# Patient Record
Sex: FEMALE | Race: WHITE | NOT HISPANIC OR LATINO | Employment: OTHER | URBAN - NONMETROPOLITAN AREA
[De-identification: names, ages, dates, MRNs, and addresses within clinical notes are randomized per-mention and may not be internally consistent; named-entity substitution may affect disease eponyms.]

---

## 2017-01-23 ENCOUNTER — OFFICE VISIT (OUTPATIENT)
Dept: FAMILY MEDICINE | Facility: OTHER | Age: 72
End: 2017-01-23
Payer: COMMERCIAL

## 2017-01-23 VITALS
OXYGEN SATURATION: 94 % | DIASTOLIC BLOOD PRESSURE: 70 MMHG | WEIGHT: 162 LBS | RESPIRATION RATE: 20 BRPM | HEART RATE: 73 BPM | TEMPERATURE: 97.3 F | SYSTOLIC BLOOD PRESSURE: 112 MMHG | BODY MASS INDEX: 28.7 KG/M2 | HEIGHT: 63 IN

## 2017-01-23 DIAGNOSIS — E78.5 HYPERLIPIDEMIA WITH TARGET LDL LESS THAN 100: ICD-10-CM

## 2017-01-23 DIAGNOSIS — I10 HYPERTENSION, GOAL BELOW 140/90: ICD-10-CM

## 2017-01-23 DIAGNOSIS — Z12.11 SCREENING FOR COLON CANCER: ICD-10-CM

## 2017-01-23 DIAGNOSIS — E11.69 TYPE 2 DIABETES MELLITUS WITH OTHER SPECIFIED COMPLICATION, WITHOUT LONG-TERM CURRENT USE OF INSULIN (H): ICD-10-CM

## 2017-01-23 DIAGNOSIS — I10 BENIGN ESSENTIAL HYPERTENSION: ICD-10-CM

## 2017-01-23 DIAGNOSIS — G60.9 HEREDITARY AND IDIOPATHIC PERIPHERAL NEUROPATHY: Primary | ICD-10-CM

## 2017-01-23 DIAGNOSIS — N18.2 TYPE 2 DIABETES MELLITUS WITH STAGE 2 CHRONIC KIDNEY DISEASE, WITHOUT LONG-TERM CURRENT USE OF INSULIN (H): ICD-10-CM

## 2017-01-23 DIAGNOSIS — Z12.31 ENCOUNTER FOR MAMMOGRAM TO ESTABLISH BASELINE MAMMOGRAM: ICD-10-CM

## 2017-01-23 DIAGNOSIS — E11.22 TYPE 2 DIABETES MELLITUS WITH STAGE 2 CHRONIC KIDNEY DISEASE, WITHOUT LONG-TERM CURRENT USE OF INSULIN (H): ICD-10-CM

## 2017-01-23 DIAGNOSIS — R29.898 RIGHT HAND WEAKNESS: ICD-10-CM

## 2017-01-23 DIAGNOSIS — M65.351 TRIGGER LITTLE FINGER OF RIGHT HAND: ICD-10-CM

## 2017-01-23 LAB — HBA1C MFR BLD: 6 % (ref 4.3–6)

## 2017-01-23 PROCEDURE — 99214 OFFICE O/P EST MOD 30 MIN: CPT | Performed by: NURSE PRACTITIONER

## 2017-01-23 PROCEDURE — 36415 COLL VENOUS BLD VENIPUNCTURE: CPT | Performed by: NURSE PRACTITIONER

## 2017-01-23 PROCEDURE — 82274 ASSAY TEST FOR BLOOD FECAL: CPT | Performed by: NURSE PRACTITIONER

## 2017-01-23 PROCEDURE — 83036 HEMOGLOBIN GLYCOSYLATED A1C: CPT | Performed by: NURSE PRACTITIONER

## 2017-01-23 PROCEDURE — 99207 C FOOT EXAM  NO CHARGE: CPT | Performed by: NURSE PRACTITIONER

## 2017-01-23 RX ORDER — LISINOPRIL 20 MG/1
20 TABLET ORAL DAILY
Qty: 30 TABLET | Refills: 3 | Status: SHIPPED | OUTPATIENT
Start: 2017-01-23 | End: 2017-05-26

## 2017-01-23 RX ORDER — GABAPENTIN 300 MG/1
600 CAPSULE ORAL 2 TIMES DAILY
Qty: 120 CAPSULE | Refills: 3 | Status: SHIPPED | OUTPATIENT
Start: 2017-01-23 | End: 2017-05-26

## 2017-01-23 RX ORDER — SIMVASTATIN 5 MG
TABLET ORAL
Qty: 30 TABLET | Refills: 3 | Status: SHIPPED | OUTPATIENT
Start: 2017-01-23 | End: 2017-05-26

## 2017-01-23 NOTE — MR AVS SNAPSHOT
After Visit Summary   1/23/2017    Tracy Mcclelland    MRN: [id number]           Patient Information     Date Of Birth          1945        Visit Information        Provider Department      1/23/2017 9:40 AM Skylar Gomez APRN [facility]        Today's Diagnoses     Hereditary and idiopathic peripheral neuropathy    -  1     Hypertension, goal below 140/90         Benign essential hypertension         Hyperlipidemia with target LDL less than 100         Type 2 diabetes mellitus with stage 2 chronic kidney disease, without long-term current use of insulin (H)         Type 2 diabetes mellitus with other specified complication, without long-term current use of insulin (H)         Right hand weakness         Trigger little finger of right hand         Screening for breast cancer         Screening for colon cancer         Encounter for mammogram to establish baseline mammogram           Care Instructions    Increase your Gabapentin to 600 mg twice day.      Stop smoking.  Let us know if we can help with this.  There are multiple things we can do to assist you.     Stop the Glipizide and the Metoprolol.      Have your blood pressure rechecked in 2 weeks.     See the orthopedic doctor in [place] about your right hand.     Drop off the paperwork for the disability parking pass     Labs will be done today.  I will call or send a letter with results.   You will need another A1C in 3 months.     Send back the fit test.     Schedule a mammogram.               Follow-ups after your visit        Additional Services     ORTHOPEDICS ADULT REFERRAL       Your provider has referred you to: PHILLIP: [facility] [place] [facility] [phone]   [email or web address]    Please be aware that coverage of these services is subject to the terms and limitations of your health insurance plan.  Call member services at your health plan with any benefit or coverage  "questions.      Please bring the following to your appointment:    >>   Any x-rays, CTs or MRIs which have been performed.  Contact the facility where they were done to arrange for  prior to your scheduled appointment.    >>   List of current medications   >>   This referral request   >>   Any documents/labs given to you for this referral                  Future tests that were ordered for you today     Open Future Orders        Priority Expected Expires Ordered    *MA Screening Digital Bilateral Routine  1/23/2018 1/23/2017    Fecal colorectal cancer screen (FIT) Routine 2/13/2017 4/17/2017 1/23/2017            Who to contact     If you have questions or need follow up information about today's clinic visit or your schedule please contact [facility] directly at [phone].  Normal or non-critical lab and imaging results will be communicated to you by MyChart, letter or phone within 4 business days after the clinic has received the results. If you do not hear from us within 7 days, please contact the clinic through [employer] or phone. If you have a critical or abnormal lab result, we will notify you by phone as soon as possible.  Submit refill requests through [employer] or call your pharmacy and they will forward the refill request to us. Please allow 3 business days for your refill to be completed.          Additional Information About Your Visit        [employer] Information     [employer] lets you send messages to your doctor, view your test results, renew your prescriptions, schedule appointments and more. To sign up, go to [email or web address] . Click on \"Log in\" on the left side of the screen, which will take you to the Welcome page. Then click on \"Sign up Now\" on the right side of the page.     You will be asked to enter the access code listed below, as well as some personal information. Please follow the directions to create your username and password.     Your access code is: " "[email or web address]  Expires: 2017 10:28 AM     Your access code will  in 90 days. If you need help or a new code, please call your [facility] clinic or [phone].        Care EveryWhere ID     This is your Care EveryWhere ID. This could be used by other organizations to access your [facility] medical records  [id number]        Your Vitals Were     Pulse Temperature Respirations    73 97.3  F (36.3  C) (Tympanic) 20    Height BMI (Body Mass Index) Pulse Oximetry    5' 3.2\" (1.605 m) 28.53 kg/m2 94%    Breastfeeding?          No         Blood Pressure from Last 3 Encounters:   17 112/70   08/15/16 124/68   16 120/70    Weight from Last 3 Encounters:   17 162 lb (73.483 kg)   16 156 lb (70.761 kg)   08/03/15 163 lb 11.2 oz (74.254 kg)              We Performed the Following     FOOT EXAM     Hemoglobin A1c     ORTHOPEDICS ADULT REFERRAL          Today's Medication Changes          These changes are accurate as of: 17 10:28 AM.  If you have any questions, ask your nurse or doctor.               These medicines have changed or have updated prescriptions.        Dose/Directions    gabapentin 300 MG capsule   Commonly known as:  NEURONTIN   This may have changed:  See the new instructions.   Used for:  Hereditary and idiopathic peripheral neuropathy   Changed by:  Skylar Gomez APRN CNP        Dose:  600 mg   Take 2 capsules (600 mg) by mouth 2 times daily   Quantity:  120 capsule   Refills:  3         Stop taking these medicines if you haven't already. Please contact your care team if you have questions.     glipiZIDE 5 MG tablet   Commonly known as:  GLUCOTROL   Stopped by:  Skylar Gomez APRN CNP           metoprolol 25 MG tablet   Commonly known as:  LOPRESSOR   Stopped by:  Skylar Gomez APRN CNP                Where to get your medicines      These medications were sent to [facility] [place], [place] - [address]  [address] , [facility] [address]     Phone:  " [phone]    - gabapentin 300 MG capsule  - lisinopril 20 MG tablet  - metFORMIN 500 MG tablet  - simvastatin 5 MG tablet             Primary Care Provider Office Phone # Fax #    MACHO Cifuentes [phone]       [facility] [address] [facility] [address]        Thank you!     Thank you for choosing [facility]  for your care. Our goal is always to provide you with excellent care. Hearing back from our patients is one way we can continue to improve our services. Please take a few minutes to complete the written survey that you may receive in the mail after your visit with us. Thank you!             Your Updated Medication List - Protect others around you: Learn how to safely use, store and throw away your medicines at www.disposemymeds.org.          This list is accurate as of: 1/23/17 10:28 AM.  Always use your most recent med list.                   Brand Name Dispense Instructions for use    aspirin 81 MG EC tablet     90 tablet    Take 1 tablet by mouth daily.       gabapentin 300 MG capsule    NEURONTIN    120 capsule    Take 2 capsules (600 mg) by mouth 2 times daily       lisinopril 20 MG tablet    PRINIVIL/ZESTRIL    30 tablet    Take 1 tablet (20 mg) by mouth daily       metFORMIN 500 MG tablet    GLUCOPHAGE    60 tablet    Take 1 tablet (500 mg) by mouth 2 times daily (with meals)       simvastatin 5 MG tablet    Zocor    30 tablet    TAKE ONE TABLET BY MOUTH EVERY NIGHT AT BEDTIME

## 2017-01-23 NOTE — PATIENT INSTRUCTIONS
Increase your Gabapentin to 600 mg twice day.      Stop smoking.  Let us know if we can help with this.  There are multiple things we can do to assist you.     Stop the Glipizide and the Metoprolol.      Have your blood pressure rechecked in 2 weeks.     See the orthopedic doctor in [place] about your right hand.     Drop off the paperwork for the disability parking pass     Labs will be done today.  I will call or send a letter with results.   You will need another A1C in 3 months.     Send back the fit test.     Schedule a mammogram.

## 2017-01-23 NOTE — NURSING NOTE
"Chief Complaint   Patient presents with     RECHECK     medication     Forms     wants handicap sticker       Initial /70 mmHg  Pulse 73  Temp(Src) 97.3  F (36.3  C) (Tympanic)  Resp 20  Ht 5' 3.2\" (1.605 m)  Wt 162 lb (73.483 kg)  BMI 28.53 kg/m2  SpO2 94%  Breastfeeding? No Estimated body mass index is 28.53 kg/(m^2) as calculated from the following:    Height as of this encounter: 5' 3.2\" (1.605 m).    Weight as of this encounter: 162 lb (73.483 kg).  BP completed using cuff size: large  ................Carrillo Bauer LPN,   January 23, 2017,      9:55 AM,   [facility]      "

## 2017-01-23 NOTE — PROGRESS NOTES
SUBJECTIVE:                                                    Tracy Mcclelland is a 71 year old female who presents to clinic today for the following health issues:        Diabetes Follow-up      Patient is checking blood sugars: not at all    Diabetic concerns: None     Symptoms of hypoglycemia (low blood sugar): 1 dizzy spell last week     Paresthesias (numbness or burning in feet) or sores: Yes painful - worsening neuropathy symptoms     Date of last diabetic eye exam: 2011     Hyperlipidemia Follow-Up      Rate your low fat/cholesterol diet?: good    Taking statin?  Yes, no muscle aches from statin    Other lipid medications/supplements?:  none     Hypertension Follow-up      Outpatient blood pressures are not being checked.    Low Salt Diet: no added salt       Chronic Kidney Disease Follow-up      Current NSAID use?  Yes:   Aleve  Frequency: twice daily      Amount of exercise or physical activity: None    Problems taking medications regularly: No    Medication side effects: none    Diet: regular (no restrictions)    Musculoskeletal problem/pain      Duration: Right hand pain, numbness, finger stiffness, weakness     Description  Location: right hand    Intensity:  moderate    Accompanying signs and symptoms: numbness, tingling and weakness     History  Previous similar problem: no   Previous evaluation:  none    Precipitating or alleviating factors:  Trauma or overuse: no   Aggravating factors include: right handed     Therapies tried and outcome: nothing       -WANTING HANDICAP STICKER    Problem list and histories reviewed & adjusted, as indicated.  Additional history: none    Patient Active Problem List   Diagnosis     Hyperlipidemia with target LDL less than 100     Microalbuminuria     Tennis Elbow-left     Tobacco use disorder     Type 2 diabetes mellitus with diabetic chronic kidney disease (H)     Hypertension, goal below 140/90     Granuloma annulare     Hereditary and idiopathic peripheral  neuropathy     CKD (chronic kidney disease) stage 2, GFR 60-89 ml/min     Past Surgical History   Procedure Laterality Date     Hc laparoscopy, surgical; cholecystectomy  2000     Cholecystectomy, Laparoscopic     Hc remove tonsils/adenoids,<12 y/o       T & A <11y.o.     C ligate fallopian tube         Social History   Substance Use Topics     Smoking status: Current Every Day Smoker -- 0.25 packs/day for 20 years     Types: Cigarettes     Last Attempt to Quit: 07/10/2012     Smokeless tobacco: Never Used      Comment: 2-3 cigs daily     Alcohol Use: Yes      Comment: couple times monthly     Family History   Problem Relation Age of Onset     HEART DISEASE Mother      Pacemaker     Alzheimer Disease Father      Dementia/Loss of memory     CEREBROVASCULAR DISEASE Maternal Grandmother      HEART DISEASE Maternal Grandfather      MI     DIABETES Paternal Grandmother          Current Outpatient Prescriptions   Medication Sig Dispense Refill     gabapentin (NEURONTIN) 300 MG capsule Take 2 capsules (600 mg) by mouth 2 times daily 120 capsule 3     lisinopril (PRINIVIL/ZESTRIL) 20 MG tablet Take 1 tablet (20 mg) by mouth daily 30 tablet 3     simvastatin (ZOCOR) 5 MG tablet TAKE ONE TABLET BY MOUTH EVERY NIGHT AT BEDTIME 30 tablet 3     metFORMIN (GLUCOPHAGE) 500 MG tablet Take 1 tablet (500 mg) by mouth 2 times daily (with meals) 60 tablet 2     aspirin 81 MG EC tablet Take 1 tablet by mouth daily. 90 tablet 3     [DISCONTINUED] gabapentin (NEURONTIN) 300 MG capsule TAKE TWO CAPSULES BY MOUTH AT BEDTIME 60 capsule 3     [DISCONTINUED] lisinopril (PRINIVIL,ZESTRIL) 20 MG tablet Take 1 tablet (20 mg) by mouth daily 90 tablet 1     [DISCONTINUED] metFORMIN (GLUCOPHAGE) 500 MG tablet Take 1 tablet (500 mg) by mouth 2 times daily (with meals) 180 tablet 1     [DISCONTINUED] simvastatin (ZOCOR) 5 MG tablet TAKE ONE TABLET BY MOUTH EVERY NIGHT AT BEDTIME 90 tablet 1     Allergies   Allergen Reactions     Mold      sneezing,  "coughing , runny nose, watery eyes & red,     No Known Drug Allergies      BP Readings from Last 3 Encounters:   01/23/17 112/70   08/15/16 124/68   08/01/16 120/70    Wt Readings from Last 3 Encounters:   01/23/17 162 lb (73.483 kg)   08/01/16 156 lb (70.761 kg)   08/03/15 163 lb 11.2 oz (74.254 kg)                    ROS:  C: NEGATIVE for fever, chills, change in weight  E/M: NEGATIVE for ear, mouth and throat problems  R: NEGATIVE for significant cough or SOB  CV: NEGATIVE for chest pain, palpitations or peripheral edema  MUSCULOSKELETAL: bilateral LE neuropathy, right hand pain, weakness as above     OBJECTIVE:                                                    /70 mmHg  Pulse 73  Temp(Src) 97.3  F (36.3  C) (Tympanic)  Resp 20  Ht 5' 3.2\" (1.605 m)  Wt 162 lb (73.483 kg)  BMI 28.53 kg/m2  SpO2 94%  Breastfeeding? No  Body mass index is 28.53 kg/(m^2).  GENERAL: healthy, alert and no distress  NECK: no adenopathy, no asymmetry, masses, or scars and thyroid normal to palpation  RESP: lungs clear to auscultation - no rales, rhonchi or wheezes  CV: regular rate and rhythm, normal S1 S2, no S3 or S4, no murmur, click or rub, no peripheral edema and peripheral pulses strong  ABDOMEN: soft, nontender, no hepatosplenomegaly, no masses and bowel sounds normal  MS: right hand has what appears to be a trigger finger of the 5th digit.  She has pain to palpation of the 4th and 5th digits.  Negative Phalens and Tinels   Diabetic foot exam: normal DP and PT pulses, no trophic changes or ulcerative lesions, normal sensory exam and dry cracking heels    Diagnostic Test Results:  Office Visit on 01/23/2017   Component Date Value Ref Range Status     Hemoglobin A1C 01/23/2017 6.0  4.3 - 6.0 % Final          ASSESSMENT/PLAN:                                                    Diabetes Type II, A1c Controlled, non-insulin dependent   Associated with the following complications    Renal Complications:  " CKD    Ophthalmologic Complications: None    Neurologic Complications: Polyneuropathy/Neuralgia/Neuritis/Neuropathy     Peripheral Vascular Complications:  None    Other: None   Plan:  The following changes are made - she would like to be on less medications.  Will try discontinuing her Glipizide.  Her A1C has been well controlled.  Will need repeat A1C in 3 months, consider increasing Metformin at that time if needed.  Is going to increase her Gabapentin to twice a day as her neuropathy is worsening.         - gabapentin (NEURONTIN) 300 MG capsule; Take 2 capsules (600 mg) by mouth 2 times daily  Dispense: 120 capsule; Refill: 3  - FOOT EXAM  - Hemoglobin A1c  - metFORMIN (GLUCOPHAGE) 500 MG tablet; Take 1 tablet (500 mg) by mouth 2 times daily (with meals)  Dispense: 60 tablet; Refill: 2  - Hemoglobin A1c; Future    Hyperlipidemia; controlled   Plan:  No changes in the patient's current treatment plan  - simvastatin (ZOCOR) 5 MG tablet; TAKE ONE TABLET BY MOUTH EVERY NIGHT AT BEDTIME  Dispense: 30 tablet; Refill: 3    Hypertension; controlled   Associated with the following complications:    CKD and Diabetes   Plan:  The following changes are made - She would like to be on less medications.  Will try discontinuing the Metoprolol.  Repeat BP check in 2 weeks with nurse.  She may need a dose increase on this.   - lisinopril (PRINIVIL/ZESTRIL) 20 MG tablet; Take 1 tablet (20 mg) by mouth daily  Dispense: 30 tablet; Refill: 3      Chronic Kidney Disease Stage 2 , stable     Associated with the following complications: None     Plan:  Current treatment plan is appropriate, no change in therapy  Right hand weakness  - ORTHOPEDICS ADULT REFERRAL    Trigger little finger of right hand  - ORTHOPEDICS ADULT REFERRAL    Screening for colon cancer  - Fecal colorectal cancer screen (FIT); Future    FUTURE APPOINTMENTS:       - Follow-up visit in 3 months for recheck.  Needs BP recheck in 2 weeks with nurse visit.   See Patient  Instructions    MACHO Cifuentes CNP  [facility]

## 2017-01-24 ENCOUNTER — OFFICE VISIT (OUTPATIENT)
Dept: ORTHOPEDICS | Facility: CLINIC | Age: 72
End: 2017-01-24
Payer: COMMERCIAL

## 2017-01-24 VITALS — BODY MASS INDEX: 28.7 KG/M2 | HEIGHT: 63 IN | TEMPERATURE: 98.4 F | WEIGHT: 162 LBS

## 2017-01-24 DIAGNOSIS — G56.21 ULNAR NERVE COMPRESSION, RIGHT: Primary | ICD-10-CM

## 2017-01-24 PROCEDURE — 99203 OFFICE O/P NEW LOW 30 MIN: CPT | Performed by: ORTHOPAEDIC SURGERY

## 2017-01-24 ASSESSMENT — PAIN SCALES - GENERAL: PAINLEVEL: NO PAIN (0)

## 2017-01-24 NOTE — PROGRESS NOTES
ORTHOPEDIC CLINIC CONSULT      Tracy Mcclelland is a 71 year old female who is seen in consultation at the request of Skylar Gomez.    History of Present illness:    Tracy presents for evaluation of:     1.) Numbness in Right hand and fingers  2.) Right Pinky and ring finger will not straighten out    Onset:  2-3 months ago      Symptoms brought on by Had been knitting and crossword puzzles a lot before it started.     Location:  Right hand.      Character:  numbness.  Palmar region below pinky finger.    Progression of symptoms:  worse.      Previous similar pain: no .     Pain Level:  0/10.     Previous treatments:  massage.    Currently on Blood thinners? Yes, Asa 81 mg    Diagnosis of Diabetes? Yes      Past Medical History   Diagnosis Date     Unspecified essential hypertension      Lumbago      Headache(784.0)      hx.of Migraines     Type II or unspecified type diabetes mellitus without mention of complication, not stated as uncontrolled      CKD (chronic kidney disease) stage 2, GFR 60-89 ml/min 10/19/2015       Past Surgical History   Procedure Laterality Date     Hc laparoscopy, surgical; cholecystectomy  2000     Cholecystectomy, Laparoscopic     Hc remove tonsils/adenoids,<12 y/o       T & A <11y.o.     C ligate fallopian tube         Home Medications:  Prior to Admission medications    Medication Sig Start Date End Date Taking? Authorizing Provider   gabapentin (NEURONTIN) 300 MG capsule Take 2 capsules (600 mg) by mouth 2 times daily 1/23/17  Yes Skylar Gomez APRN CNP   lisinopril (PRINIVIL/ZESTRIL) 20 MG tablet Take 1 tablet (20 mg) by mouth daily 1/23/17  Yes Skylar Gomez APRN CNP   simvastatin (ZOCOR) 5 MG tablet TAKE ONE TABLET BY MOUTH EVERY NIGHT AT BEDTIME 1/23/17  Yes Skylar Gomez APRN CNP   metFORMIN (GLUCOPHAGE) 500 MG tablet Take 1 tablet (500 mg) by mouth 2 times daily (with meals) 1/23/17  Yes Skylar Gomez APRN CNP   aspirin 81 MG EC tablet Take 1 tablet by mouth daily.  "7/17/12  Yes Tory Gómez MD       Allergies   Allergen Reactions     Mold      sneezing, coughing , runny nose, watery eyes & red,     No Known Drug Allergies        Social History     Occupational History      [employer]     Social History Main Topics     Smoking status: Current Every Day Smoker -- 0.25 packs/day for 20 years     Types: Cigarettes     Last Attempt to Quit: 07/10/2012     Smokeless tobacco: Never Used      Comment: 2-3 cigs daily     Alcohol Use: Yes      Comment: couple times monthly     Drug Use: No     Sexual Activity: No       Family History   Problem Relation Age of Onset     HEART DISEASE Mother      Pacemaker     Alzheimer Disease Father      Dementia/Loss of memory     CEREBROVASCULAR DISEASE Maternal Grandmother      HEART DISEASE Maternal Grandfather      MI     DIABETES Paternal Grandmother        REVIEW OF SYSTEMS  General: negative for fever or fatigue  Psych:  No anxiety or depression   Resp: No shortness of breath and no cough  Ophthalmic:  Corrective lenses?  yes  ENT:  Hearing difficulty? no  Endocrine:  positive diabetes   Resp:  Normal respiratory effort  Skin:  Negative for cuts or redness  Musculoskeletal: as above  Neurologic: positive for numbness/tingling  Hematologic: negative for bleeding disorder, no use of anticoagulants      Physical Exam:  Vitals: Temp(Src) 98.4  F (36.9  C) (Temporal)  Ht 1.605 m (5' 3.2\")  Wt 73.483 kg (162 lb)  BMI 28.53 kg/m2  BMI= Body mass index is 28.53 kg/(m^2).  Constitutional: healthy, alert and no acute distress   Psychiatric: mentation appears normal and affect normal/bright  NEURO: no focal deficits  SKIN: no excoriation or erythema. No signs of infection.  JOINT/EXTREMITIES: rightHand/Finger Exam: Inspection:  There is no specific swelling of the ring and pinky fingers of the right hand. She does have prominent PIPs.  Tender: nontender to direct palpation of the digits  Range of Motion patient is unable " to completely extend her ring and pinky fingers. She can pull all fingers into a fist. There is no locking or catching identified.  Strength: patient is asked to spread fingers wide on both hands. Her right hand she has difficulty just performing this function. With resistance applied she is very weak.  Patient with atrophy between the thumb and index finger when asked to place the thumb over the index.    Patient with a positive Tinel's at the elbow. Patient describes a tingling sensation down into her ring and pinky fingers.  Tinel's at the carpal tunnel with no additional symptoms.  Tinel's at Guyon's canal with no additional tingling.    Impression:      ICD-10-CM    1. Ulnar nerve compression, right G56.21 EMG     Patient's ring and pinky fingers appear unable to open completely in extension primarily due to small muscle wasting.  Patient is describing symptoms most consistent with ulnar nerve compression.Suspect this is likely occurring at the elbow since Tinel's is positive. However patient with a persistent numbness at the fat pad of the ulnar side of her hand which may have Guyon's canal contribution.    Plan:  Plan is to obtain EMG to further define source of the ulnar nerve compression.  Patient can utilize nighttime splinting techniques which is described to her while she awaits EMG testing.  Patient is informed that if she does have ulnar nerve compression at the elbow this would likely need surgery.  Depending on the extent of the nerve damage would depend on how much function would return.  Patient follow-up 2 weeks after EMG is accomplished for review all of her current symptoms, reexamination and referenced to the EMG.    Patient is evaluated with and plan developed in conjunction with Dr. Nieto    Scribed by  Mar Goddard PA-C   1/24/2017  1:21 PM        I attest I have seen and evaluated the patient.  I agree with above impression and plan.    Romero Nieto MD

## 2017-01-24 NOTE — MR AVS SNAPSHOT
After Visit Summary   1/24/2017    Tracy Mcclelladn    MRN: [id number]           Patient Information     Date Of Birth          1945        Visit Information        Provider Department      1/24/2017 1:20 PM Romero Nieto MD [facility]         Follow-ups after your visit        Your next 10 appointments already scheduled     Jan 24, 2017  1:20 PM   New Visit with Romero Nieto MD   [facility] ([facility])    [address] [facility] [id number] [phone]            Jan 27, 2017  9:00 AM   MA SCREENING DIGITAL BILATERAL with MCMA1   [facility] ([facility])    [address] [facility] [id number] [phone]           Do not use any powder, lotion or deodorant under your arms or on your breast. If you do, we will ask you to remove it before your exam.  Wear comfortable, two-piece clothing.  If you have any allergies, tell your care team.  Bring any previous mammograms from other facilities or have them mailed to the breast center.              Future tests that were ordered for you today     Open Future Orders        Priority Expected Expires Ordered    Hemoglobin A1c Routine 4/23/2017 4/23/2017 1/23/2017    *MA Screening Digital Bilateral Routine  1/23/2018 1/23/2017    Fecal colorectal cancer screen (FIT) Routine 2/13/2017 4/17/2017 1/23/2017            Who to contact     If you have questions or need follow up information about today's clinic visit or your schedule please contact [facility] directly at [phone].  Normal or non-critical lab and imaging results will be communicated to you by MyChart, letter or phone within 4 business days after the clinic has received the results. If you do not hear from us within 7 days, please contact the clinic through MyChart or phone. If you have a critical or abnormal lab result, we will notify you by phone as soon as possible.  Submit  "refill requests through [employer] or call your pharmacy and they will forward the refill request to us. Please allow 3 business days for your refill to be completed.          Additional Information About Your Visit        [facility] Information     [employer] lets you send messages to your doctor, view your test results, renew your prescriptions, schedule appointments and more. To sign up, go to [email or web address] . Click on \"Log in\" on the left side of the screen, which will take you to the Welcome page. Then click on \"Sign up Now\" on the right side of the page.     You will be asked to enter the access code listed below, as well as some personal information. Please follow the directions to create your username and password.     Your access code is: [email or web address]  Expires: 2017 10:28 AM     Your access code will  in 90 days. If you need help or a new code, please call your [facility] clinic or [phone].        Care EveryWhere ID     This is your Care EveryWhere ID. This could be used by other organizations to access your [facility] medical records  [id number]        Your Vitals Were     Temperature Height BMI (Body Mass Index)             98.4  F (36.9  C) (Temporal) 1.605 m (5' 3.2\") 28.53 kg/m2          Blood Pressure from Last 3 Encounters:   17 112/70   08/15/16 124/68   16 120/70    Weight from Last 3 Encounters:   17 73.483 kg (162 lb)   17 73.483 kg (162 lb)   16 70.761 kg (156 lb)              Today, you had the following     No orders found for display       Primary Care Provider Office Phone # Fax #    MACHO Cifuentes [phone]       [facility] [address] [facility] [address]        Thank you!     Thank you for choosing [facility]  for your care. Our goal is always to provide you with excellent care. Hearing back from our patients is one way we can continue to improve our services. Please take a few minutes to " complete the written survey that you may receive in the mail after your visit with us. Thank you!             Your Updated Medication List - Protect others around you: Learn how to safely use, store and throw away your medicines at www.disposemymeds.org.          This list is accurate as of: 1/24/17 12:40 PM.  Always use your most recent med list.                   Brand Name Dispense Instructions for use    aspirin 81 MG EC tablet     90 tablet    Take 1 tablet by mouth daily.       gabapentin 300 MG capsule    NEURONTIN    120 capsule    Take 2 capsules (600 mg) by mouth 2 times daily       lisinopril 20 MG tablet    PRINIVIL/ZESTRIL    30 tablet    Take 1 tablet (20 mg) by mouth daily       metFORMIN 500 MG tablet    GLUCOPHAGE    60 tablet    Take 1 tablet (500 mg) by mouth 2 times daily (with meals)       simvastatin 5 MG tablet    Zocor    30 tablet    TAKE ONE TABLET BY MOUTH EVERY NIGHT AT BEDTIME

## 2017-01-24 NOTE — NURSING NOTE
"Chief Complaint   Patient presents with     Consult     Right trigger finger.       Initial Temp(Src) 98.4  F (36.9  C) (Temporal)  Ht 1.605 m (5' 3.2\")  Wt 73.483 kg (162 lb)  BMI 28.53 kg/m2 Estimated body mass index is 28.53 kg/(m^2) as calculated from the following:    Height as of this encounter: 1.605 m (5' 3.2\").    Weight as of this encounter: 73.483 kg (162 lb).  BP completed using cuff size: NA (Not Taken)  SKYE Quevedo  "

## 2017-01-26 DIAGNOSIS — Z12.11 SCREENING FOR COLON CANCER: ICD-10-CM

## 2017-01-26 LAB — HEMOCCULT STL QL IA: NEGATIVE

## 2017-01-27 ENCOUNTER — RADIANT APPOINTMENT (OUTPATIENT)
Dept: MAMMOGRAPHY | Facility: OTHER | Age: 72
End: 2017-01-27
Attending: NURSE PRACTITIONER
Payer: COMMERCIAL

## 2017-01-27 DIAGNOSIS — Z12.31 ENCOUNTER FOR SCREENING MAMMOGRAM FOR BREAST CANCER: ICD-10-CM

## 2017-01-27 PROCEDURE — G0202 SCR MAMMO BI INCL CAD: HCPCS | Mod: TC

## 2017-04-24 ENCOUNTER — TRANSFERRED RECORDS (OUTPATIENT)
Dept: HEALTH INFORMATION MANAGEMENT | Facility: CLINIC | Age: 72
End: 2017-04-24

## 2017-05-11 DIAGNOSIS — E11.22 TYPE 2 DIABETES MELLITUS WITH STAGE 2 CHRONIC KIDNEY DISEASE, WITHOUT LONG-TERM CURRENT USE OF INSULIN (H): ICD-10-CM

## 2017-05-11 DIAGNOSIS — N18.2 TYPE 2 DIABETES MELLITUS WITH STAGE 2 CHRONIC KIDNEY DISEASE, WITHOUT LONG-TERM CURRENT USE OF INSULIN (H): ICD-10-CM

## 2017-05-11 NOTE — TELEPHONE ENCOUNTER
Routing refill request to provider for review/approval because:  Labs out of range:  Microalbumin    Kala Burk, RN  [facility]

## 2017-05-11 NOTE — TELEPHONE ENCOUNTER
metFORMIN (GLUCOPHAGE) 500 MG tablet         Last Written Prescription Date: 1/23/17  Last Fill Quantity: 60, # refills: 2  Last Office Visit with G, [facility] or [facility] prescribing provider:  1/23/17        BP Readings from Last 3 Encounters:   01/23/17 112/70   08/15/16 124/68   08/01/16 120/70     Lab Results   Component Value Date    MICROL 11 08/02/2016     Lab Results   Component Value Date    UMALCR 31.45 08/02/2016     Creatinine   Date Value Ref Range Status   08/01/2016 0.65 0.52 - 1.04 mg/dL Final   ]  GFR Estimate   Date Value Ref Range Status   08/01/2016 90 >60 mL/min/1.7m2 Final     Comment:     Non  GFR Calc   08/03/2015 69 >60 mL/min/1.7m2 Final     Comment:     Non  GFR Calc   07/29/2014 78 >60 mL/min/1.7m2 Final     GFR Estimate If Black   Date Value Ref Range Status   08/01/2016 >90   GFR Calc   >60 mL/min/1.7m2 Final   08/03/2015 84 >60 mL/min/1.7m2 Final     Comment:      GFR Calc   07/29/2014 >90 >60 mL/min/1.7m2 Final     Lab Results   Component Value Date    CHOL 152 08/01/2016     Lab Results   Component Value Date    HDL 50 08/01/2016     Lab Results   Component Value Date    LDL 83 08/01/2016     Lab Results   Component Value Date    TRIG 94 08/01/2016     Lab Results   Component Value Date    CHOLHDLRATIO 4.0 07/29/2014     Lab Results   Component Value Date    AST 10 08/03/2015     Lab Results   Component Value Date    ALT 19 08/03/2015     Lab Results   Component Value Date    A1C 6.0 01/23/2017    A1C 6.2 08/01/2016    A1C 6.3 04/16/2015    A1C 6.1 09/29/2014    A1C 6.1 03/03/2014     Potassium   Date Value Ref Range Status   08/01/2016 4.6 3.4 - 5.3 mmol/L Final

## 2017-05-26 DIAGNOSIS — G60.9 HEREDITARY AND IDIOPATHIC PERIPHERAL NEUROPATHY: ICD-10-CM

## 2017-05-26 DIAGNOSIS — E78.5 HYPERLIPIDEMIA WITH TARGET LDL LESS THAN 100: ICD-10-CM

## 2017-05-26 DIAGNOSIS — I10 BENIGN ESSENTIAL HYPERTENSION: ICD-10-CM

## 2017-05-26 NOTE — TELEPHONE ENCOUNTER
Lisinopril,   Last Written Prescription Date: 1/23/17  Last Fill Quantity: 30, # refills: 3  [facility] Office Visit with [facility] or [facility] prescribing provider: 1/23/17       Potassium   Date Value Ref Range Status   08/01/2016 4.6 3.4 - 5.3 mmol/L Final     Creatinine   Date Value Ref Range Status   08/01/2016 0.65 0.52 - 1.04 mg/dL Final     BP Readings from Last 3 Encounters:   01/23/17 112/70   08/15/16 124/68   08/01/16 120/70       Simvastatin,        Last Written Prescription Date: 1/23/17  Last Fill Quantity: 120, # refills: 3  Last Office Visit with [facility] or [facility] prescribing provider: 1/23/17       Lab Results   Component Value Date    CHOL 152 08/01/2016     Lab Results   Component Value Date    HDL 50 08/01/2016     Lab Results   Component Value Date    LDL 83 08/01/2016     Lab Results   Component Value Date    TRIG 94 08/01/2016     Lab Results   Component Value Date    CHOLHDLRATIO 4.0 07/29/2014       Gabapentin        Last Written Prescription Date:  1/23/17  Last Fill Quantity: 120,   # refills: 3  [facility] or [facility] prescribing provider: 1/23/17  Future Office visit:       Routing refill request to provider for review/approval because:  Drug not on the [facility] or [facility] refill protocol or controlled substance

## 2017-05-29 RX ORDER — SIMVASTATIN 5 MG
TABLET ORAL
Qty: 30 TABLET | Refills: 3 | Status: SHIPPED | OUTPATIENT
Start: 2017-05-29 | End: 2017-06-21

## 2017-05-29 RX ORDER — GABAPENTIN 300 MG/1
CAPSULE ORAL
Qty: 120 CAPSULE | Refills: 3 | Status: SHIPPED | OUTPATIENT
Start: 2017-05-29 | End: 2017-06-21

## 2017-05-29 RX ORDER — LISINOPRIL 20 MG/1
TABLET ORAL
Qty: 30 TABLET | Refills: 3 | Status: SHIPPED | OUTPATIENT
Start: 2017-05-29 | End: 2017-06-21

## 2017-06-05 DIAGNOSIS — E11.22 TYPE 2 DIABETES MELLITUS WITH STAGE 2 CHRONIC KIDNEY DISEASE, WITHOUT LONG-TERM CURRENT USE OF INSULIN (H): ICD-10-CM

## 2017-06-05 DIAGNOSIS — N18.2 TYPE 2 DIABETES MELLITUS WITH STAGE 2 CHRONIC KIDNEY DISEASE, WITHOUT LONG-TERM CURRENT USE OF INSULIN (H): ICD-10-CM

## 2017-06-21 DIAGNOSIS — I10 BENIGN ESSENTIAL HYPERTENSION: ICD-10-CM

## 2017-06-21 DIAGNOSIS — E78.5 HYPERLIPIDEMIA WITH TARGET LDL LESS THAN 100: ICD-10-CM

## 2017-06-21 DIAGNOSIS — G60.9 HEREDITARY AND IDIOPATHIC PERIPHERAL NEUROPATHY: ICD-10-CM

## 2017-06-21 NOTE — TELEPHONE ENCOUNTER
Lisinopril, Simvastatin, Gabapentin recently filled but insurance is wanting 90 day supply instead.  Please advise if 90 day supply is acceptable.  ................Carrillo Bauer LPN,   June 21, 2017,      4:29 PM,   [facility]

## 2017-06-22 RX ORDER — LISINOPRIL 20 MG/1
20 TABLET ORAL DAILY
Qty: 90 TABLET | Refills: 0 | Status: SHIPPED | OUTPATIENT
Start: 2017-06-22 | End: 2017-07-27

## 2017-06-22 RX ORDER — GABAPENTIN 300 MG/1
CAPSULE ORAL
Qty: 360 CAPSULE | Refills: 0 | Status: SHIPPED | OUTPATIENT
Start: 2017-06-22 | End: 2017-07-27

## 2017-06-22 RX ORDER — SIMVASTATIN 5 MG
TABLET ORAL
Qty: 90 TABLET | Refills: 0 | Status: SHIPPED | OUTPATIENT
Start: 2017-06-22 | End: 2017-07-27

## 2017-07-27 ENCOUNTER — OFFICE VISIT (OUTPATIENT)
Dept: FAMILY MEDICINE | Facility: OTHER | Age: 72
End: 2017-07-27
Payer: COMMERCIAL

## 2017-07-27 VITALS
TEMPERATURE: 97.1 F | RESPIRATION RATE: 20 BRPM | DIASTOLIC BLOOD PRESSURE: 66 MMHG | OXYGEN SATURATION: 96 % | HEART RATE: 109 BPM | SYSTOLIC BLOOD PRESSURE: 124 MMHG | HEIGHT: 63 IN | WEIGHT: 162 LBS | BODY MASS INDEX: 28.7 KG/M2

## 2017-07-27 DIAGNOSIS — Z00.00 ROUTINE GENERAL MEDICAL EXAMINATION AT A HEALTH CARE FACILITY: Primary | ICD-10-CM

## 2017-07-27 DIAGNOSIS — E11.22 TYPE 2 DIABETES MELLITUS WITH STAGE 2 CHRONIC KIDNEY DISEASE, WITHOUT LONG-TERM CURRENT USE OF INSULIN (H): ICD-10-CM

## 2017-07-27 DIAGNOSIS — E78.5 HYPERLIPIDEMIA WITH TARGET LDL LESS THAN 100: ICD-10-CM

## 2017-07-27 DIAGNOSIS — I10 BENIGN ESSENTIAL HYPERTENSION: ICD-10-CM

## 2017-07-27 DIAGNOSIS — Z78.0 ASYMPTOMATIC POSTMENOPAUSAL STATUS: ICD-10-CM

## 2017-07-27 DIAGNOSIS — N18.2 TYPE 2 DIABETES MELLITUS WITH STAGE 2 CHRONIC KIDNEY DISEASE, WITHOUT LONG-TERM CURRENT USE OF INSULIN (H): ICD-10-CM

## 2017-07-27 DIAGNOSIS — G60.9 HEREDITARY AND IDIOPATHIC PERIPHERAL NEUROPATHY: ICD-10-CM

## 2017-07-27 DIAGNOSIS — N18.2 CKD (CHRONIC KIDNEY DISEASE) STAGE 2, GFR 60-89 ML/MIN: ICD-10-CM

## 2017-07-27 LAB
ANION GAP SERPL CALCULATED.3IONS-SCNC: 10 MMOL/L (ref 3–14)
BUN SERPL-MCNC: 18 MG/DL (ref 7–30)
CALCIUM SERPL-MCNC: 8.9 MG/DL (ref 8.5–10.1)
CHLORIDE SERPL-SCNC: 100 MMOL/L (ref 94–109)
CO2 SERPL-SCNC: 27 MMOL/L (ref 20–32)
CREAT SERPL-MCNC: 0.65 MG/DL (ref 0.52–1.04)
GFR SERPL CREATININE-BSD FRML MDRD: 89 ML/MIN/1.7M2
GLUCOSE SERPL-MCNC: 140 MG/DL (ref 70–99)
HBA1C MFR BLD: 6.7 % (ref 4.3–6)
LDLC SERPL DIRECT ASSAY-MCNC: 91 MG/DL
POTASSIUM SERPL-SCNC: 4.3 MMOL/L (ref 3.4–5.3)
SODIUM SERPL-SCNC: 137 MMOL/L (ref 133–144)
TSH SERPL DL<=0.005 MIU/L-ACNC: 2.62 MU/L (ref 0.4–4)

## 2017-07-27 PROCEDURE — 80048 BASIC METABOLIC PNL TOTAL CA: CPT | Performed by: NURSE PRACTITIONER

## 2017-07-27 PROCEDURE — 83721 ASSAY OF BLOOD LIPOPROTEIN: CPT | Performed by: NURSE PRACTITIONER

## 2017-07-27 PROCEDURE — 84443 ASSAY THYROID STIM HORMONE: CPT | Performed by: NURSE PRACTITIONER

## 2017-07-27 PROCEDURE — 99397 PER PM REEVAL EST PAT 65+ YR: CPT | Performed by: NURSE PRACTITIONER

## 2017-07-27 PROCEDURE — 82043 UR ALBUMIN QUANTITATIVE: CPT | Performed by: NURSE PRACTITIONER

## 2017-07-27 PROCEDURE — 36415 COLL VENOUS BLD VENIPUNCTURE: CPT | Performed by: NURSE PRACTITIONER

## 2017-07-27 PROCEDURE — 83036 HEMOGLOBIN GLYCOSYLATED A1C: CPT | Performed by: NURSE PRACTITIONER

## 2017-07-27 RX ORDER — SIMVASTATIN 5 MG
TABLET ORAL
Qty: 90 TABLET | Refills: 0 | Status: SHIPPED | OUTPATIENT
Start: 2017-07-27 | End: 2017-11-08

## 2017-07-27 RX ORDER — GABAPENTIN 300 MG/1
600 CAPSULE ORAL 3 TIMES DAILY
Qty: 180 CAPSULE | Refills: 3 | Status: SHIPPED | OUTPATIENT
Start: 2017-07-27 | End: 2018-01-04

## 2017-07-27 RX ORDER — LISINOPRIL 20 MG/1
20 TABLET ORAL DAILY
Qty: 90 TABLET | Refills: 0 | Status: SHIPPED | OUTPATIENT
Start: 2017-07-27 | End: 2017-11-08

## 2017-07-27 ASSESSMENT — PAIN SCALES - GENERAL: PAINLEVEL: NO PAIN (0)

## 2017-07-27 NOTE — NURSING NOTE
"Chief Complaint   Patient presents with     Physical     Refill Request       Initial /66  Pulse 109  Temp 97.1  F (36.2  C) (Tympanic)  Resp 20  Ht 5' 3.2\" (1.605 m)  Wt 162 lb (73.5 kg)  SpO2 96%  Breastfeeding? No  BMI 28.52 kg/m2 Estimated body mass index is 28.52 kg/(m^2) as calculated from the following:    Height as of this encounter: 5' 3.2\" (1.605 m).    Weight as of this encounter: 162 lb (73.5 kg).  Medication Reconciliation: complete   ................Carrillo Bauer LPN,   July 27, 2017,      9:25 AM,   [facility]     "

## 2017-07-27 NOTE — PROGRESS NOTES
SUBJECTIVE:   Tracy Mcclelland is a 72 year old female who presents for Preventive Visit.    Are you in the first 12 months of your Medicare Part B coverage?  No    Healthy Habits:    Do you get at least three servings of calcium containing foods daily (dairy, green leafy vegetables, etc.)? yes    Amount of exercise or daily activities, outside of work: 3 day(s) per week    Problems taking medications regularly No    Medication side effects: No    Have you had an eye exam in the past two years? yes    Do you see a dentist twice per year? no    Do you have sleep apnea, excessive snoring or daytime drowsiness?no    COGNITIVE SCREEN  1) Repeat 3 items (Banana, Sunrise, Chair)    2) Clock draw: NORMAL  3) 3 item recall: Recalls 3 objects  Results: 3 items recalled: COGNITIVE IMPAIRMENT LESS LIKELY    Mini-CogTM Copyright S Mita. Licensed by the author for use in [facility]; reprinted with permission ([email or web address]). All rights reserved.        Reviewed and updated as needed this visit by clinical staffTobacco  Allergies  Meds  Med Hx  Surg Hx  Fam Hx  Soc Hx        Reviewed and updated as needed this visit by Provider        Social History   Substance Use Topics     Smoking status: Current Every Day Smoker     Packs/day: 0.25     Years: 20.00     Types: Cigarettes     Last attempt to quit: 7/10/2012     Smokeless tobacco: Never Used      Comment: 2-3 cigs daily     Alcohol use Yes      Comment: couple times monthly       The patient does not drink >3 drinks per day nor >7 drinks per week.    Today's PHQ-2 Score:   PHQ-2 ( 1999 [employer]) 7/27/2017 8/1/2016   Q1: Little interest or pleasure in doing things 0 0   Q2: Feeling down, depressed or hopeless 0 0   PHQ-2 Score 0 0         Do you feel safe in your environment - Yes    Do you have a Health Care Directive?: No: Advance care planning reviewed with patient; information given to patient to review.      Current providers sharing in care for this  patient include: Patient Care Team:  Skylar Gomez APRN CNP as PCP - General (Nurse Practitioner - Family)      Hearing impairment: Yes, sometimes from right ear at times    Ability to successfully perform activities of daily living: Yes, no assistance needed     Fall risk:   Fallen 2 or more times in the past year?: No  Any fall with injury in the past year?: No      Home safety:  throw rugs in the hallway and lack of grab bars in the bathroom  click delete button to remove this line now    The following health maintenance items are reviewed in Epic and correct as of today:  Health Maintenance   Topic Date Due     DEXA SCAN SCREENING (SYSTEM ASSIGNED)  06/20/2009     PNEUMOCOCCAL (2 of 2 - PCV13) 07/07/2011     ADVANCE DIRECTIVE PLANNING Q5 YRS  11/02/2016     A1C Q6 MO  07/23/2017     BMP Q1 YR  08/01/2017     FALL RISK ASSESSMENT  08/01/2017     LIPID MONITORING Q1 YEAR  08/01/2017     MICROALBUMIN Q1 YEAR  08/02/2017     TSH W/ FREE T4 REFLEX Q2 YEAR  08/03/2017     INFLUENZA VACCINE (SYSTEM ASSIGNED)  09/01/2017     FOOT EXAM Q1 YEAR  01/23/2018     FIT Q1 YR  01/23/2018     EYE EXAM Q1 YEAR  04/24/2018     MAMMO SCREEN Q2 YR (SYSTEM ASSIGNED)  01/27/2019     TETANUS IMMUNIZATION (SYSTEM ASSIGNED)  07/07/2020     Labs reviewed in EPIC  Patient Active Problem List   Diagnosis     Hyperlipidemia with target LDL less than 100     Microalbuminuria     Tennis Elbow-left     Tobacco use disorder     Type 2 diabetes mellitus with diabetic chronic kidney disease (H)     Hypertension, goal below 140/90     Granuloma annulare     Hereditary and idiopathic peripheral neuropathy     CKD (chronic kidney disease) stage 2, GFR 60-89 ml/min     Past Surgical History:   Procedure Laterality Date     C LIGATE FALLOPIAN TUBE       HC LAPAROSCOPY, SURGICAL; CHOLECYSTECTOMY  2000    Cholecystectomy, Laparoscopic     HC REMOVE TONSILS/ADENOIDS,<11 Y/O      T & A <11y.o.       Social History   Substance Use Topics     Smoking  status: Current Every Day Smoker     Packs/day: 0.25     Years: 20.00     Types: Cigarettes     Last attempt to quit: 7/10/2012     Smokeless tobacco: Never Used      Comment: 2-3 cigs daily     Alcohol use Yes      Comment: couple times monthly     Family History   Problem Relation Age of Onset     HEART DISEASE Mother      Pacemaker     Alzheimer Disease Father      Dementia/Loss of memory     CEREBROVASCULAR DISEASE Maternal Grandmother      HEART DISEASE Maternal Grandfather      MI     DIABETES Paternal Grandmother          Current Outpatient Prescriptions   Medication Sig Dispense Refill     simvastatin (ZOCOR) 5 MG tablet TAKE ONE TABLET BY MOUTH AT BEDTIME 90 tablet 0     lisinopril (PRINIVIL/ZESTRIL) 20 MG tablet Take 1 tablet (20 mg) by mouth daily 90 tablet 0     metFORMIN (GLUCOPHAGE) 500 MG tablet Take 1 tablet (500 mg) by mouth 2 times daily (with meals) 180 tablet 0     gabapentin (NEURONTIN) 300 MG capsule Take 2 capsules (600 mg) by mouth 3 times daily HOLD until time to fill 180 capsule 3     aspirin 81 MG EC tablet Take 1 tablet by mouth daily. 90 tablet 3     [DISCONTINUED] gabapentin (NEURONTIN) 300 MG capsule TAKE TWO CAPSULES BY MOUTH TWICE A  capsule 0     [DISCONTINUED] simvastatin (ZOCOR) 5 MG tablet TAKE ONE TABLET BY MOUTH AT BEDTIME 90 tablet 0     [DISCONTINUED] lisinopril (PRINIVIL/ZESTRIL) 20 MG tablet Take 1 tablet (20 mg) by mouth daily 90 tablet 0     [DISCONTINUED] metFORMIN (GLUCOPHAGE) 500 MG tablet Take 1 tablet (500 mg) by mouth 2 times daily (with meals) 180 tablet 0     Allergies   Allergen Reactions     Mold      sneezing, coughing , runny nose, watery eyes & red,     No Known Drug Allergies      Recent Labs   Lab Test  07/27/17   0940  01/23/17   1030  08/01/16   0837  08/03/15   0843   12/02/14   0754   07/29/14   0802   07/23/13   0758   07/17/12   0815  11/02/11   1116   A1C  6.7*  6.0  6.2*   --    < >   --    < >   --    < >   --    < >  6.1*  5.9   LDL  91    "--   83   --    --   101   < >  101   --   90   --   96  118   HDL   --    --   50   --    --    --    --   48*   --   46*   --    --   61   TRIG   --    --   94   --    --    --    --   98   --   104   --    --   91   ALT   --    --    --   19   --    --    --    --    --    --    --   11  <6   CR  0.65   --   0.65  0.81   --    --    --   0.74   --    --    < >   --   0.75   GFRESTIMATED  89   --   90  69   --    --    --   78   --    --    < >   --   77   GFRESTBLACK  >90   GFR Calc     --   >90   GFR Calc    84   --    --    --   >90   --    --    < >   --   >90   POTASSIUM  4.3   --   4.6  4.8   --    --    --   4.9   --    --    < >   --   4.9   TSH  2.62   --    --   2.89   --    --    --    --    --   4.19   --    --    --     < > = values in this interval not displayed.            Mammogram Screening: Patient over age 50, mutual decision to screen reflected in health maintenance.    History of abnormal Pap smear: NO - age 65 - see link Cervical Cytology Screening Guidelines    ROS:  C: NEGATIVE for fever, chills, change in weight  I: NEGATIVE for worrisome rashes, moles or lesions  E: NEGATIVE for vision changes or irritation  E/M: NEGATIVE for ear, mouth and throat problems  R: NEGATIVE for significant cough or SOB  B: NEGATIVE for masses, tenderness or discharge  CV: NEGATIVE for chest pain, palpitations or peripheral edema  GI: NEGATIVE for nausea, abdominal pain, heartburn, or change in bowel habits  : NEGATIVE for frequency, dysuria, or hematuria  M: NEGATIVE for significant arthralgias or myalgia  N: NEGATIVE for weakness, dizziness or paresthesias  E: NEGATIVE for temperature intolerance, skin/hair changes  H: NEGATIVE for bleeding problems  P: NEGATIVE for changes in mood or affect    OBJECTIVE:   /66  Pulse 109  Temp 97.1  F (36.2  C) (Tympanic)  Resp 20  Ht 5' 3.2\" (1.605 m)  Wt 162 lb (73.5 kg)  SpO2 96%  Breastfeeding? No  BMI 28.52 kg/m2 Estimated " "body mass index is 28.52 kg/(m^2) as calculated from the following:    Height as of this encounter: 5' 3.2\" (1.605 m).    Weight as of this encounter: 162 lb (73.5 kg).  EXAM:   GENERAL APPEARANCE: alert, no distress and over weight  EYES: Eyes grossly normal to inspection, PERRL and conjunctivae and sclerae normal  HENT: ear canals and TM's normal, nose and mouth without ulcers or lesions, oropharynx clear and oral mucous membranes moist  NECK: no adenopathy, no asymmetry, masses, or scars and thyroid normal to palpation  RESP: lungs clear to auscultation - no rales, rhonchi or wheezes  CV: regular rate and rhythm, normal S1 S2, no S3 or S4, no murmur, click or rub, no peripheral edema and peripheral pulses strong  ABDOMEN: soft, nontender, no hepatosplenomegaly, no masses and bowel sounds normal  MS: no musculoskeletal defects are noted and gait is age appropriate without ataxia  SKIN: no suspicious lesions or rashes  NEURO: Normal strength and tone, sensory exam grossly normal, mentation intact and speech normal  PSYCH: mentation appears normal and affect normal/bright    ASSESSMENT / PLAN:   1. Routine general medical examination at a health care facility    2. Hyperlipidemia with target LDL less than 100  Chronic, controlled.  No change in treatment plan.   - simvastatin (ZOCOR) 5 MG tablet; TAKE ONE TABLET BY MOUTH AT BEDTIME  Dispense: 90 tablet; Refill: 0  - LDL cholesterol direct    3. Benign essential hypertension  Chronic, controlled.  No change in treatment plan.   - lisinopril (PRINIVIL/ZESTRIL) 20 MG tablet; Take 1 tablet (20 mg) by mouth daily  Dispense: 90 tablet; Refill: 0  - Basic metabolic panel  (Ca, Cl, CO2, Creat, Gluc, K, Na, BUN)    4. Type 2 diabetes mellitus with stage 2 chronic kidney disease, without long-term current use of insulin (H)  Chronic, controlled.  No change in treatment plan.   - metFORMIN (GLUCOPHAGE) 500 MG tablet; Take 1 tablet (500 mg) by mouth 2 times daily (with meals)  " "Dispense: 180 tablet; Refill: 0  - HEMOGLOBIN A1C  - Basic metabolic panel  (Ca, Cl, CO2, Creat, Gluc, K, Na, BUN)  - Albumin Random Urine Quantitative  - TSH with free T4 reflex    5. CKD (chronic kidney disease) stage 2, GFR 60-89 ml/min  - TSH with free T4 reflex    6. Asymptomatic postmenopausal status  - DX Hip/Pelvis/Spine; Future    7. Hereditary and idiopathic peripheral neuropathy  - gabapentin (NEURONTIN) 300 MG capsule; Take 2 capsules (600 mg) by mouth 3 times daily HOLD until time to fill  Dispense: 180 capsule; Refill: 3    End of Life Planning:  Patient currently has an advanced directive: No.  I have verified the patient's ablity to prepare an advanced directive/make health care decisions.  Literature was provided to assist patient in preparing an advanced directive.    COUNSELING:  Reviewed preventive health counseling, as reflected in patient instructions       Regular exercise       Healthy diet/nutrition       Osteoporosis Prevention/Bone Health        Estimated body mass index is 28.52 kg/(m^2) as calculated from the following:    Height as of this encounter: 5' 3.2\" (1.605 m).    Weight as of this encounter: 162 lb (73.5 kg).     reports that she has been smoking Cigarettes.  She has a 5.00 pack-year smoking history. She has never used smokeless tobacco.  Tobacco Cessation Action Plan: Information offered: Patient not interested at this time    Appropriate preventive services were discussed with this patient, including applicable screening as appropriate for cardiovascular disease, diabetes, osteopenia/osteoporosis, and glaucoma.  As appropriate for age/gender, discussed screening for colorectal cancer, prostate cancer, breast cancer, and cervical cancer. Checklist reviewing preventive services available has been given to the patient.    Reviewed patients plan of care and provided an AVS. The Basic Care Plan (routine screening as documented in Health Maintenance) for Tracy meets the Care " Plan requirement. This Care Plan has been established and reviewed with the Patient.    Counseling Resources:  ATP IV Guidelines  Pooled Cohorts Equation Calculator  Breast Cancer Risk Calculator  FRAX Risk Assessment  ICSI Preventive Guidelines  Dietary Guidelines for Americans, 2010  USDA's MyPlate  ASA Prophylaxis  Lung CA Screening    MACHO Cifuentes [facility]

## 2017-07-27 NOTE — MR AVS SNAPSHOT
After Visit Summary   7/27/2017    Tracy Mcclelland    MRN: [id number]           Patient Information     Date Of Birth          1945        Visit Information        Provider Department      7/27/2017 9:20 AM Skylar Gomez APRN [facility]        Today's Diagnoses     CKD (chronic kidney disease) stage 2, GFR 60-89 ml/min    -  1    Hyperlipidemia with target LDL less than 100        Benign essential hypertension        Type 2 diabetes mellitus with stage 2 chronic kidney disease, without long-term current use of insulin (H)        Asymptomatic postmenopausal status        Hereditary and idiopathic peripheral neuropathy          Care Instructions    Labs will be done today.  I will call or send a letter with results within the next 1-2 weeks.      Scheduled the DEXA (bone density) scan in [place].     Stop smoking.  Let us know if we can help with this.  There are multiple things we can do to assist you.       Preventive Health Recommendations  Female Ages 65 +    Yearly exam:     See your health care provider every year in order to  o Review health changes.   o Discuss preventive care.    o Review your medicines if your doctor has prescribed any.      You no longer need a yearly Pap test unless you've had an abnormal Pap test in the past 10 years. If you have vaginal symptoms, such as bleeding or discharge, be sure to talk with your provider about a Pap test.      Every 1 to 2 years, have a mammogram.  If you are over 69, talk with your health care provider about whether or not you want to continue having screening mammograms.      Every 10 years, have a colonoscopy. Or, have a yearly FIT test (stool test). These exams will check for colon cancer.       Have a cholesterol test every 5 years, or more often if your doctor advises it.       Have a diabetes test (fasting glucose) every three years. If you are at risk for diabetes, you should have this test more often.       At age  65, have a bone density scan (DEXA) to check for osteoporosis (brittle bone disease).    Shots:    Get a flu shot each year.    Get a tetanus shot every 10 years.    Talk to your doctor about your pneumonia vaccines. There are now two you should receive - Pneumovax (PPSV 23) and Prevnar (PCV 13).    Talk to your doctor about the shingles vaccine.    Talk to your doctor about the hepatitis B vaccine.    Nutrition:     Eat at least 5 servings of fruits and vegetables each day.      Eat whole-grain bread, whole-wheat pasta and brown rice instead of white grains and rice.      Talk to your provider about Calcium and Vitamin D.     Lifestyle    Exercise at least 150 minutes a week (30 minutes a day, 5 days a week). This will help you control your weight and prevent disease.      Limit alcohol to one drink per day.      No smoking.       Wear sunscreen to prevent skin cancer.       See your dentist twice a year for an exam and cleaning.      See your eye doctor every 1 to 2 years to screen for conditions such as glaucoma, macular degeneration, cataracts, etc           Follow-ups after your visit        Future tests that were ordered for you today     Open Future Orders        Priority Expected Expires Ordered    DX Hip/Pelvis/Spine Routine  7/28/2018 7/27/2017            Who to contact     If you have questions or need follow up information about today's clinic visit or your schedule please contact [facility] directly at [phone].  Normal or non-critical lab and imaging results will be communicated to you by MyChart, letter or phone within 4 business days after the clinic has received the results. If you do not hear from us within 7 days, please contact the clinic through MyChart or phone. If you have a critical or abnormal lab result, we will notify you by phone as soon as possible.  Submit refill requests through [employer] or call your pharmacy and they will forward the refill request to us. Please allow  "3 business days for your refill to be completed.          Additional Information About Your Visit        MyChart Information     [employer] lets you send messages to your doctor, view your test results, renew your prescriptions, schedule appointments and more. To sign up, go to [email or web address] . Click on \"Log in\" on the left side of the screen, which will take you to the Welcome page. Then click on \"Sign up Now\" on the right side of the page.     You will be asked to enter the access code listed below, as well as some personal information. Please follow the directions to create your username and password.     Your access code is: [email or web address]  Expires: 10/25/2017  9:39 AM     Your access code will  in 90 days. If you need help or a new code, please call your [place] clinic or [phone].        Care EveryWhere ID     This is your Care EveryWhere ID. This could be used by other organizations to access your [place] medical records  [id number]        Your Vitals Were     Pulse Temperature Respirations Height Pulse Oximetry Breastfeeding?    109 97.1  F (36.2  C) (Tympanic) 20 5' 3.2\" (1.605 m) 96% No    BMI (Body Mass Index)                   28.52 kg/m2            Blood Pressure from Last 3 Encounters:   17 124/66   17 112/70   08/15/16 124/68    Weight from Last 3 Encounters:   17 162 lb (73.5 kg)   17 162 lb (73.5 kg)   17 162 lb (73.5 kg)              We Performed the Following     Albumin Random Urine Quantitative     Basic metabolic panel  (Ca, Cl, CO2, Creat, Gluc, K, Na, BUN)     HEMOGLOBIN A1C     LDL cholesterol direct     TSH with free T4 reflex          Today's Medication Changes          These changes are accurate as of: 17  9:39 AM.  If you have any questions, ask your nurse or doctor.               These medicines have changed or have updated prescriptions.        Dose/Directions    gabapentin 300 MG capsule   Commonly known as:  NEURONTIN   This may " have changed:    - how much to take  - how to take this  - when to take this  - additional instructions   Used for:  Hereditary and idiopathic peripheral neuropathy   Changed by:  Skylar Gomez APRN CNP        Dose:  600 mg   Take 2 capsules (600 mg) by mouth 3 times daily HOLD until time to fill   Quantity:  180 capsule   Refills:  3            Where to get your medicines      These medications were sent to [facility] - [place], [place] - [address]   [address] , [facility] [address]     Phone:  [phone]     gabapentin 300 MG capsule    lisinopril 20 MG tablet    metFORMIN 500 MG tablet    simvastatin 5 MG tablet                Primary Care Provider Office Phone # Fax #    MACHO Cifuentes -[phone]       [facility] [address] [facility] [address]        Equal Access to Services     LIU KURTZ : Hadii denton cho hadasho Soomaali, waaxda luqadaha, qaybta kaalmada adeegyada, waxmichi davies . So [facility] [phone].    ATENCIÓN: Si habla español, tiene a johnson disposición servicios gratuitos de asistencia lingüística. Suad al [phone].    We comply with applicable federal civil rights laws and [place] laws. We do not discriminate on the basis of race, color, national origin, age, disability sex, sexual orientation or gender identity.            Thank you!     Thank you for choosing [facility]  for your care. Our goal is always to provide you with excellent care. Hearing back from our patients is one way we can continue to improve our services. Please take a few minutes to complete the written survey that you may receive in the mail after your visit with us. Thank you!             Your Updated Medication List - Protect others around you: Learn how to safely use, store and throw away your medicines at www.disposemymeds.org.          This list is accurate as of: 7/27/17  9:39 AM.  Always use your most recent med list.                    Brand Name Dispense Instructions for use Diagnosis    aspirin 81 MG EC tablet     90 tablet    Take 1 tablet by mouth daily.    Type 2 diabetes, HbA1c goal < 7% (H)       gabapentin 300 MG capsule    NEURONTIN    180 capsule    Take 2 capsules (600 mg) by mouth 3 times daily HOLD until time to fill    Hereditary and idiopathic peripheral neuropathy       lisinopril 20 MG tablet    PRINIVIL/ZESTRIL    90 tablet    Take 1 tablet (20 mg) by mouth daily    Benign essential hypertension       metFORMIN 500 MG tablet    GLUCOPHAGE    180 tablet    Take 1 tablet (500 mg) by mouth 2 times daily (with meals)    Type 2 diabetes mellitus with stage 2 chronic kidney disease, without long-term current use of insulin (H)       simvastatin 5 MG tablet    ZOCOR    90 tablet    TAKE ONE TABLET BY MOUTH AT BEDTIME    Hyperlipidemia with target LDL less than 100

## 2017-07-27 NOTE — PATIENT INSTRUCTIONS
Labs will be done today.  I will call or send a letter with results within the next 1-2 weeks.      Scheduled the DEXA (bone density) scan in [place].     Stop smoking.  Let us know if we can help with this.  There are multiple things we can do to assist you.       Preventive Health Recommendations  Female Ages 65 +    Yearly exam:     See your health care provider every year in order to  o Review health changes.   o Discuss preventive care.    o Review your medicines if your doctor has prescribed any.      You no longer need a yearly Pap test unless you've had an abnormal Pap test in the past 10 years. If you have vaginal symptoms, such as bleeding or discharge, be sure to talk with your provider about a Pap test.      Every 1 to 2 years, have a mammogram.  If you are over 69, talk with your health care provider about whether or not you want to continue having screening mammograms.      Every 10 years, have a colonoscopy. Or, have a yearly FIT test (stool test). These exams will check for colon cancer.       Have a cholesterol test every 5 years, or more often if your doctor advises it.       Have a diabetes test (fasting glucose) every three years. If you are at risk for diabetes, you should have this test more often.       At age 65, have a bone density scan (DEXA) to check for osteoporosis (brittle bone disease).    Shots:    Get a flu shot each year.    Get a tetanus shot every 10 years.    Talk to your doctor about your pneumonia vaccines. There are now two you should receive - Pneumovax (PPSV 23) and Prevnar (PCV 13).    Talk to your doctor about the shingles vaccine.    Talk to your doctor about the hepatitis B vaccine.    Nutrition:     Eat at least 5 servings of fruits and vegetables each day.      Eat whole-grain bread, whole-wheat pasta and brown rice instead of white grains and rice.      Talk to your provider about Calcium and Vitamin D.     Lifestyle    Exercise at least 150 minutes a week (30  minutes a day, 5 days a week). This will help you control your weight and prevent disease.      Limit alcohol to one drink per day.      No smoking.       Wear sunscreen to prevent skin cancer.       See your dentist twice a year for an exam and cleaning.      See your eye doctor every 1 to 2 years to screen for conditions such as glaucoma, macular degeneration, cataracts, etc   Preventive Health Recommendations    Female Ages 65 +    Yearly exam:   See your health care provider every year in order to  Review health changes.   Discuss preventive care.    Review your medicines if your doctor has prescribed any.    You no longer need a yearly Pap test unless you've had an abnormal Pap test in the past 10 years. If you have vaginal symptoms, such as bleeding or discharge, be sure to talk with your provider about a Pap test.    Every 1 to 2 years, have a mammogram.  If you are over 69, talk with your health care provider about whether or not you want to continue having screening mammograms.    Every 10 years, have a colonoscopy. Or, have a yearly FIT test (stool test). These exams will check for colon cancer.     Have a cholesterol test every 5 years, or more often if your doctor advises it.     Have a diabetes test (fasting glucose) every three years. If you are at risk for diabetes, you should have this test more often.     At age 65, have a bone density scan (DEXA) to check for osteoporosis (brittle bone disease).    Shots:  Get a flu shot each year.  Get a tetanus shot every 10 years.  Talk to your doctor about your pneumonia vaccines. There are now two you should receive - Pneumovax (PPSV 23) and Prevnar (PCV 13).  Talk to your doctor about the shingles vaccine.  Talk to your doctor about the hepatitis B vaccine.    Nutrition:   Eat at least 5 servings of fruits and vegetables each day.    Eat whole-grain bread, whole-wheat pasta and brown rice instead of white grains and rice.    Talk to your provider about  Calcium and Vitamin D.     Lifestyle  Exercise at least 150 minutes a week (30 minutes a day, 5 days a week). This will help you control your weight and prevent disease.    Limit alcohol to one drink per day.    No smoking.     Wear sunscreen to prevent skin cancer.     See your dentist twice a year for an exam and cleaning.    See your eye doctor every 1 to 2 years to screen for conditions such as glaucoma, macular degeneration and cataracts.

## 2017-07-27 NOTE — PROGRESS NOTES
Letter sent to patient informing of lab results.  ................Carrillo Bauer LPN,   July 27, 2017,      4:09 PM,   [facility]

## 2017-07-28 LAB
CREAT UR-MCNC: 61 MG/DL
MICROALBUMIN UR-MCNC: 20 MG/L
MICROALBUMIN/CREAT UR: 32.18 MG/G CR (ref 0–25)

## 2017-08-09 DIAGNOSIS — N18.2 TYPE 2 DIABETES MELLITUS WITH STAGE 2 CHRONIC KIDNEY DISEASE, WITHOUT LONG-TERM CURRENT USE OF INSULIN (H): ICD-10-CM

## 2017-08-09 DIAGNOSIS — E11.22 TYPE 2 DIABETES MELLITUS WITH STAGE 2 CHRONIC KIDNEY DISEASE, WITHOUT LONG-TERM CURRENT USE OF INSULIN (H): ICD-10-CM

## 2017-08-09 NOTE — TELEPHONE ENCOUNTER
Metformin         Last Written Prescription Date: 7/27/2017  Last Fill Quantity: 180, # refills: 0  Last Office Visit with [facility], [facility] or [facility] prescribing provider:  7/27/2017        BP Readings from Last 3 Encounters:   07/27/17 124/66   01/23/17 112/70   08/15/16 124/68     Lab Results   Component Value Date    MICROL 20 07/27/2017     Lab Results   Component Value Date    UMALCR 32.18 07/27/2017     Creatinine   Date Value Ref Range Status   07/27/2017 0.65 0.52 - 1.04 mg/dL Final   ]  GFR Estimate   Date Value Ref Range Status   07/27/2017 89 >60 mL/min/1.7m2 Final     Comment:     Non  GFR Calc   08/01/2016 90 >60 mL/min/1.7m2 Final     Comment:     Non  GFR Calc   08/03/2015 69 >60 mL/min/1.7m2 Final     Comment:     Non  GFR Calc     GFR Estimate If Black   Date Value Ref Range Status   07/27/2017 >90   GFR Calc   >60 mL/min/1.7m2 Final   08/01/2016 >90   GFR Calc   >60 mL/min/1.7m2 Final   08/03/2015 84 >60 mL/min/1.7m2 Final     Comment:      GFR Calc     Lab Results   Component Value Date    CHOL 152 08/01/2016     Lab Results   Component Value Date    HDL 50 08/01/2016     Lab Results   Component Value Date    LDL 91 07/27/2017    LDL 83 08/01/2016     Lab Results   Component Value Date    TRIG 94 08/01/2016     Lab Results   Component Value Date    CHOLHDLRATIO 4.0 07/29/2014     Lab Results   Component Value Date    AST 10 08/03/2015     Lab Results   Component Value Date    ALT 19 08/03/2015     Lab Results   Component Value Date    A1C 6.7 07/27/2017    A1C 6.0 01/23/2017    A1C 6.2 08/01/2016    A1C 6.3 04/16/2015    A1C 6.1 09/29/2014     Potassium   Date Value Ref Range Status   07/27/2017 4.3 3.4 - 5.3 mmol/L Final

## 2017-08-09 NOTE — TELEPHONE ENCOUNTER
Prescription was sent 7/27/17 for #180 with 0 refills.  Pharmacy notified via E-Prescribe refusal.     Kala Burk RN  [facility]

## 2017-10-19 ENCOUNTER — OFFICE VISIT (OUTPATIENT)
Dept: FAMILY MEDICINE | Facility: OTHER | Age: 72
End: 2017-10-19
Payer: COMMERCIAL

## 2017-10-19 VITALS
SYSTOLIC BLOOD PRESSURE: 154 MMHG | TEMPERATURE: 98.2 F | HEART RATE: 104 BPM | BODY MASS INDEX: 29.11 KG/M2 | WEIGHT: 165.4 LBS | RESPIRATION RATE: 16 BRPM | DIASTOLIC BLOOD PRESSURE: 76 MMHG

## 2017-10-19 DIAGNOSIS — H61.23 BILATERAL IMPACTED CERUMEN: Primary | ICD-10-CM

## 2017-10-19 PROCEDURE — 69209 REMOVE IMPACTED EAR WAX UNI: CPT | Mod: 50 | Performed by: FAMILY MEDICINE

## 2017-10-19 PROCEDURE — 99212 OFFICE O/P EST SF 10 MIN: CPT | Mod: 25 | Performed by: FAMILY MEDICINE

## 2017-10-19 ASSESSMENT — PAIN SCALES - GENERAL: PAINLEVEL: NO PAIN (0)

## 2017-10-19 NOTE — NURSING NOTE
"Chief Complaint   Patient presents with     Ear Problem     lt ear feels plugged x's today       Initial /76 (BP Location: Right arm, Patient Position: Chair, Cuff Size: Adult Regular)  Pulse 104  Temp 98.2  F (36.8  C) (Temporal)  Resp 16  Wt 165 lb 6.4 oz (75 kg)  BMI 29.11 kg/m2 Estimated body mass index is 29.11 kg/(m^2) as calculated from the following:    Height as of 7/27/17: 5' 3.2\" (1.605 m).    Weight as of this encounter: 165 lb 6.4 oz (75 kg).  Medication Reconciliation: complete  "

## 2017-10-19 NOTE — PROGRESS NOTES
SUBJECTIVE:   Tracy Mcclelland is a 72 year old female who presents to clinic today for the following health issues:  Chief Complaint   Patient presents with     Ear Problem     lt ear feels plugged x's today     S: Tracy Mcclelland is a 72 year old year old female who presents to the clinic today for a possible cerumen impaction. Patient reports a plugged sensation.  The patient reports no pain or injury.  No fever or URI symptoms.    O:  A cerumen impaction on the left is seen. Also partial impaction on the right.  Following removal, both tympanic membranes are clear without significant erythema.    A:  Cerumen Impaction.    P:  The cerumen was  removed with tap water irrigation.    She was slightly dizzy p the irrigation, but she stated she was okay on discharge.    -Glenroy Germain MD

## 2017-10-19 NOTE — MR AVS SNAPSHOT
"              After Visit Summary   10/19/2017    Tracy Mcclelland    MRN: [id number]           Patient Information     Date Of Birth          1945        Visit Information        Provider Department      10/19/2017 2:30 PM Glenroy Germain MD [employer]        Today's Diagnoses     Bilateral impacted cerumen    -  1       Follow-ups after your visit        Who to contact     If you have questions or need follow up information about today's clinic visit or your schedule please contact [facility] directly at [phone].  Normal or non-critical lab and imaging results will be communicated to you by [employer], letter or phone within 4 business days after the clinic has received the results. If you do not hear from us within 7 days, please contact the clinic through [employer] or phone. If you have a critical or abnormal lab result, we will notify you by phone as soon as possible.  Submit refill requests through [employer] or call your pharmacy and they will forward the refill request to us. Please allow 3 business days for your refill to be completed.          Additional Information About Your Visit        MyChart Information     [employer] lets you send messages to your doctor, view your test results, renew your prescriptions, schedule appointments and more. To sign up, go to [email or web address] . Click on \"Log in\" on the left side of the screen, which will take you to the Welcome page. Then click on \"Sign up Now\" on the right side of the page.     You will be asked to enter the access code listed below, as well as some personal information. Please follow the directions to create your username and password.     Your access code is: [email or web address]  Expires: 10/25/2017  9:39 AM     Your access code will  in 90 days. If you need help or a new code, please call your [facility] or [phone].        Care EveryWhere ID     This is your Care EveryWhere ID. This could be used by other " organizations to access your [place] medical records  [id number]        Your Vitals Were     Pulse Temperature Respirations BMI (Body Mass Index)          104 98.2  F (36.8  C) (Temporal) 16 29.11 kg/m2         Blood Pressure from Last 3 Encounters:   10/19/17 154/76   07/27/17 124/66   01/23/17 112/70    Weight from Last 3 Encounters:   10/19/17 165 lb 6.4 oz (75 kg)   07/27/17 162 lb (73.5 kg)   01/24/17 162 lb (73.5 kg)              Today, you had the following     No orders found for display       Primary Care Provider Office Phone # Fax #    Skylar Gomez, MACHO [phone]       [address] [facility] [address]        Equal Access to Services     LIU KURTZ : Hadii aad ku hadasho Sobia, waaxda luqadaha, qaybta kaalmada adeegyada, tatyana davies . So [facility] [phone].    ATENCIÓN: Si habla español, tiene a johnson disposición servicios gratuitos de asistencia lingüística. Suad al [phone].    We comply with applicable federal civil rights laws and [place] laws. We do not discriminate on the basis of race, color, national origin, age, disability, sex, sexual orientation, or gender identity.            Thank you!     Thank you for choosing [facility]  for your care. Our goal is always to provide you with excellent care. Hearing back from our patients is one way we can continue to improve our services. Please take a few minutes to complete the written survey that you may receive in the mail after your visit with us. Thank you!             Your Updated Medication List - Protect others around you: Learn how to safely use, store and throw away your medicines at www.disposemymeds.org.          This list is accurate as of: 10/19/17  2:57 PM.  Always use your most recent med list.                   Brand Name Dispense Instructions for use Diagnosis    aspirin 81 MG EC tablet     90 tablet    Take 1 tablet by mouth daily.    Type 2 diabetes, HbA1c goal  < 7% (H)       gabapentin 300 MG capsule    NEURONTIN    180 capsule    Take 2 capsules (600 mg) by mouth 3 times daily HOLD until time to fill    Hereditary and idiopathic peripheral neuropathy       lisinopril 20 MG tablet    PRINIVIL/ZESTRIL    90 tablet    Take 1 tablet (20 mg) by mouth daily    Benign essential hypertension       metFORMIN 500 MG tablet    GLUCOPHAGE    180 tablet    Take 1 tablet (500 mg) by mouth 2 times daily (with meals)    Type 2 diabetes mellitus with stage 2 chronic kidney disease, without long-term current use of insulin (H)       simvastatin 5 MG tablet    ZOCOR    90 tablet    TAKE ONE TABLET BY MOUTH AT BEDTIME    Hyperlipidemia with target LDL less than 100

## 2017-11-02 ENCOUNTER — TELEPHONE (OUTPATIENT)
Dept: FAMILY MEDICINE | Facility: OTHER | Age: 72
End: 2017-11-02

## 2017-11-02 NOTE — TELEPHONE ENCOUNTER
Panel Management Review      Patient has the following on her problem list:     Hypertension   Last three blood pressure readings:  BP Readings from Last 3 Encounters:   10/19/17 154/76   07/27/17 124/66   01/23/17 112/70     Blood pressure: FAILED    HTN Guidelines:  Age 18-59 BP range:  Less than 140/90  Age 60-85 with Diabetes:  Less than 140/90  Age 60-85 without Diabetes:  less than 150/90        Composite cancer screening  Chart review shows that this patient is due/due soon for the following None  Summary:    Patient is due/failing the following:   BP CHECK    Action needed:   Patient needs nurse only appointment.    Type of outreach:    Phone, spoke to patient.  appt set up.    Questions for provider review:    None                                                                                                                                    ................Carrillo Bauer LPN,   November 2, 2017,      2:11 PM,   [facility]      Chart routed to closed .

## 2017-11-07 ENCOUNTER — ALLIED HEALTH/NURSE VISIT (OUTPATIENT)
Dept: FAMILY MEDICINE | Facility: OTHER | Age: 72
End: 2017-11-07
Payer: COMMERCIAL

## 2017-11-07 VITALS — DIASTOLIC BLOOD PRESSURE: 74 MMHG | SYSTOLIC BLOOD PRESSURE: 154 MMHG | HEART RATE: 74 BPM

## 2017-11-07 DIAGNOSIS — Z01.30 BP CHECK: Primary | ICD-10-CM

## 2017-11-07 PROCEDURE — 99207 ZZC NO CHARGE NURSE ONLY: CPT

## 2017-11-07 NOTE — MR AVS SNAPSHOT
"              After Visit Summary   2017    Tracy Mcclelland    MRN: [id number]           Patient Information     Date Of Birth          1945        Visit Information        Provider Department      2017 9:45 AM RADHA FLORES NURSE, [facility]        Today's Diagnoses     BP check    -  1       Follow-ups after your visit        Who to contact     If you have questions or need follow up information about today's clinic visit or your schedule please contact [facility] directly at [phone].  Normal or non-critical lab and imaging results will be communicated to you by MyChart, letter or phone within 4 business days after the clinic has received the results. If you do not hear from us within 7 days, please contact the clinic through GoCooph[employer] or phone. If you have a critical or abnormal lab result, we will notify you by phone as soon as possible.  Submit refill requests through [employer] or call your pharmacy and they will forward the refill request to us. Please allow 3 business days for your refill to be completed.          Additional Information About Your Visit        MyChart Information     Cur[employer] lets you send messages to your doctor, view your test results, renew your prescriptions, schedule appointments and more. To sign up, go to [email or web address] . Click on \"Log in\" on the left side of the screen, which will take you to the Welcome page. Then click on \"Sign up Now\" on the right side of the page.     You will be asked to enter the access code listed below, as well as some personal information. Please follow the directions to create your username and password.     Your access code is: [email or web address]  Expires: 2018  3:47 PM     Your access code will  in 90 days. If you need help or a new code, please call your [facility] or [phone].        Care EveryWhere ID     This is your Care EveryWhere ID. This could be used by other organizations to access your " [place] medical records  [id number]        Your Vitals Were     Pulse                   74            Blood Pressure from Last 3 Encounters:   11/07/17 154/74   10/19/17 154/76   07/27/17 124/66    Weight from Last 3 Encounters:   10/19/17 165 lb 6.4 oz (75 kg)   07/27/17 162 lb (73.5 kg)   01/24/17 162 lb (73.5 kg)              Today, you had the following     No orders found for display       Primary Care Provider Office Phone # Fax #    Skylar Gomez, MACHO [phone]       [address] [facility] [address]        Equal Access to Services     [facility] Center[facility] : Hadii denton cho hadasho Somicaali, waaxda luqadaha, qaybta kaalmada adeegyada, tatyana davies . So [facility] [phone].    ATENCIÓN: Si habla español, tiene a johnson disposición servicios gratuitos de asistencia lingüística. Llame al [phone].    We comply with applicable federal civil rights laws and [place] laws. We do not discriminate on the basis of race, color, national origin, age, disability, sex, sexual orientation, or gender identity.            Thank you!     Thank you for choosing [facility]  for your care. Our goal is always to provide you with excellent care. Hearing back from our patients is one way we can continue to improve our services. Please take a few minutes to complete the written survey that you may receive in the mail after your visit with us. Thank you!             Your Updated Medication List - Protect others around you: Learn how to safely use, store and throw away your medicines at www.disposemymeds.org.          This list is accurate as of: 11/7/17  3:47 PM.  Always use your most recent med list.                   Brand Name Dispense Instructions for use Diagnosis    aspirin 81 MG EC tablet     90 tablet    Take 1 tablet by mouth daily.    Type 2 diabetes, HbA1c goal < 7% (H)       gabapentin 300 MG capsule    NEURONTIN    180 capsule    Take 2 capsules (600 mg) by mouth 3  times daily HOLD until time to fill    Hereditary and idiopathic peripheral neuropathy       lisinopril 20 MG tablet    PRINIVIL/ZESTRIL    90 tablet    Take 1 tablet (20 mg) by mouth daily    Benign essential hypertension       metFORMIN 500 MG tablet    GLUCOPHAGE    180 tablet    Take 1 tablet (500 mg) by mouth 2 times daily (with meals)    Type 2 diabetes mellitus with stage 2 chronic kidney disease, without long-term current use of insulin (H)       simvastatin 5 MG tablet    ZOCOR    90 tablet    TAKE ONE TABLET BY MOUTH AT BEDTIME    Hyperlipidemia with target LDL less than 100

## 2017-11-07 NOTE — NURSING NOTE
Tracy is a 72 year old female who comes in today for a blood pressure check because of ongoing blood pressure monitoring.  Patient is taking medication as prescribed  Patient is tolerating medications well.  Current complaints: none  Patient is not monitoring Blood Pressure elsewhere.  /74  Pulse 74    Vitals as recorded, a regular cuff was used.  left arm  BP Readings from Last 4 Encounters:   11/07/17 154/74   10/19/17 154/76   07/27/17 124/66   01/23/17 112/70       Health Maintenance Due   Topic Date Due     DEXA SCAN SCREENING (SYSTEM ASSIGNED)  06/20/2009     PNEUMOCOCCAL (2 of 2 - PCV13) 07/07/2011     ADVANCE DIRECTIVE PLANNING Q5 YRS  11/02/2016     INFLUENZA VACCINE (SYSTEM ASSIGNED)  09/01/2017     Pt plans to return for a bp check in a couple weeks. Pati Conrad MA     11/7/2017

## 2018-01-04 DIAGNOSIS — G60.9 HEREDITARY AND IDIOPATHIC PERIPHERAL NEUROPATHY: ICD-10-CM

## 2018-01-04 RX ORDER — GABAPENTIN 300 MG/1
CAPSULE ORAL
Qty: 180 CAPSULE | Refills: 3 | Status: SHIPPED | OUTPATIENT
Start: 2018-01-04 | End: 2018-04-27

## 2018-01-04 NOTE — TELEPHONE ENCOUNTER
Gabapentin 300 MG       Last Written Prescription Date:  7/27/17  Last Fill Quantity: 180,   # refills: 3  Last Office Visit: 7/27/17  Future Office visit:       Routing refill request to provider for review/approval because:  Drug not on the G, P or [facility] refill protocol or controlled substance

## 2018-02-14 ENCOUNTER — OFFICE VISIT (OUTPATIENT)
Dept: FAMILY MEDICINE | Facility: OTHER | Age: 73
End: 2018-02-14
Payer: COMMERCIAL

## 2018-02-14 ENCOUNTER — RADIANT APPOINTMENT (OUTPATIENT)
Dept: GENERAL RADIOLOGY | Facility: OTHER | Age: 73
End: 2018-02-14
Attending: NURSE PRACTITIONER
Payer: COMMERCIAL

## 2018-02-14 VITALS
DIASTOLIC BLOOD PRESSURE: 62 MMHG | BODY MASS INDEX: 28.52 KG/M2 | SYSTOLIC BLOOD PRESSURE: 136 MMHG | TEMPERATURE: 97.8 F | HEART RATE: 111 BPM | WEIGHT: 162 LBS | OXYGEN SATURATION: 94 % | RESPIRATION RATE: 20 BRPM

## 2018-02-14 DIAGNOSIS — G89.29 CHRONIC BILATERAL LOW BACK PAIN WITHOUT SCIATICA: ICD-10-CM

## 2018-02-14 DIAGNOSIS — E11.22 TYPE 2 DIABETES MELLITUS WITH STAGE 2 CHRONIC KIDNEY DISEASE, WITHOUT LONG-TERM CURRENT USE OF INSULIN (H): Primary | ICD-10-CM

## 2018-02-14 DIAGNOSIS — I10 BENIGN ESSENTIAL HYPERTENSION: ICD-10-CM

## 2018-02-14 DIAGNOSIS — E78.5 HYPERLIPIDEMIA WITH TARGET LDL LESS THAN 100: ICD-10-CM

## 2018-02-14 DIAGNOSIS — M54.50 CHRONIC BILATERAL LOW BACK PAIN WITHOUT SCIATICA: ICD-10-CM

## 2018-02-14 DIAGNOSIS — Z12.11 SPECIAL SCREENING FOR MALIGNANT NEOPLASMS, COLON: ICD-10-CM

## 2018-02-14 DIAGNOSIS — N18.2 TYPE 2 DIABETES MELLITUS WITH STAGE 2 CHRONIC KIDNEY DISEASE, WITHOUT LONG-TERM CURRENT USE OF INSULIN (H): Primary | ICD-10-CM

## 2018-02-14 LAB — HBA1C MFR BLD: 6.9 % (ref 4.3–6)

## 2018-02-14 PROCEDURE — 36415 COLL VENOUS BLD VENIPUNCTURE: CPT | Performed by: NURSE PRACTITIONER

## 2018-02-14 PROCEDURE — 83036 HEMOGLOBIN GLYCOSYLATED A1C: CPT | Performed by: NURSE PRACTITIONER

## 2018-02-14 PROCEDURE — 99214 OFFICE O/P EST MOD 30 MIN: CPT | Performed by: NURSE PRACTITIONER

## 2018-02-14 PROCEDURE — 99207 C FOOT EXAM  NO CHARGE: CPT | Mod: 25 | Performed by: NURSE PRACTITIONER

## 2018-02-14 PROCEDURE — 72100 X-RAY EXAM L-S SPINE 2/3 VWS: CPT | Mod: FY

## 2018-02-14 RX ORDER — SIMVASTATIN 5 MG
TABLET ORAL
Qty: 90 TABLET | Refills: 1 | Status: SHIPPED | OUTPATIENT
Start: 2018-02-14 | End: 2018-08-06

## 2018-02-14 RX ORDER — LISINOPRIL 20 MG/1
20 TABLET ORAL DAILY
Qty: 90 TABLET | Refills: 0 | Status: SHIPPED | OUTPATIENT
Start: 2018-02-14 | End: 2018-06-06

## 2018-02-14 RX ORDER — GABAPENTIN 600 MG/1
600 TABLET ORAL 3 TIMES DAILY
Qty: 360 TABLET | Refills: 0 | Status: SHIPPED | OUTPATIENT
Start: 2018-02-14 | End: 2018-06-29

## 2018-02-14 NOTE — PROGRESS NOTES
Letter sent to patient informing of a1c.  ................Carrillo Bauer LPN,   February 14, 2018,      4:50 PM,   [facility]

## 2018-02-14 NOTE — LETTER
February 14, 2018      Tracy Mcclelland  [address] [place] [id number]        Dear MsAshtynMcclelland,    We are writing to inform you of your test results.    Your test results fall within the expected range(s) or remain unchanged from previous results.  Please continue with current treatment plan.    Resulted Orders   Hemoglobin A1c   Result Value Ref Range    Hemoglobin A1C 6.9 (H) 4.3 - 6.0 %       If you have any questions or concerns, please call the clinic at the number listed above.       Sincerely,        MACHO Cifuentes CNP

## 2018-02-14 NOTE — NURSING NOTE
"Chief Complaint   Patient presents with     Hypertension     Refill Request     3 mo supply     Back Pain     x9 mo       Initial /62  Pulse 111  Temp 97.8  F (36.6  C) (Tympanic)  Resp 20  Wt 162 lb (73.5 kg)  SpO2 94%  Breastfeeding? No  BMI 28.52 kg/m2 Estimated body mass index is 28.52 kg/(m^2) as calculated from the following:    Height as of 7/27/17: 5' 3.2\" (1.605 m).    Weight as of this encounter: 162 lb (73.5 kg).  Medication Reconciliation: complete   ................Carrillo Bauer LPN,   February 14, 2018,      2:33 PM,   [facility]    "

## 2018-02-14 NOTE — PATIENT INSTRUCTIONS
Increase your Gabapentin to 600 mg in the morning, 600 mg at 1400 and 1200 mg at night.  I sent over new pills for this that are stronger.     We will call you with the x-ray results.     Labs will be done today.  I will call or send a letter with results within the next 1-2 weeks.      Follow up in 6 months for a physical, be fasting for labs that day.     If you want to do any physical therapy, call and let me know.

## 2018-02-14 NOTE — MR AVS SNAPSHOT
After Visit Summary   2/14/2018    Tracy Mcclelland    MRN: [id number]           Patient Information     Date Of Birth          1945        Visit Information        Provider Department      2/14/2018 2:20 PM Skylar Gomez APRN CNP [facility]        Today's Diagnoses     Type 2 diabetes mellitus with stage 2 chronic kidney disease, without long-term current use of insulin (H)    -  1    Benign essential hypertension        Hyperlipidemia with target LDL less than 100        Special screening for malignant neoplasms, colon        Chronic bilateral low back pain without sciatica          Care Instructions    Increase your Gabapentin to 600 mg in the morning, 600 mg at 1400 and 1200 mg at night.  I sent over new pills for this that are stronger.     We will call you with the x-ray results.     Labs will be done today.  I will call or send a letter with results within the next 1-2 weeks.      Follow up in 6 months for a physical, be fasting for labs that day.     If you want to do any physical therapy, call and let me know.               Follow-ups after your visit        Future tests that were ordered for you today     Open Future Orders        Priority Expected Expires Ordered    Fecal colorectal cancer screen (FIT) Routine 3/7/2018 5/9/2018 2/14/2018    XR Lumbar Spine 2/3 Views Routine 2/14/2018 2/14/2019 2/14/2018            Who to contact     If you have questions or need follow up information about today's clinic visit or your schedule please contact [facility] directly at [phone].  Normal or non-critical lab and imaging results will be communicated to you by MyChart, letter or phone within 4 business days after the clinic has received the results. If you do not hear from us within 7 days, please contact the clinic through MyChart or phone. If you have a critical or abnormal lab result, we will notify you by phone as soon as possible.  Submit refill requests  "through [employer] or call your pharmacy and they will forward the refill request to us. Please allow 3 business days for your refill to be completed.          Additional Information About Your Visit        [employer] Information     [employer] lets you send messages to your doctor, view your test results, renew your prescriptions, schedule appointments and more. To sign up, go to [email or web address] . Click on \"Log in\" on the left side of the screen, which will take you to the Welcome page. Then click on \"Sign up Now\" on the right side of the page.     You will be asked to enter the access code listed below, as well as some personal information. Please follow the directions to create your username and password.     Your access code is: [email or web address]  Expires: 5/15/2018  2:59 PM     Your access code will  in 90 days. If you need help or a new code, please call your [place] clinic or [phone].        Care EveryWhere ID     This is your Care EveryWhere ID. This could be used by other organizations to access your [place] medical records  [id number]        Your Vitals Were     Pulse Temperature Respirations Pulse Oximetry Breastfeeding? BMI (Body Mass Index)    111 97.8  F (36.6  C) (Tympanic) 20 94% No 28.52 kg/m2       Blood Pressure from Last 3 Encounters:   18 136/62   17 154/74   10/19/17 154/76    Weight from Last 3 Encounters:   18 162 lb (73.5 kg)   10/19/17 165 lb 6.4 oz (75 kg)   17 162 lb (73.5 kg)              We Performed the Following     FOOT EXAM     Hemoglobin A1c          Today's Medication Changes          These changes are accurate as of 18  2:59 PM.  If you have any questions, ask your nurse or doctor.               These medicines have changed or have updated prescriptions.        Dose/Directions    * gabapentin 300 MG capsule   Commonly known as:  NEURONTIN   This may have changed:  Another medication with the same name was added. Make sure you understand how " and when to take each.   Used for:  Hereditary and idiopathic peripheral neuropathy   Changed by:  Skylar Gomez APRN CNP        TAKE TWO CAPSULES BY MOUTH THREE TIMES A DAY   Quantity:  180 capsule   Refills:  3       * gabapentin 600 MG tablet   Commonly known as:  NEURONTIN   This may have changed:  You were already taking a medication with the same name, and this prescription was added. Make sure you understand how and when to take each.   Used for:  Type 2 diabetes mellitus with stage 2 chronic kidney disease, without long-term current use of insulin (H)   Changed by:  Skylar Gomez APRN CNP        Dose:  600 mg   Take 1 tablet (600 mg) by mouth 3 times daily   Quantity:  360 tablet   Refills:  0       lisinopril 20 MG tablet   Commonly known as:  PRINIVIL/ZESTRIL   This may have changed:  See the new instructions.   Used for:  Benign essential hypertension   Changed by:  Skylar Gomez APRN CNP        Dose:  20 mg   Take 1 tablet (20 mg) by mouth daily   Quantity:  90 tablet   Refills:  0       metFORMIN 500 MG tablet   Commonly known as:  GLUCOPHAGE   This may have changed:  See the new instructions.   Used for:  Type 2 diabetes mellitus with stage 2 chronic kidney disease, without long-term current use of insulin (H)   Changed by:  Skylar Gomez APRN CNP        Dose:  500 mg   Take 1 tablet (500 mg) by mouth 2 times daily (with meals)   Quantity:  180 tablet   Refills:  0       simvastatin 5 MG tablet   Commonly known as:  ZOCOR   This may have changed:  See the new instructions.   Used for:  Hyperlipidemia with target LDL less than 100   Changed by:  Skylar Gomez APRN CNP        TAKE ONE TABLET BY MOUTH EVERY NIGHT AT BEDTIME   Quantity:  90 tablet   Refills:  1       * Notice:  This list has 2 medication(s) that are the same as other medications prescribed for you. Read the directions carefully, and ask your doctor or other care provider to review them with you.         Where to get  your medicines      These medications [place] [facility] [place] - [address]  [address] , [facility] [address]     Phone:  [phone]     gabapentin 600 MG tablet    lisinopril 20 MG tablet    metFORMIN 500 MG tablet    simvastatin 5 MG tablet                Primary Care Provider Office Phone # Fax #    MACHO Cifuentes [phone]       [address] [facility] [address]        Equal Access to Services     LIU KURTZ : Hadii aad ku hadasho Soomaali, waaxda luqadaha, qaybta kaalmada adeegyada, waxay idiin hayaan adeeg kharash laamparo . So [facility] [phone].    ATENCIÓN: Si habla español, tiene a johnson disposición servicios gratuitos de asistencia lingüística. [facility] [phone].    We comply with applicable federal civil rights laws and [place] laws. We do not discriminate on the basis of race, color, national origin, age, disability, sex, sexual orientation, or gender identity.            Thank you!     Thank you for choosing [facility]  for your care. Our goal is always to provide you with excellent care. Hearing back from our patients is one way we can continue to improve our services. Please take a few minutes to complete the written survey that you may receive in the mail after your visit with us. Thank you!             Your Updated Medication List - Protect others around you: Learn how to safely use, store and throw away your medicines at www.disposemymeds.org.          This list is accurate as of 2/14/18  2:59 PM.  Always use your most recent med list.                   Brand Name Dispense Instructions for use Diagnosis    aspirin 81 MG EC tablet     90 tablet    Take 1 tablet by mouth daily.    Type 2 diabetes, HbA1c goal < 7% (H)       * gabapentin 300 MG capsule    NEURONTIN    180 capsule    TAKE TWO CAPSULES BY MOUTH THREE TIMES A DAY    Hereditary and idiopathic peripheral neuropathy       * gabapentin 600 MG tablet    NEURONTIN    360  tablet    Take 1 tablet (600 mg) by mouth 3 times daily    Type 2 diabetes mellitus with stage 2 chronic kidney disease, without long-term current use of insulin (H)       lisinopril 20 MG tablet    PRINIVIL/ZESTRIL    90 tablet    Take 1 tablet (20 mg) by mouth daily    Benign essential hypertension       metFORMIN 500 MG tablet    GLUCOPHAGE    180 tablet    Take 1 tablet (500 mg) by mouth 2 times daily (with meals)    Type 2 diabetes mellitus with stage 2 chronic kidney disease, without long-term current use of insulin (H)       simvastatin 5 MG tablet    ZOCOR    90 tablet    TAKE ONE TABLET BY MOUTH EVERY NIGHT AT BEDTIME    Hyperlipidemia with target LDL less than 100       * Notice:  This list has 2 medication(s) that are the same as other medications prescribed for you. Read the directions carefully, and ask your doctor or other care provider to review them with you.

## 2018-02-14 NOTE — PROGRESS NOTES
SUBJECTIVE:   Tracy Mcclelland is a 72 year old female who presents to clinic today for the following health issues:      Diabetes Follow-up      Patient is checking blood sugars: not at all    Diabetic concerns: None     Symptoms of hypoglycemia (low blood sugar): none     Paresthesias (numbness or burning in feet) or sores: Yes burning at night, tingling in daytime     Date of last diabetic eye exam: 1 yr ago    Hyperlipidemia Follow-Up      Rate your low fat/cholesterol diet?: good    Taking statin?  Yes, no muscle aches from statin    Other lipid medications/supplements?:  none    Hypertension Follow-up      Outpatient blood pressures are not being checked.    Low Salt Diet: low salt    BP Readings from Last 2 Encounters:   02/14/18 136/62   11/07/17 154/74     Hemoglobin A1C (%)   Date Value   07/27/2017 6.7 (H)   01/23/2017 6.0     LDL Cholesterol Calculated (mg/dL)   Date Value   08/01/2016 83   07/29/2014 101     LDL Cholesterol Direct (mg/dL)   Date Value   07/27/2017 91   12/02/2014 101       Amount of exercise or physical activity: None    Problems taking medications regularly: No    Medication side effects: none    Diet: regular (no restrictions)        Back Pain Follow Up      Description:   Location of pain:  center  Character of pain: dull ache  Pain radiation: Does not radiate  Since last visit, pain is:  worsened  New numbness or weakness in legs, not attributed to pain:  no     Intensity: moderate    History:   Pain interferes with job: Not applicable  Therapies tried without relief: acetaminophen (Tylenol)  Therapies tried with relief: none           Accompanying Signs & Symptoms:  Risk of Fracture:  Age >64  Risk of Cauda Equina:  None  Risk of Infection:  None  Risk of Cancer:  None        Problem list and histories reviewed & adjusted, as indicated.  Additional history: none    Patient Active Problem List   Diagnosis     Hyperlipidemia with target LDL less than 100     Microalbuminuria      Tennis Elbow-left     Tobacco use disorder     Type 2 diabetes mellitus with diabetic chronic kidney disease (H)     Hypertension, goal below 140/90     Granuloma annulare     Hereditary and idiopathic peripheral neuropathy     CKD (chronic kidney disease) stage 2, GFR 60-89 ml/min     Past Surgical History:   Procedure Laterality Date     C LIGATE FALLOPIAN TUBE       HC LAPAROSCOPY, SURGICAL; CHOLECYSTECTOMY  2000    Cholecystectomy, Laparoscopic     HC REMOVE TONSILS/ADENOIDS,<12 Y/O      T & A <11y.o.       Social History   Substance Use Topics     Smoking status: Current Every Day Smoker     Packs/day: 0.25     Years: 20.00     Types: Cigarettes     Last attempt to quit: 7/10/2012     Smokeless tobacco: Never Used      Comment: 2-3 cigs daily     Alcohol use Yes      Comment: couple times monthly     Family History   Problem Relation Age of Onset     HEART DISEASE Mother      Pacemaker     Alzheimer Disease Father      Dementia/Loss of memory     CEREBROVASCULAR DISEASE Maternal Grandmother      HEART DISEASE Maternal Grandfather      MI     DIABETES Paternal Grandmother          Current Outpatient Prescriptions   Medication Sig Dispense Refill     lisinopril (PRINIVIL/ZESTRIL) 20 MG tablet Take 1 tablet (20 mg) by mouth daily 90 tablet 0     metFORMIN (GLUCOPHAGE) 500 MG tablet Take 1 tablet (500 mg) by mouth 2 times daily (with meals) 180 tablet 0     simvastatin (ZOCOR) 5 MG tablet TAKE ONE TABLET BY MOUTH EVERY NIGHT AT BEDTIME 90 tablet 1     gabapentin (NEURONTIN) 600 MG tablet Take 1 tablet (600 mg) by mouth 3 times daily 360 tablet 0     gabapentin (NEURONTIN) 300 MG capsule TAKE TWO CAPSULES BY MOUTH THREE TIMES A  capsule 3     aspirin 81 MG EC tablet Take 1 tablet by mouth daily. 90 tablet 3     Allergies   Allergen Reactions     Mold      sneezing, coughing , runny nose, watery eyes & red,     No Known Drug Allergies      BP Readings from Last 3 Encounters:   02/14/18 136/62   11/07/17 154/74    10/19/17 154/76    Wt Readings from Last 3 Encounters:   02/14/18 162 lb (73.5 kg)   10/19/17 165 lb 6.4 oz (75 kg)   07/27/17 162 lb (73.5 kg)                    Reviewed and updated as needed this visit by clinical staff  Tobacco  Allergies  Meds  Med Hx  Surg Hx  Fam Hx  Soc Hx      Reviewed and updated as needed this visit by Provider         ROS:  Constitutional, HEENT, cardiovascular, pulmonary, gi and gu systems are negative, except as otherwise noted.    OBJECTIVE:     /62  Pulse 111  Temp 97.8  F (36.6  C) (Tympanic)  Resp 20  Wt 162 lb (73.5 kg)  SpO2 94%  Breastfeeding? No  BMI 28.52 kg/m2  Body mass index is 28.52 kg/(m^2).  GENERAL: healthy, alert and no distress  NECK: no adenopathy, no asymmetry, masses, or scars and thyroid normal to palpation  RESP: lungs clear to auscultation - no rales, rhonchi or wheezes  CV: regular rate and rhythm, normal S1 S2, no S3 or S4, no murmur, click or rub, no peripheral edema and peripheral pulses strong  MS: no gross musculoskeletal defects noted, no edema  Comprehensive back pain exam:  Tenderness of lumbar paraspinal muscles, Pain limits the following motions: bending, twisting, Lower extremity strength functional and equal on both sides, Lower extremity sensation normal and equal on both sides and Straight leg raise negative bilaterally  Diabetic foot exam: normal DP and PT pulses, no trophic changes or ulcerative lesions, normal sensory exam and normal monofilament exam    Diagnostic Test Results:  Office Visit on 02/14/2018   Component Date Value Ref Range Status     Hemoglobin A1C 02/14/2018 6.9* 4.3 - 6.0 % Final      X-ray lumbar spine: multilevel degenerative changes of lumbar spine, no fractures      ASSESSMENT/PLAN:     Diabetes Type II, A1c Controlled, non-insulin dependent   Associated with the following complications    Renal Complications:  CKD    Ophthalmologic Complications: None    Neurologic Complications: None    Peripheral  Vascular Complications:  None    Other: None   Plan:  No changes in the patient's current treatment plan  - metFORMIN (GLUCOPHAGE) 500 MG tablet; Take 1 tablet (500 mg) by mouth 2 times daily (with meals)  Dispense: 180 tablet; Refill: 0  - Hemoglobin A1c  - FOOT EXAM  - gabapentin (NEURONTIN) 600 MG tablet; Take 1 tablet (600 mg) by mouth 3 times daily  Dispense: 360 tablet; Refill: 0      Hyperlipidemia; controlled   Plan:  No changes in the patient's current treatment plan  - simvastatin (ZOCOR) 5 MG tablet; TAKE ONE TABLET BY MOUTH EVERY NIGHT AT BEDTIME  Dispense: 90 tablet; Refill: 1    Hypertension; controlled   Associated with the following complications:    CKD and Diabetes   Plan:  No changes in the patient's current treatment plan  - lisinopril (PRINIVIL/ZESTRIL) 20 MG tablet; Take 1 tablet (20 mg) by mouth daily  Dispense: 90 tablet; Refill: 0       Special screening for malignant neoplasms, colon  - Fecal colorectal cancer screen (FIT); Future    Chronic bilateral low back pain without sciatica  Degenerative changes of the lumbar spine seen on X-ray.  Discussed trial of physical therapy to strengthen her core as she feels weakness in that area after extended physical activity.  She will consider this and is going to try Tylenol arthritis and see if that helps.   - XR Lumbar Spine 2/3 Views; Future    FUTURE APPOINTMENTS:       - Follow-up visit in 6 months for physical and fasting labs   See Patient Instructions    MACHO Cifuentes [facility]

## 2018-02-15 ENCOUNTER — TELEPHONE (OUTPATIENT)
Dept: FAMILY MEDICINE | Facility: OTHER | Age: 73
End: 2018-02-15

## 2018-02-15 DIAGNOSIS — M54.50 BILATERAL LOW BACK PAIN WITHOUT SCIATICA, UNSPECIFIED CHRONICITY: Primary | ICD-10-CM

## 2018-02-15 NOTE — PROGRESS NOTES
Called pt, left msg informing to call clinic for xray results.  ................Carrillo Bauer LPN,   February 15, 2018,      9:28 AM,   [facility]

## 2018-02-15 NOTE — TELEPHONE ENCOUNTER
----- Message from MACHO Cifuentes CNP sent at 2/15/2018  7:05 AM CST -----  Please call and advise patient her x-ray showed degenerative disease (arthritis) of her lumbar spine, but nothing else abnormal.  If she wants to try physical therapy, she should let me know.     Electronically signed by Skylar Gomez CNP.

## 2018-02-15 NOTE — TELEPHONE ENCOUNTER
Left msg to call for xray results. Please give info from Skylar Gomez' note below.  ................Carrillo Bauer LPN,   February 15, 2018,      9:30 AM,   [facility]

## 2018-02-16 NOTE — TELEPHONE ENCOUNTER
Tracy would like you to place an order for Physical Therapy, she will call to make an appointment with them.    Regina Huynh XRO/  [facility]

## 2018-02-18 ENCOUNTER — HOSPITAL ENCOUNTER (OUTPATIENT)
Facility: CLINIC | Age: 73
Setting detail: SPECIMEN
Discharge: HOME | End: 2018-02-18
Admitting: NURSE PRACTITIONER
Payer: MEDICARE

## 2018-02-18 PROCEDURE — 82274 ASSAY TEST FOR BLOOD FECAL: CPT | Performed by: NURSE PRACTITIONER

## 2018-02-20 DIAGNOSIS — Z12.11 SPECIAL SCREENING FOR MALIGNANT NEOPLASMS, COLON: ICD-10-CM

## 2018-02-20 LAB — HEMOCCULT STL QL IA: NEGATIVE

## 2018-04-27 DIAGNOSIS — G60.9 HEREDITARY AND IDIOPATHIC PERIPHERAL NEUROPATHY: ICD-10-CM

## 2018-04-27 RX ORDER — GABAPENTIN 300 MG/1
CAPSULE ORAL
Qty: 540 CAPSULE | Refills: 1 | Status: SHIPPED | OUTPATIENT
Start: 2018-04-27 | End: 2018-06-29 | Stop reason: DRUGHIGH

## 2018-04-27 NOTE — TELEPHONE ENCOUNTER
GABAPENTIN 300MG  Last Written Prescription Date:  1/4/18  Last Fill Quantity: 180,  # refills: 3   Last office visit: 2/14/2018 with prescribing provider   Future Office Visit:  None      Requested Prescriptions   Pending Prescriptions Disp Refills     gabapentin (NEURONTIN) 300 MG capsule 540 capsule 1     Sig: TAKE TWO CAPSULES BY MOUTH THREE TIMES A DAY    There is no refill protocol information for this order

## 2018-06-06 DIAGNOSIS — N18.2 TYPE 2 DIABETES MELLITUS WITH STAGE 2 CHRONIC KIDNEY DISEASE, WITHOUT LONG-TERM CURRENT USE OF INSULIN (H): ICD-10-CM

## 2018-06-06 DIAGNOSIS — E11.22 TYPE 2 DIABETES MELLITUS WITH STAGE 2 CHRONIC KIDNEY DISEASE, WITHOUT LONG-TERM CURRENT USE OF INSULIN (H): ICD-10-CM

## 2018-06-06 DIAGNOSIS — I10 BENIGN ESSENTIAL HYPERTENSION: ICD-10-CM

## 2018-06-06 NOTE — TELEPHONE ENCOUNTER
"Requested Prescriptions   Pending Prescriptions Disp Refills     metFORMIN (GLUCOPHAGE) 500 MG tablet [Pharmacy Med Name: METFORMIN HCL 500MG TABS] 180 tablet 0     Sig: TAKE ONE TABLET BY MOUTH TWICE A DAY WITH MEALS    Biguanide Agents Passed    6/6/2018 12:07 PM       Passed - Blood pressure less than 140/90 in past 6 months    BP Readings from Last 3 Encounters:   02/14/18 136/62   11/07/17 154/74   10/19/17 154/76                Passed - Patient has documented LDL within the past 12 mos.    Recent Labs   Lab Test  07/27/17   0940   LDL  91            Passed - Patient has had a Microalbumin in the past 12 mos.    Recent Labs   Lab Test  07/27/17   1326   MICROL  20   UMALCR  32.18*            Passed - Patient is age 10 or older       Passed - Patient has documented A1c within the specified period of time.    If HgbA1C is 8 or greater, it needs to be on file within the past 3 months.  If less than 8, must be on file within the past 6 months.     Recent Labs   Lab Test  02/14/18   1505   A1C  6.9*            Passed - Patient's CR is NOT>1.4 OR Patient's EGFR is NOT<45 within past 12 mos.    Recent Labs   Lab Test  07/27/17   0940   GFRESTIMATED  89   GFRESTBLACK  >90   GFR Calc         Recent Labs   Lab Test  07/27/17   0940   CR  0.65            Passed - Patient does NOT have a diagnosis of CHF.       Passed - Patient is not pregnant       Passed - Patient has not had a positive pregnancy test within the past 12 mos.        Passed - Recent (6 mo) or future (30 days) visit within the authorizing provider's specialty    Patient had office visit in the last 6 months or has a visit in the next 30 days with authorizing provider or within the authorizing provider's specialty.  See \"Patient Info\" tab in inbasket, or \"Choose Columns\" in Meds & Orders section of the refill encounter.            lisinopril (PRINIVIL/ZESTRIL) 20 MG tablet [Pharmacy Med Name: LISINOPRIL 20MG TABS] 90 tablet 0     Sig: TAKE " "ONE TABLET BY MOUTH ONCE DAILY    ACE Inhibitors (Including Combos) Protocol Passed    6/6/2018 12:07 PM       Passed - Blood pressure under 140/90 in past 12 months    BP Readings from Last 3 Encounters:   02/14/18 136/62   11/07/17 154/74   10/19/17 154/76                Passed - Recent (12 mo) or future (30 days) visit within the authorizing provider's specialty    Patient had office visit in the last 12 months or has a visit in the next 30 days with authorizing provider or within the authorizing provider's specialty.  See \"Patient Info\" tab in inbasket, or \"Choose Columns\" in Meds & Orders section of the refill encounter.           Passed - Patient is age 18 or older       Passed - No active pregnancy on record       Passed - Normal serum creatinine on file in past 12 months    Recent Labs   Lab Test  07/27/17   0940   CR  0.65            Passed - Normal serum potassium on file in past 12 months    Recent Labs   Lab Test  07/27/17   0940   POTASSIUM  4.3            Passed - No positive pregnancy test in past 12 months          Last Written Prescription Date:  2/14/18  Last Fill Quantity: 180,  # refills: 0   Last Office Visit with [facility], [facility] or [facility] prescribing provider:  2/14/18   Future Office Visit:       Lisinopril 20 MG       Last Written Prescription Date:  2/14/18  Last Fill Quantity: 90,   # refills: 0  Last Office Visit: 2/14/18  Future Office visit:           "

## 2018-06-07 RX ORDER — LISINOPRIL 20 MG/1
TABLET ORAL
Qty: 90 TABLET | Refills: 0 | Status: SHIPPED | OUTPATIENT
Start: 2018-06-07 | End: 2018-08-06

## 2018-06-07 NOTE — TELEPHONE ENCOUNTER
Routing refill request to provider for review/approval because:  Labs out of range:  Microalbumin    Kala Burk, RN  [facility]

## 2018-06-29 ENCOUNTER — OFFICE VISIT (OUTPATIENT)
Dept: FAMILY MEDICINE | Facility: OTHER | Age: 73
End: 2018-06-29
Payer: COMMERCIAL

## 2018-06-29 VITALS
HEART RATE: 108 BPM | WEIGHT: 162 LBS | TEMPERATURE: 98.1 F | OXYGEN SATURATION: 95 % | BODY MASS INDEX: 28.52 KG/M2 | SYSTOLIC BLOOD PRESSURE: 130 MMHG | DIASTOLIC BLOOD PRESSURE: 68 MMHG | RESPIRATION RATE: 20 BRPM

## 2018-06-29 DIAGNOSIS — M25.561 CHRONIC PAIN OF BOTH KNEES: ICD-10-CM

## 2018-06-29 DIAGNOSIS — E11.22 TYPE 2 DIABETES MELLITUS WITH STAGE 2 CHRONIC KIDNEY DISEASE, WITHOUT LONG-TERM CURRENT USE OF INSULIN (H): Primary | ICD-10-CM

## 2018-06-29 DIAGNOSIS — M25.551 HIP PAIN, RIGHT: ICD-10-CM

## 2018-06-29 DIAGNOSIS — H61.23 BILATERAL IMPACTED CERUMEN: ICD-10-CM

## 2018-06-29 DIAGNOSIS — G89.29 CHRONIC PAIN OF BOTH KNEES: ICD-10-CM

## 2018-06-29 DIAGNOSIS — N18.2 TYPE 2 DIABETES MELLITUS WITH STAGE 2 CHRONIC KIDNEY DISEASE, WITHOUT LONG-TERM CURRENT USE OF INSULIN (H): Primary | ICD-10-CM

## 2018-06-29 DIAGNOSIS — M25.562 CHRONIC PAIN OF BOTH KNEES: ICD-10-CM

## 2018-06-29 DIAGNOSIS — M51.369 DDD (DEGENERATIVE DISC DISEASE), LUMBAR: ICD-10-CM

## 2018-06-29 PROCEDURE — 69209 REMOVE IMPACTED EAR WAX UNI: CPT | Mod: 50 | Performed by: NURSE PRACTITIONER

## 2018-06-29 PROCEDURE — 99214 OFFICE O/P EST MOD 30 MIN: CPT | Mod: 25 | Performed by: NURSE PRACTITIONER

## 2018-06-29 RX ORDER — GABAPENTIN 600 MG/1
600 TABLET ORAL 3 TIMES DAILY
Qty: 360 TABLET | Refills: 1 | Status: SHIPPED | OUTPATIENT
Start: 2018-06-29 | End: 2018-08-06

## 2018-06-29 NOTE — PROGRESS NOTES
SUBJECTIVE:   Tracy Mcclelland is a 73 year old female who presents to clinic today for the following health issues:      Musculoskeletal Pain  -knees  -back  -hips  -causing trouble walking any distance  -Hx: worsening    Has DDD in her lumbar spine, chronic low back pain from that.  Now having right hip and bilateral knee pain.  It is intermittent, getting worse.  Worse with prolonged activity and standing.  No joint swelling or warmth.  Has been using Aleve and Tylenol without much improvement.     Also has diabetic neuropathy that is worsening.  On Gabapentin 400 mg TID.     Ear Problem  -left ear plugged  -wants irrigation      Problem list and histories reviewed & adjusted, as indicated.  Additional history: as documented    Patient Active Problem List   Diagnosis     Hyperlipidemia with target LDL less than 100     Microalbuminuria     Tennis Elbow-left     Tobacco use disorder     Type 2 diabetes mellitus with diabetic chronic kidney disease (H)     Hypertension, goal below 140/90     Granuloma annulare     Hereditary and idiopathic peripheral neuropathy     CKD (chronic kidney disease) stage 2, GFR 60-89 ml/min     Past Surgical History:   Procedure Laterality Date     C LIGATE FALLOPIAN TUBE       HC LAPAROSCOPY, SURGICAL; CHOLECYSTECTOMY  2000    Cholecystectomy, Laparoscopic     HC REMOVE TONSILS/ADENOIDS,<13 Y/O      T & A <11y.o.       Social History   Substance Use Topics     Smoking status: Current Every Day Smoker     Packs/day: 0.25     Years: 20.00     Types: Cigarettes     Last attempt to quit: 7/10/2012     Smokeless tobacco: Never Used      Comment: 2-3 cigs daily     Alcohol use Yes      Comment: couple times monthly     Family History   Problem Relation Age of Onset     HEART DISEASE Mother      Pacemaker     Alzheimer Disease Father      Dementia/Loss of memory     Cerebrovascular Disease Maternal Grandmother      HEART DISEASE Maternal Grandfather      MI     Diabetes Paternal Grandmother           Current Outpatient Prescriptions   Medication Sig Dispense Refill     aspirin 81 MG EC tablet Take 1 tablet by mouth daily. 90 tablet 3     lisinopril (PRINIVIL/ZESTRIL) 20 MG tablet TAKE ONE TABLET BY MOUTH ONCE DAILY 90 tablet 0     metFORMIN (GLUCOPHAGE) 500 MG tablet TAKE ONE TABLET BY MOUTH TWICE A DAY WITH MEALS 180 tablet 0     simvastatin (ZOCOR) 5 MG tablet TAKE ONE TABLET BY MOUTH EVERY NIGHT AT BEDTIME 90 tablet 1     gabapentin (NEURONTIN) 300 MG capsule TAKE TWO CAPSULES BY MOUTH THREE TIMES A  capsule 1     gabapentin (NEURONTIN) 600 MG tablet Take 1 tablet (600 mg) by mouth 3 times daily 360 tablet 0     Allergies   Allergen Reactions     Mold      sneezing, coughing , runny nose, watery eyes & red,     No Known Drug Allergies      BP Readings from Last 3 Encounters:   06/29/18 130/68   02/14/18 136/62   11/07/17 154/74    Wt Readings from Last 3 Encounters:   06/29/18 162 lb (73.5 kg)   02/14/18 162 lb (73.5 kg)   10/19/17 165 lb 6.4 oz (75 kg)                    Reviewed and updated as needed this visit by clinical staff  Tobacco  Allergies  Meds       Reviewed and updated as needed this visit by Provider         ROS:  Constitutional, HEENT, cardiovascular, pulmonary, gi and gu systems are negative, except as otherwise noted.    OBJECTIVE:     /68  Pulse 108  Temp 98.1  F (36.7  C) (Tympanic)  Resp 20  Wt 162 lb (73.5 kg)  SpO2 95%  Breastfeeding? No  BMI 28.52 kg/m2  Body mass index is 28.52 kg/(m^2).  GENERAL: healthy, alert and no distress  HENT: normal cephalic/atraumatic, both ears: occluded with wax, nose and mouth without ulcers or lesions, oropharynx clear and oral mucous membranes moist  NECK: no adenopathy, no asymmetry, masses, or scars and thyroid normal to palpation  RESP: lungs clear to auscultation - no rales, rhonchi or wheezes  CV: regular rate and rhythm, normal S1 S2, no S3 or S4, no murmur, click or rub,   ABDOMEN: soft, nontender, no  hepatosplenomegaly, no masses and bowel sounds normal  MS: no gross musculoskeletal defects noted, no edema    Diagnostic Test Results:  none     Both ears irrigated by nursing staff with a large amount of cerumen removed.  TMs clear bilaterally.     ASSESSMENT/PLAN:         1. Type 2 diabetes mellitus with stage 2 chronic kidney disease, without long-term current use of insulin (H)  Will increase her Gabapentin and see if that helps her neuropathy pain.  Due for labs in 2 months.   - gabapentin (NEURONTIN) 600 MG tablet; Take 1 tablet (600 mg) by mouth 3 times daily  Dispense: 360 tablet; Refill: 1    2. Hip pain, right  - ORTHOPEDICS ADULT REFERRAL    3. Chronic pain of both knees  - ORTHOPEDICS ADULT REFERRAL    4. DDD (degenerative disc disease), lumbar  - ORTHOPEDICS ADULT REFERRAL    5. Bilateral impacted cerumen  - REMOVE IMPACTED CERUMEN    FUTURE APPOINTMENTS:       - Follow-up visit in 2 months for diabetes check, fasting labs.   See Patient Instructions    MACHO Cifuentes [facility]

## 2018-06-29 NOTE — PROGRESS NOTES
ORTHOPEDIC CONSULT      Chief Complaint: Tracy Mcclelland is a 73 year old female      She is being seen for   Chief Complaints and History of Present Illnesses   Patient presents with     Consult     rt hip pain        History of Present Illness:   Tracy Mcclelland is a 73 year old female who is seen in consultation at the request of .  History of Present illness:  Tracy presents for evaluation of:  1.) rt hip   Onset: about 1 year  Symptoms brought on by: nothing.   Character:  sharp, dull ache and loss of balance.    Progression of symptoms:  worse.    Previous similar pain: no .   Pain Level:  5/10.   Previous treatments:  aleve.  Currently on Blood thinners? Yes  Diagnosis of Diabetes? Yes  Difficulty walking, limping  Right leg shorter      Patient's past medical, surgical, social and family histories reviewed.       Past Medical History:   Diagnosis Date     CKD (chronic kidney disease) stage 2, GFR 60-89 ml/min 10/19/2015     Headache(784.0)     hx.of Migraines     Lumbago      Type II or unspecified type diabetes mellitus without mention of complication, not stated as uncontrolled      Unspecified essential hypertension          Past Surgical History:   Procedure Laterality Date     C LIGATE FALLOPIAN TUBE       HC LAPAROSCOPY, SURGICAL; CHOLECYSTECTOMY  2000    Cholecystectomy, Laparoscopic     HC REMOVE TONSILS/ADENOIDS,<10 Y/O      T & A <11y.o.         Medications:    Current Outpatient Prescriptions on File Prior to Visit:  aspirin 81 MG EC tablet Take 1 tablet by mouth daily.   gabapentin (NEURONTIN) 600 MG tablet Take 1 tablet (600 mg) by mouth 3 times daily   lisinopril (PRINIVIL/ZESTRIL) 20 MG tablet TAKE ONE TABLET BY MOUTH ONCE DAILY   metFORMIN (GLUCOPHAGE) 500 MG tablet TAKE ONE TABLET BY MOUTH TWICE A DAY WITH MEALS   simvastatin (ZOCOR) 5 MG tablet TAKE ONE TABLET BY MOUTH EVERY NIGHT AT BEDTIME     No current facility-administered medications on file prior to visit.       Allergies   Allergen  "Reactions     Mold      sneezing, coughing , runny nose, watery eyes & red,     No Known Drug Allergies          Social History     Occupational History      [employer]     Social History Main Topics     Smoking status: Current Every Day Smoker     Packs/day: 0.25     Years: 20.00     Types: Cigarettes     Last attempt to quit: 7/10/2012     Smokeless tobacco: Never Used      Comment: 2-3 cigs daily     Alcohol use Yes      Comment: couple times monthly     Drug use: No     Sexual activity: No       Patient does use Tobacco products. Patient not ready to quit at this time.  Strongly encouraged smoking cessation.  Risks of smoking and benefits of quitting were discussed.  Information offered: AVS with information about stopping smoking and advised to discuss smoking cessation medications/strategies with Primary care provider.  3-5 Minutes were spent in counseling.      Family History   Problem Relation Age of Onset     HEART DISEASE Mother      Pacemaker     Alzheimer Disease Father      Dementia/Loss of memory     Cerebrovascular Disease Maternal Grandmother      HEART DISEASE Maternal Grandfather      MI     Diabetes Paternal Grandmother          REVIEW OF SYSTEMS  10 point review systems performed otherwise negative as noted as per history of present illness.      Physical Exam:  Vitals: /81  Ht 1.605 m (5' 3.2\")  Wt 73.5 kg (162 lb)  BMI 28.52 kg/m2  BMI= Body mass index is 28.52 kg/(m^2).    Constitutional: healthy, alert and no acute distress   Psychiatric: mentation appears normal and affect normal/bright  NEURO: no focal deficits  RESP: Normal with easy respirations and no use of accessory muscles to breathe, no audible wheezing or retractions  CV: regular pulse  SKIN: No erythema, rashes, excoriation, or breakdown. No evidence of infection.   JOINT/EXTREMITIES:right Hip Exam: Palpation: Tender:   none  Non-tender:  right greater trochanter  Range of Motion:  Right Hip  Very limited " rotation with pain  Strength:  full strength    GAIT: antalgic  Lymph: no palpable lymph nodes    Diagnostic Modalities:  right hip X-ray: superior wear , with bone on bone wear, with bone erosion noted, osteophytes, Shallow acetabulum, Flattening of the head, Collapse of the head  Independent visualization of the images was performed.      Impression: right Hip osteoarthritis  Severe with significant femoral wear and shallow acetabulum    Plan:  All of the above pertinent physical exam and imaging modalities findings was reviewed with Tracy.                                          CONSERVATIVE CARE:  I recommend conservative care for the patient to include NSAIDs, Tylenol, steroid injections, activity modifications. Today I provided or dispensed Mobic prescription, info, referral to XR for hip injection.                                        NSAIDS RISKS:  I have prescribed an antiinflammatory medication.  We discussed that it is the same class of some the common over the counter medications (Ibuprofen, Advil, Motrin, Aleve, Naproxen, and  Naprosyn). I recommend to avoid taking these OTC's medication when taking the medication that I prescribed. This medication should be stopped if having stomach issues, bleeding, high blood pressure and/or chest pain                                                FUTURE PLAN:  On their return if they still have symptoms we will consider referral to Dr Ervin for EAMON.    BP Readings from Last 1 Encounters:   07/02/18 148/81       BP noted to be well controlled today in office.     Return to clinic PRN, or sooner as needed for changes.  Re-x-ray on return: No    Martita Stiles M.D.

## 2018-06-29 NOTE — MR AVS SNAPSHOT
After Visit Summary   6/29/2018    Tracy Mcclelland    MRN: [id number]           Patient Information     Date Of Birth          1945        Visit Information        Provider Department      6/29/2018 2:40 PM Skylar Gomez APRN CNP [facility]        Today's Diagnoses     Type 2 diabetes mellitus with stage 2 chronic kidney disease, without long-term current use of insulin (H)    -  1    Hip pain, right        Chronic pain of both knees        DDD (degenerative disc disease), lumbar          Care Instructions    Increase your Gabapentin to 600 mg three times a day.      See the orthopedic doctor about your hip, knees and back.     Come back in August, see me and be fasting for labs.               Follow-ups after your visit        Additional Services     ORTHOPEDICS ADULT REFERRAL       Your provider has referred you to: [facility]: [facility] [phone]   [email or web address]    Please be aware that coverage of these services is subject to the terms and limitations of your health insurance plan.  Call member services at your health plan with any benefit or coverage questions.      Please bring the following to your appointment:    >>   Any x-rays, CTs or MRIs which have been performed.  Contact the facility where they were done to arrange for  prior to your scheduled appointment.    >>   List of current medications   >>   This referral request   >>   Any documents/labs given to you for this referral                  Your next 10 appointments already scheduled     Jul 02, 2018 10:00 AM CDT   [facility] Visit with [facility] Stiles MD   [facility]    [address] [place] [id number] [phone]              Who to contact     If you have questions or need follow up information about today's clinic visit or your schedule please contact [facility] directly at  "[phone].  Normal or non-critical lab and imaging results will be communicated to you by MyChart, letter or phone within 4 business days after the clinic has received the results. If you do not hear from us within 7 days, please contact the clinic through [employer] or phone. If you have a critical or abnormal lab result, we will notify you by phone as soon as possible.  Submit refill requests through [employer] or call your pharmacy and they will forward the refill request to us. Please allow 3 business days for your refill to be completed.          Additional Information About Your Visit        [employer] Information     [employer] lets you send messages to your doctor, view your test results, renew your prescriptions, schedule appointments and more. To sign up, go to [email or web address] . Click on \"Log in\" on the left side of the screen, which will take you to the Welcome page. Then click on \"Sign up Now\" on the right side of the page.     You will be asked to enter the access code listed below, as well as some personal information. Please follow the directions to create your username and password.     Your access code is: [email or web address]  Expires: 2018  3:31 PM     Your access code will  in 90 days. If you need help or a new code, please call your [facility] clinic or [phone].        Care EveryWhere ID     This is your Care EveryWhere ID. This could be used by other organizations to access your [facility] medical records  [id number]        Your Vitals Were     Pulse Temperature Respirations Pulse Oximetry Breastfeeding? BMI (Body Mass Index)    108 98.1  F (36.7  C) (Tympanic) 20 95% No 28.52 kg/m2       Blood Pressure from Last 3 Encounters:   18 130/68   18 136/62   17 154/74    Weight from Last 3 Encounters:   18 162 lb (73.5 kg)   18 162 lb (73.5 kg)   10/19/17 165 lb 6.4 oz (75 kg)              We Performed the Following     ORTHOPEDICS ADULT REFERRAL          Today's " Medication Changes          These changes are accurate as of 6/29/18  3:53 PM.  If you have any questions, ask your nurse or doctor.               These medicines have changed or have updated prescriptions.        Dose/Directions    gabapentin 600 MG tablet   Commonly known as:  NEURONTIN   This may have changed:  Another medication with the same name was removed. Continue taking this medication, and follow the directions you see here.   Used for:  Type 2 diabetes mellitus with stage 2 chronic kidney disease, without long-term current use of insulin (H)   Changed by:  Skylar Gomez APRN CNP        Dose:  600 mg   Take 1 tablet (600 mg) by mouth 3 times daily   Quantity:  360 tablet   Refills:  1            Where to get your medicines      These medications were sent to [facility] [address]   [address] [facility] [address]     Phone:  [phone]     gabapentin 600 MG tablet                Primary Care Provider Office Phone # Fax #    MACHO Cifuentes [phone]       [address] [facility] [address]        Equal Access to Services     [facility]: Hadii denton ku hadasho Soomaali, waaxda luqadaha, qaybta kaalmada adeegyada, waxay idiin hayliana davies . So [facility] [phone].    ATENCIÓN: Si habla español, tiene a johnson disposición servicios gratuitos de asistencia lingüística. Llame al [phone].    We comply with applicable federal civil rights laws and [place] laws. We do not discriminate on the basis of race, color, national origin, age, disability, sex, sexual orientation, or gender identity.            Thank you!     Thank you for choosing [facility]  for your care. Our goal is always to provide you with excellent care. Hearing back from our patients is one way we can continue to improve our services. Please take a few minutes to complete the written survey that you may receive in the mail after your visit with us. Thank  you!             Your Updated Medication List - Protect others around you: Learn how to safely use, store and throw away your medicines at www.disposemymeds.org.          This list is accurate as of 6/29/18  3:53 PM.  Always use your most recent med list.                   Brand Name Dispense Instructions for use Diagnosis    aspirin 81 MG EC tablet     90 tablet    Take 1 tablet by mouth daily.    Type 2 diabetes, HbA1c goal < 7% (H)       gabapentin 600 MG tablet    NEURONTIN    360 tablet    Take 1 tablet (600 mg) by mouth 3 times daily    Type 2 diabetes mellitus with stage 2 chronic kidney disease, without long-term current use of insulin (H)       lisinopril 20 MG tablet    PRINIVIL/ZESTRIL    90 tablet    TAKE ONE TABLET BY MOUTH ONCE DAILY    Benign essential hypertension       metFORMIN 500 MG tablet    GLUCOPHAGE    180 tablet    TAKE ONE TABLET BY MOUTH TWICE A DAY WITH MEALS    Type 2 diabetes mellitus with stage 2 chronic kidney disease, without long-term current use of insulin (H)       simvastatin 5 MG tablet    ZOCOR    90 tablet    TAKE ONE TABLET BY MOUTH EVERY NIGHT AT BEDTIME    Hyperlipidemia with target LDL less than 100

## 2018-06-29 NOTE — PATIENT INSTRUCTIONS
Increase your Gabapentin to 600 mg three times a day.      See the orthopedic doctor about your hip, knees and back.     Come back in August, see me and be fasting for labs.

## 2018-07-02 ENCOUNTER — OFFICE VISIT (OUTPATIENT)
Dept: ORTHOPEDICS | Facility: CLINIC | Age: 73
End: 2018-07-02
Payer: COMMERCIAL

## 2018-07-02 ENCOUNTER — RADIANT APPOINTMENT (OUTPATIENT)
Dept: GENERAL RADIOLOGY | Facility: CLINIC | Age: 73
End: 2018-07-02
Attending: ORTHOPAEDIC SURGERY
Payer: COMMERCIAL

## 2018-07-02 VITALS
DIASTOLIC BLOOD PRESSURE: 81 MMHG | WEIGHT: 162 LBS | SYSTOLIC BLOOD PRESSURE: 148 MMHG | BODY MASS INDEX: 28.7 KG/M2 | HEIGHT: 63 IN

## 2018-07-02 DIAGNOSIS — M16.11 PRIMARY OSTEOARTHRITIS OF RIGHT HIP: Primary | ICD-10-CM

## 2018-07-02 DIAGNOSIS — M25.551 HIP PAIN, RIGHT: ICD-10-CM

## 2018-07-02 PROCEDURE — 99214 OFFICE O/P EST MOD 30 MIN: CPT | Performed by: ORTHOPAEDIC SURGERY

## 2018-07-02 PROCEDURE — 73502 X-RAY EXAM HIP UNI 2-3 VIEWS: CPT | Mod: TC

## 2018-07-02 RX ORDER — MELOXICAM 15 MG/1
15 TABLET ORAL DAILY
Qty: 30 TABLET | Refills: 1 | Status: SHIPPED | OUTPATIENT
Start: 2018-07-02 | End: 2018-10-03

## 2018-07-02 ASSESSMENT — PAIN SCALES - GENERAL: PAINLEVEL: MODERATE PAIN (5)

## 2018-07-02 NOTE — PATIENT INSTRUCTIONS
Understanding Osteoarthritis of the Hip    A joint is a place where two bones meet. The hip is a ball-and-socket joint. It is formed where the ball at the top of the thighbone (femur) rests in the socket in the pelvic bone. The hip joint allows the leg to move freely in many directions.  Hip osteoarthritis is a condition where parts of the hip joint wear out. It can lead to pain, stiffness, and limited movement.   What is osteoarthritis?  All joints contain a smooth tissue called cartilage. Cartilage cushions the ends of bones, helping them glide against each other. Hip osteoarthritis occurs when cartilage in the hip joint begins to break down and wear away. The ball and socket bones may then become exposed and rub together. The cartilage may become rough and pitted and may begin to wear away. This prevents smooth movement of the joint and can lead to pain.  Causes of osteoarthritis of the hip  Causes can include:    Wear and tear from normal use of the hip over time    Overuse of the hip during sports or work activities    Being overweight. This increases stress on the hip joint.    Injury to the hip    Infection of the hip joint  Symptoms of osteoarthritis of the hip  The main symptom of hip osteoarthritis is pain. The pain is most often felt in the groin area. It may also travel down the leg to the knee or to the back of the hip. The pain usually gets worse with activity, such as walking or climbing stairs. The pain may get better with rest. The joint may also be stiff first thing in the morning or after periods of sitting or lying down. Stiffness usually gets better with movement.  Treating osteoarthritis of the hip  Osteoarthritis is a long-term condition. Treatment usually focuses on managing symptoms. Treatment may include:    Over-the-counter or prescription medicines taken by mouth to help relieve pain and swelling    Injections of medicine into the joint to help relieve symptoms for a time    A  weight-loss plan if you are overweight    A plan of physical therapy and exercises to improve strength and flexibility around the joint    Cold or heat packs help relieve pain and stiffness    Assistive devices that help with movement, such as a cane or a walker    Assistive devices that make activities of daily life easier, such as raised toilet seats or shower bars  If other treatments don t do enough to relieve severe symptoms, you may need surgery to replace the joint. This surgery replaces the hip joint with an artificial joint. It can help relieve pain and stiffness and improve function of the hip.     When to call your healthcare provider  Call your healthcare provider right away if you have any of these:    Fever of 100.4 F (38 C) or higher, or as directed    Symptoms that don t get better with prescribed medicines or get worse    New symptoms   Date Last Reviewed: 3/10/2016    2410-2440 [employer]. [address] [place] [address]. All rights reserved. This information is not intended as a substitute for professional medical care. Always follow your healthcare professional's instructions.        Understanding Hip Replacement  The hip joint is one of the body s largest weight-bearing joints. It is a ball-and-socket joint. This helps the hip remain stable even during twisting and extreme ranges of motion. A healthy hip joint allows you to walk, squat, and turn without pain. But when a hip joint is damaged, it is likely to hurt when you move. When a natural hip must be replaced, a prosthesis is used.    A healthy hip  In a healthy hip, smooth cartilage covers the ends of the thighbone, as well as the pelvis where it joins the thighbone. This allows the ball to glide easily inside the socket with little friction. When the surrounding muscles support your weight and the joint moves smoothly, you can walk painlessly.                A problem hip  In a problem hip, the worn cartilage no  longer serves as a cushion. As the roughened bones rub together, they become irregular, with a surface like sandpaper. The ball grinds in the socket when you move your leg, causing pain and stiffness.                A prosthesis  An artificial ball replaces the head of the thighbone, and an artificial cup replaces the worn socket. A stem is inserted into the thigh bone to keep the ball in place. These parts connect to create your new artificial hip. A plastic liner is placed between the metal ball and cup to create a smooth surface for comfortable movement once you have healed.     Date Last Reviewed: 10/4/2015    4594-6533 [employer]. [address] [place] [address]. All rights reserved. This information is not intended as a substitute for professional medical care. Always follow your healthcare professional's instructions.      Dr. Stiles has ordered Imaging test for you.  Please call [phone] to schedule this  You will also need to schedule a follow up visit for the results from your Imaging test  with Dr. Stiles.  You may call us at [phone] to schedule this appointment.  Please make this appointment for at least  2-3 days your test to go over the results.

## 2018-07-02 NOTE — MR AVS SNAPSHOT
After Visit Summary   7/2/2018    Tracy Mcclelland    MRN: [id number]           Patient Information     Date Of Birth          1945        Visit Information        Provider Department      7/2/2018 10:00 AM Martita Stiles MD [facility]        Today's Diagnoses     Hip pain, right    -  1      Care Instructions      Understanding Osteoarthritis of the Hip    A joint is a place where two bones meet. The hip is a ball-and-socket joint. It is formed where the ball at the top of the thighbone (femur) rests in the socket in the pelvic bone. The hip joint allows the leg to move freely in many directions.  Hip osteoarthritis is a condition where parts of the hip joint wear out. It can lead to pain, stiffness, and limited movement.   What is osteoarthritis?  All joints contain a smooth tissue called cartilage. Cartilage cushions the ends of bones, helping them glide against each other. Hip osteoarthritis occurs when cartilage in the hip joint begins to break down and wear away. The ball and socket bones may then become exposed and rub together. The cartilage may become rough and pitted and may begin to wear away. This prevents smooth movement of the joint and can lead to pain.  Causes of osteoarthritis of the hip  Causes can include:    Wear and tear from normal use of the hip over time    Overuse of the hip during sports or work activities    Being overweight. This increases stress on the hip joint.    Injury to the hip    Infection of the hip joint  Symptoms of osteoarthritis of the hip  The main symptom of hip osteoarthritis is pain. The pain is most often felt in the groin area. It may also travel down the leg to the knee or to the back of the hip. The pain usually gets worse with activity, such as walking or climbing stairs. The pain may get better with rest. The joint may also be stiff first thing in the morning or after periods of sitting or lying down. Stiffness usually gets  better with movement.  Treating osteoarthritis of the hip  Osteoarthritis is a long-term condition. Treatment usually focuses on managing symptoms. Treatment may include:    Over-the-counter or prescription medicines taken by mouth to help relieve pain and swelling    Injections of medicine into the joint to help relieve symptoms for a time    A weight-loss plan if you are overweight    A plan of physical therapy and exercises to improve strength and flexibility around the joint    Cold or heat packs help relieve pain and stiffness    Assistive devices that help with movement, such as a cane or a walker    Assistive devices that make activities of daily life easier, such as raised toilet seats or shower bars  If other treatments don t do enough to relieve severe symptoms, you may need surgery to replace the joint. This surgery replaces the hip joint with an artificial joint. It can help relieve pain and stiffness and improve function of the hip.     When to call your healthcare provider  Call your healthcare provider right away if you have any of these:    Fever of 100.4 F (38 C) or higher, or as directed    Symptoms that don t get better with prescribed medicines or get worse    New symptoms   Date Last Reviewed: 3/10/2016    0610-5809 [employer]. [address] [place] [address]. All rights reserved. This information is not intended as a substitute for professional medical care. Always follow your healthcare professional's instructions.        Understanding Hip Replacement  The hip joint is one of the body s largest weight-bearing joints. It is a ball-and-socket joint. This helps the hip remain stable even during twisting and extreme ranges of motion. A healthy hip joint allows you to walk, squat, and turn without pain. But when a hip joint is damaged, it is likely to hurt when you move. When a natural hip must be replaced, a prosthesis is used.    A healthy hip  In a healthy hip, smooth  cartilage covers the ends of the thighbone, as well as the pelvis where it joins the thighbone. This allows the ball to glide easily inside the socket with little friction. When the surrounding muscles support your weight and the joint moves smoothly, you can walk painlessly.                A problem hip  In a problem hip, the worn cartilage no longer serves as a cushion. As the roughened bones rub together, they become irregular, with a surface like sandpaper. The ball grinds in the socket when you move your leg, causing pain and stiffness.                A prosthesis  An artificial ball replaces the head of the thighbone, and an artificial cup replaces the worn socket. A stem is inserted into the thigh bone to keep the ball in place. These parts connect to create your new artificial hip. A plastic liner is placed between the metal ball and cup to create a smooth surface for comfortable movement once you have healed.     Date Last Reviewed: 10/4/2015    6711-6694 [employer]. [address] [place] [address]. All rights reserved. This information is not intended as a substitute for professional medical care. Always follow your healthcare professional's instructions.      Dr. Stiles has ordered Imaging test for you.  Please call [phone] to schedule this  You will also need to schedule a follow up visit for the results from your Imaging test  with Dr. Stiles.  You may call us at [phone] to schedule this appointment.  Please make this appointment for at least  2-3 days your test to go over the results.               Follow-ups after your visit        Who to contact     If you have questions or need follow up information about today's clinic visit or your schedule please contact [facility] directly at [phone].  Normal or non-critical lab and imaging results will be communicated to you by MyChart, letter or phone within 4 business days after the clinic has  "received the results. If you do not hear from us within 7 days, please contact the clinic through [employer] or phone. If you have a critical or abnormal lab result, we will notify you by phone as soon as possible.  Submit refill requests through [employer] or call your pharmacy and they will forward the refill request to us. Please allow 3 business days for your refill to be completed.          Additional Information About Your Visit        [employer] Information     [employer] lets you send messages to your doctor, view your test results, renew your prescriptions, schedule appointments and more. To sign up, go to [email or web address] . Click on \"Log in\" on the left side of the screen, which will take you to the Welcome page. Then click on \"Sign up Now\" on the right side of the page.     You will be asked to enter the access code listed below, as well as some personal information. Please follow the directions to create your username and password.     Your access code is: [email or web address]  Expires: 2018  3:31 PM     Your access code will  in 90 days. If you need help or a new code, please call your [facility] clinic or [phone].        Care EveryWhere ID     This is your Care EveryWhere ID. This could be used by other organizations to access your [facility] medical records  [id number]        Your Vitals Were     Height BMI (Body Mass Index)                1.605 m (5' 3.2\") 28.52 kg/m2           Blood Pressure from Last 3 Encounters:   18 148/81   18 130/68   18 136/62    Weight from Last 3 Encounters:   18 73.5 kg (162 lb)   18 73.5 kg (162 lb)   18 73.5 kg (162 lb)               Primary Care Provider Office Phone # Fax #    MACHO Cifuentes [phone]       [address] [facility] [address]        Equal Access to Services     LIU KURTZ AH: Lázaro Proctor, ani stoner, alton biswas, tatyana davies" ah. So [facility] [phone].    ATENCIÓN: Si chikis morgan, tiene a johnson disposición servicios gratuitos de asistencia lingüística. Suad al [phone].    We comply with applicable federal civil rights laws and [place] laws. We do not discriminate on the basis of race, color, national origin, age, disability, sex, sexual orientation, or gender identity.            Thank you!     Thank you for choosing [facility]  for your care. Our goal is always to provide you with excellent care. Hearing back from our patients is one way we can continue to improve our services. Please take a few minutes to complete the written survey that you may receive in the mail after your visit with us. Thank you!             Your Updated Medication List - Protect others around you: Learn how to safely use, store and throw away your medicines at www.disposemymeds.org.          This list is accurate as of 7/2/18 10:51 AM.  Always use your most recent med list.                   Brand Name Dispense Instructions for use Diagnosis    aspirin 81 MG EC tablet     90 tablet    Take 1 tablet by mouth daily.    Type 2 diabetes, HbA1c goal < 7% (H)       gabapentin 600 MG tablet    NEURONTIN    360 tablet    Take 1 tablet (600 mg) by mouth 3 times daily    Type 2 diabetes mellitus with stage 2 chronic kidney disease, without long-term current use of insulin (H)       lisinopril 20 MG tablet    PRINIVIL/ZESTRIL    90 tablet    TAKE ONE TABLET BY MOUTH ONCE DAILY    Benign essential hypertension       metFORMIN 500 MG tablet    GLUCOPHAGE    180 tablet    TAKE ONE TABLET BY MOUTH TWICE A DAY WITH MEALS    Type 2 diabetes mellitus with stage 2 chronic kidney disease, without long-term current use of insulin (H)       simvastatin 5 MG tablet    ZOCOR    90 tablet    TAKE ONE TABLET BY MOUTH EVERY NIGHT AT BEDTIME    Hyperlipidemia with target LDL less than 100

## 2018-07-02 NOTE — LETTER
7/2/2018         RE: Tracy Mcclelland  [address] [facility] [address]        Dear Colleague,    Thank you for referring your patient, Tracy Mcclelland, to the [facility]. Please see a copy of my visit note below.    ORTHOPEDIC CONSULT      Chief Complaint: Tracy Mcclelland is a 73 year old female      She is being seen for   Chief Complaints and History of Present Illnesses   Patient presents with     Consult     rt hip pain        History of Present Illness:   Tracy Mcclelland is a 73 year old female who is seen in consultation at the request of .  History of Present illness:  Tracy presents for evaluation of:  1.) rt hip   Onset: about 1 year  Symptoms brought on by: nothing.   Character:  sharp, dull ache and loss of balance.    Progression of symptoms:  worse.    Previous similar pain: no .   Pain Level:  5/10.   Previous treatments:  aleve.  Currently on Blood thinners? Yes  Diagnosis of Diabetes? Yes  Difficulty walking, limping  Right leg shorter      Patient's past medical, surgical, social and family histories reviewed.       Past Medical History:   Diagnosis Date     CKD (chronic kidney disease) stage 2, GFR 60-89 ml/min 10/19/2015     Headache(784.0)     hx.of Migraines     Lumbago      Type II or unspecified type diabetes mellitus without mention of complication, not stated as uncontrolled      Unspecified essential hypertension          Past Surgical History:   Procedure Laterality Date     C LIGATE FALLOPIAN TUBE       [facility] LAPAROSCOPY, SURGICAL; CHOLECYSTECTOMY  2000    Cholecystectomy, Laparoscopic     HC REMOVE TONSILS/ADENOIDS,<10 Y/O      T & A <11y.o.         Medications:    Current Outpatient Prescriptions on File Prior to Visit:  aspirin 81 MG EC tablet Take 1 tablet by mouth daily.   gabapentin (NEURONTIN) 600 MG tablet Take 1 tablet (600 mg) by mouth 3 times daily   lisinopril (PRINIVIL/ZESTRIL) 20 MG tablet TAKE ONE TABLET BY MOUTH ONCE DAILY   metFORMIN (GLUCOPHAGE) 500 MG  "tablet TAKE ONE TABLET BY MOUTH TWICE A DAY WITH MEALS   simvastatin (ZOCOR) 5 MG tablet TAKE ONE TABLET BY MOUTH EVERY NIGHT AT BEDTIME     No current facility-administered medications on file prior to visit.       Allergies   Allergen Reactions     Mold      sneezing, coughing , runny nose, watery eyes & red,     No Known Drug Allergies          Social History     Occupational History      [employer]     Social History Main Topics     Smoking status: Current Every Day Smoker     Packs/day: 0.25     Years: 20.00     Types: Cigarettes     Last attempt to quit: 7/10/2012     Smokeless tobacco: Never Used      Comment: 2-3 cigs daily     Alcohol use Yes      Comment: couple times monthly     Drug use: No     Sexual activity: No       Patient does use Tobacco products. Patient not ready to quit at this time.  Strongly encouraged smoking cessation.  Risks of smoking and benefits of quitting were discussed.  Information offered: AVS with information about stopping smoking and advised to discuss smoking cessation medications/strategies with Primary care provider.  3-5 Minutes were spent in counseling.      Family History   Problem Relation Age of Onset     HEART DISEASE Mother      Pacemaker     Alzheimer Disease Father      Dementia/Loss of memory     Cerebrovascular Disease Maternal Grandmother      HEART DISEASE Maternal Grandfather      MI     Diabetes Paternal Grandmother          REVIEW OF SYSTEMS  10 point review systems performed otherwise negative as noted as per history of present illness.      Physical Exam:  Vitals: /81  Ht 1.605 m (5' 3.2\")  Wt 73.5 kg (162 lb)  BMI 28.52 kg/m2  BMI= Body mass index is 28.52 kg/(m^2).    Constitutional: healthy, alert and no acute distress   Psychiatric: mentation appears normal and affect normal/bright  NEURO: no focal deficits  RESP: Normal with easy respirations and no use of accessory muscles to breathe, no audible wheezing or retractions  CV: " regular pulse  SKIN: No erythema, rashes, excoriation, or breakdown. No evidence of infection.   JOINT/EXTREMITIES:right Hip Exam: Palpation: Tender:   none  Non-tender:  right greater trochanter  Range of Motion:  Right Hip  Very limited rotation with pain  Strength:  full strength    GAIT: antalgic  Lymph: no palpable lymph nodes    Diagnostic Modalities:  right hip X-ray: superior wear , with bone on bone wear, with bone erosion noted, osteophytes, Shallow acetabulum, Flattening of the head, Collapse of the head  Independent visualization of the images was performed.      Impression: right Hip osteoarthritis  Severe with significant femoral wear and shallow acetabulum    Plan:  All of the above pertinent physical exam and imaging modalities findings was reviewed with Tracy.                                          CONSERVATIVE CARE:  I recommend conservative care for the patient to include NSAIDs, Tylenol, steroid injections, activity modifications. Today I provided or dispensed Mobic prescription, info, referral to XR for hip injection.                                        NSAIDS RISKS:  I have prescribed an antiinflammatory medication.  We discussed that it is the same class of some the common over the counter medications (Ibuprofen, Advil, Motrin, Aleve, Naproxen, and  Naprosyn). I recommend to avoid taking these OTC's medication when taking the medication that I prescribed. This medication should be stopped if having stomach issues, bleeding, high blood pressure and/or chest pain                                                FUTURE PLAN:  On their return if they still have symptoms we will consider referral to Dr Ervin for EAMON.    BP Readings from Last 1 Encounters:   07/02/18 148/81       BP noted to be well controlled today in office.     Return to clinic PRN, or sooner as needed for changes.  Re-x-ray on return: No    Martita Stiles M.D.    Again, thank you for allowing me to participate in the  care of your patient.        Sincerely,        Martita Stiles MD

## 2018-07-10 ENCOUNTER — HOSPITAL ENCOUNTER (OUTPATIENT)
Dept: GENERAL RADIOLOGY | Facility: CLINIC | Age: 73
Discharge: HOME | End: 2018-07-10
Attending: ORTHOPAEDIC SURGERY | Admitting: ORTHOPAEDIC SURGERY
Payer: MEDICARE

## 2018-07-10 DIAGNOSIS — M16.11 PRIMARY OSTEOARTHRITIS OF RIGHT HIP: ICD-10-CM

## 2018-07-10 PROCEDURE — 25000125 ZZHC RX 250: Performed by: RADIOLOGY

## 2018-07-10 PROCEDURE — 25500064 ZZH RX 255 OP 636: Performed by: RADIOLOGY

## 2018-07-10 PROCEDURE — 20610 DRAIN/INJ JOINT/BURSA W/O US: CPT | Mod: RT

## 2018-07-10 PROCEDURE — 25000128 H RX IP 250 OP 636: Performed by: RADIOLOGY

## 2018-07-10 RX ORDER — IOPAMIDOL 408 MG/ML
50 INJECTION, SOLUTION INTRAVASCULAR ONCE
Status: COMPLETED | OUTPATIENT
Start: 2018-07-10 | End: 2018-07-10

## 2018-07-10 RX ORDER — BUPIVACAINE HYDROCHLORIDE 2.5 MG/ML
10 INJECTION, SOLUTION EPIDURAL; INFILTRATION; INTRACAUDAL ONCE
Status: COMPLETED | OUTPATIENT
Start: 2018-07-10 | End: 2018-07-10

## 2018-07-10 RX ORDER — TRIAMCINOLONE ACETONIDE 40 MG/ML
1 INJECTION, SUSPENSION INTRA-ARTICULAR; INTRAMUSCULAR ONCE
Status: COMPLETED | OUTPATIENT
Start: 2018-07-10 | End: 2018-07-10

## 2018-07-10 RX ORDER — LIDOCAINE HYDROCHLORIDE 10 MG/ML
20 INJECTION, SOLUTION INFILTRATION; PERINEURAL ONCE
Status: COMPLETED | OUTPATIENT
Start: 2018-07-10 | End: 2018-07-10

## 2018-07-10 RX ADMIN — TRIAMCINOLONE ACETONIDE 40 MG: 40 INJECTION, SUSPENSION INTRA-ARTICULAR; INTRAMUSCULAR at 14:28

## 2018-07-10 RX ADMIN — LIDOCAINE HYDROCHLORIDE 0.4 ML: 10 INJECTION, SOLUTION EPIDURAL; INFILTRATION; INTRACAUDAL; PERINEURAL at 14:25

## 2018-07-10 RX ADMIN — BUPIVACAINE HYDROCHLORIDE 4 ML: 2.5 INJECTION, SOLUTION EPIDURAL; INFILTRATION; INTRACAUDAL at 14:28

## 2018-07-10 RX ADMIN — IOPAMIDOL 1 ML: 408 INJECTION, SOLUTION INTRAVASCULAR at 14:27

## 2018-07-10 NOTE — PROGRESS NOTES
Appropriate assistive devices provided during their visit.yes (Yes, No, N/A) wc (list device)    Exam table and/or cart  placed in the lowest position. yes (Yes, No, N/A)    Brakes on tables/carts/wheelchairs used at all times. yes (Yes, No, N/A)    Non slip footwear applied. na (Yes, No, NA)    Patient was accompanied by staff throughout visit. yes (Yes, No, N/A)    Equipment safety straps used. na (Yes, No, N/A)    Assist with toileting. na (Yes, No, N/A)

## 2018-08-06 ENCOUNTER — TELEPHONE (OUTPATIENT)
Dept: FAMILY MEDICINE | Facility: OTHER | Age: 73
End: 2018-08-06

## 2018-08-06 ENCOUNTER — OFFICE VISIT (OUTPATIENT)
Dept: FAMILY MEDICINE | Facility: OTHER | Age: 73
End: 2018-08-06
Payer: COMMERCIAL

## 2018-08-06 VITALS
HEART RATE: 102 BPM | RESPIRATION RATE: 20 BRPM | OXYGEN SATURATION: 96 % | BODY MASS INDEX: 27.64 KG/M2 | SYSTOLIC BLOOD PRESSURE: 124 MMHG | TEMPERATURE: 96.8 F | WEIGHT: 157 LBS | DIASTOLIC BLOOD PRESSURE: 64 MMHG

## 2018-08-06 DIAGNOSIS — N18.2 TYPE 2 DIABETES MELLITUS WITH STAGE 2 CHRONIC KIDNEY DISEASE, WITHOUT LONG-TERM CURRENT USE OF INSULIN (H): ICD-10-CM

## 2018-08-06 DIAGNOSIS — E11.22 TYPE 2 DIABETES MELLITUS WITH STAGE 2 CHRONIC KIDNEY DISEASE, WITHOUT LONG-TERM CURRENT USE OF INSULIN (H): ICD-10-CM

## 2018-08-06 DIAGNOSIS — Z78.0 POST-MENOPAUSAL: ICD-10-CM

## 2018-08-06 DIAGNOSIS — I10 BENIGN ESSENTIAL HYPERTENSION: ICD-10-CM

## 2018-08-06 DIAGNOSIS — E78.5 HYPERLIPIDEMIA WITH TARGET LDL LESS THAN 100: ICD-10-CM

## 2018-08-06 DIAGNOSIS — G62.9 PERIPHERAL POLYNEUROPATHY: ICD-10-CM

## 2018-08-06 DIAGNOSIS — R25.1 TREMOR: ICD-10-CM

## 2018-08-06 DIAGNOSIS — Z00.00 ROUTINE GENERAL MEDICAL EXAMINATION AT A HEALTH CARE FACILITY: Primary | ICD-10-CM

## 2018-08-06 DIAGNOSIS — F17.200 TOBACCO USE DISORDER: ICD-10-CM

## 2018-08-06 DIAGNOSIS — H65.91 MIDDLE EAR EFFUSION, RIGHT: ICD-10-CM

## 2018-08-06 LAB
ANION GAP SERPL CALCULATED.3IONS-SCNC: 8 MMOL/L (ref 3–14)
BUN SERPL-MCNC: 16 MG/DL (ref 7–30)
CALCIUM SERPL-MCNC: 8.7 MG/DL (ref 8.5–10.1)
CHLORIDE SERPL-SCNC: 100 MMOL/L (ref 94–109)
CO2 SERPL-SCNC: 30 MMOL/L (ref 20–32)
CREAT SERPL-MCNC: 0.79 MG/DL (ref 0.52–1.04)
GFR SERPL CREATININE-BSD FRML MDRD: 71 ML/MIN/1.7M2
GLUCOSE SERPL-MCNC: 136 MG/DL (ref 70–99)
HBA1C MFR BLD: 6.8 % (ref 0–5.6)
POTASSIUM SERPL-SCNC: 4.3 MMOL/L (ref 3.4–5.3)
SODIUM SERPL-SCNC: 138 MMOL/L (ref 133–144)

## 2018-08-06 PROCEDURE — 99214 OFFICE O/P EST MOD 30 MIN: CPT | Mod: 25 | Performed by: NURSE PRACTITIONER

## 2018-08-06 PROCEDURE — 80048 BASIC METABOLIC PNL TOTAL CA: CPT | Performed by: NURSE PRACTITIONER

## 2018-08-06 PROCEDURE — 36415 COLL VENOUS BLD VENIPUNCTURE: CPT | Performed by: NURSE PRACTITIONER

## 2018-08-06 PROCEDURE — 99397 PER PM REEVAL EST PAT 65+ YR: CPT | Performed by: NURSE PRACTITIONER

## 2018-08-06 PROCEDURE — 83036 HEMOGLOBIN GLYCOSYLATED A1C: CPT | Performed by: NURSE PRACTITIONER

## 2018-08-06 RX ORDER — LISINOPRIL 20 MG/1
20 TABLET ORAL DAILY
Qty: 90 TABLET | Refills: 0 | Status: SHIPPED | OUTPATIENT
Start: 2018-08-06 | End: 2018-12-03

## 2018-08-06 RX ORDER — LORATADINE 10 MG/1
10 TABLET ORAL DAILY
Qty: 30 TABLET | Refills: 1 | Status: SHIPPED | OUTPATIENT
Start: 2018-08-06 | End: 2020-04-20

## 2018-08-06 RX ORDER — SIMVASTATIN 5 MG
TABLET ORAL
Qty: 90 TABLET | Refills: 1 | Status: SHIPPED | OUTPATIENT
Start: 2018-08-06 | End: 2019-03-04

## 2018-08-06 RX ORDER — GABAPENTIN 600 MG/1
1200 TABLET ORAL 3 TIMES DAILY
Qty: 360 TABLET | Refills: 1
Start: 2018-08-06 | End: 2018-10-03

## 2018-08-06 NOTE — TELEPHONE ENCOUNTER
What dose is she actually taking? Please check with the pharmacy and see what they dispensed.     Electronically signed by Skylar Gomez CNP.

## 2018-08-06 NOTE — MR AVS SNAPSHOT
After Visit Summary   8/6/2018    Tracy Mcclelland    MRN: [id number]           Patient Information     Date Of Birth          1945        Visit Information        Provider Department      8/6/2018 9:20 AM Skylar Gomez APRN [facility]        Today's Diagnoses     Routine general medical examination at a health care facility    -  1    Hyperlipidemia with target LDL less than 100        Type 2 diabetes mellitus with stage 2 chronic kidney disease, without long-term current use of insulin (H)        Benign essential hypertension        Middle ear effusion, right        Tremor        Peripheral polyneuropathy        Tobacco use disorder        Post-menopausal          Care Instructions    Stay on 2 of the Gabapentin three times a day if needed.     See Neurology in [place].  Discuss your tremor and your Gabapentin dosing, Neuropathy pain.     Labs will be done today.   For normal results, you will receive a letter with the results in about 2 weeks.  If anything is abnormal or unexpected, someone from the clinic will call you.      Consider a pneumonia and shingles vaccine at the pharmacy.     Schedule a DEXA scan in [place].       Preventive Health Recommendations    Female Ages 65 +    Yearly exam:     See your health care provider every year in order to  o Review health changes.   o Discuss preventive care.    o Review your medicines if your doctor has prescribed any.      You no longer need a yearly Pap test unless you've had an abnormal Pap test in the past 10 years. If you have vaginal symptoms, such as bleeding or discharge, be sure to talk with your provider about a Pap test.      Every 1 to 2 years, have a mammogram.  If you are over 69, talk with your health care provider about whether or not you want to continue having screening mammograms.      Every 10 years, have a colonoscopy. Or, have a yearly FIT test (stool test). These exams will check for colon cancer.        Have a cholesterol test every 5 years, or more often if your doctor advises it.       Have a diabetes test (fasting glucose) every three years. If you are at risk for diabetes, you should have this test more often.       At age 65, have a bone density scan (DEXA) to check for osteoporosis (brittle bone disease).    Shots:    Get a flu shot each year.    Get a tetanus shot every 10 years.    Talk to your doctor about your pneumonia vaccines. There are now two you should receive - Pneumovax (PPSV 23) and Prevnar (PCV 13).    Talk to your pharmacist about the shingles vaccine.    Talk to your doctor about the hepatitis B vaccine.    Nutrition:     Eat at least 5 servings of fruits and vegetables each day.      Eat whole-grain bread, whole-wheat pasta and brown rice instead of white grains and rice.      Get adequate Calcium and Vitamin D.     Lifestyle    Exercise at least 150 minutes a week (30 minutes a day, 5 days a week). This will help you control your weight and prevent disease.      Limit alcohol to one drink per day.      No smoking.       Wear sunscreen to prevent skin cancer.       See your dentist twice a year for an exam and cleaning.      See your eye doctor every 1 to 2 years to screen for conditions such as glaucoma, macular degeneration and cataracts.          Follow-ups after your visit        Additional Services     NEUROLOGY ADULT REFERRAL       Your provider has referred you for the following:   Consult at [facility]: [facility] [phone]   [email or web address]    Please be aware that coverage of these services is subject to the terms and limitations of your health insurance plan.  Call member services at your health plan with any benefit or coverage questions.      Please bring the following with you to your appointment:    (1) Any X-Rays, CTs or MRIs which have been performed.  Contact the facility where  they were done to arrange for  prior to your scheduled appointment.    (2) List of current medications  (3) This referral request   (4) Any documents/labs given to you for this referral                  Your next 10 appointments already scheduled     Aug 09, 2018  8:45 AM CDT   XR PELVIS 1/2 VIEWS with PHXRSP1   [facility])    [address] [place] [id number]              Please bring a list of your current medicines to your exam. (Include vitamins, minerals and over-thecounter medicines.) Leave your valuables at home.  Tell your doctor if there is a chance you may be pregnant.  You do not need to do anything special for this exam.            Aug 09, 2018  9:10 AM CDT   New Visit with Terrell Ervin,    [facility]    [address] [place] [id number] [phone]              Future tests that were ordered for you today     Open Future Orders        Priority Expected Expires Ordered    DX Hip/Pelvis/Spine Routine  8/6/2019 8/6/2018    Lipid panel reflex to direct LDL Fasting Routine 9/5/2018 9/15/2018 8/6/2018    XR Pelvis 1/2 Views Routine 8/9/2018 8/2/2019 8/2/2018            Who to contact     If you have questions or need follow up information about today's clinic visit or your schedule please contact [facility] directly at [phone].  Normal or non-critical lab and imaging results will be communicated to you by MyChart, letter or phone within 4 business days after the clinic has received the results. If you do not hear from us within 7 days, please contact the clinic through MyChart or phone. If you have a critical or abnormal lab result, we will notify you by phone as soon as possible.  Submit refill requests through [employer] or call your pharmacy and they will forward the refill request to us. Please allow 3 business days for your refill to be completed.           Additional Information About Your Visit        Care EveryWhere ID     This is your Care EveryWhere ID. This could be used by other organizations to access your [place] medical records  [id number]        Your Vitals Were     Pulse Temperature Respirations Pulse Oximetry Breastfeeding? BMI (Body Mass Index)    102 96.8  F (36  C) (Tympanic) 20 96% No 27.64 kg/m2       Blood Pressure from Last 3 Encounters:   08/06/18 124/64   07/02/18 148/81   06/29/18 130/68    Weight from Last 3 Encounters:   08/06/18 157 lb (71.2 kg)   07/02/18 162 lb (73.5 kg)   06/29/18 162 lb (73.5 kg)              We Performed the Following     Albumin Random Urine Quantitative with Creat Ratio     Basic metabolic panel  (Ca, Cl, CO2, Creat, Gluc, K, Na, BUN)     Hemoglobin A1c     NEUROLOGY ADULT REFERRAL          Today's Medication Changes          These changes are accurate as of 8/6/18 10:06 AM.  If you have any questions, ask your nurse or doctor.               Start taking these medicines.        Dose/Directions    loratadine 10 MG tablet   Commonly known as:  CLARITIN   Used for:  Middle ear effusion, right   Started by:  Skylar Gomez APRN CNP        Dose:  10 mg   Take 1 tablet (10 mg) by mouth daily   Quantity:  30 tablet   Refills:  1         These medicines have changed or have updated prescriptions.        Dose/Directions    gabapentin 600 MG tablet   Commonly known as:  NEURONTIN   This may have changed:  how much to take   Used for:  Type 2 diabetes mellitus with stage 2 chronic kidney disease, without long-term current use of insulin (H)   Changed by:  Skylar Gomez APRN CNP        Dose:  1200 mg   Take 2 tablets (1,200 mg) by mouth 3 times daily   Quantity:  360 tablet   Refills:  1       lisinopril 20 MG tablet   Commonly known as:  PRINIVIL/ZESTRIL   This may have changed:  See the new instructions.   Used for:  Benign essential hypertension   Changed by:  Skylar Gomez APRN CNP        Dose:  20 mg   Take 1  tablet (20 mg) by mouth daily   Quantity:  90 tablet   Refills:  0       metFORMIN 500 MG tablet   Commonly known as:  GLUCOPHAGE   This may have changed:  See the new instructions.   Used for:  Type 2 diabetes mellitus with stage 2 chronic kidney disease, without long-term current use of insulin (H)   Changed by:  Skylar Gomez APRN CNP        Dose:  500 mg   Take 1 tablet (500 mg) by mouth 2 times daily (with meals)   Quantity:  180 tablet   Refills:  0            Where to get your medicines      These medications were sent to [facility] [address]   [address] [facility] [address]     Phone:  [phone]     lisinopril 20 MG tablet    loratadine 10 MG tablet    metFORMIN 500 MG tablet    simvastatin 5 MG tablet         Some of these will need a paper prescription and others can be bought over the counter.  Ask your nurse if you have questions.     You don't need a prescription for these medications     gabapentin 600 MG tablet                Primary Care Provider Office Phone # Fax #    MACHO Cifuentes [phone]       [address] [facility] [address]        Equal Access to Services     LIU KURTZ : Hadii denton carroll Sobia, waaxda luqadaha, qaybta kaalmada true, tatyana conde. So [facility] [phone].    ATENCIÓN: Si habla español, tiene a johnson disposición servicios gratuitos de asistencia lingüística. Llame al [phone].    We comply with applicable federal civil rights laws and [place] laws. We do not discriminate on the basis of race, color, national origin, age, disability, sex, sexual orientation, or gender identity.            Thank you!     Thank you for choosing [facility]  for your care. Our goal is always to provide you with excellent care. Hearing back from our patients is one way we can continue to improve our services. Please take a few minutes to complete the written survey that you  may receive in the mail after your visit with us. Thank you!             Your Updated Medication List - Protect others around you: Learn how to safely use, store and throw away your medicines at www.disposemymeds.org.          This list is accurate as of 8/6/18 10:06 AM.  Always use your most recent med list.                   Brand Name Dispense Instructions for use Diagnosis    aspirin 81 MG EC tablet     90 tablet    Take 1 tablet by mouth daily.    Type 2 diabetes, HbA1c goal < 7% (H)       gabapentin 600 MG tablet    NEURONTIN    360 tablet    Take 2 tablets (1,200 mg) by mouth 3 times daily    Type 2 diabetes mellitus with stage 2 chronic kidney disease, without long-term current use of insulin (H)       lisinopril 20 MG tablet    PRINIVIL/ZESTRIL    90 tablet    Take 1 tablet (20 mg) by mouth daily    Benign essential hypertension       loratadine 10 MG tablet    CLARITIN    30 tablet    Take 1 tablet (10 mg) by mouth daily    Middle ear effusion, right       meloxicam 15 MG tablet    MOBIC    30 tablet    Take 1 tablet (15 mg) by mouth daily    Primary osteoarthritis of right hip       metFORMIN 500 MG tablet    GLUCOPHAGE    180 tablet    Take 1 tablet (500 mg) by mouth 2 times daily (with meals)    Type 2 diabetes mellitus with stage 2 chronic kidney disease, without long-term current use of insulin (H)       simvastatin 5 MG tablet    ZOCOR    90 tablet    TAKE ONE TABLET BY MOUTH EVERY NIGHT AT BEDTIME    Hyperlipidemia with target LDL less than 100

## 2018-08-06 NOTE — PROGRESS NOTES
SUBJECTIVE:   Tracy Mcclelland is a 73 year old female who presents for Preventive Visit.    Are you in the first 12 months of your Medicare Part B coverage?  No    Healthy Habits:    Do you get at least three servings of calcium containing foods daily (dairy, green leafy vegetables, etc.)? yes    Amount of exercise or daily activities, outside of work: none    Problems taking medications regularly No    Medication side effects: No    Have you had an eye exam in the past two years? yes    Do you see a dentist twice per year? no    Do you have sleep apnea, excessive snoring or daytime drowsiness?no      Ability to successfully perform activities of daily living: Yes, no assistance needed    Home safety:  none identified     Hearing impairment: No    Fall risk:  Fallen 2 or more times in the past year?: No  Any fall with injury in the past year?: No      COGNITIVE SCREEN  1) Repeat 3 items (Leader, Season, Table)    2) Clock draw: NORMAL  3) 3 item recall: Recalls 3 objects  Results: 3 items recalled: COGNITIVE IMPAIRMENT LESS LIKELY    Mini-CogTM Copyright JASIEL Fan. Licensed by the author for use in [facility]; reprinted with permission (saroj@[facility]). All rights reserved.      She has several other issues to discuss today:     1.  Peripheral Neuropathy - thought to be due to diabetes, worsening now.  We increased her Gabapentin to 600 mg TID in June.  She refilled this and got 600 mg tablets, but was not aware of that so she has been taking 2 of those tablets (1200 mg) three times a day for 1 week.  This has not helped.   2.  Tremor - has been present for years, but worsening now.  Has never see Neurology.   3. Worsening right hip pain - is set up to see orthopedics, anticipating [facility].   4.  Right ear hearing loss - no pain    She also needs refills and labs for her chronic conditions.  Continues to smoke, no interest in quitting.        Reviewed and updated as needed this visit by clinical  staff  Tobacco  Allergies  Meds  Med Hx  Surg Hx  Fam Hx  Soc Hx        Reviewed and updated as needed this visit by Provider        Social History   Substance Use Topics     Smoking status: Current Every Day Smoker     Packs/day: 0.25     Years: 20.00     Types: Cigarettes     Last attempt to quit: 7/10/2012     Smokeless tobacco: Never Used      Comment: 2-3 cigs daily     Alcohol use Yes      Comment: couple times monthly                           Today's PHQ-2 Score:   PHQ-2 ( 1999 Pf[employer]) 8/6/2018 8/6/2018   Q1: Little interest or pleasure in doing things 0 0   Q2: Feeling down, depressed or hopeless 0 0   PHQ-2 Score 0 0       Do you feel safe in your environment - Yes    Do you have a Health Care Directive?: No: Advance care planning reviewed with patient; information given to patient to review.    Current providers sharing in care for this patient include:   Patient Care Team:  Skylar Gomez APRN CNP as PCP - General (Nurse Practitioner - Family)    The following health maintenance items are reviewed in Epic and correct as of today:  Health Maintenance   Topic Date Due     DEXA SCAN SCREENING (SYSTEM ASSIGNED)  06/20/2009     PNEUMOCOCCAL (2 of 2 - PCV13) 07/07/2011     EYE EXAM Q1 YEAR  04/24/2018     BMP Q1 YR  07/27/2018     LIPID MONITORING Q1 YEAR  07/27/2018     MICROALBUMIN Q1 YEAR  07/27/2018     A1C Q6 MO  08/14/2018     INFLUENZA VACCINE (1) 09/01/2018     MAMMO SCREEN Q2 YR (SYSTEM ASSIGNED)  01/27/2019     FOOT EXAM Q1 YEAR  02/14/2019     FIT Q1 YR  02/18/2019     FALL RISK ASSESSMENT  06/29/2019     PHQ-2 Q1 YR  06/29/2019     TSH W/ FREE T4 REFLEX Q2 YEAR  07/27/2019     TETANUS IMMUNIZATION (SYSTEM ASSIGNED)  07/07/2020     ADVANCE DIRECTIVE PLANNING Q5 YRS  06/29/2023     Labs reviewed in EPIC  BP Readings from Last 3 Encounters:   08/06/18 124/64   07/02/18 148/81   06/29/18 130/68    Wt Readings from Last 3 Encounters:   08/06/18 157 lb (71.2 kg)   07/02/18 162 lb (73.5 kg)    06/29/18 162 lb (73.5 kg)                  Patient Active Problem List   Diagnosis     Hyperlipidemia with target LDL less than 100     Microalbuminuria     Tennis Elbow-left     Tobacco use disorder     Type 2 diabetes mellitus with diabetic chronic kidney disease (H)     Hypertension, goal below 140/90     Granuloma annulare     Hereditary and idiopathic peripheral neuropathy     CKD (chronic kidney disease) stage 2, GFR 60-89 ml/min     Primary osteoarthritis of right hip     Past Surgical History:   Procedure Laterality Date     C LIGATE FALLOPIAN TUBE       HC LAPAROSCOPY, SURGICAL; CHOLECYSTECTOMY  2000    Cholecystectomy, Laparoscopic     HC REMOVE TONSILS/ADENOIDS,<12 Y/O      T & A <11y.o.       Social History   Substance Use Topics     Smoking status: Current Every Day Smoker     Packs/day: 0.25     Years: 20.00     Types: Cigarettes     Last attempt to quit: 7/10/2012     Smokeless tobacco: Never Used      Comment: 2-3 cigs daily     Alcohol use Yes      Comment: couple times monthly     Family History   Problem Relation Age of Onset     HEART DISEASE Mother      Pacemaker     Alzheimer Disease Father      Dementia/Loss of memory     Cerebrovascular Disease Maternal Grandmother      HEART DISEASE Maternal Grandfather      MI     Diabetes Paternal Grandmother          Current Outpatient Prescriptions   Medication Sig Dispense Refill     aspirin 81 MG EC tablet Take 1 tablet by mouth daily. 90 tablet 3     gabapentin (NEURONTIN) 600 MG tablet Take 2 tablets (1,200 mg) by mouth 3 times daily 360 tablet 1     lisinopril (PRINIVIL/ZESTRIL) 20 MG tablet Take 1 tablet (20 mg) by mouth daily 90 tablet 0     loratadine (CLARITIN) 10 MG tablet Take 1 tablet (10 mg) by mouth daily 30 tablet 1     meloxicam (MOBIC) 15 MG tablet Take 1 tablet (15 mg) by mouth daily 30 tablet 1     metFORMIN (GLUCOPHAGE) 500 MG tablet Take 1 tablet (500 mg) by mouth 2 times daily (with meals) 180 tablet 0     simvastatin (ZOCOR) 5 MG  tablet TAKE ONE TABLET BY MOUTH EVERY NIGHT AT BEDTIME 90 tablet 1     [DISCONTINUED] gabapentin (NEURONTIN) 600 MG tablet Take 1 tablet (600 mg) by mouth 3 times daily 360 tablet 1     [DISCONTINUED] lisinopril (PRINIVIL/ZESTRIL) 20 MG tablet TAKE ONE TABLET BY MOUTH ONCE DAILY 90 tablet 0     [DISCONTINUED] metFORMIN (GLUCOPHAGE) 500 MG tablet TAKE ONE TABLET BY MOUTH TWICE A DAY WITH MEALS 180 tablet 0     [DISCONTINUED] simvastatin (ZOCOR) 5 MG tablet TAKE ONE TABLET BY MOUTH EVERY NIGHT AT BEDTIME 90 tablet 1     Allergies   Allergen Reactions     Mold      sneezing, coughing , runny nose, watery eyes & red,     No Known Drug Allergies        Mammogram Screening: Patient over age 50, mutual decision to screen reflected in health maintenance.  Last 3 Pap and HPV Results:      ROS:  CONSTITUTIONAL: NEGATIVE for fever, chills, change in weight  INTEGUMENTARY/SKIN: NEGATIVE for worrisome rashes, moles or lesions  EYES: NEGATIVE for vision changes or irritation  ENT/MOUTH: NEGATIVE for ear, mouth and throat problems, POSITIVE for decreased hearing right ear as above   RESP: NEGATIVE for significant cough or SOB  BREAST: NEGATIVE for masses, tenderness or discharge  CV: NEGATIVE for chest pain, palpitations or peripheral edema  GI: NEGATIVE for nausea, abdominal pain, heartburn, or change in bowel habits  : NEGATIVE for frequency, dysuria, or hematuria  MUSCULOSKELETAL: NEGATIVE for significant arthralgias or myalgia, POSITIVE for right hip pain as above   NEURO: NEGATIVE for weakness, dizziness or paresthesias, POSITIVE for peripheral neuropathy and tremor as above   ENDOCRINE: NEGATIVE for temperature intolerance, skin/hair changes  HEME: NEGATIVE for bleeding problems  PSYCHIATRIC: NEGATIVE for changes in mood or affect    OBJECTIVE:   /64  Pulse 102  Temp 96.8  F (36  C) (Tympanic)  Resp 20  Wt 157 lb (71.2 kg)  SpO2 96%  Breastfeeding? No  BMI 27.64 kg/m2 Estimated body mass index is 27.64  "kg/(m^2) as calculated from the following:    Height as of 7/2/18: 5' 3.2\" (1.605 m).    Weight as of this encounter: 157 lb (71.2 kg).  EXAM:   GENERAL APPEARANCE: alert, no distress, obese and appears older than stated age  EYES: Eyes grossly normal to inspection, PERRL and conjunctivae and sclerae normal  HENT: Left:  ear canal and TM normal, right: canal normal, clear effusion noted, nose and mouth without ulcers or lesions, oropharynx clear and oral mucous membranes moist  NECK: no adenopathy, no asymmetry, masses, or scars and thyroid normal to palpation  RESP: lungs clear to auscultation - no rales, rhonchi or wheezes  BREAST: normal without masses, tenderness or nipple discharge and no palpable axillary masses or adenopathy  CV: regular rate and rhythm, normal S1 S2, no S3 or S4, no murmur, click or rub, no peripheral edema and peripheral pulses strong  ABDOMEN: soft, nontender, no hepatosplenomegaly, no masses and bowel sounds normal  MS: no musculoskeletal defects are noted and gait is age appropriate without ataxia  SKIN: no suspicious lesions or rashes  NEURO: Normal strength and tone, mentation intact and speech normal, resting tremor in bilateral arms and head  PSYCH: mentation appears normal and affect normal/bright    Diagnostic Test Results:  Office Visit on 08/06/2018   Component Date Value Ref Range Status     Hemoglobin A1C 08/06/2018 6.8* 0 - 5.6 % Final    Comment: Normal <5.7% Prediabetes 5.7-6.4%  Diabetes 6.5% or higher - adopted from ADA   consensus guidelines.             ASSESSMENT / PLAN:   1. Routine general medical examination at a health care facility    2. Hyperlipidemia with target LDL less than 100  Chronic, controlled.  No change in treatment plan.   - simvastatin (ZOCOR) 5 MG tablet; TAKE ONE TABLET BY MOUTH EVERY NIGHT AT BEDTIME  Dispense: 90 tablet; Refill: 1  - Lipid panel reflex to direct LDL Fasting; Future    3. Type 2 diabetes mellitus with stage 2 chronic kidney disease, " "without long-term current use of insulin (H)  Chronic, controlled.  No change in treatment plan.   - metFORMIN (GLUCOPHAGE) 500 MG tablet; Take 1 tablet (500 mg) by mouth 2 times daily (with meals)  Dispense: 180 tablet; Refill: 0  - Hemoglobin A1c  - Basic metabolic panel  (Ca, Cl, CO2, Creat, Gluc, K, Na, BUN)  - gabapentin (NEURONTIN) 600 MG tablet; Take 2 tablets (1,200 mg) by mouth 3 times daily  Dispense: 360 tablet; Refill: 1  - Albumin Random Urine Quantitative with Creat Ratio; Future    4. Benign essential hypertension  Chronic, controlled.  No change in treatment plan.   - lisinopril (PRINIVIL/ZESTRIL) 20 MG tablet; Take 1 tablet (20 mg) by mouth daily  Dispense: 90 tablet; Refill: 0  - Basic metabolic panel  (Ca, Cl, CO2, Creat, Gluc, K, Na, BUN)    5. Middle ear effusion, right  - loratadine (CLARITIN) 10 MG tablet; Take 1 tablet (10 mg) by mouth daily  Dispense: 30 tablet; Refill: 1    6. Tremor  Will refer to Neurology to discuss this along with her worsening neuropathy.   - NEUROLOGY ADULT REFERRAL    7. Peripheral polyneuropathy  Refer to Neurology      8. Tobacco use disorder  Advised cessation, she declined.  I informed her she would be required to quit if she needed any orthopedic surgery.   - DX Hip/Pelvis/Spine; Future    9. Post-menopausal  - DX Hip/Pelvis/Spine; Future    End of Life Planning:  Patient currently has an advanced directive: No.  I have verified the patient's ablity to prepare an advanced directive/make health care decisions.  Literature was provided to assist patient in preparing an advanced directive.    COUNSELING:  Reviewed preventive health counseling, as reflected in patient instructions       Regular exercise       Healthy diet/nutrition       Vision screening       Osteoporosis Prevention/Bone Health    BP Readings from Last 1 Encounters:   08/06/18 124/64     Estimated body mass index is 27.64 kg/(m^2) as calculated from the following:    Height as of 7/2/18: 5' 3.2\" " (1.605 m).    Weight as of this encounter: 157 lb (71.2 kg).      Weight management plan: Discussed healthy diet and exercise guidelines and patient will follow up in 12 months in clinic to re-evaluate.     reports that she has been smoking Cigarettes.  She has a 5.00 pack-year smoking history. She has never used smokeless tobacco.  Tobacco Cessation Action Plan: Information offered: Patient not interested at this time    Appropriate preventive services were discussed with this patient, including applicable screening as appropriate for cardiovascular disease, diabetes, osteopenia/osteoporosis, and glaucoma.  As appropriate for age/gender, discussed screening for colorectal cancer, prostate cancer, breast cancer, and cervical cancer. Checklist reviewing preventive services available has been given to the patient.    Reviewed patients plan of care and provided an AVS. The Basic Care Plan (routine screening as documented in Health Maintenance) for Tracy meets the Care Plan requirement. This Care Plan has been established and reviewed with the Patient.    Counseling Resources:  ATP IV Guidelines  Pooled Cohorts Equation Calculator  Breast Cancer Risk Calculator  FRAX Risk Assessment  ICSI Preventive Guidelines  Dietary Guidelines for Americans, 2010  USDA's MyPlate  ASA Prophylaxis  Lung CA Screening    In addition to the preventive visit, 25  minutes of the appointment were spent evaluating and developing a treatment plan for her additional concern(s).      MACHO Cifuentes [facility]

## 2018-08-06 NOTE — PATIENT INSTRUCTIONS
Stay on 2 of the Gabapentin three times a day if needed.     See Neurology in [place].  Discuss your tremor and your Gabapentin dosing, Neuropathy pain.     Labs will be done today.   For normal results, you will receive a letter with the results in about 2 weeks.  If anything is abnormal or unexpected, someone from the clinic will call you.      Consider a pneumonia and shingles vaccine at the pharmacy.     Schedule a DEXA scan in [place].       Preventive Health Recommendations    Female Ages 65 +    Yearly exam:     See your health care provider every year in order to  o Review health changes.   o Discuss preventive care.    o Review your medicines if your doctor has prescribed any.      You no longer need a yearly Pap test unless you've had an abnormal Pap test in the past 10 years. If you have vaginal symptoms, such as bleeding or discharge, be sure to talk with your provider about a Pap test.      Every 1 to 2 years, have a mammogram.  If you are over 69, talk with your health care provider about whether or not you want to continue having screening mammograms.      Every 10 years, have a colonoscopy. Or, have a yearly FIT test (stool test). These exams will check for colon cancer.       Have a cholesterol test every 5 years, or more often if your doctor advises it.       Have a diabetes test (fasting glucose) every three years. If you are at risk for diabetes, you should have this test more often.       At age 65, have a bone density scan (DEXA) to check for osteoporosis (brittle bone disease).    Shots:    Get a flu shot each year.    Get a tetanus shot every 10 years.    Talk to your doctor about your pneumonia vaccines. There are now two you should receive - Pneumovax (PPSV 23) and Prevnar (PCV 13).    Talk to your pharmacist about the shingles vaccine.    Talk to your doctor about the hepatitis B vaccine.    Nutrition:     Eat at least 5 servings of fruits and vegetables each day.      Eat whole-grain  bread, whole-wheat pasta and brown rice instead of white grains and rice.      Get adequate Calcium and Vitamin D.     Lifestyle    Exercise at least 150 minutes a week (30 minutes a day, 5 days a week). This will help you control your weight and prevent disease.      Limit alcohol to one drink per day.      No smoking.       Wear sunscreen to prevent skin cancer.       See your dentist twice a year for an exam and cleaning.      See your eye doctor every 1 to 2 years to screen for conditions such as glaucoma, macular degeneration and cataracts.

## 2018-08-06 NOTE — TELEPHONE ENCOUNTER
Reason for Call:  Medication or medication refill:    Do you use a [facility]?  Name of the pharmacy and phone number for the current request:  [facility] - [phone]    Name of the medication requested: gabapentin (NEURONTIN) 600 MG tablet   Patient states she rec'd 300mg of this from the Pharmacy at the end of June, patient was advised to check with the Pharmacy as the computer is showing it should have been the 600mg.  Patient got upset & requesting that Skylar call her to discuss dosing to take more tablets of the 300mg    Other request:     Can we leave a detailed message on this number? YES    Phone number patient can be reached at: Home number on file [phone] (home)    Best Time:     Call taken on 8/6/2018 at 12:35 PM by Cheyanne Barrett

## 2018-08-08 NOTE — TELEPHONE ENCOUNTER
Patient called back, medication is all straightened out and she has picked up new Rx from pharmacy.    Regina Huynh XRO/  [facility]

## 2018-08-08 NOTE — TELEPHONE ENCOUNTER
Left message for patient to call back.    Spoke with pharmacy, patient was given 300mg tablets as they were still the medication on file when patient called for a refill. Pharmacist stated that the patient brought the medication back in, was refunded and that the 600mg tablets are being dispensed and ready for the patient to , 300mg dosage has been removed from patients active med profile.     Regina SCOTTO/  [facility]

## 2018-08-09 ENCOUNTER — TELEPHONE (OUTPATIENT)
Dept: ORTHOPEDICS | Facility: CLINIC | Age: 73
End: 2018-08-09

## 2018-08-09 ENCOUNTER — RADIANT APPOINTMENT (OUTPATIENT)
Dept: GENERAL RADIOLOGY | Facility: CLINIC | Age: 73
End: 2018-08-09
Attending: ORTHOPAEDIC SURGERY
Payer: COMMERCIAL

## 2018-08-09 ENCOUNTER — OFFICE VISIT (OUTPATIENT)
Dept: ORTHOPEDICS | Facility: CLINIC | Age: 73
End: 2018-08-09
Payer: COMMERCIAL

## 2018-08-09 VITALS
WEIGHT: 157 LBS | HEIGHT: 63 IN | SYSTOLIC BLOOD PRESSURE: 135 MMHG | BODY MASS INDEX: 27.82 KG/M2 | DIASTOLIC BLOOD PRESSURE: 82 MMHG | TEMPERATURE: 98.7 F

## 2018-08-09 DIAGNOSIS — Z01.818 PRE-OP EXAM: Primary | ICD-10-CM

## 2018-08-09 DIAGNOSIS — M25.551 HIP PAIN, RIGHT: ICD-10-CM

## 2018-08-09 DIAGNOSIS — M16.11 PRIMARY OSTEOARTHRITIS OF RIGHT HIP: Primary | ICD-10-CM

## 2018-08-09 PROCEDURE — 99214 OFFICE O/P EST MOD 30 MIN: CPT | Performed by: ORTHOPAEDIC SURGERY

## 2018-08-09 PROCEDURE — 72170 X-RAY EXAM OF PELVIS: CPT | Mod: TC

## 2018-08-09 ASSESSMENT — PAIN SCALES - GENERAL: PAINLEVEL: SEVERE PAIN (7)

## 2018-08-09 NOTE — TELEPHONE ENCOUNTER
Type of surgery: Right total hip replacement  Location of surgery: [facility]   Date of surgery: 10/16/18  Surgeon: Dr. Ervin  Pre-Op Appt Date: 10/03/18  Post-Op Appt Date: 10/31/18   Packet sent out: Surgery packet mailed to patient's home address.   Pre-cert/Authorization completed: NA  Date: 8/9/2018    *class scheduled  **labs ordered  **soap given    Wendy Mendes  Surgery Scheduler

## 2018-08-09 NOTE — LETTER
8/9/2018         RE: Tracy Mcclelland  [address] [facility] [address]        Dear Colleague,    Thank you for referring your patient, Tracy Mcclelland, to the [facility]. Please see a copy of my visit note below.    ORTHOPEDIC CONSULT      Chief Complaint: Tracy Mcclelland is a 73 year old female who is being seen for Chief Complaint   Patient presents with     Musculoskeletal Problem     right hip pain     Consult     ref: Dr. Stiles       History of Present Illness:   Tracy Mcclelland is a 73 year old female who is seen in consultation at the request of Dr. Stiles for evaluation of right hip replacement.  Mechanism of Injury: No trauma or inciting event. States for at least 1 year has had increasing right hip pain mostly lateral, dull ache. Does have pain into the knee at times.  Having hard time with balance, walking, and ADLS due to the pain. Hard time placing weight on the right hip. Denies any radiating pain from back to foot, or any numbness/tingling into foot other than baseline from diabetic neuropathy. States pain is moderate to severe.  Getting worse.  No complaints with left hip. Bearing weight, walking, weight bearing or use of hip worsens the pain.      Therapies tried to date: steroid injection intra-articular on 7/10/18 got a few weeks of relief, starting to wear off now.  Mobic helps some.  Activity modification involving avoiding use of hip helps some.  Rest.  No previous PT.    Last A1c 8/18 = 6.8.  Denies previous stroke, heart attack, clots. Does have neuropathy to both feet, takes gabapentin, helps with numbness.       Patient's past medical, surgical, social and family histories reviewed.     Past Medical History:   Diagnosis Date     CKD (chronic kidney disease) stage 2, GFR 60-89 ml/min 10/19/2015     Headache(784.0)     hx.of Migraines     Lumbago      Type II or unspecified type diabetes mellitus without mention of complication, not stated as uncontrolled       Unspecified essential hypertension        Past Surgical History:   Procedure Laterality Date     C LIGATE FALLOPIAN TUBE       HC LAPAROSCOPY, SURGICAL; CHOLECYSTECTOMY  2000    Cholecystectomy, Laparoscopic     HC REMOVE TONSILS/ADENOIDS,<10 Y/O      T & A <11y.o.       Medications:    Current Outpatient Prescriptions on File Prior to Visit:  aspirin 81 MG EC tablet Take 1 tablet by mouth daily.   gabapentin (NEURONTIN) 600 MG tablet Take 2 tablets (1,200 mg) by mouth 3 times daily   lisinopril (PRINIVIL/ZESTRIL) 20 MG tablet Take 1 tablet (20 mg) by mouth daily   loratadine (CLARITIN) 10 MG tablet Take 1 tablet (10 mg) by mouth daily   meloxicam (MOBIC) 15 MG tablet Take 1 tablet (15 mg) by mouth daily   metFORMIN (GLUCOPHAGE) 500 MG tablet Take 1 tablet (500 mg) by mouth 2 times daily (with meals)   simvastatin (ZOCOR) 5 MG tablet TAKE ONE TABLET BY MOUTH EVERY NIGHT AT BEDTIME     No current facility-administered medications on file prior to visit.     Allergies   Allergen Reactions     Mold      sneezing, coughing , runny nose, watery eyes & red,     No Known Drug Allergies        Social History     Occupational History      [employer]     Social History Main Topics     Smoking status: Current Every Day Smoker     Packs/day: 0.25     Years: 20.00     Types: Cigarettes     Last attempt to quit: 7/10/2012     Smokeless tobacco: Never Used      Comment: 2-3 cigs daily     Alcohol use Yes      Comment: couple times monthly     Drug use: No     Sexual activity: No       Family History   Problem Relation Age of Onset     HEART DISEASE Mother      Pacemaker     Alzheimer Disease Father      Dementia/Loss of memory     Cerebrovascular Disease Maternal Grandmother      HEART DISEASE Maternal Grandfather      MI     Diabetes Paternal Grandmother        REVIEW OF SYSTEMS  10 point review systems performed otherwise negative as noted as per history of present illness.    Physical Exam:  Vitals: /82  "(BP Location: Right arm, Patient Position: Chair, Cuff Size: Adult Regular)  Temp 98.7  F (37.1  C) (Temporal)  Ht 1.605 m (5' 3.2\")  Wt 71.2 kg (157 lb)  BMI 27.64 kg/m2  BMI= Body mass index is 27.64 kg/(m^2).  Constitutional: healthy, alert and no acute distress   Psychiatric: mentation appears normal and affect normal/bright  NEURO: no focal deficits  RESP: Normal with easy respirations and no use of accessory muscles to breathe, no audible wheezing or retractions  CV: bilateral lower extremity:  No edema         Regular rate and rhythm by palpation  JOINT/EXTREMITIES:right hip: slow sit to stand. Unable to bear full weight on hip.  Very limited hip flexion, internal/external rotation, and ROM recreates pain.  Negative straight leg raise. Unable to do MEENU due to stiffness/pain.  Decreased sensation to bilateral feet. No focal tenderness to SI joint or greater trochanteric busa. Decreased quad tone. Atrophy to right thigh.   Lymph: no appreciated RLE lymphedema  GAIT: presented in wheelchairs  States normally ambulates short distances but for long distances requires wheelchair.    Diagnostic Modalities:  right hip X-ray: bone on bone arthritis to hip, with flattening and collapse of the femoral head, osteophytes present.  Independent visualization of the images was performed.      Impression: right hip primary osteoarthritis    Plan:  All of the above pertinent physical exam and imaging modalities findings was reviewed with Tracy and her son.  Discussed options, has failed conservative, exam corresponds to imaging.  Patient would like to proceed with total hip replacement.  Understands there will be a 3 month delay after injection, soonest would be 10/10/18.    The patient has attempted conservative treatments that include NSAIDs, steroid injections, activity modifications, rest, partial weight-bearing yet still continues to have significant issues. These issues include pain at rest, increased pain with " activity, pain that wakes at night, gait disturbances, other body areas hurting secondary to favoring because of pain of the affected extremity, instability, needing the use of assistive devices for ambulation, loss of motion of the joint. Secondary to these reasons I have offered surgery in the form of RIGHT total hip arthroplasty, posterior approach    Risks, benefits, complications, alternatives, limitations, and post operative course were discussed. Risks including infection (requiring long-term antibiotics and repeat surgeries), implant loosening (requiring revision surgery), heart attacks, strokes, bleeding (possibly requiring blood transfusion), scars, leg length differences (causing a limp), instability and dislocations, fractures, blood clots the legs and lungs, nerve injury (possibly leading to foot drop), and or death.   Postoperative course was discussed as far as limitations and expected recovery times. We also reviewed my anticipated surgical approach.  It was also discussed that after surgery there is a need for blood thinners to prevent blood clots, but even when being treated it is possible to develop a blood clot. Anticipate being hospitalized 1-2 days and subsequently either discharged home or to a rehabilitation facility. If discharge home from the hospital expect [place] home physical therapy for about 2 weeks and then transition to going to a physical therapy location for more aggressive therapy. Determinations of this will be based on progress with physical therapy while in the hospital. All questions were answered. The patient agrees to proceed with surgery.  Lives alone, but states has 2 sons nearby that will stay with her 24/7 following surgery.  Anticipate discharge POD1.   Past medical and surgical history was reviewed. It is positive for diabetes, neuropathy, CKD stage 2. Ms. Mcclelland will need a pre-operative medical evaluation and clearance by a primary care provider. We will obtain a  CBC, UA, nasal swab for MRSA as well as the appropriate radiographs prior to surgery. I will leave it to the discretion of the primary care provider for additional pre-operative testing.     Return to clinic 1, week(s) prior to surgery then 14 days post-op, or sooner as needed for changes.  Re-x-ray on return: NO for 1 week prior to surgery, YES to 14 day post-op    Scribed by:  Hola Caraballo, APRN, CNP, DNP  10:54 AM  8/9/2018  I attest I have seen and evaluated the patient.  I agree with above impression and plan.       Rayshawn Ervin D.O.    Again, thank you for allowing me to participate in the care of your patient.        Sincerely,        Terrell Ervin, DO

## 2018-08-09 NOTE — PROGRESS NOTES
ORTHOPEDIC CONSULT      Chief Complaint: Tracy Mcclelland is a 73 year old female who is being seen for Chief Complaint   Patient presents with     Musculoskeletal Problem     right hip pain     Consult     ref: Dr. Stiles       History of Present Illness:   Tracy Mcclelland is a 73 year old female who is seen in consultation at the request of Dr. Stiles for evaluation of right hip replacement.  Mechanism of Injury: No trauma or inciting event. States for at least 1 year has had increasing right hip pain mostly lateral, dull ache. Does have pain into the knee at times.  Having hard time with balance, walking, and ADLS due to the pain. Hard time placing weight on the right hip. Denies any radiating pain from back to foot, or any numbness/tingling into foot other than baseline from diabetic neuropathy. States pain is moderate to severe.  Getting worse.  No complaints with left hip. Bearing weight, walking, weight bearing or use of hip worsens the pain.      Therapies tried to date: steroid injection intra-articular on 7/10/18 got a few weeks of relief, starting to wear off now.  Mobic helps some.  Activity modification involving avoiding use of hip helps some.  Rest.  No previous PT.    Last A1c 8/18 = 6.8.  Denies previous stroke, heart attack, clots. Does have neuropathy to both feet, takes gabapentin, helps with numbness.       Patient's past medical, surgical, social and family histories reviewed.     Past Medical History:   Diagnosis Date     CKD (chronic kidney disease) stage 2, GFR 60-89 ml/min 10/19/2015     Headache(784.0)     hx.of Migraines     Lumbago      Type II or unspecified type diabetes mellitus without mention of complication, not stated as uncontrolled      Unspecified essential hypertension        Past Surgical History:   Procedure Laterality Date     C LIGATE FALLOPIAN TUBE       HC LAPAROSCOPY, SURGICAL; CHOLECYSTECTOMY  2000    Cholecystectomy, Laparoscopic     HC REMOVE TONSILS/ADENOIDS,<11  "Y/O      T & A <11y.o.       Medications:    Current Outpatient Prescriptions on File Prior to Visit:  aspirin 81 MG EC tablet Take 1 tablet by mouth daily.   gabapentin (NEURONTIN) 600 MG tablet Take 2 tablets (1,200 mg) by mouth 3 times daily   lisinopril (PRINIVIL/ZESTRIL) 20 MG tablet Take 1 tablet (20 mg) by mouth daily   loratadine (CLARITIN) 10 MG tablet Take 1 tablet (10 mg) by mouth daily   meloxicam (MOBIC) 15 MG tablet Take 1 tablet (15 mg) by mouth daily   metFORMIN (GLUCOPHAGE) 500 MG tablet Take 1 tablet (500 mg) by mouth 2 times daily (with meals)   simvastatin (ZOCOR) 5 MG tablet TAKE ONE TABLET BY MOUTH EVERY NIGHT AT BEDTIME     No current facility-administered medications on file prior to visit.     Allergies   Allergen Reactions     Mold      sneezing, coughing , runny nose, watery eyes & red,     No Known Drug Allergies        Social History     Occupational History      [employer]     Social History Main Topics     Smoking status: Current Every Day Smoker     Packs/day: 0.25     Years: 20.00     Types: Cigarettes     Last attempt to quit: 7/10/2012     Smokeless tobacco: Never Used      Comment: 2-3 cigs daily     Alcohol use Yes      Comment: couple times monthly     Drug use: No     Sexual activity: No       Family History   Problem Relation Age of Onset     HEART DISEASE Mother      Pacemaker     Alzheimer Disease Father      Dementia/Loss of memory     Cerebrovascular Disease Maternal Grandmother      HEART DISEASE Maternal Grandfather      MI     Diabetes Paternal Grandmother        REVIEW OF SYSTEMS  10 point review systems performed otherwise negative as noted as per history of present illness.    Physical Exam:  Vitals: /82 (BP Location: Right arm, Patient Position: Chair, Cuff Size: Adult Regular)  Temp 98.7  F (37.1  C) (Temporal)  Ht 1.605 m (5' 3.2\")  Wt 71.2 kg (157 lb)  BMI 27.64 kg/m2  BMI= Body mass index is 27.64 kg/(m^2).  Constitutional: healthy, " alert and no acute distress   Psychiatric: mentation appears normal and affect normal/bright  NEURO: no focal deficits  RESP: Normal with easy respirations and no use of accessory muscles to breathe, no audible wheezing or retractions  CV: bilateral lower extremity:  No edema         Regular rate and rhythm by palpation  JOINT/EXTREMITIES:right hip: slow sit to stand. Unable to bear full weight on hip.  Very limited hip flexion, internal/external rotation, and ROM recreates pain.  Negative straight leg raise. Unable to do MEENU due to stiffness/pain.  Decreased sensation to bilateral feet. No focal tenderness to SI joint or greater trochanteric busa. Decreased quad tone. Atrophy to right thigh.   Lymph: no appreciated RLE lymphedema  GAIT: presented in wheelchairs  States normally ambulates short distances but for long distances requires wheelchair.    Diagnostic Modalities:  right hip X-ray: bone on bone arthritis to hip, with flattening and collapse of the femoral head, osteophytes present.  Independent visualization of the images was performed.      Impression: right hip primary osteoarthritis    Plan:  All of the above pertinent physical exam and imaging modalities findings was reviewed with Tracy and her son.  Discussed options, has failed conservative, exam corresponds to imaging.  Patient would like to proceed with total hip replacement.  Understands there will be a 3 month delay after injection, soonest would be 10/10/18.    The patient has attempted conservative treatments that include NSAIDs, steroid injections, activity modifications, rest, partial weight-bearing yet still continues to have significant issues. These issues include pain at rest, increased pain with activity, pain that wakes at night, gait disturbances, other body areas hurting secondary to favoring because of pain of the affected extremity, instability, needing the use of assistive devices for ambulation, loss of motion of the joint.  Secondary to these reasons I have offered surgery in the form of RIGHT total hip arthroplasty, posterior approach    Risks, benefits, complications, alternatives, limitations, and post operative course were discussed. Risks including infection (requiring long-term antibiotics and repeat surgeries), implant loosening (requiring revision surgery), heart attacks, strokes, bleeding (possibly requiring blood transfusion), scars, leg length differences (causing a limp), instability and dislocations, fractures, blood clots the legs and lungs, nerve injury (possibly leading to foot drop), and or death.   Postoperative course was discussed as far as limitations and expected recovery times. We also reviewed my anticipated surgical approach.  It was also discussed that after surgery there is a need for blood thinners to prevent blood clots, but even when being treated it is possible to develop a blood clot. Anticipate being hospitalized 1-2 days and subsequently either discharged home or to a rehabilitation facility. If discharge home from the hospital expect [place] home physical therapy for about 2 weeks and then transition to going to a physical therapy location for more aggressive therapy. Determinations of this will be based on progress with physical therapy while in the hospital. All questions were answered. The patient agrees to proceed with surgery.  Lives alone, but states has 2 sons nearby that will stay with her 24/7 following surgery.  Anticipate discharge POD1.   Past medical and surgical history was reviewed. It is positive for diabetes, neuropathy, CKD stage 2. Ms. Mcclelland will need a pre-operative medical evaluation and clearance by a primary care provider. We will obtain a CBC, UA, nasal swab for MRSA as well as the appropriate radiographs prior to surgery. I will leave it to the discretion of the primary care provider for additional pre-operative testing.     Return to clinic 1, week(s) prior to surgery then  14 days post-op, or sooner as needed for changes.  Re-x-ray on return: NO for 1 week prior to surgery, YES to 14 day post-op    Scribed by:  Hola Caraballo APRN, CNP, DNP  10:54 AM  8/9/2018  I attest I have seen and evaluated the patient.  I agree with above impression and plan.       Rayshawn Ervin D.O.

## 2018-08-09 NOTE — MR AVS SNAPSHOT
"              After Visit Summary   8/9/2018    Tracy Mcclelland    MRN: [id number]           Patient Information     Date Of Birth          1945        Visit Information        Provider Department      8/9/2018 9:10 AM Terrell Ervin DO [facility]        Today's Diagnoses     Hip pain, right    -  1       Follow-ups after your visit        Your next 10 appointments already scheduled     Aug 09, 2018  9:10 AM CDT   New Visit with Terrell Ervin DO   [facility] ([facility])    [address] [place] [address] [phone]              Who to contact     If you have questions or need follow up information about today's clinic visit or your schedule please contact [facility] directly at [phone].  Normal or non-critical lab and imaging results will be communicated to you by MyChart, letter or phone within 4 business days after the clinic has received the results. If you do not hear from us within 7 days, please contact the clinic through MyChart or phone. If you have a critical or abnormal lab result, we will notify you by phone as soon as possible.  Submit refill requests through Webjam or call your pharmacy and they will forward the refill request to us. Please allow 3 business days for your refill to be completed.          Additional Information About Your Visit        Care EveryWhere ID     This is your Care EveryWhere ID. This could be used by other organizations to access your [facility] medical records  [id number]        Your Vitals Were     Temperature Height BMI (Body Mass Index)             98.7  F (37.1  C) (Temporal) 1.605 m (5' 3.2\") 27.64 kg/m2          Blood Pressure from Last 3 Encounters:   08/09/18 135/82   08/06/18 124/64   07/02/18 148/81    Weight from Last 3 Encounters:   08/09/18 71.2 kg (157 lb)   08/06/18 71.2 kg (157 lb)   07/02/18 73.5 kg (162 lb)               Primary Care Provider " Office Phone # Fax #    MACHO Cifuentes [phone]       [address] [facility] [address]        Equal Access to Services     LIU KURTZ : Hadii aad ku hadrehano Somicaali, waaxda luqadaha, qaybta kaalmada adeegyada, tatyana bladimirin hayaajoseph wildenaila asrtidelizabethандрей conde. So [facility] [phone].    ATENCIÓN: Si habla español, tiene a johnson disposición servicios gratuitos de asistencia lingüística. Llame al [phone].    We comply with applicable federal civil rights laws and [place] laws. We do not discriminate on the basis of race, color, national origin, age, disability, sex, sexual orientation, or gender identity.            Thank you!     Thank you for choosing [facility]  for your care. Our goal is always to provide you with excellent care. Hearing back from our patients is one way we can continue to improve our services. Please take a few minutes to complete the written survey that you may receive in the mail after your visit with us. Thank you!             Your Updated Medication List - Protect others around you: Learn how to safely use, store and throw away your medicines at www.disposemymeds.org.          This list is accurate as of 8/9/18  9:04 AM.  Always use your most recent med list.                   Brand Name Dispense Instructions for use Diagnosis    aspirin 81 MG EC tablet     90 tablet    Take 1 tablet by mouth daily.    Type 2 diabetes, HbA1c goal < 7% (H)       gabapentin 600 MG tablet    NEURONTIN    360 tablet    Take 2 tablets (1,200 mg) by mouth 3 times daily    Type 2 diabetes mellitus with stage 2 chronic kidney disease, without long-term current use of insulin (H)       lisinopril 20 MG tablet    PRINIVIL/ZESTRIL    90 tablet    Take 1 tablet (20 mg) by mouth daily    Benign essential hypertension       loratadine 10 MG tablet    CLARITIN    30 tablet    Take 1 tablet (10 mg) by mouth daily    Middle ear effusion, right       meloxicam 15 MG tablet    MOBIC     30 tablet    Take 1 tablet (15 mg) by mouth daily    Primary osteoarthritis of right hip       metFORMIN 500 MG tablet    GLUCOPHAGE    180 tablet    Take 1 tablet (500 mg) by mouth 2 times daily (with meals)    Type 2 diabetes mellitus with stage 2 chronic kidney disease, without long-term current use of insulin (H)       simvastatin 5 MG tablet    ZOCOR    90 tablet    TAKE ONE TABLET BY MOUTH EVERY NIGHT AT BEDTIME    Hyperlipidemia with target LDL less than 100

## 2018-08-10 DIAGNOSIS — E78.5 HYPERLIPIDEMIA WITH TARGET LDL LESS THAN 100: ICD-10-CM

## 2018-08-10 DIAGNOSIS — E11.22 TYPE 2 DIABETES MELLITUS WITH STAGE 2 CHRONIC KIDNEY DISEASE, WITHOUT LONG-TERM CURRENT USE OF INSULIN (H): ICD-10-CM

## 2018-08-10 DIAGNOSIS — N18.2 TYPE 2 DIABETES MELLITUS WITH STAGE 2 CHRONIC KIDNEY DISEASE, WITHOUT LONG-TERM CURRENT USE OF INSULIN (H): ICD-10-CM

## 2018-08-10 LAB
CREAT UR-MCNC: 85 MG/DL
MICROALBUMIN UR-MCNC: 20 MG/L
MICROALBUMIN/CREAT UR: 24.2 MG/G CR (ref 0–25)

## 2018-08-10 PROCEDURE — 82043 UR ALBUMIN QUANTITATIVE: CPT | Performed by: NURSE PRACTITIONER

## 2018-10-03 ENCOUNTER — OFFICE VISIT (OUTPATIENT)
Dept: FAMILY MEDICINE | Facility: OTHER | Age: 73
End: 2018-10-03
Payer: COMMERCIAL

## 2018-10-03 VITALS
HEART RATE: 110 BPM | WEIGHT: 165 LBS | BODY MASS INDEX: 29.23 KG/M2 | SYSTOLIC BLOOD PRESSURE: 124 MMHG | RESPIRATION RATE: 20 BRPM | HEIGHT: 63 IN | TEMPERATURE: 97.3 F | OXYGEN SATURATION: 96 % | DIASTOLIC BLOOD PRESSURE: 66 MMHG

## 2018-10-03 DIAGNOSIS — Z01.818 PREOP GENERAL PHYSICAL EXAM: Primary | ICD-10-CM

## 2018-10-03 DIAGNOSIS — M16.11 PRIMARY OSTEOARTHRITIS OF RIGHT HIP: ICD-10-CM

## 2018-10-03 DIAGNOSIS — N18.2 TYPE 2 DIABETES MELLITUS WITH STAGE 2 CHRONIC KIDNEY DISEASE, WITHOUT LONG-TERM CURRENT USE OF INSULIN (H): ICD-10-CM

## 2018-10-03 DIAGNOSIS — I10 HYPERTENSION, GOAL BELOW 140/90: ICD-10-CM

## 2018-10-03 DIAGNOSIS — E11.22 TYPE 2 DIABETES MELLITUS WITH STAGE 2 CHRONIC KIDNEY DISEASE, WITHOUT LONG-TERM CURRENT USE OF INSULIN (H): ICD-10-CM

## 2018-10-03 LAB
ANION GAP SERPL CALCULATED.3IONS-SCNC: 8 MMOL/L (ref 3–14)
BASOPHILS # BLD AUTO: 0 10E9/L (ref 0–0.2)
BASOPHILS NFR BLD AUTO: 0.2 %
BUN SERPL-MCNC: 16 MG/DL (ref 7–30)
CALCIUM SERPL-MCNC: 8.9 MG/DL (ref 8.5–10.1)
CHLORIDE SERPL-SCNC: 100 MMOL/L (ref 94–109)
CO2 SERPL-SCNC: 29 MMOL/L (ref 20–32)
CREAT SERPL-MCNC: 0.72 MG/DL (ref 0.52–1.04)
DIFFERENTIAL METHOD BLD: NORMAL
EOSINOPHIL # BLD AUTO: 0.1 10E9/L (ref 0–0.7)
EOSINOPHIL NFR BLD AUTO: 0.5 %
ERYTHROCYTE [DISTWIDTH] IN BLOOD BY AUTOMATED COUNT: 13.3 % (ref 10–15)
GFR SERPL CREATININE-BSD FRML MDRD: 80 ML/MIN/1.7M2
GLUCOSE SERPL-MCNC: 161 MG/DL (ref 70–99)
HBA1C MFR BLD: 7.1 % (ref 0–5.6)
HCT VFR BLD AUTO: 46.7 % (ref 35–47)
HGB BLD-MCNC: 15.3 G/DL (ref 11.7–15.7)
LYMPHOCYTES # BLD AUTO: 2.9 10E9/L (ref 0.8–5.3)
LYMPHOCYTES NFR BLD AUTO: 31.4 %
MCH RBC QN AUTO: 31.1 PG (ref 26.5–33)
MCHC RBC AUTO-ENTMCNC: 32.8 G/DL (ref 31.5–36.5)
MCV RBC AUTO: 95 FL (ref 78–100)
MONOCYTES # BLD AUTO: 0.6 10E9/L (ref 0–1.3)
MONOCYTES NFR BLD AUTO: 6.3 %
NEUTROPHILS # BLD AUTO: 5.7 10E9/L (ref 1.6–8.3)
NEUTROPHILS NFR BLD AUTO: 61.6 %
PLATELET # BLD AUTO: 221 10E9/L (ref 150–450)
POTASSIUM SERPL-SCNC: 4.2 MMOL/L (ref 3.4–5.3)
RBC # BLD AUTO: 4.92 10E12/L (ref 3.8–5.2)
SODIUM SERPL-SCNC: 137 MMOL/L (ref 133–144)
WBC # BLD AUTO: 9.3 10E9/L (ref 4–11)

## 2018-10-03 PROCEDURE — 80048 BASIC METABOLIC PNL TOTAL CA: CPT | Performed by: NURSE PRACTITIONER

## 2018-10-03 PROCEDURE — 93000 ELECTROCARDIOGRAM COMPLETE: CPT | Performed by: NURSE PRACTITIONER

## 2018-10-03 PROCEDURE — 40000868 ZZHCL STATISTIC MRSA/MSSA PRESURGICAL SCREEN ID: Performed by: NURSE PRACTITIONER

## 2018-10-03 PROCEDURE — 36415 COLL VENOUS BLD VENIPUNCTURE: CPT | Performed by: NURSE PRACTITIONER

## 2018-10-03 PROCEDURE — 85025 COMPLETE CBC W/AUTO DIFF WBC: CPT | Performed by: NURSE PRACTITIONER

## 2018-10-03 PROCEDURE — 83036 HEMOGLOBIN GLYCOSYLATED A1C: CPT | Performed by: NURSE PRACTITIONER

## 2018-10-03 PROCEDURE — 40000869 ZZHCL STATISTIC MRSA/MSSA PRESURGICAL SCREEN CULTURE: Performed by: NURSE PRACTITIONER

## 2018-10-03 PROCEDURE — 99214 OFFICE O/P EST MOD 30 MIN: CPT | Performed by: NURSE PRACTITIONER

## 2018-10-03 RX ORDER — GABAPENTIN 600 MG/1
1200 TABLET ORAL 3 TIMES DAILY
Qty: 360 TABLET | Refills: 1 | Status: SHIPPED | OUTPATIENT
Start: 2018-10-03 | End: 2019-02-18

## 2018-10-03 ASSESSMENT — PAIN SCALES - GENERAL: PAINLEVEL: EXTREME PAIN (8)

## 2018-10-03 NOTE — PATIENT INSTRUCTIONS
Do not take your Lisinopril for 24 hours prior to surgery.    Do not take any medications the morning of surgery.     Labs will be done today.   For normal results, you will receive a letter with the results in about 2 weeks.  If anything is abnormal or unexpected, someone from the clinic will call you.        Before Your Surgery      Call your surgeon if there is any change in your health. This includes signs of a cold or flu (such as a sore throat, runny nose, cough, rash or fever).    Do not smoke, drink alcohol or take over the counter medicine (unless your surgeon or primary care doctor tells you to) for the 24 hours before and after surgery.    If you take prescribed drugs: Follow your doctor s orders about which medicines to take and which to stop until after surgery.    Eating and drinking prior to surgery: follow the instructions from your surgeon    Take a shower or bath the night before surgery. Use the soap your surgeon gave you to gently clean your skin. If you do not have soap from your surgeon, use your regular soap. Do not shave or scrub the surgery site.  Wear clean pajamas and have clean sheets on your bed.

## 2018-10-03 NOTE — PROGRESS NOTES
[facility]  [address] [facility] [address] [phone]  Dept: [phone]    PRE-OP EVALUATION:  Today's date: 10/3/2018    Tracy Mcclelland (: 1945) presents for pre-operative evaluation assessment as requested by Dr. Crane.  She requires evaluation and anesthesia risk assessment prior to undergoing surgery/procedure for treatment of hip pain - right OA.    Fax number for surgical facility: available on [facility]   Primary Physician: Skylar Gomez  Type of Anesthesia Anticipated: General    Patient has a Health Care Directive or Living Will:  NO    Preop Questions 10/3/2018   Who is doing your surgery? dr crane   What are you having done? hip surgery   Date of Surgery/Procedure: 10 16 18   Facility or Hospital where procedure/surgery will be performed: [facility]   1.  Do you have a history of Heart attack, stroke, stent, coronary bypass surgery, or other heart surgery? No   2.  Do you ever have any pain or discomfort in your chest? No   3.  Do you have a history of  Heart Failure? No   4.   Are you troubled by shortness of breath when:  walking on a level surface, or up a slight hill, or at night? No   5.  Do you currently have a cold, bronchitis or other respiratory infection? No   6.  Do you have a cough, shortness of breath, or wheezing? No   7.  Do you sometimes get pains in the calves of your legs when you walk? No   8. Do you or anyone in your family have previous history of blood clots? No   9.  Do you or does anyone in your family have a serious bleeding problem such as prolonged bleeding following surgeries or cuts? No   10. Have you ever had problems with anemia or been told to take iron pills? No   11. Have you had any abnormal blood loss such as black, tarry or bloody stools, or abnormal vaginal bleeding? No   12. Have you ever had a blood transfusion? No   13. Have you or any of your relatives ever had problems with anesthesia? No   14. Do you have  sleep apnea, excessive snoring or daytime drowsiness? No   15. Do you have any prosthetic heart valves? No   16. Do you have prosthetic joints? No   17. Is there any chance that you may be pregnant? No         HPI:     HPI related to upcoming procedure: right hip OA      See problem list for active medical problems.  Problems all longstanding and stable, except as noted/documented.  See ROS for pertinent symptoms related to these conditions.                                                                                                                                                          .    MEDICAL HISTORY:     Patient Active Problem List    Diagnosis Date Noted     Primary osteoarthritis of right hip 07/02/2018     Priority: Medium     CKD (chronic kidney disease) stage 2, GFR 60-89 ml/min 10/19/2015     Priority: Medium     Hereditary and idiopathic peripheral neuropathy 07/02/2015     Priority: Medium     Problem list name updated by automated process. Provider to review       Granuloma annulare 08/07/2014     Priority: Medium     Right dorsal hand       Hypertension, goal below 140/90 07/17/2012     Priority: Medium     Type 2 diabetes mellitus with diabetic chronic kidney disease (H) 11/02/2011     Priority: Medium     Tobacco use disorder 10/07/2010     Priority: Medium     Tennis Elbow-left 07/28/2010     Priority: Medium     Microalbuminuria 01/29/2010     Priority: Medium     Hyperlipidemia with target LDL less than 100 01/28/2010     Priority: Medium     Diagnosis updated by automated process. Provider to review and confirm.        Past Medical History:   Diagnosis Date     CKD (chronic kidney disease) stage 2, GFR 60-89 ml/min 10/19/2015     Headache(784.0)     hx.of Migraines     Lumbago      Type II or unspecified type diabetes mellitus without mention of complication, not stated as uncontrolled      Unspecified essential hypertension      Past Surgical History:   Procedure Laterality Date     C  LIGATE FALLOPIAN TUBE       HC LAPAROSCOPY, SURGICAL; CHOLECYSTECTOMY  2000    Cholecystectomy, Laparoscopic     HC REMOVE TONSILS/ADENOIDS,<10 Y/O      T & A <11y.o.     Current Outpatient Prescriptions   Medication Sig Dispense Refill     gabapentin (NEURONTIN) 600 MG tablet Take 2 tablets (1,200 mg) by mouth 3 times daily 360 tablet 1     lisinopril (PRINIVIL/ZESTRIL) 20 MG tablet Take 1 tablet (20 mg) by mouth daily 90 tablet 0     loratadine (CLARITIN) 10 MG tablet Take 1 tablet (10 mg) by mouth daily 30 tablet 1     metFORMIN (GLUCOPHAGE) 500 MG tablet Take 1 tablet (500 mg) by mouth 2 times daily (with meals) 180 tablet 0     simvastatin (ZOCOR) 5 MG tablet TAKE ONE TABLET BY MOUTH EVERY NIGHT AT BEDTIME 90 tablet 1     aspirin 81 MG EC tablet Take 1 tablet by mouth daily. 90 tablet 3     OTC products: Tylenol    Allergies   Allergen Reactions     Mold      sneezing, coughing , runny nose, watery eyes & red,     No Known Drug Allergies       Latex Allergy: NO    Social History   Substance Use Topics     Smoking status: Current Every Day Smoker     Packs/day: 0.25     Years: 20.00     Types: Cigarettes     Last attempt to quit: 7/10/2012     Smokeless tobacco: Never Used      Comment: 2-3 cigs daily     Alcohol use Yes      Comment: couple times monthly     History   Drug Use No       REVIEW OF SYSTEMS:   CONSTITUTIONAL: NEGATIVE for fever, chills, change in weight  INTEGUMENTARY/SKIN: NEGATIVE for worrisome rashes, moles or lesions  EYES: NEGATIVE for vision changes or irritation  ENT/MOUTH: NEGATIVE for ear, mouth and throat problems  RESP: NEGATIVE for significant cough or SOB  BREAST: NEGATIVE for masses, tenderness or discharge  CV: NEGATIVE for chest pain, palpitations or peripheral edema  GI: NEGATIVE for nausea, abdominal pain, heartburn, or change in bowel habits  : NEGATIVE for frequency, dysuria, or hematuria  MUSCULOSKELETAL: NEGATIVE for significant arthralgias or myalgia  NEURO: NEGATIVE for  "weakness, dizziness or paresthesias  ENDOCRINE: NEGATIVE for temperature intolerance, skin/hair changes  HEME: NEGATIVE for bleeding problems  PSYCHIATRIC: NEGATIVE for changes in mood or affect    EXAM:   /66  Pulse 110  Temp 97.3  F (36.3  C) (Tympanic)  Resp 20  Ht 5' 3.2\" (1.605 m)  Wt 165 lb (74.8 kg)  LMP  (LMP Unknown)  SpO2 96%  Breastfeeding? No  BMI 29.04 kg/m2    GENERAL APPEARANCE: healthy, alert and no distress     EYES: EOMI, PERRL     HENT: ear canals and TM's normal and nose and mouth without ulcers or lesions     NECK: no adenopathy, no asymmetry, masses, or scars and thyroid normal to palpation     RESP: lungs clear to auscultation - no rales, rhonchi or wheezes     CV: regular rates and rhythm, normal S1 S2, no S3 or S4 and no murmur, click or rub     ABDOMEN:  soft, nontender, no HSM or masses and bowel sounds normal     MS: extremities normal- no gross deformities noted, no evidence of inflammation in joints, FROM in all extremities.     SKIN: no suspicious lesions or rashes     NEURO: Normal strength and tone, sensory exam grossly normal, mentation intact and speech normal     PSYCH: mentation appears normal. and affect normal/bright     LYMPHATICS: No cervical adenopathy    DIAGNOSTICS:   EKG: appears normal, NSR, normal axis, normal intervals, no acute ST/T changes c/w ischemia, there are no prior tracings available    Recent Labs   Lab Test  08/06/18   1003  02/14/18   1505  07/27/17   0940   NA  138   --   137   POTASSIUM  4.3   --   4.3   CR  0.79   --   0.65   A1C  6.8*  6.9*  6.7*      Office Visit on 10/03/2018   Component Date Value Ref Range Status     WBC 10/03/2018 9.3  4.0 - 11.0 10e9/L Final     RBC Count 10/03/2018 4.92  3.8 - 5.2 10e12/L Final     Hemoglobin 10/03/2018 15.3  11.7 - 15.7 g/dL Final     Hematocrit 10/03/2018 46.7  35.0 - 47.0 % Final     MCV 10/03/2018 95  78 - 100 fl Final     MCH 10/03/2018 31.1  26.5 - 33.0 pg Final     MCHC 10/03/2018 32.8  31.5 " - 36.5 g/dL Final     RDW 10/03/2018 13.3  10.0 - 15.0 % Final     Platelet Count 10/03/2018 221  150 - 450 10e9/L Final     % Neutrophils 10/03/2018 61.6  % Final     % Lymphocytes 10/03/2018 31.4  % Final     % Monocytes 10/03/2018 6.3  % Final     % Eosinophils 10/03/2018 0.5  % Final     % Basophils 10/03/2018 0.2  % Final     Absolute Neutrophil 10/03/2018 5.7  1.6 - 8.3 10e9/L Final     Absolute Lymphocytes 10/03/2018 2.9  0.8 - 5.3 10e9/L Final     Absolute Monocytes 10/03/2018 0.6  0.0 - 1.3 10e9/L Final     Absolute Eosinophils 10/03/2018 0.1  0.0 - 0.7 10e9/L Final     Absolute Basophils 10/03/2018 0.0  0.0 - 0.2 10e9/L Final     Diff Method 10/03/2018 Automated Method   Final     Hemoglobin A1C 10/03/2018 7.1* 0 - 5.6 % Final    Comment: Normal <5.7% Prediabetes 5.7-6.4%  Diabetes 6.5% or higher - adopted from ADA   consensus guidelines.             IMPRESSION:   Reason for surgery/procedure: right hip OA    The proposed surgical procedure is considered INTERMEDIATE risk.    REVISED CARDIAC RISK INDEX  The patient has the following serious cardiovascular risks for perioperative complications such as (MI, PE, VFib and 3  AV Block):  No serious cardiac risks  INTERPRETATION: 0 risks: Class I (very low risk - 0.4% complication rate)    The patient has the following additional risks for perioperative complications:  No identified additional risks      ICD-10-CM    1. Preop general physical exam Z01.818 CBC with platelets differential     MRSA MSSA Presurgical Screen     EKG 12-lead complete w/read - Clinics     *UA reflex to Microscopic and Culture ([facility] (except [place] and [place])   2. Primary osteoarthritis of right hip M16.11    3. Type 2 diabetes mellitus with stage 2 chronic kidney disease, without long-term current use of insulin (H) E11.22 Hemoglobin A1c    N18.2 gabapentin (NEURONTIN) 600 MG tablet   4. Hypertension, goal below 140/90 I10 Basic metabolic panel  (Ca, Cl, CO2,  Creat, Gluc, K, Na, BUN)       RECOMMENDATIONS:           Diabetes Medication Use  -----Hold usual oral and non-insulin diabetic meds (e.g. Metformin, Actos, Glipizide) while NPO.       Anticoagulant or Antiplatelet Medication Use  ASPIRIN: Discontinue ASA 7-10 days prior to procedure to reduce bleeding risk.  It should be resumed post-operatively.  NSAIDS: Naproxen (Naprosyn):   Stop 2-3 days prior to surgery        ACE Inhibitor or Angiotensin Receptor Blocker (ARB) Use  Ace inhibitor or Angiotensin Receptor Blocker (ARB) and should HOLD this medication for the 24 hours prior to surgery.      APPROVAL GIVEN to proceed with proposed procedure, without further diagnostic evaluation       Signed Electronically by: MACHO Cifuentes CNP    Copy of this evaluation report is provided to requesting physician.    [facility] Preop Guidelines    Revised Cardiac Risk Index

## 2018-10-03 NOTE — MR AVS SNAPSHOT
After Visit Summary   10/3/2018    Tracy Mcclelland    MRN: [id number]           Patient Information     Date Of Birth          1945        Visit Information        Provider Department      10/3/2018 10:00 AM Skylar Gomez APRN [facility]        Today's Diagnoses     Preop general physical exam    -  1    Primary osteoarthritis of right hip        Type 2 diabetes mellitus with stage 2 chronic kidney disease, without long-term current use of insulin (H)        Hypertension, goal below 140/90          Care Instructions    Do not take your Lisinopril for 24 hours prior to surgery.    Do not take any medications the morning of surgery.     Labs will be done today.   For normal results, you will receive a letter with the results in about 2 weeks.  If anything is abnormal or unexpected, someone from the clinic will call you.        Before Your Surgery      Call your surgeon if there is any change in your health. This includes signs of a cold or flu (such as a sore throat, runny nose, cough, rash or fever).    Do not smoke, drink alcohol or take over the counter medicine (unless your surgeon or primary care doctor tells you to) for the 24 hours before and after surgery.    If you take prescribed drugs: Follow your doctor s orders about which medicines to take and which to stop until after surgery.    Eating and drinking prior to surgery: follow the instructions from your surgeon    Take a shower or bath the night before surgery. Use the soap your surgeon gave you to gently clean your skin. If you do not have soap from your surgeon, use your regular soap. Do not shave or scrub the surgery site.  Wear clean pajamas and have clean sheets on your bed.           Follow-ups after your visit        Follow-up notes from your care team     Return in about 2 weeks (around 10/16/2018) for Surgery.      Your next 10 appointments already scheduled     Oct 10, 2018 10:10 AM CDT   Return Visit with  "Terrell Ervin DO   [facility]    [address] [id number] [phone]            Oct 16, 2018   Procedure with Terrell Ervin DO   [facility] Periop Services ([facility])    [address] [place] [address]   [place] [address]   [phone]           From [address]: Exit at [address] on south side of [place]. Turn right on [address]. Turn left at stoplight on [address]. [facility] will be in view two blocks ahead            Oct 31, 2018 10:10 AM CDT   Return Visit with Terrell Ervin DO   [facility] ([facility])    [address] [id number] [phone]              Who to contact     If you have questions or need follow up information about today's clinic visit or your schedule please contact [facility] directly at [phone].  Normal or non-critical lab and imaging results will be communicated to you by MyChart, letter or phone within 4 business days after the clinic has received the results. If you do not hear from us within 7 days, please contact the clinic through MyChart or phone. If you have a critical or abnormal lab result, we will notify you by phone as soon as possible.  Submit refill requests through [employer] or call your pharmacy and they will forward the refill request to us. Please allow 3 business days for your refill to be completed.          Additional Information About Your Visit        Care EveryWhere ID     This is your Care EveryWhere ID. This could be used by other organizations to access your [place] medical records  [id number]        Your Vitals Were     Pulse Temperature Respirations Height Last Period Pulse Oximetry    110 97.3  F (36.3  C) (Tympanic) 20 5' 3.2\" (1.605 m) (LMP Unknown) 96%    Breastfeeding? BMI (Body Mass Index)                No 29.04 kg/m2           Blood Pressure " from Last 3 Encounters:   10/03/18 124/66   08/09/18 135/82   08/06/18 124/64    Weight from Last 3 Encounters:   10/03/18 165 lb (74.8 kg)   08/09/18 157 lb (71.2 kg)   08/06/18 157 lb (71.2 kg)              We Performed the Following     *UA reflex to Microscopic and Culture ([place]; [facility]; [facility]; [facility]; [facility]; [facility]; [place]; [place])     Basic metabolic panel  (Ca, Cl, CO2, Creat, Gluc, K, Na, BUN)     CBC with platelets differential     EKG 12-lead complete w/read - Clinics     Hemoglobin A1c     MRSA MSSA Presurgical Screen          Where to get your medicines      These medications were sent to [facility] [place], [place] - [address]  [address] [facility] [address]     Phone:  [phone]     gabapentin 600 MG tablet          Primary Care Provider Office Phone # Fax #    MACHO Cifuentes [phone]       [address] [facility] [address]        Equal Access to Services     LIU KURTZ : Hadii aad ku hadasho Soomaali, waaxda luqadaha, qaybta kaalmada adeegyada, tatyana villavicencio hayfernandon leeanna davies . So [facility] [phone].    ATENCIÓN: Si habla español, tiene a johnson disposición servicios gratuitos de asistencia lingüística. Llame al [phone].    We comply with applicable federal civil rights laws and [place] laws. We do not discriminate on the basis of race, color, national origin, age, disability, sex, sexual orientation, or gender identity.            Thank you!     Thank you for choosing [facility]  for your care. Our goal is always to provide you with excellent care. Hearing back from our patients is one way we can continue to improve our services. Please take a few minutes to complete the written survey that you may receive in the mail after your visit with us. Thank you!             Your Updated Medication List - Protect others around you: Learn how to safely use, store and throw away your  medicines at www.disposemymeds.org.          This list is accurate as of 10/3/18 11:02 AM.  Always use your most recent med list.                   Brand Name Dispense Instructions for use Diagnosis    aspirin 81 MG EC tablet     90 tablet    Take 1 tablet by mouth daily.    Type 2 diabetes, HbA1c goal < 7% (H)       gabapentin 600 MG tablet    NEURONTIN    360 tablet    Take 2 tablets (1,200 mg) by mouth 3 times daily    Type 2 diabetes mellitus with stage 2 chronic kidney disease, without long-term current use of insulin (H)       lisinopril 20 MG tablet    PRINIVIL/ZESTRIL    90 tablet    Take 1 tablet (20 mg) by mouth daily    Benign essential hypertension       loratadine 10 MG tablet    CLARITIN    30 tablet    Take 1 tablet (10 mg) by mouth daily    Middle ear effusion, right       metFORMIN 500 MG tablet    GLUCOPHAGE    180 tablet    Take 1 tablet (500 mg) by mouth 2 times daily (with meals)    Type 2 diabetes mellitus with stage 2 chronic kidney disease, without long-term current use of insulin (H)       simvastatin 5 MG tablet    ZOCOR    90 tablet    TAKE ONE TABLET BY MOUTH EVERY NIGHT AT BEDTIME    Hyperlipidemia with target LDL less than 100

## 2018-10-04 ENCOUNTER — TELEPHONE (OUTPATIENT)
Dept: FAMILY MEDICINE | Facility: OTHER | Age: 73
End: 2018-10-04

## 2018-10-04 DIAGNOSIS — Z01.818 PREOP GENERAL PHYSICAL EXAM: ICD-10-CM

## 2018-10-04 LAB
BACTERIA SPEC CULT: NORMAL
BACTERIA SPEC CULT: NORMAL
SPECIMEN SOURCE: NORMAL

## 2018-10-04 NOTE — TELEPHONE ENCOUNTER
Reason for Call:  Other call back    Detailed comments: Patient has a questions regarding appointment yesterday, patient refused 2X on what the question was. Please return call    Phone Number Patient can be reached at: Home number on file [phone] (home)    Best Time: any    Can we leave a detailed message on this number? YES    Call taken on 10/4/2018 at 12:54 PM by Morena Jensen

## 2018-10-04 NOTE — TELEPHONE ENCOUNTER
I have attempted to contact this patient by phone with the following results: no answer, voicemail not set up.  Pati Interiano MA     10/4/2018

## 2018-10-05 NOTE — TELEPHONE ENCOUNTER
She just wanted to let us know that when she brought in her urine it was not accepted because it was 4 hours old.  ................Carrillo Bauer LPN,   October 5, 2018,      12:00 PM,   [facility]

## 2018-10-05 NOTE — TELEPHONE ENCOUNTER
Left msg that we are returning her call and to please leave detailed info when she calls back if we are not available.  ................Carrillo Bauer LPN,   October 5, 2018,      11:41 AM,   [facility]

## 2018-10-10 ENCOUNTER — OFFICE VISIT (OUTPATIENT)
Dept: ORTHOPEDICS | Facility: CLINIC | Age: 73
End: 2018-10-10
Payer: COMMERCIAL

## 2018-10-10 VITALS
WEIGHT: 163.5 LBS | HEART RATE: 120 BPM | DIASTOLIC BLOOD PRESSURE: 82 MMHG | BODY MASS INDEX: 28.97 KG/M2 | SYSTOLIC BLOOD PRESSURE: 138 MMHG | HEIGHT: 63 IN

## 2018-10-10 DIAGNOSIS — M16.11 PRIMARY OSTEOARTHRITIS OF RIGHT HIP: Primary | ICD-10-CM

## 2018-10-10 PROCEDURE — 99207 ZZC PREOP VISIT IN GLOBAL PKG: CPT | Performed by: ORTHOPAEDIC SURGERY

## 2018-10-10 ASSESSMENT — PAIN SCALES - GENERAL: PAINLEVEL: WORST PAIN (10)

## 2018-10-10 NOTE — NURSING NOTE
"Tracy Mcclelland is a 73 year old female who presents for:  Chief Complaint   Patient presents with     RECHECK     1 week prior to Right total hip        Initial Vitals:  /82 (BP Location: Right arm, Patient Position: Sitting, Cuff Size: Adult Large)  Pulse 120  Ht 1.605 m (5' 3.2\")  Wt 74.2 kg (163 lb 8 oz)  LMP  (LMP Unknown)  BMI 28.78 kg/m2 Estimated body mass index is 28.78 kg/(m^2) as calculated from the following:    Height as of this encounter: 1.605 m (5' 3.2\").    Weight as of this encounter: 74.2 kg (163 lb 8 oz).. Body surface area is 1.82 meters squared. BP completed using cuff size: large  Worst Pain (10)    Do you feel safe in your environment?  Yes  Do you need any refills today? No    Nursing Comments: Discussed dental precautions.         Pepper Faria  "

## 2018-10-10 NOTE — MR AVS SNAPSHOT
After Visit Summary   10/10/2018    Tracy Mcclelland    MRN: [id number]           Patient Information     Date Of Birth          1945        Visit Information        Provider Department      10/10/2018 10:10 AM Terrell Ervin DO [facility]         Follow-ups after your visit        Your next 10 appointments already scheduled     Oct 10, 2018 10:10 AM CDT   Return Visit with Terrell Ervin DO   [facility] ([facility])    [address] [place] [phone]            Oct 16, 2018   Procedure with Terrell Ervin DO   [facility] ([facility])    [address] [place] [address] [phone]           From [address]: Exit at [facility] [address] on south side of [place]. Turn right on [address]. Turn left at stoplight on [address]. [facility] will be in view two blocks ahead            Oct 31, 2018 10:10 AM CDT   Return Visit with Terrell Ervin DO   [facility] ([facility])    [address] [place] [phone]              Who to contact     If you have questions or need follow up information about today's clinic visit or your schedule please contact [facility] directly at [phone].  Normal or non-critical lab and imaging results will be communicated to you by MyChart, letter or phone within 4 business days after the clinic has received the results. If you do not hear from us within 7 days, please contact the clinic through MyChart or phone. If you have a critical or abnormal lab result, we will notify you by phone as soon as possible.  Submit refill requests through [employer] or call your pharmacy and they will forward the refill request to us. Please allow 3 business days for your refill to be completed.          Additional Information About Your  "Visit        Care EveryWhere ID     This is your Care EveryWhere ID. This could be used by other organizations to access your [facility] medical records  [id number]        Your Vitals Were     Pulse Height Last Period BMI (Body Mass Index)          120 1.605 m (5' 3.2\") (LMP Unknown) 28.78 kg/m2         Blood Pressure from Last 3 Encounters:   10/10/18 138/82   10/03/18 124/66   08/09/18 135/82    Weight from Last 3 Encounters:   10/10/18 74.2 kg (163 lb 8 oz)   10/03/18 74.8 kg (165 lb)   08/09/18 71.2 kg (157 lb)              Today, you had the following     No orders found for display       Primary Care Provider Office Phone # Fax #    SkylarMACHO Gonzalez [phone]       [address] [facility] [id number]        Equal Access to Services     STEVE KURTZ : Hadii aad ku hadasho Soomaali, waaxda luqadaha, qaybta kaalmada adeegyada, waxay idiin hayaan leeanna davies . So [facility] [phone].    ATENCIÓN: Si habla español, tiene a johnson disposición servicios gratuitos de asistencia lingüística. Rickiestaci al [phone].    We comply with applicable federal civil rights laws and [place] laws. We do not discriminate on the basis of race, color, national origin, age, disability, sex, sexual orientation, or gender identity.            Thank you!     Thank you for choosing [facility]  for your care. Our goal is always to provide you with excellent care. Hearing back from our patients is one way we can continue to improve our services. Please take a few minutes to complete the written survey that you may receive in the mail after your visit with us. Thank you!             Your Updated Medication List - Protect others around you: Learn how to safely use, store and throw away your medicines at www.disposemymeds.org.          This list is accurate as of 10/10/18 10:07 AM.  Always use your most recent med list.                   Brand Name Dispense Instructions for use Diagnosis    ALEVE PO "           aspirin 81 MG EC tablet     90 tablet    Take 1 tablet by mouth daily.    Type 2 diabetes, HbA1c goal < 7% (H)       gabapentin 600 MG tablet    NEURONTIN    360 tablet    Take 2 tablets (1,200 mg) by mouth 3 times daily    Type 2 diabetes mellitus with stage 2 chronic kidney disease, without long-term current use of insulin (H)       lisinopril 20 MG tablet    PRINIVIL/ZESTRIL    90 tablet    Take 1 tablet (20 mg) by mouth daily    Benign essential hypertension       loratadine 10 MG tablet    CLARITIN    30 tablet    Take 1 tablet (10 mg) by mouth daily    Middle ear effusion, right       metFORMIN 500 MG tablet    GLUCOPHAGE    180 tablet    Take 1 tablet (500 mg) by mouth 2 times daily (with meals)    Type 2 diabetes mellitus with stage 2 chronic kidney disease, without long-term current use of insulin (H)       simvastatin 5 MG tablet    ZOCOR    90 tablet    TAKE ONE TABLET BY MOUTH EVERY NIGHT AT BEDTIME    Hyperlipidemia with target LDL less than 100

## 2018-10-10 NOTE — PROGRESS NOTES
1 week prior to right total hip replacement.  H&P reviewed, recommendations noted.  Safe to proceed with surgery.  Labs reviewed and unremarkable.  Daughter-in-law at the bedside.  She has family arranged to stay with her.  Anticipate next day discharge.  Aspirin for DVT prophylaxis.  All questions answered.

## 2018-10-10 NOTE — LETTER
10/10/2018         RE: Tracy Mcclelland  [address] [facility] [phone]        Dear Colleague,    Thank you for referring your patient, Tracy Mcclelland, to the [facility]. Please see a copy of my visit note below.    1 week prior to right total hip replacement.  H&P reviewed, recommendations noted.  Safe to proceed with surgery.  Labs reviewed and unremarkable.  Daughter-in-law at the bedside.  She has family arranged to stay with her.  Anticipate next day discharge.  Aspirin for DVT prophylaxis.  All questions answered.    Again, thank you for allowing me to participate in the care of your patient.        Sincerely,        Terrell Ervin, DO

## 2018-10-15 NOTE — H&P (VIEW-ONLY)
[facility]  [address] [facility] [phone]  Dept: [phone]    PRE-OP EVALUATION:  Today's date: 10/3/2018    Tracy Mcclelland (: 1945) presents for pre-operative evaluation assessment as requested by Dr. Crane.  She requires evaluation and anesthesia risk assessment prior to undergoing surgery/procedure for treatment of hip pain - right OA.    Fax number for surgical facility: available on [facility]   Primary Physician: Skylar Gomez  Type of Anesthesia Anticipated: General    Patient has a Health Care Directive or Living Will:  NO    Preop Questions 10/3/2018   Who is doing your surgery? dr crane   What are you having done? hip surgery   Date of Surgery/Procedure: 10 16 18   Facility or Hospital where procedure/surgery will be performed: [facility]   1.  Do you have a history of Heart attack, stroke, stent, coronary bypass surgery, or other heart surgery? No   2.  Do you ever have any pain or discomfort in your chest? No   3.  Do you have a history of  Heart Failure? No   4.   Are you troubled by shortness of breath when:  walking on a level surface, or up a slight hill, or at night? No   5.  Do you currently have a cold, bronchitis or other respiratory infection? No   6.  Do you have a cough, shortness of breath, or wheezing? No   7.  Do you sometimes get pains in the calves of your legs when you walk? No   8. Do you or anyone in your family have previous history of blood clots? No   9.  Do you or does anyone in your family have a serious bleeding problem such as prolonged bleeding following surgeries or cuts? No   10. Have you ever had problems with anemia or been told to take iron pills? No   11. Have you had any abnormal blood loss such as black, tarry or bloody stools, or abnormal vaginal bleeding? No   12. Have you ever had a blood transfusion? No   13. Have you or any of your relatives ever had problems with anesthesia? No   14. Do you have  sleep apnea, excessive snoring or daytime drowsiness? No   15. Do you have any prosthetic heart valves? No   16. Do you have prosthetic joints? No   17. Is there any chance that you may be pregnant? No         HPI:     HPI related to upcoming procedure: right hip OA      See problem list for active medical problems.  Problems all longstanding and stable, except as noted/documented.  See ROS for pertinent symptoms related to these conditions.                                                                                                                                                          .    MEDICAL HISTORY:     Patient Active Problem List    Diagnosis Date Noted     Primary osteoarthritis of right hip 07/02/2018     Priority: Medium     CKD (chronic kidney disease) stage 2, GFR 60-89 ml/min 10/19/2015     Priority: Medium     Hereditary and idiopathic peripheral neuropathy 07/02/2015     Priority: Medium     Problem list name updated by automated process. Provider to review       Granuloma annulare 08/07/2014     Priority: Medium     Right dorsal hand       Hypertension, goal below 140/90 07/17/2012     Priority: Medium     Type 2 diabetes mellitus with diabetic chronic kidney disease (H) 11/02/2011     Priority: Medium     Tobacco use disorder 10/07/2010     Priority: Medium     Tennis Elbow-left 07/28/2010     Priority: Medium     Microalbuminuria 01/29/2010     Priority: Medium     Hyperlipidemia with target LDL less than 100 01/28/2010     Priority: Medium     Diagnosis updated by automated process. Provider to review and confirm.        Past Medical History:   Diagnosis Date     CKD (chronic kidney disease) stage 2, GFR 60-89 ml/min 10/19/2015     Headache(784.0)     hx.of Migraines     Lumbago      Type II or unspecified type diabetes mellitus without mention of complication, not stated as uncontrolled      Unspecified essential hypertension      Past Surgical History:   Procedure Laterality Date     C  LIGATE FALLOPIAN TUBE       HC LAPAROSCOPY, SURGICAL; CHOLECYSTECTOMY  2000    Cholecystectomy, Laparoscopic     HC REMOVE TONSILS/ADENOIDS,<12 Y/O      T & A <11y.o.     Current Outpatient Prescriptions   Medication Sig Dispense Refill     gabapentin (NEURONTIN) 600 MG tablet Take 2 tablets (1,200 mg) by mouth 3 times daily 360 tablet 1     lisinopril (PRINIVIL/ZESTRIL) 20 MG tablet Take 1 tablet (20 mg) by mouth daily 90 tablet 0     loratadine (CLARITIN) 10 MG tablet Take 1 tablet (10 mg) by mouth daily 30 tablet 1     metFORMIN (GLUCOPHAGE) 500 MG tablet Take 1 tablet (500 mg) by mouth 2 times daily (with meals) 180 tablet 0     simvastatin (ZOCOR) 5 MG tablet TAKE ONE TABLET BY MOUTH EVERY NIGHT AT BEDTIME 90 tablet 1     aspirin 81 MG EC tablet Take 1 tablet by mouth daily. 90 tablet 3     OTC products: Tylenol    Allergies   Allergen Reactions     Mold      sneezing, coughing , runny nose, watery eyes & red,     No Known Drug Allergies       Latex Allergy: NO    Social History   Substance Use Topics     Smoking status: Current Every Day Smoker     Packs/day: 0.25     Years: 20.00     Types: Cigarettes     Last attempt to quit: 7/10/2012     Smokeless tobacco: Never Used      Comment: 2-3 cigs daily     Alcohol use Yes      Comment: couple times monthly     History   Drug Use No       REVIEW OF SYSTEMS:   CONSTITUTIONAL: NEGATIVE for fever, chills, change in weight  INTEGUMENTARY/SKIN: NEGATIVE for worrisome rashes, moles or lesions  EYES: NEGATIVE for vision changes or irritation  ENT/MOUTH: NEGATIVE for ear, mouth and throat problems  RESP: NEGATIVE for significant cough or SOB  BREAST: NEGATIVE for masses, tenderness or discharge  CV: NEGATIVE for chest pain, palpitations or peripheral edema  GI: NEGATIVE for nausea, abdominal pain, heartburn, or change in bowel habits  : NEGATIVE for frequency, dysuria, or hematuria  MUSCULOSKELETAL: NEGATIVE for significant arthralgias or myalgia  NEURO: NEGATIVE for  "weakness, dizziness or paresthesias  ENDOCRINE: NEGATIVE for temperature intolerance, skin/hair changes  HEME: NEGATIVE for bleeding problems  PSYCHIATRIC: NEGATIVE for changes in mood or affect    EXAM:   /66  Pulse 110  Temp 97.3  F (36.3  C) (Tympanic)  Resp 20  Ht 5' 3.2\" (1.605 m)  Wt 165 lb (74.8 kg)  LMP  (LMP Unknown)  SpO2 96%  Breastfeeding? No  BMI 29.04 kg/m2    GENERAL APPEARANCE: healthy, alert and no distress     EYES: EOMI, PERRL     HENT: ear canals and TM's normal and nose and mouth without ulcers or lesions     NECK: no adenopathy, no asymmetry, masses, or scars and thyroid normal to palpation     RESP: lungs clear to auscultation - no rales, rhonchi or wheezes     CV: regular rates and rhythm, normal S1 S2, no S3 or S4 and no murmur, click or rub     ABDOMEN:  soft, nontender, no HSM or masses and bowel sounds normal     MS: extremities normal- no gross deformities noted, no evidence of inflammation in joints, FROM in all extremities.     SKIN: no suspicious lesions or rashes     NEURO: Normal strength and tone, sensory exam grossly normal, mentation intact and speech normal     PSYCH: mentation appears normal. and affect normal/bright     LYMPHATICS: No cervical adenopathy    DIAGNOSTICS:   EKG: appears normal, NSR, normal axis, normal intervals, no acute ST/T changes c/w ischemia, there are no prior tracings available    Recent Labs   Lab Test  08/06/18   1003  02/14/18   1505  07/27/17   0940   NA  138   --   137   POTASSIUM  4.3   --   4.3   CR  0.79   --   0.65   A1C  6.8*  6.9*  6.7*      Office Visit on 10/03/2018   Component Date Value Ref Range Status     WBC 10/03/2018 9.3  4.0 - 11.0 10e9/L Final     RBC Count 10/03/2018 4.92  3.8 - 5.2 10e12/L Final     Hemoglobin 10/03/2018 15.3  11.7 - 15.7 g/dL Final     Hematocrit 10/03/2018 46.7  35.0 - 47.0 % Final     MCV 10/03/2018 95  78 - 100 fl Final     MCH 10/03/2018 31.1  26.5 - 33.0 pg Final     MCHC 10/03/2018 32.8  31.5 " - 36.5 g/dL Final     RDW 10/03/2018 13.3  10.0 - 15.0 % Final     Platelet Count 10/03/2018 221  150 - 450 10e9/L Final     % Neutrophils 10/03/2018 61.6  % Final     % Lymphocytes 10/03/2018 31.4  % Final     % Monocytes 10/03/2018 6.3  % Final     % Eosinophils 10/03/2018 0.5  % Final     % Basophils 10/03/2018 0.2  % Final     Absolute Neutrophil 10/03/2018 5.7  1.6 - 8.3 10e9/L Final     Absolute Lymphocytes 10/03/2018 2.9  0.8 - 5.3 10e9/L Final     Absolute Monocytes 10/03/2018 0.6  0.0 - 1.3 10e9/L Final     Absolute Eosinophils 10/03/2018 0.1  0.0 - 0.7 10e9/L Final     Absolute Basophils 10/03/2018 0.0  0.0 - 0.2 10e9/L Final     Diff Method 10/03/2018 Automated Method   Final     Hemoglobin A1C 10/03/2018 7.1* 0 - 5.6 % Final    Comment: Normal <5.7% Prediabetes 5.7-6.4%  Diabetes 6.5% or higher - adopted from ADA   consensus guidelines.             IMPRESSION:   Reason for surgery/procedure: right hip OA    The proposed surgical procedure is considered INTERMEDIATE risk.    REVISED CARDIAC RISK INDEX  The patient has the following serious cardiovascular risks for perioperative complications such as (MI, PE, VFib and 3  AV Block):  No serious cardiac risks  INTERPRETATION: 0 risks: Class I (very low risk - 0.4% complication rate)    The patient has the following additional risks for perioperative complications:  No identified additional risks      ICD-10-CM    1. Preop general physical exam Z01.818 CBC with platelets differential     MRSA MSSA Presurgical Screen     EKG 12-lead complete w/read - Clinics     *UA reflex to Microscopic and Culture ([facility] (except [place] and [place])   2. Primary osteoarthritis of right hip M16.11    3. Type 2 diabetes mellitus with stage 2 chronic kidney disease, without long-term current use of insulin (H) E11.22 Hemoglobin A1c    N18.2 gabapentin (NEURONTIN) 600 MG tablet   4. Hypertension, goal below 140/90 I10 Basic metabolic panel  (Ca, Cl, CO2,  Creat, Gluc, K, Na, BUN)       RECOMMENDATIONS:           Diabetes Medication Use  -----Hold usual oral and non-insulin diabetic meds (e.g. Metformin, Actos, Glipizide) while NPO.       Anticoagulant or Antiplatelet Medication Use  ASPIRIN: Discontinue ASA 7-10 days prior to procedure to reduce bleeding risk.  It should be resumed post-operatively.  NSAIDS: Naproxen (Naprosyn):   Stop 2-3 days prior to surgery        ACE Inhibitor or Angiotensin Receptor Blocker (ARB) Use  Ace inhibitor or Angiotensin Receptor Blocker (ARB) and should HOLD this medication for the 24 hours prior to surgery.      APPROVAL GIVEN to proceed with proposed procedure, without further diagnostic evaluation       Signed Electronically by: MACHO Cifuentes CNP    Copy of this evaluation report is provided to requesting physician.    [facility] Preop Guidelines    Revised Cardiac Risk Index

## 2018-10-16 ENCOUNTER — APPOINTMENT (OUTPATIENT)
Dept: GENERAL RADIOLOGY | Facility: CLINIC | Age: 73
DRG: 470 | End: 2018-10-16
Attending: ORTHOPAEDIC SURGERY
Payer: MEDICARE

## 2018-10-16 ENCOUNTER — ANESTHESIA EVENT (OUTPATIENT)
Dept: SURGERY | Facility: CLINIC | Age: 73
DRG: 470 | End: 2018-10-16
Payer: MEDICARE

## 2018-10-16 ENCOUNTER — HOSPITAL ENCOUNTER (INPATIENT)
Facility: CLINIC | Age: 73
LOS: 2 days | Discharge: HOME HEALTH | DRG: 470 | End: 2018-10-18
Attending: ORTHOPAEDIC SURGERY | Admitting: ORTHOPAEDIC SURGERY
Payer: MEDICARE

## 2018-10-16 ENCOUNTER — ANESTHESIA (OUTPATIENT)
Dept: SURGERY | Facility: CLINIC | Age: 73
DRG: 470 | End: 2018-10-16
Payer: MEDICARE

## 2018-10-16 DIAGNOSIS — Z96.641 STATUS POST TOTAL HIP REPLACEMENT, RIGHT: Primary | ICD-10-CM

## 2018-10-16 LAB
GLUCOSE BLDC GLUCOMTR-MCNC: 132 MG/DL (ref 70–99)
GLUCOSE BLDC GLUCOMTR-MCNC: 141 MG/DL (ref 70–99)
GLUCOSE BLDC GLUCOMTR-MCNC: 151 MG/DL (ref 70–99)
GLUCOSE BLDC GLUCOMTR-MCNC: 191 MG/DL (ref 70–99)

## 2018-10-16 PROCEDURE — 0SR902A REPLACEMENT OF RIGHT HIP JOINT WITH METAL ON POLYETHYLENE SYNTHETIC SUBSTITUTE, UNCEMENTED, OPEN APPROACH: ICD-10-PCS | Performed by: ORTHOPAEDIC SURGERY

## 2018-10-16 PROCEDURE — 36000063 ZZH SURGERY LEVEL 4 EA 15 ADDTL MIN: Performed by: ORTHOPAEDIC SURGERY

## 2018-10-16 PROCEDURE — 25000128 H RX IP 250 OP 636: Performed by: NURSE PRACTITIONER

## 2018-10-16 PROCEDURE — 36000093 ZZH SURGERY LEVEL 4 1ST 30 MIN: Performed by: ORTHOPAEDIC SURGERY

## 2018-10-16 PROCEDURE — 25000128 H RX IP 250 OP 636: Performed by: ORTHOPAEDIC SURGERY

## 2018-10-16 PROCEDURE — 27130 TOTAL HIP ARTHROPLASTY: CPT | Mod: RT | Performed by: ORTHOPAEDIC SURGERY

## 2018-10-16 PROCEDURE — 27110028 ZZH OR GENERAL SUPPLY NON-STERILE: Performed by: ORTHOPAEDIC SURGERY

## 2018-10-16 PROCEDURE — 40000306 ZZH STATISTIC PRE PROC ASSESS II: Performed by: ORTHOPAEDIC SURGERY

## 2018-10-16 PROCEDURE — 27210794 ZZH OR GENERAL SUPPLY STERILE: Performed by: ORTHOPAEDIC SURGERY

## 2018-10-16 PROCEDURE — 37000009 ZZH ANESTHESIA TECHNICAL FEE, EACH ADDTL 15 MIN: Performed by: ORTHOPAEDIC SURGERY

## 2018-10-16 PROCEDURE — 71000014 ZZH RECOVERY PHASE 1 LEVEL 2 FIRST HR: Performed by: ORTHOPAEDIC SURGERY

## 2018-10-16 PROCEDURE — 37000022 ZZH ANESTHESIA SPINAL/EPIDURAL IN OPERATING ROOM: Performed by: NURSE ANESTHETIST, CERTIFIED REGISTERED

## 2018-10-16 PROCEDURE — 27130 TOTAL HIP ARTHROPLASTY: CPT | Mod: AS | Performed by: NURSE PRACTITIONER

## 2018-10-16 PROCEDURE — 25000125 ZZHC RX 250: Performed by: ORTHOPAEDIC SURGERY

## 2018-10-16 PROCEDURE — 25000125 ZZHC RX 250: Performed by: NURSE ANESTHETIST, CERTIFIED REGISTERED

## 2018-10-16 PROCEDURE — A9270 NON-COVERED ITEM OR SERVICE: HCPCS | Mod: GY | Performed by: ORTHOPAEDIC SURGERY

## 2018-10-16 PROCEDURE — 37000008 ZZH ANESTHESIA TECHNICAL FEE, 1ST 30 MIN: Performed by: ORTHOPAEDIC SURGERY

## 2018-10-16 PROCEDURE — 25000128 H RX IP 250 OP 636: Performed by: NURSE ANESTHETIST, CERTIFIED REGISTERED

## 2018-10-16 PROCEDURE — 12000007 ZZH R&B INTERMEDIATE

## 2018-10-16 PROCEDURE — 00000146 ZZHCL STATISTIC GLUCOSE BY METER IP

## 2018-10-16 PROCEDURE — C1776 JOINT DEVICE (IMPLANTABLE): HCPCS | Performed by: ORTHOPAEDIC SURGERY

## 2018-10-16 PROCEDURE — 40000986 XR PELVIS AD HIP PORTABLE RIGHT 1 VIEW

## 2018-10-16 PROCEDURE — 25000132 ZZH RX MED GY IP 250 OP 250 PS 637: Mod: GY | Performed by: ORTHOPAEDIC SURGERY

## 2018-10-16 PROCEDURE — 71000015 ZZH RECOVERY PHASE 1 LEVEL 2 EA ADDTL HR: Performed by: ORTHOPAEDIC SURGERY

## 2018-10-16 RX ORDER — ONDANSETRON 2 MG/ML
4 INJECTION INTRAMUSCULAR; INTRAVENOUS EVERY 6 HOURS PRN
Status: DISCONTINUED | OUTPATIENT
Start: 2018-10-16 | End: 2018-10-18 | Stop reason: HOSPADM

## 2018-10-16 RX ORDER — FENTANYL CITRATE 50 UG/ML
INJECTION, SOLUTION INTRAMUSCULAR; INTRAVENOUS PRN
Status: DISCONTINUED | OUTPATIENT
Start: 2018-10-16 | End: 2018-10-16

## 2018-10-16 RX ORDER — HYDROMORPHONE HYDROCHLORIDE 1 MG/ML
.3-.5 INJECTION, SOLUTION INTRAMUSCULAR; INTRAVENOUS; SUBCUTANEOUS
Status: DISCONTINUED | OUTPATIENT
Start: 2018-10-16 | End: 2018-10-18 | Stop reason: HOSPADM

## 2018-10-16 RX ORDER — HYDROXYZINE HYDROCHLORIDE 10 MG/1
10 TABLET, FILM COATED ORAL EVERY 6 HOURS PRN
Status: DISCONTINUED | OUTPATIENT
Start: 2018-10-16 | End: 2018-10-18 | Stop reason: HOSPADM

## 2018-10-16 RX ORDER — CEFAZOLIN SODIUM 2 G/100ML
2 INJECTION, SOLUTION INTRAVENOUS
Status: COMPLETED | OUTPATIENT
Start: 2018-10-16 | End: 2018-10-16

## 2018-10-16 RX ORDER — GABAPENTIN 400 MG/1
1200 CAPSULE ORAL 3 TIMES DAILY
Status: DISCONTINUED | OUTPATIENT
Start: 2018-10-16 | End: 2018-10-18 | Stop reason: HOSPADM

## 2018-10-16 RX ORDER — OXYCODONE HYDROCHLORIDE 5 MG/1
5-10 TABLET ORAL EVERY 4 HOURS PRN
Status: DISCONTINUED | OUTPATIENT
Start: 2018-10-16 | End: 2018-10-18 | Stop reason: HOSPADM

## 2018-10-16 RX ORDER — DEXTROSE MONOHYDRATE 25 G/50ML
25-50 INJECTION, SOLUTION INTRAVENOUS
Status: DISCONTINUED | OUTPATIENT
Start: 2018-10-16 | End: 2018-10-18 | Stop reason: HOSPADM

## 2018-10-16 RX ORDER — LIDOCAINE 40 MG/G
CREAM TOPICAL
Status: DISCONTINUED | OUTPATIENT
Start: 2018-10-16 | End: 2018-10-18 | Stop reason: HOSPADM

## 2018-10-16 RX ORDER — PROCHLORPERAZINE MALEATE 5 MG
5 TABLET ORAL EVERY 6 HOURS PRN
Status: DISCONTINUED | OUTPATIENT
Start: 2018-10-16 | End: 2018-10-18 | Stop reason: HOSPADM

## 2018-10-16 RX ORDER — SODIUM CHLORIDE, SODIUM LACTATE, POTASSIUM CHLORIDE, CALCIUM CHLORIDE 600; 310; 30; 20 MG/100ML; MG/100ML; MG/100ML; MG/100ML
INJECTION, SOLUTION INTRAVENOUS CONTINUOUS
Status: DISCONTINUED | OUTPATIENT
Start: 2018-10-16 | End: 2018-10-18 | Stop reason: HOSPADM

## 2018-10-16 RX ORDER — BUPIVACAINE HYDROCHLORIDE 7.5 MG/ML
INJECTION, SOLUTION INTRASPINAL PRN
Status: DISCONTINUED | OUTPATIENT
Start: 2018-10-16 | End: 2018-10-16

## 2018-10-16 RX ORDER — DOCUSATE SODIUM 100 MG/1
100 CAPSULE, LIQUID FILLED ORAL 2 TIMES DAILY
Status: DISCONTINUED | OUTPATIENT
Start: 2018-10-16 | End: 2018-10-18 | Stop reason: HOSPADM

## 2018-10-16 RX ORDER — CEFAZOLIN SODIUM 1 G/3ML
1 INJECTION, POWDER, FOR SOLUTION INTRAMUSCULAR; INTRAVENOUS SEE ADMIN INSTRUCTIONS
Status: DISCONTINUED | OUTPATIENT
Start: 2018-10-16 | End: 2018-10-16 | Stop reason: HOSPADM

## 2018-10-16 RX ORDER — NICOTINE POLACRILEX 4 MG
15-30 LOZENGE BUCCAL
Status: DISCONTINUED | OUTPATIENT
Start: 2018-10-16 | End: 2018-10-18 | Stop reason: HOSPADM

## 2018-10-16 RX ORDER — METHOCARBAMOL 500 MG/1
500 TABLET, FILM COATED ORAL 4 TIMES DAILY PRN
Status: DISCONTINUED | OUTPATIENT
Start: 2018-10-16 | End: 2018-10-18 | Stop reason: HOSPADM

## 2018-10-16 RX ORDER — SODIUM CHLORIDE, SODIUM LACTATE, POTASSIUM CHLORIDE, CALCIUM CHLORIDE 600; 310; 30; 20 MG/100ML; MG/100ML; MG/100ML; MG/100ML
INJECTION, SOLUTION INTRAVENOUS CONTINUOUS
Status: DISCONTINUED | OUTPATIENT
Start: 2018-10-16 | End: 2018-10-16 | Stop reason: HOSPADM

## 2018-10-16 RX ORDER — ONDANSETRON 4 MG/1
4 TABLET, ORALLY DISINTEGRATING ORAL EVERY 6 HOURS PRN
Status: DISCONTINUED | OUTPATIENT
Start: 2018-10-16 | End: 2018-10-18 | Stop reason: HOSPADM

## 2018-10-16 RX ORDER — NALOXONE HYDROCHLORIDE 0.4 MG/ML
.1-.4 INJECTION, SOLUTION INTRAMUSCULAR; INTRAVENOUS; SUBCUTANEOUS
Status: DISCONTINUED | OUTPATIENT
Start: 2018-10-16 | End: 2018-10-18 | Stop reason: HOSPADM

## 2018-10-16 RX ORDER — CEFAZOLIN SODIUM 1 G/50ML
1 SOLUTION INTRAVENOUS EVERY 8 HOURS
Status: COMPLETED | OUTPATIENT
Start: 2018-10-16 | End: 2018-10-17

## 2018-10-16 RX ORDER — ACETAMINOPHEN 325 MG/1
975 TABLET ORAL EVERY 8 HOURS
Status: DISCONTINUED | OUTPATIENT
Start: 2018-10-16 | End: 2018-10-18 | Stop reason: HOSPADM

## 2018-10-16 RX ORDER — SIMVASTATIN 5 MG
5 TABLET ORAL AT BEDTIME
Status: DISCONTINUED | OUTPATIENT
Start: 2018-10-16 | End: 2018-10-18 | Stop reason: HOSPADM

## 2018-10-16 RX ORDER — LIDOCAINE 40 MG/G
CREAM TOPICAL
Status: DISCONTINUED | OUTPATIENT
Start: 2018-10-16 | End: 2018-10-16 | Stop reason: HOSPADM

## 2018-10-16 RX ORDER — PROPOFOL 10 MG/ML
INJECTION, EMULSION INTRAVENOUS CONTINUOUS PRN
Status: DISCONTINUED | OUTPATIENT
Start: 2018-10-16 | End: 2018-10-16

## 2018-10-16 RX ADMIN — CEFAZOLIN SODIUM 2 G: 2 INJECTION, SOLUTION INTRAVENOUS at 12:49

## 2018-10-16 RX ADMIN — GABAPENTIN 1200 MG: 400 CAPSULE ORAL at 20:08

## 2018-10-16 RX ADMIN — MIDAZOLAM 1 MG: 1 INJECTION INTRAMUSCULAR; INTRAVENOUS at 12:25

## 2018-10-16 RX ADMIN — LIDOCAINE HYDROCHLORIDE 1 ML: 10 INJECTION, SOLUTION EPIDURAL; INFILTRATION; INTRACAUDAL; PERINEURAL at 12:19

## 2018-10-16 RX ADMIN — DOCUSATE SODIUM 100 MG: 100 CAPSULE, LIQUID FILLED ORAL at 20:09

## 2018-10-16 RX ADMIN — FENTANYL CITRATE 25 MCG: 50 INJECTION, SOLUTION INTRAMUSCULAR; INTRAVENOUS at 14:11

## 2018-10-16 RX ADMIN — SIMVASTATIN 5 MG: 5 TABLET, FILM COATED ORAL at 20:09

## 2018-10-16 RX ADMIN — TRANEXAMIC ACID 1 G: 100 INJECTION, SOLUTION INTRAVENOUS at 12:58

## 2018-10-16 RX ADMIN — CEFAZOLIN SODIUM 1 G: 1 SOLUTION INTRAVENOUS at 20:20

## 2018-10-16 RX ADMIN — SODIUM CHLORIDE, POTASSIUM CHLORIDE, SODIUM LACTATE AND CALCIUM CHLORIDE: 600; 310; 30; 20 INJECTION, SOLUTION INTRAVENOUS at 12:19

## 2018-10-16 RX ADMIN — FENTANYL CITRATE 25 MCG: 50 INJECTION, SOLUTION INTRAMUSCULAR; INTRAVENOUS at 13:51

## 2018-10-16 RX ADMIN — SODIUM CHLORIDE, POTASSIUM CHLORIDE, SODIUM LACTATE AND CALCIUM CHLORIDE: 600; 310; 30; 20 INJECTION, SOLUTION INTRAVENOUS at 12:57

## 2018-10-16 RX ADMIN — METFORMIN HYDROCHLORIDE 500 MG: 500 TABLET, FILM COATED ORAL at 18:27

## 2018-10-16 RX ADMIN — MIDAZOLAM 1 MG: 1 INJECTION INTRAMUSCULAR; INTRAVENOUS at 12:19

## 2018-10-16 RX ADMIN — REGULAR STRENGTH 325 MG: 325 TABLET ORAL at 20:10

## 2018-10-16 RX ADMIN — ACETAMINOPHEN 975 MG: 325 TABLET ORAL at 16:35

## 2018-10-16 RX ADMIN — FENTANYL CITRATE 25 MCG: 50 INJECTION, SOLUTION INTRAMUSCULAR; INTRAVENOUS at 12:32

## 2018-10-16 RX ADMIN — Medication 0.5 MG: at 16:37

## 2018-10-16 RX ADMIN — ONDANSETRON 4 MG: 2 INJECTION INTRAMUSCULAR; INTRAVENOUS at 17:10

## 2018-10-16 RX ADMIN — SODIUM CHLORIDE, POTASSIUM CHLORIDE, SODIUM LACTATE AND CALCIUM CHLORIDE: 600; 310; 30; 20 INJECTION, SOLUTION INTRAVENOUS at 16:47

## 2018-10-16 RX ADMIN — PROPOFOL 50 MCG/KG/MIN: 10 INJECTION, EMULSION INTRAVENOUS at 12:46

## 2018-10-16 RX ADMIN — BUPIVACAINE HYDROCHLORIDE IN DEXTROSE 1.6 ML: 7.5 INJECTION, SOLUTION SUBARACHNOID at 12:32

## 2018-10-16 RX ADMIN — Medication 0.5 MG: at 20:11

## 2018-10-16 RX ADMIN — PROCHLORPERAZINE EDISYLATE 5 MG: 5 INJECTION INTRAMUSCULAR; INTRAVENOUS at 18:10

## 2018-10-16 ASSESSMENT — ACTIVITIES OF DAILY LIVING (ADL)
TRANSFERRING: 1-->ASSISTIVE EQUIPMENT
AMBULATION: 1-->ASSISTIVE EQUIPMENT
BATHING: 0-->INDEPENDENT
COGNITION: 0 - NO COGNITION ISSUES REPORTED
SWALLOWING: 0-->SWALLOWS FOODS/LIQUIDS WITHOUT DIFFICULTY
DRESS: 0-->INDEPENDENT
RETIRED_EATING: 0-->INDEPENDENT
ADLS_ACUITY_SCORE: 13
FALL_HISTORY_WITHIN_LAST_SIX_MONTHS: NO
ADLS_ACUITY_SCORE: 12
TOILETING: 0-->INDEPENDENT

## 2018-10-16 ASSESSMENT — LIFESTYLE VARIABLES: TOBACCO_USE: 1

## 2018-10-16 NOTE — ANESTHESIA PROCEDURE NOTES
Peripheral nerve/Neuraxial procedure note : intrathecal  Pre-Procedure  Performed by  ZELDA ANTUNEZ   Location: OR      Pre-Anesthestic Checklist: patient identified, IV checked, risks and benefits discussed, informed consent, monitors and equipment checked and pre-op evaluation    Timeout  Correct Patient: Yes   Correct Procedure: Yes   Correct Site: Yes   Correct Laterality: N/A   Correct Position: Yes   Site Marked: N/A   .   Procedure Documentation  ASA 3  Diagnosis:Osteoarthritis right hip.    Procedure:    Intrathecal.  Insertion Site:L3-4  (midline approach)      Patient Prep;mask, sterile gloves, povidone-iodine 7.5% surgical scrub, patient draped.  .  Needle: Rebecca tip Spinal Needle (gauge): 25  Spinal/LP Needle Length (inches): 3.5 # of attempts: 1 and # of redirects:  Introducer used Introducer: 20 G .       Assessment/Narrative  Paresthesias: No.  .  .  clear CSF fluid removed while sitting   . Time Injected: 12:32  Sensory Level: T8

## 2018-10-16 NOTE — BRIEF OP NOTE
[facility] Brief Operative Note    Pre-operative diagnosis: right hip primary osteoarthritis    Post-operative diagnosis: right hip primary osteoarthritis    Procedure: Procedure(s):  ARTHROPLASTY HIP   Surgeon:  Anesthesia: Terrell Ervin DO  Spinal   Assistant(s): MACHO Ross, CNP, DNP (A advanced practice provider was necessary for his expertise, exposure and surgical assistance throughout the case.)   Estimated blood loss:  Fluids:  UO: 200 ml  1600 ml Crystalloids  650 ml   Specimens: None   Findings: See dictated operative report for full details [id number]     Rayshawn Ervin D.O.

## 2018-10-16 NOTE — INTERVAL H&P NOTE
This H&P has been reviewed and there are no clinically significant changes in the patient s condition.  The patient was evaluated by myself as well as Skylar Gomez CNP prior to surgery. The Patient is approved for the surgery and the stated surgical procedure is still clinically indicated.

## 2018-10-16 NOTE — ANESTHESIA CARE TRANSFER NOTE
Patient: Tracy Mcclelland    Procedure(s):  right total hip arthroplasty - Wound Class: I-Clean    Diagnosis: Right hip arthritis  Diagnosis Additional Information: No value filed.    Anesthesia Type:   Spinal, MAC     Note:  Airway :Face Mask  Patient transferred to:PACU  Handoff Report: Identifed the Patient, Identified the Reponsible Provider, Reviewed the pertinent medical history, Discussed the surgical course, Reviewed Intra-OP anesthesia mangement and issues during anesthesia, Set expectations for post-procedure period and Allowed opportunity for questions and acknowledgement of understanding      Vitals: (Last set prior to Anesthesia Care Transfer)    CRNA VITALS  10/16/2018 1355 - 10/16/2018 1443      10/16/2018             Resp Rate (observed): 15                Electronically Signed By: MACHO Martin CRNA  October 16, 2018  2:43 PM

## 2018-10-16 NOTE — IP AVS SNAPSHOT
"                  MRN:[id number]                      After Visit Summary   10/16/2018    Tracy Mcclelland    MRN: [id number]           Thank you!     Thank you for choosing [facility] for your care. Our goal is always to provide you with excellent care. Hearing back from our patients is one way we can continue to improve our services. Please take a few minutes to complete the written survey that you may receive in the mail after you visit with us. Thank you!        Patient Information     Date Of Birth          1945        Designated Caregiver       Most Recent Value    Caregiver    Will someone help with your care after discharge? yes    Name of designated caregiver Dania    Phone number of caregiver [phone]    Caregiver address same      About your hospital stay     You were admitted on:  October 16, 2018 You last received care in the:  [address] [facility]    You were discharged on:  October 18, 2018        Reason for your hospital stay       Following hip replacement                  Who to Call     For medical emergencies, please call 911.  For non-urgent questions about your medical care, please call your primary care provider or clinic, [phone]  For questions related to your surgery, please call your surgery clinic        Attending Provider     Provider Terrell Knott, DO Orthopedics       Primary Care Provider Office Phone # Fax #    MACHO Cifuentes [phone]       When to contact your care team       Normal findings after surgery:  Numbness and tenderness around the incisions and to the outside of the incision is normal.  You may have bruising around the incisions and down the lower leg.   Your knee will be swollen for months after surgery. It will feel \"tight\" to move.   Low grade fevers less than 100.5 degrees Fahrenheit are normal.   You may have some minor swelling in the leg/calf area.  You will have some increased pain " after your therapy sessions.     When to call the Office:  Temperature greater than 101.5 degrees Fahreheit.  Fever, chills, and increasing pain in the knee.  Excessive drainage from the incisions that include bright red blood.  Drainage from the incisions sites that appear yellow, pus-like, or foul smelling.  Increasing pain the knee not relieved by the prescribed pain medications or ice.    Persistent nausea or vomiting not helped by the Zofran.  Increased pain or swelling in your calf area (in back above your ankle) that wasn't there when in the hospital.  Any other effects you feel are significant.  Call 911 if you experience any chest pain and/or shortness or breath.                  After Care Instructions     Activity       Activity:  Unless otherwise instructed, you can weight bear as tolerated with a walker/cane. For 6 weeks follow these listed precautions:  Do not bend the operated hip past 90 degrees (for example sitting in a low couch or toilet). Use a raised seat on your toilet for 6-8 weeks. If you find yourself in a low seat, keep your legs apart (don't let the knees touch) and kneecaps facing forward when getting up. Please ask for help.    Do not cross the midline of your body with the operated leg.  Do not rotate the operated leg inward, your toes and kneecap should point toward the ceiling when in bed.            Diet       Follow this diet upon discharge: Orders Placed This Encounter      Moderate Consistent CHO Diet            Wound care and dressings       Keep incision covered with hospital dressing (Aquacel) for one week. It is okay to shower with this dressing on. However, do not submerge your dressing and incision in water for roughly 2 weeks. After one week remove this dressing and then keep it clean, dry, and covered. If this dressing become looses or falls off it is ok to cover with gauze and tape.  Wash the incision gently with warm soapy water after removing your hospital dressing and  lightly pat dry.                  Follow-up Appointments     Follow-up and recommended labs and tests        Follow up with Dr. Ervin , at (location with clinic name or city) [place] or [place], within 14 days after surgery  to evaluate after surgery. The following labs/tests are recommended: hip xrays.  This appointment should already be scheduled, if not call [phone] to schedule.                  Your next 10 appointments already scheduled     Oct 31, 2018 10:10 AM CDT   [facility]    [address] [place] [address] [id number] [phone]              Additional Services     Home Care PT Referral for Hospital Discharge       PT to eval and treat  2 weeks in home physical therapy followed by outpatient physical therapy.    S/p total hip replacement with posterior precautions    Your provider has ordered home care - physical therapy. If you have not been contacted within 2 days of your discharge please call the department phone number listed on the top of this document.            Physical Therapy Referral       call for seamless transition from 2 weeks inhome PT to outpatient PT.  s/p total hip replacement with posterior hip precautions    If you have not heard from the scheduling office within 2 business days, please call [phone] for all locations, with the exception of [place], please call [phone] and [place], please call [phone].    Please be aware that coverage of these services is subject to the terms and limitations of your health insurance plan.  Call member services at your health plan with any benefit or coverage questions.                  Further instructions from your care team       Total Hip Replacement Discharge Instructions                                      [phone]  Bone and Joint Service Line for issues or concerns    General Care:  After surgery you may feel  tired/sleepy. This is normal. If you have any question along the way please contact the office. If you feel it is an issue cannot wait for normal office hours, contact the on-call physician. You should not drive or operate machines/equipment until released by your physician to do so.     Bandages:    It s normal to have some blood-tinged fluid on your bandages, this may occur for a few days after being sent home from the hospital. Keep incision covered with hospital dressing (Aquacel) for one week. It is okay to shower with this dressing on. However, do not submerge your dressing and incision in water for roughly 2 weeks. After one week remove this dressing and then keep it clean, dry, and covered. If this dressing become looses or falls off it is ok to cover with gauze and tape.  Wash the incision gently with warm soapy water after removing your hospital dressing and lightly pat dry.      Swelling (edema) control:  Preventing swelling (edema) in your legs after surgery is very important. It is helpful for achieving optimal range of motion as well as preventing blood clots.  It starts with simple things such as elevation of your legs and icing. Elevate your legs above your heart.    Do this for 20 minutes every couple hours the first few days after surgery. We also recommend TRUNG hose (compression hose) to be worn. Wear on both legs during the day. You may remove at night. Wear these until directed to stop.    Bathing:  Do not submerge your incision in water such as a bath or pool. It is ok to shower when you get home but keep your incision covered with a sealed bandage. You may find in comfortable to get a shower chair for the first month while you continue to heal and get stronger.     Follow up:  Your follow up appointment should already be scheduled. If it s not, please call the office to verify an appointment 14 days after surgery. If there are no issues in your recovery you will have an appointment at 14 days,  6 weeks, 3 months, 6 months, and 1 year from your surgery date.     Diet:  You may not have a full appetite at discharge this should get better. Progress to bland foods such as crackers and bread and finally to your normal diet if you have no problems.  Avoid alcohol when taking narcotic pain medications.      Pain control:  Take your pain medications as prescribed. These medications may make you sleepy. Do not drive, operate equipment, or drink alcohol when taking these.  You may take Tylenol (Generic name is acetaminophen) as directed on the bottle for additional relief or in place of the prescribed pain medications as your pain gets better.  If the medications cause a reaction such as nausea or skin rash, stop taking them and contact your doctor. Please plan accordingly, pain medications will not be re-filled on the weekends or at night. Call the office during the day if you need more medications.    Blood thinner:  It is very important to take the prescribed medication as directed to prevent blood clots. No medication is perfect, so if you notice a sudden onset of pain/swelling in your calf area call your doctor. If you notice a sudden onset of troubles breathing and/or chest pain call 911.     Icing:  It is common for some swelling, aching and stiffness to occur for awhile after a hip replacement.  Apply for 20 minutes at a time. For the first 1-2 weeks apply ice 2-3 x a day or more after therapy.    Walker/crutches:  Use a walker/crutches when you go home. You will transition to the use of a cane and finally to no additional support.     Physical Therapy:  The success of your hip replacement is based on doing physical therapy. You will have some pain and discomfort along the way. If you feel your pain is limiting your progress make sure to take some pain medication prior to your therapy session. If your pain medications is not working, talk to your surgeon.   For the first 1-2 weeks after going home you will  have in-home physical therapy. The goal is to work on walking and feeling steady.  Usually after your first post-operative follow up you will move to out-patient physical therapy.     Activity:  Unless otherwise instructed, you can weight bear as tolerated with a walker/cane. For 6 weeks follow these listed precautions:  Do not bend the operated hip past 90 degrees (for example sitting in a low couch or toilet). Use a raised seat on your toilet for 6-8 weeks. If you find yourself in a low seat, keep your legs apart (don t let the knees touch) and kneecaps facing forward when getting up. Please ask for help.    Do not cross the midline of your body with the operated leg.  Do not rotate the operated leg inward, your toes and kneecap should point toward the ceiling when in bed.       Normal findings after surgery:  Numbness and tenderness around the incisions.   You may have bruising around the incisions and down the thigh.   Low grade fevers less than 100.5 degrees Fahrenheit are normal.   You will have some increased pain after your therapy sessions.     When to call the Office:  Temperature greater than 101.5 degrees Fahreheit.  Fever, chills, and increasing pain in the hip.  Excessive drainage from the incisions that include bright red blood.  Drainage from the incisions sites that appear yellow, pus-like, or foul smelling.  Increasing pain the hip not relieved by the prescribed pain medications or ice.  Persistent nausea or vomiting   Increased pain or swelling in your calf area (in back above your ankle) that wasn t there when in the hospital.  Any other effects you feel are significant.  Call 911 if you experience any chest pain and/or shortness or breath.      Pending Results     No orders found from 10/14/2018 to 10/17/2018.            Statement of Approval     Ordered          10/18/18 1145  I have reviewed and agree with all the recommendations and orders detailed in this document.  EFFECTIVE NOW    "  Approved and electronically signed by:  Terrell Ervin DO             Admission Information     Date & Time Provider Department Dept. Phone    10/16/2018 Terrell Ervin DO [address] [facility] [phone]      Your Vitals Were     Blood Pressure Pulse Temperature Respirations Height Weight    130/58 (BP Location: Left arm) 92 97.8  F (36.6  C) (Oral) 20 1.651 m (5' 5\") 75.6 kg (166 lb 10.7 oz)    Pulse Oximetry BMI (Body Mass Index)                91% 27.73 kg/m2          Care EveryWhere ID     This is your Care EveryWhere ID. This could be used by other organizations to access your [facility] medical records  [id number]        Equal Access to Services     LIU KURTZ : Lázaro Proctor, waandrewda luqadaha, qaybta kaalmada adenailayajessica, tatyana conde. So [facility] [phone].    ATENCIÓN: Si habla español, tiene a johnson disposición servicios gratuitos de asistencia lingüística. Ll[facility] [phone].    We comply with applicable federal civil rights laws and [place] laws. We do not discriminate on the basis of race, color, national origin, age, disability, sex, sexual orientation, or gender identity.               Review of your medicines      START taking        Dose / Directions    acetaminophen 325 MG tablet   Commonly known as:  TYLENOL        Dose:  975 mg   Take 3 tablets (975 mg) by mouth every 8 hours as needed for mild pain   Quantity:  100 tablet   Refills:  1       docusate sodium 100 MG capsule   Commonly known as:  COLACE        Dose:  100 mg   Take 1 capsule (100 mg) by mouth 2 times daily as needed for constipation   Quantity:  60 capsule   Refills:  1       methocarbamol 500 MG tablet   Commonly known as:  ROBAXIN        Dose:  500 mg   Take 1 tablet (500 mg) by mouth 3 times daily as needed for muscle spasms   Quantity:  60 tablet   Refills:  1       ondansetron 4 MG ODT tab   Commonly known as:  ZOFRAN-ODT        Dose:  4 mg "   Take 1 tablet (4 mg) by mouth every 8 hours as needed for nausea or vomiting   Quantity:  20 tablet   Refills:  1       oxyCODONE IR 5 MG tablet   Commonly known as:  ROXICODONE        Dose:  5-10 mg   Take 1-2 tablets (5-10 mg) by mouth every 4 hours as needed for moderate to severe pain   Quantity:  50 tablet   Refills:  0         CONTINUE these medicines which may have CHANGED, or have new prescriptions. If we are uncertain of the size of tablets/capsules you have at home, strength may be listed as something that might have changed.        Dose / Directions    aspirin 325 MG EC tablet   Indication:  VTE Prophylaxis   This may have changed:    - medication strength  - how much to take  - when to take this  - additional instructions        Dose:  325 mg   Take 1 tablet (325 mg) by mouth 2 times daily Take 1 tablet twice a day for 42 days for clot prevention. Do not take any NSAIDs or other aspirin products during this time.  After 42 days may return to any previous NSAID or aspirin therapy.   Quantity:  84 tablet   Refills:  0         CONTINUE these medicines which have NOT CHANGED        Dose / Directions    gabapentin 600 MG tablet   Commonly known as:  NEURONTIN   Used for:  Type 2 diabetes mellitus with stage 2 chronic kidney disease, without long-term current use of insulin (H)        Dose:  1200 mg   Take 2 tablets (1,200 mg) by mouth 3 times daily   Quantity:  360 tablet   Refills:  1       lisinopril 20 MG tablet   Commonly known as:  PRINIVIL/ZESTRIL   Used for:  Benign essential hypertension        Dose:  20 mg   Take 1 tablet (20 mg) by mouth daily   Quantity:  90 tablet   Refills:  0       loratadine 10 MG tablet   Commonly known as:  CLARITIN   Used for:  Middle ear effusion, right        Dose:  10 mg   Take 1 tablet (10 mg) by mouth daily   Quantity:  30 tablet   Refills:  1       metFORMIN 500 MG tablet   Commonly known as:  GLUCOPHAGE   Used for:  Type 2 diabetes mellitus with stage 2 chronic  kidney disease, without long-term current use of insulin (H)        Dose:  500 mg   Take 1 tablet (500 mg) by mouth 2 times daily (with meals)   Quantity:  180 tablet   Refills:  0       simvastatin 5 MG tablet   Commonly known as:  ZOCOR   Used for:  Hyperlipidemia with target LDL less than 100        TAKE ONE TABLET BY MOUTH EVERY NIGHT AT BEDTIME   Quantity:  90 tablet   Refills:  1         STOP taking     ALEVE PO                Where to get your medicines      Some of these will need a paper prescription and others can be bought over the counter. Ask your nurse if you have questions.     Bring a paper prescription for each of these medications     acetaminophen 325 MG tablet    aspirin 325 MG EC tablet    docusate sodium 100 MG capsule    methocarbamol 500 MG tablet    ondansetron 4 MG ODT tab    oxyCODONE IR 5 MG tablet                Protect others around you: Learn how to safely use, store and throw away your medicines at www.disposemymeds.org.        Information about OPIOIDS     PRESCRIPTION OPIOIDS: WHAT YOU NEED TO KNOW   We gave you an opioid (narcotic) pain medicine. It is important to manage your pain, but opioids are not always the best choice. You should first try all the other options your care team gave you. Take this medicine for as short a time (and as few doses) as possible.    Some activities can increase your pain, such as bandage changes or therapy sessions. It may help to take your pain medicine 30 to 60 minutes before these activities. Reduce your stress by getting enough sleep, working on hobbies you enjoy and practicing relaxation or meditation. Talk to your care team about ways to manage your pain beyond prescription opioids.    These medicines have risks:    DO NOT drive when on new or higher doses of pain medicine. These medicines can affect your alertness and reaction times, and you could be arrested for driving under the influence (DUI). If you need to use opioids long-term, talk to  your care team about driving.    DO NOT operate heavy machinery    DO NOT do any other dangerous activities while taking these medicines.    DO NOT drink any alcohol while taking these medicines.     If the opioid prescribed includes acetaminophen, DO NOT take with any other medicines that contain acetaminophen. Read all labels carefully. Look for the word  acetaminophen  or  Tylenol.  Ask your pharmacist if you have questions or are unsure.    You can get addicted to pain medicines, especially if you have a history of addiction (chemical, alcohol or substance dependence). Talk to your care team about ways to reduce this risk.    All opioids tend to cause constipation. Drink plenty of water and eat foods that have a lot of fiber, such as fruits, vegetables, prune juice, apple juice and high-fiber cereal. Take a laxative (Miralax, milk of magnesia, Colace, Senna) if you don t move your bowels at least every other day. Other side effects include upset stomach, sleepiness, dizziness, throwing up, tolerance (needing more of the medicine to have the same effect), physical dependence and slowed breathing.    Store your pills in a secure place, locked if possible. We will not replace any lost or stolen medicine. If you don t finish your medicine, please throw away (dispose) as directed by your pharmacist. The [place] [employer] has more information about safe disposal: https://www.pca.[facility].mn.us/living-green/managing-unwanted-medications             Medication List: This is a list of all your medications and when to take them. Check marks below indicate your daily home schedule. Keep this list as a reference.      Medications           Morning Afternoon Evening Bedtime As Needed    acetaminophen 325 MG tablet   Commonly known as:  TYLENOL   Take 3 tablets (975 mg) by mouth every 8 hours as needed for mild pain   Last time this was given:  975 mg on 10/18/2018  8:21 AM                                    aspirin 325 MG EC tablet   Take 1 tablet (325 mg) by mouth 2 times daily Take 1 tablet twice a day for 42 days for clot prevention. Do not take any NSAIDs or other aspirin products during this time.  After 42 days may return to any previous NSAID or aspirin therapy.   Last time this was given:  325 mg on 10/18/2018  8:22 AM                                      docusate sodium 100 MG capsule   Commonly known as:  COLACE   Take 1 capsule (100 mg) by mouth 2 times daily as needed for constipation   Last time this was given:  100 mg on 10/18/2018  8:22 AM                                   gabapentin 600 MG tablet   Commonly known as:  NEURONTIN   Take 2 tablets (1,200 mg) by mouth 3 times daily                                         lisinopril 20 MG tablet   Commonly known as:  PRINIVIL/ZESTRIL   Take 1 tablet (20 mg) by mouth daily                                   loratadine 10 MG tablet   Commonly known as:  CLARITIN   Take 1 tablet (10 mg) by mouth daily                                   metFORMIN 500 MG tablet   Commonly known as:  GLUCOPHAGE   Take 1 tablet (500 mg) by mouth 2 times daily (with meals)   Last time this was given:  500 mg on 10/18/2018  8:22 AM                                      methocarbamol 500 MG tablet   Commonly known as:  ROBAXIN   Take 1 tablet (500 mg) by mouth 3 times daily as needed for muscle spasms                                   ondansetron 4 MG ODT tab   Commonly known as:  ZOFRAN-ODT   Take 1 tablet (4 mg) by mouth every 8 hours as needed for nausea or vomiting                                   oxyCODONE IR 5 MG tablet   Commonly known as:  ROXICODONE   Take 1-2 tablets (5-10 mg) by mouth every 4 hours as needed for moderate to severe pain   Last time this was given:  5 mg on 10/18/2018 10:27 AM                                   simvastatin 5 MG tablet   Commonly known as:  ZOCOR   TAKE ONE TABLET BY MOUTH EVERY NIGHT AT BEDTIME   Last time this was given:  5 mg on 10/17/2018   9:03 PM

## 2018-10-16 NOTE — OR NURSING
Transfer from  PACU to Room medsurg  Transferred to bed via glyder mat (Glyder Mat,Transfer Boar,Slider Sheet)    S: 72 y/o F  S/P right total hip       Anesthesia Type:  Mac with spinal        Surgeon:  Dr. Ervin       Allergies:  See Medication Reconciliation Record       DNR:   (Yes,No)    B:  Pertinent Medical History:   Past Medical History:   Diagnosis Date     CKD (chronic kidney disease) stage 2, GFR 60-89 ml/min 10/19/2015     Headache(784.0)     hx.of Migraines     Lumbago      Type II or unspecified type diabetes mellitus without mention of complication, not stated as uncontrolled      Unspecified essential hypertension      Past Surgical History:   Procedure Laterality Date     C LIGATE FALLOPIAN TUBE       HC LAPAROSCOPY, SURGICAL; CHOLECYSTECTOMY  2000    Cholecystectomy, Laparoscopic     HC REMOVE TONSILS/ADENOIDS,<10 Y/O      T & A <11y.o.         A:  EBL: 200        IVF:  1700ml OR 100ml PACU        UOP:  650ml OR        NPO:  ___Yes ___No         Vomiting:  ___Yes ___No         Drainage: none noted        Skin Integrity: CDI (Normal; Pressure Ulcer (Location)        RFO: ___Yes___No (identify item if present)        SSI Patient?  ___Yes___No (if yes, see checklist for actions)        Brace/sling/equipment:  ___Yes___No (identify item if present)         See PACU record for ongoing assessment, vital signs and pain assessment.    R: Post-Op vitals and assessments as ordered/indicated per patient's condition.       Follow Post-Op orders and notify Physician prn.       Continue to involve patient/family in plan of care and discharge planning.       Reinforce Pre-Operative education.       Implement skin safety interventions as appropriate.    Name: Chelly [facility]I

## 2018-10-16 NOTE — IP AVS SNAPSHOT
[address] [facility]    [address] [place] [address]    Phone:  [phone]                                       After Visit Summary   10/16/2018    Tracy Mcclelland    MRN: [id number]           After Visit Summary Signature Page     I have received my discharge instructions, and my questions have been answered. I have discussed any challenges I see with this plan with the nurse or doctor.    ..........................................................................................................................................  Patient/Patient Representative Signature      ..........................................................................................................................................  Patient Representative Print Name and Relationship to Patient    ..................................................               ................................................  Date                                   Time    ..........................................................................................................................................  Reviewed by Signature/Title    ...................................................              ..............................................  Date                                               Time          22EPIC Rev 08/18

## 2018-10-16 NOTE — PROGRESS NOTES
S-(situation): Patient arrives to room 253 via cart from PACU and was transferred to bed per air dayanna.    B-(background): R EAMON    A-(assessment): Alert and oriented. Denied pain. Dressing to R hip CDI with ice to site. IV fluids infusing. Good CMS to right foot. Compression stockings on and SCD's applied.     R-(recommendations): Orders reviewed with pt and daughter. Will monitor patient per MD orders.     Inpatient nursing criteria listed below were met:    Health care directives status obtained and documented: Yes  SCD's Documented: Yes  Skin issues/needs documented:Yes  Isolation needs addressed, if appropriate: NA  Fall Prevention: Care plan updated, Education given and documented Yes  MRSA swab completed for patient 55 years and older (exclude EAMON and TKA): NA  Care Plan initiated: Yes  Education Assessment documented:Yes  Education Documented (Pre-existing chronic infection such as, MRSA/VRE need education on admission): Yes  Admission Medication Reconciliation completed: Yes  New medication patient education completed and documented (Possible Side Effects of Common Medications handout): Yes  Home medications if not able to send immediately home with family stored here: NA  Reminder note placed in discharge instructions: NA  Discharge planning review completed (admission navigator) Yes

## 2018-10-16 NOTE — ANESTHESIA PREPROCEDURE EVALUATION
Anesthesia Evaluation     . Pt has had prior anesthetic. Type: MAC and General    No history of anesthetic complications          ROS/MED HX    ENT/Pulmonary:     (+)tobacco use, Current use 0.25 ppdx 20 years packs/day  , . .    Neurologic:     (+)other neuro Peripheral neuropathy    Cardiovascular:     (+) Dyslipidemia, hypertension-range: <140 / 90, ---. : . . . :. . Previous cardiac testing date:results:date: results:ECG reviewed date: results:NSR, normal axis, normal intervals, no acute ST/T changes c/w ischemia date: results:          METS/Exercise Tolerance:     Hematologic:         Musculoskeletal:   (+) arthritis, , , other musculoskeletal- osteoarthritis right hip - presents for right EAMON      GI/Hepatic:  - neg GI/hepatic ROS       Renal/Genitourinary: Comment: Stage II CRI    (+) chronic renal disease, type: CRI, Pt does not require dialysis, Pt has no history of transplant,       Endo:     (+) type II DM Last HgA1c: 7.1 date: 10/3/2018 Not using insulin - not using insulin pump not previously admitted for DM/DKA Diabetic complications: nephropathy neuropathy, .      Psychiatric:  - neg psychiatric ROS       Infectious Disease:  - neg infectious disease ROS       Malignancy:      - no malignancy   Other:    (+) No chance of pregnancy   - neg other ROS                 Physical Exam  Normal systems: cardiovascular, pulmonary and dental    Airway   Mallampati: II  TM distance: >3 FB  Neck ROM: full    Dental     Cardiovascular   Rhythm and rate: regular and normal  (-) no murmur    Pulmonary    breath sounds clear to auscultation    Other findings: Last dose of Lisinopril was  Last GLC was  Last dose Metformin was                Anesthesia Plan      History & Physical Review  History and physical reviewed and following examination; no interval change.    ASA Status:  3 .    NPO Status:  > 6 hours    Plan for MAC and Spinal with Propofol induction. Maintenance will be TIVA.  Reason for MAC:  Deep or  markedly invasive procedure (G8)  PONV prophylaxis:  Ondansetron (or other 5HT-3) and Dexamethasone or Solumedrol       Postoperative Care  Postoperative pain management:  IV analgesics and Oral pain medications.      Consents  Anesthetic plan, risks, benefits and alternatives discussed with:  Patient.  Use of blood products discussed: No .   .                          .

## 2018-10-17 ENCOUNTER — APPOINTMENT (OUTPATIENT)
Dept: OCCUPATIONAL THERAPY | Facility: CLINIC | Age: 73
DRG: 470 | End: 2018-10-17
Attending: ORTHOPAEDIC SURGERY
Payer: MEDICARE

## 2018-10-17 ENCOUNTER — APPOINTMENT (OUTPATIENT)
Dept: PHYSICAL THERAPY | Facility: CLINIC | Age: 73
DRG: 470 | End: 2018-10-17
Attending: ORTHOPAEDIC SURGERY
Payer: MEDICARE

## 2018-10-17 LAB
GLUCOSE BLDC GLUCOMTR-MCNC: 131 MG/DL (ref 70–99)
GLUCOSE BLDC GLUCOMTR-MCNC: 159 MG/DL (ref 70–99)
GLUCOSE BLDC GLUCOMTR-MCNC: 161 MG/DL (ref 70–99)
GLUCOSE BLDC GLUCOMTR-MCNC: 163 MG/DL (ref 70–99)
GLUCOSE BLDC GLUCOMTR-MCNC: 170 MG/DL (ref 70–99)
GLUCOSE SERPL-MCNC: 145 MG/DL (ref 70–99)
HGB BLD-MCNC: 10.3 G/DL (ref 11.7–15.7)

## 2018-10-17 PROCEDURE — 25000132 ZZH RX MED GY IP 250 OP 250 PS 637: Mod: GY | Performed by: ORTHOPAEDIC SURGERY

## 2018-10-17 PROCEDURE — 82947 ASSAY GLUCOSE BLOOD QUANT: CPT | Performed by: ORTHOPAEDIC SURGERY

## 2018-10-17 PROCEDURE — 97116 GAIT TRAINING THERAPY: CPT | Mod: GP | Performed by: PHYSICAL THERAPIST

## 2018-10-17 PROCEDURE — 97165 OT EVAL LOW COMPLEX 30 MIN: CPT | Mod: GO

## 2018-10-17 PROCEDURE — 00000146 ZZHCL STATISTIC GLUCOSE BY METER IP

## 2018-10-17 PROCEDURE — 25000131 ZZH RX MED GY IP 250 OP 636 PS 637: Mod: GY | Performed by: NURSE PRACTITIONER

## 2018-10-17 PROCEDURE — 97535 SELF CARE MNGMENT TRAINING: CPT | Mod: GO

## 2018-10-17 PROCEDURE — 97110 THERAPEUTIC EXERCISES: CPT | Mod: GP | Performed by: PHYSICAL THERAPIST

## 2018-10-17 PROCEDURE — 97161 PT EVAL LOW COMPLEX 20 MIN: CPT | Mod: GP | Performed by: PHYSICAL THERAPIST

## 2018-10-17 PROCEDURE — 12000007 ZZH R&B INTERMEDIATE

## 2018-10-17 PROCEDURE — 25000125 ZZHC RX 250: Performed by: NURSE PRACTITIONER

## 2018-10-17 PROCEDURE — 97530 THERAPEUTIC ACTIVITIES: CPT | Mod: GP | Performed by: PHYSICAL THERAPIST

## 2018-10-17 PROCEDURE — 40000133 ZZH STATISTIC OT WARD VISIT

## 2018-10-17 PROCEDURE — 85018 HEMOGLOBIN: CPT | Performed by: ORTHOPAEDIC SURGERY

## 2018-10-17 PROCEDURE — 36415 COLL VENOUS BLD VENIPUNCTURE: CPT | Performed by: ORTHOPAEDIC SURGERY

## 2018-10-17 PROCEDURE — A9270 NON-COVERED ITEM OR SERVICE: HCPCS | Mod: GY | Performed by: ORTHOPAEDIC SURGERY

## 2018-10-17 PROCEDURE — 40000193 ZZH STATISTIC PT WARD VISIT: Performed by: PHYSICAL THERAPIST

## 2018-10-17 PROCEDURE — 25000128 H RX IP 250 OP 636: Performed by: ORTHOPAEDIC SURGERY

## 2018-10-17 RX ORDER — SCOLOPAMINE TRANSDERMAL SYSTEM 1 MG/1
1 PATCH, EXTENDED RELEASE TRANSDERMAL
Status: DISCONTINUED | OUTPATIENT
Start: 2018-10-17 | End: 2018-10-17

## 2018-10-17 RX ADMIN — CEFAZOLIN SODIUM 1 G: 1 SOLUTION INTRAVENOUS at 03:58

## 2018-10-17 RX ADMIN — ONDANSETRON 4 MG: 2 INJECTION INTRAMUSCULAR; INTRAVENOUS at 11:50

## 2018-10-17 RX ADMIN — OXYCODONE HYDROCHLORIDE 5 MG: 5 TABLET ORAL at 01:08

## 2018-10-17 RX ADMIN — GABAPENTIN 1200 MG: 400 CAPSULE ORAL at 08:25

## 2018-10-17 RX ADMIN — OXYCODONE HYDROCHLORIDE 5 MG: 5 TABLET ORAL at 18:51

## 2018-10-17 RX ADMIN — DOCUSATE SODIUM 100 MG: 100 CAPSULE, LIQUID FILLED ORAL at 21:03

## 2018-10-17 RX ADMIN — ONDANSETRON 4 MG: 2 INJECTION INTRAMUSCULAR; INTRAVENOUS at 04:31

## 2018-10-17 RX ADMIN — ACETAMINOPHEN 975 MG: 325 TABLET ORAL at 01:08

## 2018-10-17 RX ADMIN — INSULIN ASPART 1 UNITS: 100 INJECTION, SOLUTION INTRAVENOUS; SUBCUTANEOUS at 17:40

## 2018-10-17 RX ADMIN — ACETAMINOPHEN 975 MG: 325 TABLET ORAL at 17:41

## 2018-10-17 RX ADMIN — ACETAMINOPHEN 975 MG: 325 TABLET ORAL at 08:25

## 2018-10-17 RX ADMIN — REGULAR STRENGTH 325 MG: 325 TABLET ORAL at 08:25

## 2018-10-17 RX ADMIN — REGULAR STRENGTH 325 MG: 325 TABLET ORAL at 21:03

## 2018-10-17 RX ADMIN — METFORMIN HYDROCHLORIDE 500 MG: 500 TABLET, FILM COATED ORAL at 08:25

## 2018-10-17 RX ADMIN — DOCUSATE SODIUM 100 MG: 100 CAPSULE, LIQUID FILLED ORAL at 08:25

## 2018-10-17 RX ADMIN — GABAPENTIN 1200 MG: 400 CAPSULE ORAL at 21:03

## 2018-10-17 RX ADMIN — GABAPENTIN 1200 MG: 400 CAPSULE ORAL at 15:00

## 2018-10-17 RX ADMIN — HYDROXYZINE HYDROCHLORIDE 10 MG: 10 TABLET ORAL at 18:51

## 2018-10-17 RX ADMIN — SODIUM CHLORIDE, POTASSIUM CHLORIDE, SODIUM LACTATE AND CALCIUM CHLORIDE: 600; 310; 30; 20 INJECTION, SOLUTION INTRAVENOUS at 03:59

## 2018-10-17 RX ADMIN — SIMVASTATIN 5 MG: 5 TABLET, FILM COATED ORAL at 21:03

## 2018-10-17 RX ADMIN — OXYCODONE HYDROCHLORIDE 5 MG: 5 TABLET ORAL at 09:31

## 2018-10-17 RX ADMIN — SCOPALAMINE 1 PATCH: 1 PATCH, EXTENDED RELEASE TRANSDERMAL at 08:25

## 2018-10-17 RX ADMIN — OXYCODONE HYDROCHLORIDE 5 MG: 5 TABLET ORAL at 15:00

## 2018-10-17 RX ADMIN — INSULIN ASPART 1 UNITS: 100 INJECTION, SOLUTION INTRAVENOUS; SUBCUTANEOUS at 13:18

## 2018-10-17 RX ADMIN — METFORMIN HYDROCHLORIDE 500 MG: 500 TABLET, FILM COATED ORAL at 17:41

## 2018-10-17 ASSESSMENT — ACTIVITIES OF DAILY LIVING (ADL)
ADLS_ACUITY_SCORE: 13
ADLS_ACUITY_SCORE: 16
PREVIOUS_RESPONSIBILITIES: MEAL PREP;HOUSEKEEPING;LAUNDRY;SHOPPING;DRIVING;FINANCES;MEDICATION MANAGEMENT

## 2018-10-17 NOTE — ANESTHESIA POSTPROCEDURE EVALUATION
Patient: Tracy Mcclelland    Procedure(s):  right total hip arthroplasty    Diagnosis:Right hip arthritis  Diagnosis Additional Information: No value filed.    Anesthesia Type:  Spinal, MAC    Note:  Anesthesia Post Evaluation    Patient location during evaluation: Floor  Patient participation: Able to fully participate in evaluation  Level of consciousness: awake and alert  Pain management: adequate  Airway patency: patent  Cardiovascular status: blood pressure returned to baseline  Respiratory status: room air and spontaneous ventilation  Hydration status: acceptable  PONV: none     Anesthetic complications: None    Comments: Patient was pleased with her anesthetic yesterday, she denies any nausea and her pain has been well controlled No anesthesia concerns.         Last vitals:  Vitals:    10/17/18 0517 10/17/18 0700 10/17/18 0725   BP: 111/54 107/54    Pulse: 75 72    Resp: 18 20    Temp: 96.4  F (35.8  C) 96  F (35.6  C)    SpO2: 97% 99% 96%         Electronically Signed By: MACHO Tejada CRNA  October 17, 2018  9:21 AM

## 2018-10-17 NOTE — PROVIDER NOTIFICATION
Hola from Ortho here, informed of bladder scan 44 last evening, and 177 at 0500.  Pt not uncomfortable, IVF continue at 100cc/hr.  Pt has taken in 600cc po during the night but has been nauseated too.

## 2018-10-17 NOTE — PLAN OF CARE
Discharge Planner OT   Patient plan for discharge: Home with assist from family  Current status: OT eval completed and treatment initiated. Pt requires min assist for bed mobility, LB dressing, and bathing. SBA for toileting and functional mobility. Pt recalled 2/3 hip precautions and does require verbal cues to adhere to precautions during ADL. Pt's family is supportive and pt's daughter was present during treatment and assisted with reminders for hip precautions. Pt lives alone however pt's family has a plan to provide 24/7 assist for 1-2 weeks upon discharge. Pt's family is also installing grab bars and purchasing necessary AE to assist with safe discharge home.  Barriers to return to prior living situation: decreased activity tolerance  Recommendations for discharge: Home with 24/7 assist from family   Rationale for recommendations: Pt's family is supportive and will be able to provide assist as needed. Pt's home environment is also conducive for safe discharge home.        Entered by: Cecilia Morales 10/17/2018 11:49 AM

## 2018-10-17 NOTE — OP NOTE
Procedure Date: 10/16/2018      DATE OF PROCEDURE: 10/16/2018      PREOPERATIVE DIAGNOSIS:  Right hip primary osteoarthritis.      POSTOPERATIVE DIAGNOSIS:  Right hip primary osteoarthritis.      PROCEDURE:  Right total hip arthroplasty.      SURGEON:  Terrell Ervin DO      FIRST ASSISTANT:  Hola Caraballo NP (he is utilized throughout the procedure, assisting with soft tissue protection, assisting with leg positioning, assisting with trial and final implant placement, and he provided skin closure)      ANESTHESIA:  Spinal with IV sedation.      COMPLICATIONS:  None.      ESTIMATED BLOOD LOSS: 200 mL.      FLUIDS: 1600 mL crystalloid.      URINE OUTPUT:  650 mL.      COUNTS:  Correct.      DISPOSITION:  To PACU in stable condition.      SPECIFICATIONS:  Includes DePuy size 54 mm [place] acetabular shell with Gription, a neutral 36 mm inner diameter AltrX polyethylene liner, one 6.5 x 25 mm cancellous bone screw in the acetabulum component.  A size 11 standard collar [place] femoral stem with a +1.5, 36 mm metal head.      GROSS FINDINGS:  Preoperative motion hip flexion was limited to approximately 60 degrees.  Abduction was approximately 20 degrees.  There was no external rotation to the hip.  There was bone on bone arthritis with extensive bone wear, complete loss of articular cartilage, significant rimming osteophytes around the acetabulum.      INDICATIONS:  This is a 73-year-old female with complaints of hip pain.  Pain has been present for greater than 1 year.  She has been having trouble with balance and walking due to the pain.  She has attempted intra-articular steroid injections, anti-inflammatories, activity modification, and rest with no significant improvements.  Pain has been impacting the quality of her life, impacting ambulation, causing pain at night, pain at rest, and subsequently causing other body areas to hurt.  Given her continued pain refractory to conservative care, we discussed a right total  hip arthroplasty.  The risks, benefits, complications and alternatives were thoroughly discussed, postoperative timeframe for recovery reviewed.  Once discussed, she opted to proceed.      DETAILS OF PROCEDURE:  She was met preoperatively, again informed consent was verified and appropriate extremity was marked, and she was wheeled to OP suite #1.  Transferred over to the OR table with no issues.  When deemed appropriate by Anesthesia, she was positioned in the lateral decubitus position.  Axillary roll was placed.  All bony prominences were padded.  The right lower extremity was then sterilely prepped and draped in normal manner.  It should be noted that her motion was so limited it was difficult to fully abduct the leg in the prepping of the medial thigh.  Once prepped and draped, timeout was performed.  Again, the appropriate patient, surgery and extremity were verified, antibiotics had been administered, tranexamic acid was administered.  A posterior approach was utilized.  The skin was sharply incised.  The subcutaneous tissue was split in line with electrocautery used for hemostasis.  The IT and fascia was visualized and this was split in line as well.  A Charnley retractor was placed.  The bursal tissue was excised.  The short external rotators, piriformis and abductors identified.  Protecting the piriformis, a release of the short external rotators as well as the capsule was performed.  This was tagged for later repair.  Once adequately released along the femoral neck, the hip was gently dislocated.  A Cobra retractor was placed around the neck.  Based on preoperative templating, an osteotomy of the neck was performed.  I did step-cut in along the greater trochanter using an osteotome.  The head was removed and measured on the backtable.  I did release some of the anterior capsule along the femur.  The hip was repositioned.  There was some remaining posterior labral tissue that was excised.  There was  significant rimming osteophytes all along the acetabulum.  I used an osteotome and removed a few of these at this point.  An anterior retractor was placed followed with a posterior retractor.  Adequate visualization was found.  The acetabulum then was sequentially reamed.  At 53 mm, there was some near circumferential bleeding bed of bone.  I did opt to ream 54 just along the periphery to aid in cup insertion.  The area was irrigated.  An acetabular cup was placed with excellent press-fit, confirming position based on intraoperative anatomy, preoperative templating and an insertion guide.  With this inserted, I did use an osteotome and removed some osteophytes, particularly along the inferior and posterior as well as anterior aspects.  The area was irrigated.  The screw was placed with no issues.  It had good purchase into the bone.  The cup was then placed and confirmed to be locked.  The hip was then repositioned.  Femoral neck elevator was placed along the proximal femur as well as a Cobra retractor.  The box osteotome was utilized to enter the proximal.  This was followed with a canal finder.  I then sequentially broached.  At size 10, there was no stability with rotation, so I opted for the 11.  The 11 broach did sit proud approximately 4 mm.  However, I had excellent interference and was stable.  I trialed a variety of head and neck lengths, opting for the +1.5.  This broach was then removed.  The area was irrigated.  The final stem was placed, sitting in the same position as the broach.  Again I trialed different lengths, opting for the 1.5.  A dilute Betadine lavage was then performed for approximately 3 minutes followed with pulse lavage.  The short external rotators and capsule were repaired back to the greater trochanter with no issues.  The IT and fascia were then closed with 0 Vicryl, followed with 2-0 Vicryl subcutaneous, followed with running 4-0 Monocryl suture with some Dermabond on the skin.  A  sterile bandage was applied.  She was subsequently transferred to PACU in stable condition.  She will be admitted to the hospital for continued orthopedic care, DVT prophylaxis, and pain management.         ABY GELLER DO             D: 10/16/2018   T: 10/16/2018   MT:       Name:     TREVOR GUERRERO   MRN:      [id number]        Account:        [id number]   :      1945           Procedure Date: 10/16/2018      Document: [id number]

## 2018-10-17 NOTE — PROGRESS NOTES
Saw patient briefly while ambulating in the xavier with physical therapy. States doing much better, pain is minimum, voiding adequately. No nausea. No dizziness.  Patch was recommended against by pharmacy.  This is fine to discontinue, not having any issues with nausea, does have zofran and compazine.      Plan:  Discharge tomorrow AM.    MACHO Oviedo, CNP  Orthopedic Surgery

## 2018-10-17 NOTE — PROGRESS NOTES
" 10/17/18 1045   Quick Adds   Type of Visit Initial Occupational Therapy Evaluation   Living Environment   Lives With alone   Living Arrangements mobile home   Home Accessibility bed and bath on same level;grab bars present (bathtub);grab bars present (toilet);stairs to enter home;stairs (1 railing present);tub/shower is not walk in   Number of Stairs to Enter Home 3  (left handrail)   Number of Stairs Within Home 0   Stair Railings at Home outside, present on left side   Transportation Available family or friend will provide   Living Environment Comment Pt's children will be providing 24/7 assist for 1-2 weeks upon discharge. Pt's daughter present during evaluation and was supportive. Pt's daughter stated she \"has a list\" of AE to purchase prior to pt's d/c to ensure her safety at home   Self-Care   Dominant Hand right   Usual Activity Tolerance fair   Current Activity Tolerance fair   Regular Exercise no   Equipment Currently Used at Home walker, rolling;cane, straight;grab bar;shower chair;raised toilet  (reacher)   Functional Level Prior   Ambulation 1-->assistive equipment  (walker)   Transferring 1-->assistive equipment   Toileting 1-->assistive equipment   Bathing 1-->assistive equipment   Dressing 1-->assistive equipment   Eating 0-->independent   Communication 0-->understands/communicates without difficulty   Swallowing 0-->swallows foods/liquids without difficulty   Cognition 0 - no cognition issues reported   Fall history within last six months no   Which of the above functional risks had a recent onset or change? ambulation   General Information   Onset of Illness/Injury or Date of Surgery - Date 10/16/18   Referring Physician Terrell Ervin, DO   Patient/Family Goals Statement Pt plans to return home   Additional Occupational Profile Info/Pertinent History of Current Problem Pt is a 73 year old female being seen for OT evaluation s/p R EAMON. Pt lives alone and was previously independent in most " "ADL/IADL tasks. Pt has a previous medical history of hypertension, lumbago, Type II diabetes, and CKD   Precautions/Limitations right hip precautions   Pain Assessment   Patient Currently in Pain Yes, see Vital Sign flowsheet  (3/10)   Bathing   Level of Gray minimum assist (75% patients effort)   Physical Assist/Nonphysical Assist 1 person assist   Upper Body Dressing   Level of Gray: Dress Upper Body independent   Lower Body Dressing   Level of Gray: Dress Lower Body moderate assist (50% patients effort)   Physical Assist/Nonphysical Assist: Dress Lower Body 1 person assist   Toileting   Level of Gray: Toilet stand-by assist   Physical Assist/Nonphysical Assist: Toilet supervision   Grooming   Level of Gray: Grooming independent   Eating/Self Feeding   Level of Gray: Eating independent   Instrumental Activities of Daily Living (IADL)   Previous Responsibilities meal prep;housekeeping;laundry;shopping;driving;finances;medication management   IADL Comments Pt's family provided some meals and pt only made \"simple meals\"   Activities of Daily Living Analysis   Impairments Contributing to Impaired Activities of Daily Living balance impaired;pain;post surgical precautions;strength decreased;ROM decreased;flexibility decreased   General Therapy Interventions   Planned Therapy Interventions ADL retraining;IADL retraining;bed mobility training;ROM;strengthening   Clinical Impression   Criteria for Skilled Therapeutic Interventions Met yes, treatment indicated   OT Diagnosis decreased independence with ADL   Influenced by the following impairments s/p R EAMON   Assessment of Occupational Performance 3-5 Performance Deficits   Identified Performance Deficits bathing, dressing, toileting, home mgmt   Clinical Decision Making (Complexity) Low complexity   Therapy Frequency daily   Predicted Duration of Therapy Intervention (days/wks) 1-3 days   Anticipated Equipment Needs at " "Discharge dressing equipment   Anticipated Discharge Disposition Home with Assist   Risks and Benefits of Treatment have been explained. Yes   Patient, Family & other staff in agreement with plan of care Yes   Clinical Impression Comments Pt would benefit from skilled OT services to increase independence with ADL/IADL to progress toward prior level of function   [facility] TM \"6 Clicks\"   2016, Trustees of [facility], under license to [employer].  All rights reserved.   6 Clicks Short Forms Daily Activity Inpatient Short Form   [facility]  \"6 Clicks\" Daily Activity Inpatient Short Form   1. Putting on and taking off regular lower body clothing? 2 - A Lot   2. Bathing (including washing, rinsing, drying)? 2 - A Lot   3. Toileting, which includes using toilet, bedpan or urinal? 3 - A Little   4. Putting on and taking off regular upper body clothing? 4 - None   5. Taking care of personal grooming such as brushing teeth? 4 - None   6. Eating meals? 4 - None   Daily Activity Raw Score (Score out of 24.Lower scores equate to lower levels of function) 19   Total Evaluation Time   Total Evaluation Time (Minutes) 8     ASH Bell/L  [facility]  [phone]    "

## 2018-10-17 NOTE — PLAN OF CARE
Discharge Planner PT   Patient plan for discharge: Home with assistance of family for at least 3 days  Current status: Patient is a 73 year old female, day 1 status post right total hip arthroplasty. Patient has a previous medical history of peripheral neuropathy, DM and CKD. Prior to surgery patient living alone in a mobile home with 3 stairs and left hand rail to enter. Patient using walker for mobility and has a SPC at home. Patient reports no falls in the last 6 months, attended the pre-operative joint class and does not exercise regularly. Currently, patient with no pain at rest. Min assist sit to stand with 2WW and verbal cues for safety. Ambulated 80 ft with 2WW, CGA for safety, no LOB, VC for hip precautions and no LOB. Stand to sit SBA with 2WW and verbal cues for limb management. Sit to/from supine MOD IND with gait belt to assist RLE management. 1/3 hip precaution recall following teaching. Bed to chair transfers MOD IND with 2WW. Tolerated post operative EAMON exercises well and with fair control and VC to correct IR at the knee with flexion.   Barriers to return to prior living situation: Stairs, impaired functional mobility, pain and decreased activity tolerance  Recommendations for discharge: Home with 24/7 assistance, HHPT  Rationale for recommendations: Patient mobilized well and daughter is highly involved. Patient requires constant cues for hip precautions thus family participation is necessary. Patient has sufficient support system established for safe discharge plan home. Patient would benefit from skilled therapeutic intervention to address post operative impairments in order to progress her towards her desired level of function in accordance with her surgeon's protocol.       Entered by: Eliza Cuevas 10/17/2018 10:32 AM

## 2018-10-17 NOTE — CONSULTS
CARE TRANSITION SOCIAL WORK INITIAL ASSESSMENT:  Reason For Consult: discharge planning   Met with: Patient.    DATA  Active Problems:    Status post total hip replacement, right       [facility]  Primary Care MD Name: Skylar Gomez    ASSESSMENT  Cognitive Status: awake, alert and oriented.       Resources List: Home Care     Lives With: alone  Living Arrangements: mobile home  Quality Of Family Relationships: supportive, involved, helpful      Who is your support system?: Children       Insurance Concerns: No Insurance issues identified        This writer met with pt introduced self and role. Discussed discharge planning and medicare guidelines in regards to home care and SNF benefits.  Patient lives alone in a mobile home.  She states she has two steps to get into the mobile home and then everything is on one level.  She states her family is involved and she has someone to stay with her for 6 days after discharge.  Discussed home care and provided list of home care agencies.  [facility] Phone: [phone] for RN.          PLAN    Home with [facility] Phone: [phone] for P/T    DEEPTHI Allred  [facility] [phone]/ [facility] [phone]

## 2018-10-17 NOTE — PLAN OF CARE
Problem: Hip Arthroplasty (Total, Partial) (Adult)  Goal: Signs and Symptoms of Listed Potential Problems Will be Absent, Minimized or Managed (Hip Arthroplasty)  Signs and symptoms of listed potential problems will be absent, minimized or managed by discharge/transition of care (reference Hip Arthroplasty (Total, Partial) (Adult) CPG).   Outcome: Therapy, progress toward functional goals as expected  Good CMS to right leg and dressing to right hip is CDI.VSS Had difficulty getting comfortable in bed. Dilaudid was administered x2 to decrease pain. Rested well, but had nausea with position changes which was decreased some with Zofran and Compazine. Ambulated to bathroom to attempt to void with walker, but was unable to void. Was later bladder scanned for 44ml. IV fluids continue and pt refused clear liquid supper tray. Took popsicle and soda crackers with water. Oxygen still on at 1liter as pt has shallow breathing at times and some apnea. Smoker, but declined Nicotine patch. Will continue to monitor pain, nausea, dressing, CMS, I&O, sats, activity tolerance.

## 2018-10-17 NOTE — PROGRESS NOTES
10/17/18 0940   Quick Adds   Type of Visit Initial PT Evaluation       Present no   Living Environment   Lives With alone   Living Arrangements mobile home   Home Accessibility bed and bath on same level;grab bars present (bathtub);grab bars present (toilet);stairs to enter home;stairs (1 railing present);tub/shower is not walk in   Number of Stairs to Enter Home 3  (left handrail)   Number of Stairs Within Home 0   Stair Railings at Home outside, present on left side   Transportation Available family or friend will provide;car   Living Environment Comment Children and family have a plan for 24/7 assistance for about a week at discharge   Self-Care   Dominant Hand right   Usual Activity Tolerance fair   Current Activity Tolerance poor   Regular Exercise no   Equipment Currently Used at Home tub bench;walker, rolling;cane, straight;grab bar   Activity/Exercise/Self-Care Comment Activity limited by hip pain. Attended pre-operative teaching course   Functional Level Prior   Ambulation 1-->assistive equipment  (walker)   Transferring 1-->assistive equipment   Toileting 1-->assistive equipment   Bathing 1-->assistive equipment   Dressing 1-->assistive equipment   Eating 0-->independent   Communication 0-->understands/communicates without difficulty   Swallowing 0-->swallows foods/liquids without difficulty   Cognition 0 - no cognition issues reported   Fall history within last six months no   Which of the above functional risks had a recent onset or change? ambulation   General Information   Onset of Illness/Injury or Date of Surgery - Date 10/16/18   Referring Physician Dr. Ervin   Patient/Family Goals Statement Discharge home with family assistance and home health PT   Pertinent History of Current Problem (include personal factors and/or comorbidities that impact the POC) Patient is a 73 year old female, day 1 status post right total hip arthroplasty. Patient has a previous medical history of  peripheral neuropathy, DM and CKD.   Precautions/Limitations right hip precautions   Weight-Bearing Status - LUE full weight-bearing   Weight-Bearing Status - RUE full weight-bearing   Weight-Bearing Status - LLE full weight-bearing   Weight-Bearing Status - RLE weight-bearing as tolerated   General Observations Patient pleasant and agreeable with encouragement. Daughter supportive and attentive to education   General Info Comments PT orders: eval and treat s/p R EAMON. Activity orders: up with assistance, ambulate with assistance   Cognitive Status Examination   Orientation orientation to person, place and time   Level of Consciousness alert   Follows Commands and Answers Questions 100% of the time   Personal Safety and Judgment intact   Memory intact   Pain Assessment   Patient Currently in Pain No   Integumentary/Edema   Integumentary/Edema Comments Bilat TRUNG stockings   Posture    Posture Forward head position   Range of Motion (ROM)   ROM Comment Bilat UE and LLE WFL. RLE limited by hip pecautions   Strength   Strength Comments Not formally assessed, however sufficient for safe mobilization   Bed Mobility   Bed Mobility Bed mobility skill: Scooting/Bridging;Bed mobility skill: Sit to supine;Bed mobility skill: Supine to sit   Bed Mobility Skill: Scooting/Bridging   Level of Jay: Scooting/Bridging stand-by assist   Physical/Nonphysical Assist: Scooting/Bridging supervision;verbal cues   Bed Mobility Skill: Sit to Supine   Level of Jay: Sit/Supine stand-by assist   Physical Assist/Nonphysical Assist: Sit/Supine supervision;verbal cues   Assistive Device: Sit/Supine (gait belt)   Bed Mobility Skill: Supine to Sit   Level of Jay: Supine/Sit stand-by assist   Physical Assist/Nonphysical Assist: Supine/Sit supervision;verbal cues   Assistive Device: Supine/Sit (gait belt)   Transfer Skills   Transfer Transfer Skill: Sit to Stand;Transfer Skill:  Stand to Sit   Transfer Skill:  Sit to Stand    Level of Conception: Sit/Stand minimum assist (75% patients effort)   Physical Assist/Nonphysical Assist: Sit/Stand verbal cues;supervision   Weightbearing Restrictions: Sit/Stand weight-bearing as tolerated   Assistive Device for Transfer: Sit/Stand edison walker   Transfer Skill: Stand to Sit   Level of Conception: Stand/Sit contact guard   Physical Assist/Nonphysical Assist: Stand/Sit verbal cues;supervision   Weight-Bearing Restrictions: Stand/Sit weight-bearing as tolerated   Assistive Device: Stand to Sit rolling walker   Gait   Gait Gait Skill;Gait Analysis   Gait Skills   Level of Conception: Gait contact guard   Physical Assist/Nonphysical Assist: Gait supervision;verbal cues   Weight-Bearing Restrictions: Gait weight-bearing as tolerated   Assistive Device for Transfer: Gait rolling walker   Gait Distance (80 ft)   Gait Analysis   Gait Pattern Used 3-point gait   Gait Deviations Noted decreased toe-to-floor clearance;decreased stride length;decreased step length;increased time in double stance   Impairments Contributing to Gait Deviations decreased ROM;pain;decreased strength   Balance   Balance Comments required UE support in standing, no LOB with mobility   Sensory Examination   Sensory Perception no deficits were identified   Coordination   Coordination no deficits were identified   Muscle Tone   Muscle Tone no deficits were identified   Modality Interventions   Planned Modality Interventions Cryotherapy   Planned Modality Interventions Comments Right hip   General Therapy Interventions   Planned Therapy Interventions balance training;bed mobility training;gait training;ROM;strengthening;home program guidelines   Clinical Impression   Criteria for Skilled Therapeutic Intervention yes, treatment indicated   PT Diagnosis right hip stiffness, muscle weakness, pain, impaired gait    Influenced by the following impairments Day 1 status post right total hip arthroplasty   Functional limitations due to  "impairments impaired functional mobility, decreased activity tolerance, decreased safety with transitional movements   Clinical Presentation Evolving/Changing   Clinical Presentation Rationale Patient with increased pain with mobilization, no change in vitals and good limb control. Anticipate progression towards stable response to mobilization with progression from surgery.   Clinical Decision Making (Complexity) Low complexity   Therapy Frequency` 2 times/day   Predicted Duration of Therapy Intervention (days/wks) 1-2 days   Anticipated Equipment Needs at Discharge (None)   Anticipated Discharge Disposition Home with Assist;Home with Home Therapy   Risk & Benefits of therapy have been explained Yes   Patient, Family & other staff in agreement with plan of care Yes   Clinical Impression Comments Patient mobilized well and daughter is highly involved. Patient requires constant cues for hip precautions thus family participation is necessary. Patient has sufficient support system established for safe discharge plan home. Patient would benefit from skilled therapeutic intervention to address post operative impairments in order to progress her towards her desired level of function in accordance with her surgeon's protocol.   [facility] [employer] and Safety TM \"6 Clicks\"   2016, Trustees of [facility], under license to [employer].  All rights reserved.   6 Clicks Short Forms Basic Mobility Inpatient Short Form   [facility] AM-PAC  \"6 Clicks\" V.2 Basic Mobility Inpatient Short Form   1. Turning from your back to your side while in a flat bed without using bedrails? 4 - None   2. Moving from lying on your back to sitting on the side of a flat bed without using bedrails? 3 - A Little   3. Moving to and from a bed to a chair (including a wheelchair)? 3 - A Little   4. Standing up from a chair using your arms (e.g., wheelchair, or bedside chair)? 3 - A Little   5. To walk in hospital room? 2 - A Lot   6. Climbing 3-5 " steps with a railing? 2 - A Lot   Basic Mobility Raw Score (Score out of 24.Lower scores equate to lower levels of function) 17   Total Evaluation Time   Total Evaluation Time (Minutes) 15     Thank you for your referral.    Eliza Cuevas, PT, DPT, ATC    [facility]    O: [phone]  E: [email or web address]

## 2018-10-17 NOTE — PROGRESS NOTES
[facility]  Orthopedics Progress Note           Assessment and Plan:    Assessment:   Post-operative day #1 Procedure(s):  right total hip arthroplasty   Type 2 diabetic - currently controlled on oral medication-has sliding scale insulin ordered if needed  Nausea, dizziness POD0 - improving POD1      Plan:   No immediate surgical complications identified.  No excessive bleeding  Pain well-controlled currently  Tolerating physical therapy and rehabilitation well.  Encourage IS  Start or continue physical therapy  Activity as tolerated  Weight-bear as tolerated with posterior hip precautions  Anticipate discharge from hospital tomorrow AM  Pain control measures  Advance diet as tolerated  Continue to monitor for following. Last bladder scan was 177.  Will add scopolamine patch to help with ongoing nausea.  Routine wound care  DVT Prophylaxis: Aspirin , SCD's, Compression Hose  No acute medical issues.            Interval History:   Doing better this AM per report. Has continued nausea, but improving. No further emesis after 1 small bout POD0. States the dizziness is somewhat improved. Has not voided since surgery yet, recent bladder scan was for 177.  Does have IV fluids running and had reported 600ml of oral intake last night into this AM.   Overall she feels she continues to improve.  Pain is well-controlled currently. States had increased pain last night, but now she feels the pain medications are working and doing better. Has not ambulated yet this AM.  Will add scopolamine patch to help with nausea, continue IV fluids at 100 ml/hr, continue to encourage oral intake, continue to monitor.   Plan: discharge tomorrow AM if continues to improve.                  Review of Systems:    The patient denies any chest pain, shortness of breath, excessive pain, fever, chills, purulent drainage from the wound. No current vomiting. States continues mild nausea.                Physical Exam:   General: awake,  "alert, appropriate and in no acute distress  Blood pressure 107/54, pulse 72, temperature 96  F (35.6  C), temperature source Oral, resp. rate 20, height 1.651 m (5' 5\"), weight 75.6 kg (166 lb 10.7 oz), SpO2 96 %, not currently breastfeeding.  Right hip:  Dressing clean, dry and intact. Surrounding skin intact, no breakdown. Calf is soft, nontender with no significant swelling. Distal neurovascular grossly intact.  Compartments soft and non-tender.  2+ distal pulse, sensation intact to foot, able to df/pf against resistance. Brisk cap refill.            Data:     Results for orders placed or performed during the hospital encounter of 10/16/18 (from the past 24 hour(s))   Glucose by meter   Result Value Ref Range    Glucose 132 (H) 70 - 99 mg/dL   XR Pelvis w Hip Port Right 1 View    Narrative    PELVIS AND RIGHT HIP PORTABLE ONE VIEW  10/16/2018 2:58 PM     HISTORY:  Postop hip arthroplasty.     COMPARISON: 8/9/2018      Impression    IMPRESSION: Postoperative changes of right total hip arthroplasty. The  components appear well seated. No complications are evident. Mild  degenerative changes in the left hip.    JEFFERY CLEMENTS MD   Glucose by meter   Result Value Ref Range    Glucose 141 (H) 70 - 99 mg/dL   Glucose by meter   Result Value Ref Range    Glucose 151 (H) 70 - 99 mg/dL   Glucose by meter   Result Value Ref Range    Glucose 191 (H) 70 - 99 mg/dL   Glucose by meter   Result Value Ref Range    Glucose 159 (H) 70 - 99 mg/dL   Hemoglobin   Result Value Ref Range    Hemoglobin 10.3 (L) 11.7 - 15.7 g/dL   Glucose   Result Value Ref Range    Glucose 145 (H) 70 - 99 mg/dL     MACHO Oviedo, CNP  Orthopedic Surgery      "

## 2018-10-18 ENCOUNTER — APPOINTMENT (OUTPATIENT)
Dept: PHYSICAL THERAPY | Facility: CLINIC | Age: 73
DRG: 470 | End: 2018-10-18
Attending: ORTHOPAEDIC SURGERY
Payer: MEDICARE

## 2018-10-18 ENCOUNTER — APPOINTMENT (OUTPATIENT)
Dept: OCCUPATIONAL THERAPY | Facility: CLINIC | Age: 73
DRG: 470 | End: 2018-10-18
Attending: ORTHOPAEDIC SURGERY
Payer: MEDICARE

## 2018-10-18 VITALS
WEIGHT: 166.67 LBS | BODY MASS INDEX: 27.77 KG/M2 | HEIGHT: 65 IN | RESPIRATION RATE: 20 BRPM | TEMPERATURE: 97.8 F | HEART RATE: 92 BPM | SYSTOLIC BLOOD PRESSURE: 130 MMHG | OXYGEN SATURATION: 91 % | DIASTOLIC BLOOD PRESSURE: 58 MMHG

## 2018-10-18 LAB
GLUCOSE BLDC GLUCOMTR-MCNC: 141 MG/DL (ref 70–99)
GLUCOSE BLDC GLUCOMTR-MCNC: 147 MG/DL (ref 70–99)
GLUCOSE BLDC GLUCOMTR-MCNC: 160 MG/DL (ref 70–99)
GLUCOSE SERPL-MCNC: 138 MG/DL (ref 70–99)
HGB BLD-MCNC: 11.1 G/DL (ref 11.7–15.7)

## 2018-10-18 PROCEDURE — 82947 ASSAY GLUCOSE BLOOD QUANT: CPT | Performed by: ORTHOPAEDIC SURGERY

## 2018-10-18 PROCEDURE — 40000193 ZZH STATISTIC PT WARD VISIT: Performed by: PHYSICAL THERAPIST

## 2018-10-18 PROCEDURE — 97535 SELF CARE MNGMENT TRAINING: CPT | Mod: GO

## 2018-10-18 PROCEDURE — 85018 HEMOGLOBIN: CPT | Performed by: ORTHOPAEDIC SURGERY

## 2018-10-18 PROCEDURE — A9270 NON-COVERED ITEM OR SERVICE: HCPCS | Mod: GY | Performed by: ORTHOPAEDIC SURGERY

## 2018-10-18 PROCEDURE — 00000146 ZZHCL STATISTIC GLUCOSE BY METER IP

## 2018-10-18 PROCEDURE — 25000132 ZZH RX MED GY IP 250 OP 250 PS 637: Mod: GY | Performed by: ORTHOPAEDIC SURGERY

## 2018-10-18 PROCEDURE — 40000133 ZZH STATISTIC OT WARD VISIT

## 2018-10-18 PROCEDURE — 97530 THERAPEUTIC ACTIVITIES: CPT | Mod: GP | Performed by: PHYSICAL THERAPIST

## 2018-10-18 PROCEDURE — 36415 COLL VENOUS BLD VENIPUNCTURE: CPT | Performed by: ORTHOPAEDIC SURGERY

## 2018-10-18 RX ORDER — OXYCODONE HYDROCHLORIDE 5 MG/1
5-10 TABLET ORAL EVERY 4 HOURS PRN
Qty: 50 TABLET | Refills: 0 | Status: SHIPPED | OUTPATIENT
Start: 2018-10-18 | End: 2018-11-28

## 2018-10-18 RX ORDER — METHOCARBAMOL 500 MG/1
500 TABLET, FILM COATED ORAL 3 TIMES DAILY PRN
Qty: 60 TABLET | Refills: 1 | Status: SHIPPED | OUTPATIENT
Start: 2018-10-18 | End: 2018-10-18

## 2018-10-18 RX ORDER — TIZANIDINE 2 MG/1
2 TABLET ORAL 3 TIMES DAILY PRN
Qty: 60 TABLET | Refills: 1 | Status: SHIPPED | OUTPATIENT
Start: 2018-10-18 | End: 2019-01-09

## 2018-10-18 RX ORDER — DOCUSATE SODIUM 100 MG/1
100 CAPSULE, LIQUID FILLED ORAL 2 TIMES DAILY PRN
Qty: 60 CAPSULE | Refills: 1 | Status: SHIPPED | OUTPATIENT
Start: 2018-10-18 | End: 2019-01-09

## 2018-10-18 RX ORDER — ASPIRIN 325 MG
325 TABLET, DELAYED RELEASE (ENTERIC COATED) ORAL 2 TIMES DAILY
Qty: 84 TABLET | Refills: 0 | Status: SHIPPED | OUTPATIENT
Start: 2018-10-18 | End: 2019-03-21

## 2018-10-18 RX ORDER — ACETAMINOPHEN 325 MG/1
975 TABLET ORAL EVERY 8 HOURS PRN
Qty: 100 TABLET | Refills: 1 | Status: SHIPPED | OUTPATIENT
Start: 2018-10-18 | End: 2021-11-03

## 2018-10-18 RX ORDER — ONDANSETRON 4 MG/1
4 TABLET, ORALLY DISINTEGRATING ORAL EVERY 8 HOURS PRN
Qty: 20 TABLET | Refills: 1 | Status: SHIPPED | OUTPATIENT
Start: 2018-10-18 | End: 2018-11-28

## 2018-10-18 RX ADMIN — ACETAMINOPHEN 975 MG: 325 TABLET ORAL at 08:21

## 2018-10-18 RX ADMIN — DOCUSATE SODIUM 100 MG: 100 CAPSULE, LIQUID FILLED ORAL at 08:22

## 2018-10-18 RX ADMIN — INSULIN ASPART 1 UNITS: 100 INJECTION, SOLUTION INTRAVENOUS; SUBCUTANEOUS at 08:22

## 2018-10-18 RX ADMIN — OXYCODONE HYDROCHLORIDE 5 MG: 5 TABLET ORAL at 10:27

## 2018-10-18 RX ADMIN — REGULAR STRENGTH 325 MG: 325 TABLET ORAL at 08:22

## 2018-10-18 RX ADMIN — METFORMIN HYDROCHLORIDE 500 MG: 500 TABLET, FILM COATED ORAL at 08:22

## 2018-10-18 RX ADMIN — OXYCODONE HYDROCHLORIDE 5 MG: 5 TABLET ORAL at 04:59

## 2018-10-18 RX ADMIN — ACETAMINOPHEN 975 MG: 325 TABLET ORAL at 00:06

## 2018-10-18 RX ADMIN — GABAPENTIN 1200 MG: 400 CAPSULE ORAL at 08:22

## 2018-10-18 ASSESSMENT — ACTIVITIES OF DAILY LIVING (ADL)
ADLS_ACUITY_SCORE: 16

## 2018-10-18 NOTE — PLAN OF CARE
Problem: Hip Arthroplasty (Total, Partial) (Adult)  Goal: Signs and Symptoms of Listed Potential Problems Will be Absent, Minimized or Managed (Hip Arthroplasty)  Signs and symptoms of listed potential problems will be absent, minimized or managed by discharge/transition of care (reference Hip Arthroplasty (Total, Partial) (Adult) CPG).   Outcome: Improving  Vss. Pt denies pain to R hip, received scheduled tylenol, declined prn oxycodone. Has good bed mobility and is up to the bathroom with standby assist of one and walker. CMS intact to RLE, dressing to R hip is clean dry and intact. Will continue with plan of care.

## 2018-10-18 NOTE — PROGRESS NOTES
Name: Tracy Mcclelland    MRN#: [id number]    Reason for Hospitalization: Right hip arthritis  Status post total hip replacement, right    Discharge Date: 10/18/2018    Patient / Family response to discharge plan: in agreement    Follow-Up Appt: Future Appointments  Date Time Provider Department Center   10/18/2018 2:30 PM Regina Yanes, PT PHPT [facility]   10/19/2018 10:15 AM [facility]   10/31/2018 10:10 AM Terrell Ervin DO PHOS [facility]       Other Providers (Care Coordinator, County Services, PCA services etc): No    Discharge Disposition: home with [facility] Phone: [phone]    DEEPTHI Allred  [facility] [phone]/ [facility] [phone]

## 2018-10-18 NOTE — PLAN OF CARE
Problem: Hip Arthroplasty (Total, Partial) (Adult)  Goal: Signs and Symptoms of Listed Potential Problems Will be Absent, Minimized or Managed (Hip Arthroplasty)  Signs and symptoms of listed potential problems will be absent, minimized or managed by discharge/transition of care (reference Hip Arthroplasty (Total, Partial) (Adult) CPG).   Outcome: Improving  Pt alert/orientated, VSS, temp at 1600 was 99.8 oral, pt had some nausea at noon time and IV zofran given with relief, ate well at meals and good appetite, voiding good, ,170, 163, IS 1700, Hgb 10.3, Incision reinforced, clean, dry, intact, no drainage noted, CMS intact, pain controlled with 5mg oxycodone, ice pack.

## 2018-10-18 NOTE — PLAN OF CARE
Problem: Hip Arthroplasty (Total, Partial) (Adult)  Goal: Signs and Symptoms of Listed Potential Problems Will be Absent, Minimized or Managed (Hip Arthroplasty)  Signs and symptoms of listed potential problems will be absent, minimized or managed by discharge/transition of care (reference Hip Arthroplasty (Total, Partial) (Adult) CPG).   Outcome: Therapy, progress toward functional goals as expected  Ambulated 2x with PT today, but refused to walk in xavier this evening with writer stating that it hurt too much when she walked earlier. Ambulated to bathroom to void 5x this shift. Ate well. Dressing to right hip CDI. Oxycodone controlled pain well. Temp of 100.5 at 1945 and 99.6 at 1700. Good CMS. No nausea. Sleeping well at this time. Bed alarm on. SCD's on.Will continue to monitor pain, CMS, dressing, temps, activity tolerance, safety.

## 2018-10-18 NOTE — PLAN OF CARE
Problem: Hip Arthroplasty (Total, Partial) (Adult)  Intervention: Support Psychosocial Response to Surgery  Physical Therapy Discharge Summary    Reason for therapy discharge:    Able to safely go home with assist--met goals to be able to discharge.    Progress towards therapy goal(s). See goals on Care Plan [facility] electronic health record for goal details.  Goals met    Therapy recommendation(s):    Continued therapy is recommended.  Rationale/Recommendations:  per PT note.  Home with family assist and home health PT.        Problem: Patient Care Overview  Goal: Plan of Care/Patient Progress Review  Discharge Planner PT   Patient plan for discharge: home with home health and family assist  Current status: walking safely with walker and some cues--family able to provide this assist, stairs trained today and able to do with family assist  Barriers to return to prior living situation: none as family is able to provide assist needed  Recommendations for discharge: home with family assist and home health PT  Rationale for recommendations: needing come cues and training yet to get to her max potential/baseline but safe enough and enough support to do so at home        Entered by: Regina Yanes 10/18/2018 10:36 AM

## 2018-10-18 NOTE — DISCHARGE INSTRUCTIONS
Total Hip Replacement Discharge Instructions                                      [phone]  Bone and Joint Service Line for issues or concerns    General Care:  After surgery you may feel tired/sleepy. This is normal. If you have any question along the way please contact the office. If you feel it is an issue cannot wait for normal office hours, contact the on-call physician. You should not drive or operate machines/equipment until released by your physician to do so.     Bandages:    It s normal to have some blood-tinged fluid on your bandages, this may occur for a few days after being sent home from the hospital. Keep incision covered with hospital dressing (Aquacel) for one week. It is okay to shower with this dressing on. However, do not submerge your dressing and incision in water for roughly 2 weeks. After one week remove this dressing and then keep it clean, dry, and covered. If this dressing become looses or falls off it is ok to cover with gauze and tape.  Wash the incision gently with warm soapy water after removing your hospital dressing and lightly pat dry.      Swelling (edema) control:  Preventing swelling (edema) in your legs after surgery is very important. It is helpful for achieving optimal range of motion as well as preventing blood clots.  It starts with simple things such as elevation of your legs and icing. Elevate your legs above your heart.    Do this for 20 minutes every couple hours the first few days after surgery. We also recommend TRUNG hose (compression hose) to be worn. Wear on both legs during the day. You may remove at night. Wear these until directed to stop.    Bathing:  Do not submerge your incision in water such as a bath or pool. It is ok to shower when you get home but keep your incision covered with a sealed bandage. You may find in comfortable to get a shower chair for the first month while you continue to heal and get stronger.     Follow up:  Your follow up appointment  should already be scheduled. If it s not, please call the office to verify an appointment 14 days after surgery. If there are no issues in your recovery you will have an appointment at 14 days, 6 weeks, 3 months, 6 months, and 1 year from your surgery date.     Diet:  You may not have a full appetite at discharge this should get better. Progress to bland foods such as crackers and bread and finally to your normal diet if you have no problems.  Avoid alcohol when taking narcotic pain medications.      Pain control:  Take your pain medications as prescribed. These medications may make you sleepy. Do not drive, operate equipment, or drink alcohol when taking these.  You may take Tylenol (Generic name is acetaminophen) as directed on the bottle for additional relief or in place of the prescribed pain medications as your pain gets better.  If the medications cause a reaction such as nausea or skin rash, stop taking them and contact your doctor. Please plan accordingly, pain medications will not be re-filled on the weekends or at night. Call the office during the day if you need more medications.    Blood thinner:  It is very important to take the prescribed medication as directed to prevent blood clots. No medication is perfect, so if you notice a sudden onset of pain/swelling in your calf area call your doctor. If you notice a sudden onset of troubles breathing and/or chest pain call 911.     Icing:  It is common for some swelling, aching and stiffness to occur for awhile after a hip replacement.  Apply for 20 minutes at a time. For the first 1-2 weeks apply ice 2-3 x a day or more after therapy.    Walker/crutches:  Use a walker/crutches when you go home. You will transition to the use of a cane and finally to no additional support.     Physical Therapy:  The success of your hip replacement is based on doing physical therapy. You will have some pain and discomfort along the way. If you feel your pain is limiting your  progress make sure to take some pain medication prior to your therapy session. If your pain medications is not working, talk to your surgeon.   For the first 1-2 weeks after going home you will have in-home physical therapy. The goal is to work on walking and feeling steady.  Usually after your first post-operative follow up you will move to out-patient physical therapy.     Activity:  Unless otherwise instructed, you can weight bear as tolerated with a walker/cane. For 6 weeks follow these listed precautions:  Do not bend the operated hip past 90 degrees (for example sitting in a low couch or toilet). Use a raised seat on your toilet for 6-8 weeks. If you find yourself in a low seat, keep your legs apart (don t let the knees touch) and kneecaps facing forward when getting up. Please ask for help.    Do not cross the midline of your body with the operated leg.  Do not rotate the operated leg inward, your toes and kneecap should point toward the ceiling when in bed.       Normal findings after surgery:  Numbness and tenderness around the incisions.   You may have bruising around the incisions and down the thigh.   Low grade fevers less than 100.5 degrees Fahrenheit are normal.   You will have some increased pain after your therapy sessions.     When to call the Office:  Temperature greater than 101.5 degrees Fahreheit.  Fever, chills, and increasing pain in the hip.  Excessive drainage from the incisions that include bright red blood.  Drainage from the incisions sites that appear yellow, pus-like, or foul smelling.  Increasing pain the hip not relieved by the prescribed pain medications or ice.  Persistent nausea or vomiting   Increased pain or swelling in your calf area (in back above your ankle) that wasn t there when in the hospital.  Any other effects you feel are significant.  Call 911 if you experience any chest pain and/or shortness or breath.

## 2018-10-18 NOTE — PLAN OF CARE
Discharge Planner OT   Patient plan for discharge: Home  Current status: Pt ambulated to bathroom and completed all aspects of toileting with SBA. Pt stood at sink to complete grooming tasks with SBA. Verbal cues required for safe use of FWW during grooming. Pt educated on AE for dressing, and after initial instruction pt aleyda/doffed pants, underwear, and socks with modified independence. Dependent for tying shoes. Pt demonstrates improved activity tolerance this date.   Barriers to return to prior living situation: None from an OT perspective  Recommendations for discharge: Home with assist from family as needed  Rationale for recommendations: Pt's family is supportive and will be able to provide assist as needed. Pt's home environment is also conducive for safe discharge home.        Entered by: Cecilia Morales 10/18/2018 12:30 PM        Occupational Therapy Discharge Summary    Reason for therapy discharge:    Discharged to home.  All goals and outcomes met, no further needs identified.    Progress towards therapy goal(s). See goals on Care Plan in [facility] electronic health record for goal details.  Goals met    Therapy recommendation(s):    No further therapy is recommended.     Cecilia Morlaes, OTR/L  [facility]  [phone]

## 2018-10-18 NOTE — DISCHARGE SUMMARY
"[facility] Discharge Summary     Tracy Mcclelland MRN# [id number]   YOB: 1945 Age: 73 year old     Date of Admission:  10/16/2018  Date of Discharge:  10/18/2018  1:40 PM  Admitting Physician:  Terrell Ervin DO  Discharge Physician:  MACHO Xiong CNP with Dr. Ervin as attending  Discharging Service:  Orthopedics     Primary Provider: Skylar Gomez          Admission Diagnoses:   Right hip arthritis  Status post total hip replacement, right          Discharge Diagnosis:   Active Problems:    Status post total hip replacement, right               Discharge Disposition:   Discharged to home           Condition on Discharge:   Discharge condition: Stable   Discharge vitals: Blood pressure 130/58, pulse 92, temperature 97.8  F (36.6  C), temperature source Oral, resp. rate 20, height 1.651 m (5' 5\"), weight 75.6 kg (166 lb 10.7 oz), SpO2 91 %, not currently breastfeeding.   Code status on discharge: Full Code           Procedures / Labs / Imaging:   No other procedures performed during this admission          Medications Prior to Admission:     Prescriptions Prior to Admission   Medication Sig Dispense Refill Last Dose     gabapentin (NEURONTIN) 600 MG tablet Take 2 tablets (1,200 mg) by mouth 3 times daily 360 tablet 1 10/15/2018 at 2030     lisinopril (PRINIVIL/ZESTRIL) 20 MG tablet Take 1 tablet (20 mg) by mouth daily 90 tablet 0 10/14/2018 at 2030     loratadine (CLARITIN) 10 MG tablet Take 1 tablet (10 mg) by mouth daily 30 tablet 1      metFORMIN (GLUCOPHAGE) 500 MG tablet Take 1 tablet (500 mg) by mouth 2 times daily (with meals) 180 tablet 0 10/15/2018 at 2030     simvastatin (ZOCOR) 5 MG tablet TAKE ONE TABLET BY MOUTH EVERY NIGHT AT BEDTIME 90 tablet 1 10/15/2018 at 2100     [DISCONTINUED] aspirin 81 MG EC tablet Take 1 tablet by mouth daily. 90 tablet 3 10/2/2018     [DISCONTINUED] Naproxen Sodium (ALEVE PO)    10/2/2018             Discharge Medications: "     Current Discharge Medication List      START taking these medications    Details   acetaminophen (TYLENOL) 325 MG tablet Take 3 tablets (975 mg) by mouth every 8 hours as needed for mild pain  Qty: 100 tablet, Refills: 1    Associated Diagnoses: Status post total hip replacement, right      docusate sodium (COLACE) 100 MG capsule Take 1 capsule (100 mg) by mouth 2 times daily as needed for constipation  Qty: 60 capsule, Refills: 1    Associated Diagnoses: Status post total hip replacement, right      methocarbamol (ROBAXIN) 500 MG tablet Take 1 tablet (500 mg) by mouth 3 times daily as needed for muscle spasms  Qty: 60 tablet, Refills: 1    Associated Diagnoses: Status post total hip replacement, right      ondansetron (ZOFRAN-ODT) 4 MG ODT tab Take 1 tablet (4 mg) by mouth every 8 hours as needed for nausea or vomiting  Qty: 20 tablet, Refills: 1    Associated Diagnoses: Status post total hip replacement, right      oxyCODONE IR (ROXICODONE) 5 MG tablet Take 1-2 tablets (5-10 mg) by mouth every 4 hours as needed for moderate to severe pain  Qty: 50 tablet, Refills: 0    Associated Diagnoses: Status post total hip replacement, right         CONTINUE these medications which have CHANGED    Details   aspirin 325 MG EC tablet Take 1 tablet (325 mg) by mouth 2 times daily Take 1 tablet twice a day for 42 days for clot prevention. Do not take any NSAIDs or other aspirin products during this time.  After 42 days may return to any previous NSAID or aspirin therapy.  Qty: 84 tablet, Refills: 0    Associated Diagnoses: Status post total hip replacement, right         CONTINUE these medications which have NOT CHANGED    Details   gabapentin (NEURONTIN) 600 MG tablet Take 2 tablets (1,200 mg) by mouth 3 times daily  Qty: 360 tablet, Refills: 1    Associated Diagnoses: Type 2 diabetes mellitus with stage 2 chronic kidney disease, without long-term current use of insulin (H)      lisinopril (PRINIVIL/ZESTRIL) 20 MG tablet  Take 1 tablet (20 mg) by mouth daily  Qty: 90 tablet, Refills: 0    Associated Diagnoses: Benign essential hypertension      loratadine (CLARITIN) 10 MG tablet Take 1 tablet (10 mg) by mouth daily  Qty: 30 tablet, Refills: 1    Associated Diagnoses: Middle ear effusion, right      metFORMIN (GLUCOPHAGE) 500 MG tablet Take 1 tablet (500 mg) by mouth 2 times daily (with meals)  Qty: 180 tablet, Refills: 0    Associated Diagnoses: Type 2 diabetes mellitus with stage 2 chronic kidney disease, without long-term current use of insulin (H)      simvastatin (ZOCOR) 5 MG tablet TAKE ONE TABLET BY MOUTH EVERY NIGHT AT BEDTIME  Qty: 90 tablet, Refills: 1    Associated Diagnoses: Hyperlipidemia with target LDL less than 100         STOP taking these medications       Naproxen Sodium (ALEVE PO) Comments:   Reason for Stopping:                     Consultations:   No consultations were requested during this admission             Brief History of Illness:   See operative report          Hospital Course:   Patient admitted after routine elective surgery.  Patient discharge POD2 without incident.  By day of discharge discharge: Did well.  Continued to improve.  Pain was well-controlled with oral medications.  No fevers.   N/v had resolved. Had some issues with dizziness and nausea on POD1 but this was resolved by the afternoon of POD1.  Per patient felt good.  Shaver had been d/c, voiding adequate, tolerated oral intake.  Per patient PT went well.  She expressed desire to discharge this day.  Has family at home to help.  No concerns, questions, or new issues.  Therapy oked for discharge home with family support.                 Significant Results:   None             Pending Results:   None           Discharge Instructions and Follow-Up:   Discharge diet: Regular   Discharge activity: Activity as tolerated   Discharge follow-up: 14 days with Dr. Ervin   Effected Extremity Right leg       Weight Bearing status Weight bearing as  tolerated with posterior hip precautions       Lines and drains: None    Wound care: Keep incision covered with hospital dressing (Aquacel) for one week. It is okay to shower with this dressing on. However, do not submerge your dressing and incision in water for roughly 2 weeks. After one week remove this dressing and then keep it clean, dry, and covered. If this dressing become looses or falls off it is ok to cover with gauze and tape.  Wash the incision gently with warm soapy water after removing your hospital dressing and lightly pat dry.            MACHO Oviedo, CNP  Orthopedic Surgery

## 2018-10-18 NOTE — PROGRESS NOTES
Discharge Planner   Discharge Plans in progress: [facility] P/T    Barriers to discharge plan: None  Follow up plan: Ortho       Entered by: Vanessa Morillo 10/18/2018 11:11 AM

## 2018-10-18 NOTE — PROGRESS NOTES
SPIRITUAL HEALTH SERVICES  SPIRITUAL ASSESSMENT Progress Note  [facility]      During Rounding,  introduced himself to Tracy Mcclelland and informed her of his availability.    Denis Butler M.Div., [facility]  Staff   Office tel: [phone]

## 2018-10-18 NOTE — PROGRESS NOTES
"[facility]  Orthopedics Progress Note           Assessment and Plan:    Assessment:   Post-operative day #2 Procedure(s):  right total hip arthroplasty   Type 2 diabetic - currently controlled on oral medication-has sliding scale insulin ordered if needed  Nausea, dizziness POD0 - resolved POD1, no current symptoms  Voiding well.      Plan:   No immediate surgical complications identified.  No excessive bleeding  Pain well-controlled currently  Tolerating physical therapy and rehabilitation well.  Encourage IS  Start or continue physical therapy  Activity as tolerated  Weight-bear as tolerated with posterior hip precautions  Anticipate discharge this AM  Pain control measures  Advance diet as tolerated  Routine wound care  DVT Prophylaxis: Aspirin , SCD's, Compression Hose  No acute medical issues.            Interval History:   Doing much better this AM per report. Nausea, dizzinesss has resolved. Pain well controlled.. No further emesis after 1 small bout POD0. Voiding without difficulty.   Overall she feels she continues to improve.   Ambulating with physical therapy. States happy with progress.  Patient expresses desire to discharge and feels ready.  Plan: discharge this AM.               Review of Systems:    The patient denies any chest pain, shortness of breath, excessive pain, fever, chills, purulent drainage from the wound. No current vomiting. States continues mild nausea.                Physical Exam:   General: awake, alert, appropriate and in no acute distress  /58 (BP Location: Left arm)  Pulse 92  Temp 97.8  F (36.6  C) (Oral)  Resp 20  Ht 1.651 m (5' 5\")  Wt 75.6 kg (166 lb 10.7 oz)  SpO2 91%  BMI 27.73 kg/m2  Right hip:  Dressing clean, dry and intact. Surrounding skin intact, no breakdown. Dressing changed. Very minimum dried drainage on dressing. Sterile Aquacell placed. Calf is soft, nontender with no significant swelling. Distal neurovascular grossly intact.  " Compartments soft and non-tender.  2+ distal pulse, sensation intact to foot, able to df/pf against resistance. Brisk cap refill.            Data:     Results for orders placed or performed during the hospital encounter of 10/16/18 (from the past 24 hour(s))   Glucose by meter   Result Value Ref Range    Glucose 131 (H) 70 - 99 mg/dL   Glucose by meter   Result Value Ref Range    Glucose 170 (H) 70 - 99 mg/dL   Glucose by meter   Result Value Ref Range    Glucose 163 (H) 70 - 99 mg/dL   Glucose by meter   Result Value Ref Range    Glucose 161 (H) 70 - 99 mg/dL   Glucose by meter   Result Value Ref Range    Glucose 147 (H) 70 - 99 mg/dL     Roberto Carlos Caraballo APRN, CNP  Orthopedic Surgery

## 2018-10-18 NOTE — PROGRESS NOTES
S-(situation): Patient discharged to home via wheelchair with son.    B-(background): Hip replacement right side.    A-(assessment): VSS, pain controlled with oxycodone, dressing intact.    R-(recommendations): Discharge instructions reviewed with pt/son. Listed belongings gathered and returned to patient.          Discharge Nursing Criteria:     Care Plan and Patient education resolved: Yes    New Medications- pt has been educated about purpose and side effects: Yes    Vaccines  Influenza status verified at discharge:  No      MISC  Prescriptions if needed, hard copies sent with patient  Yes  Home and hospital aquired medications returned to patient: Yes  Medication Bin checked and emptied on discharge Yes  Patient reports post-discharge pain management plan is effective: Yes    TKA/EAMON ONLY:   Discharged with TRUNG Stockings Yes  TRUNG stocking Education Completed Yes

## 2018-10-19 ENCOUNTER — TELEPHONE (OUTPATIENT)
Dept: FAMILY MEDICINE | Facility: OTHER | Age: 73
End: 2018-10-19

## 2018-10-19 NOTE — TELEPHONE ENCOUNTER
"Hospital/TCU/ED for chronic condition Discharge Protocol    \"Hi, my name is Kala Burk, a registered nurse, and I am calling from [facility].  I am calling to follow up and see how things are going for you after your recent emergency visit/hospital/TCU stay.\"    Tell me how you are doing now that you are home?\" feeling just great!\"      Discharge Instructions    \"Let's review your discharge instructions.  What is/are the follow-up recommendations?  Pt. Response: follow up with Dr. Ervin    \"Has an appointment with your primary care provider been scheduled?\"   Yes. (confirm)    \"When you see the provider, I would recommend that you bring your medications with you.\"    Medications    \"Tell me what changed about your medicines when you discharged?\"    Changes to chronic meds?    0-1    \"What questions do you have about your medications?\"    None     New diagnoses of heart failure, COPD, diabetes, or MI?    No              Medication reconciliation completed? Yes  Was MTM referral placed (*Make sure to put transitions as reason for referral)?   No    Call Summary    \"What questions or concerns do you have about your recent visit and your follow-up care?\"     none    \"If you have questions or things don't continue to improve, we encourage you contact us through the main clinic number (give number).  Even if the clinic is not open, triage nurses are available 24/7 to help you.     We would like you to know that our clinic has extended hours (provide information).  We also have urgent care (provide details on closest location and hours/contact info)\"      \"Thank you for your time and take care!\"    LAQUITA Stinson, RN  [facility]  "

## 2018-10-19 NOTE — TELEPHONE ENCOUNTER
Patient called to schedule an appointment for a hospital follow-up or appeared on a report showing that they were recently discharged from the hospital.    Patient was admitted to :  [facility]  Discharged date: 10-  Reason for hospital admission:  Status Post Total Hip Replacement, Right, Right Hip Arthritis  Does patient have future appointment scheduled with provider? Yes - Dr Ervin  Date of future appointment:  10-      This information will be used to help the care team plan for the patients upcoming visit.  The triage RN may determine that a follow up call is necessary and reach out to the patient via the phone number listed in the chart.     Please route this message on routine priority to the Triage RN pool.

## 2018-10-23 NOTE — PROGRESS NOTES
"Tracy Mcclelland  Gender: female  : 1945  [address] [facility] [id number] [phone] (home)     Medical Record: [id number]  [place] [facility] [place] [address] [facility]  Primary Care Provider: Skylar Gomez    Parent's names are: Data Unavailable (mother) and Data Unavailable (father).      [facility]  2018     Discharge Phone Call:  Key Words/Key Times      Introduction - AIDET (Acknowledge, Introduce, Duration, Explanation)      Empathy-   We are calling to see how you are since your recent stay in the hospital?     Call back COMMENTS:  Everything is going good.       Clinical Questions -  (f/u appts, medication side effects/purpose, ability to care for self at home) \"For your safety, it is important to us that you understand the purpose and side effects of your medications, can you tell me what your new medications are?\"     Call back COMMENTS: Pain medications, Oxycodone. And stool softer, Colace.  Pt has not had a BM for 7 days, denies any abd pain,  Per pt stomach is soft and non-tender. Pt is passing gas. Per pt drinking plenty of fluids.  Pt added Miralax today and plans to add a laxative tomorrow if no BM.       Staff Recognition -  We like to recognize staff and physicians who have done an excellent job.  Do you remember any people from your care team that you would like recognize?     Call back COMMENTS: \"Everyone was so great!\"       Very Good Care -  We want to provide very good care to all patients.  How was your care?     Call back COMMENTS: \"I was impressed at how patient all the staff was day and night\" \"Everyone took the time to answer questions, talk, and I never felt like they were in a hurry when I was slow to get out of bed to the bathroom\"      Opportunities for Improvement -  Our goal is to be the best.  Do you have any suggestions for things that we could improve upon?     Call back COMMENTS: nothing      Thank You "       ALON Lester  October 23, 2018

## 2018-10-31 ENCOUNTER — OFFICE VISIT (OUTPATIENT)
Dept: ORTHOPEDICS | Facility: CLINIC | Age: 73
End: 2018-10-31
Payer: COMMERCIAL

## 2018-10-31 ENCOUNTER — RADIANT APPOINTMENT (OUTPATIENT)
Dept: GENERAL RADIOLOGY | Facility: CLINIC | Age: 73
End: 2018-10-31
Attending: ORTHOPAEDIC SURGERY
Payer: COMMERCIAL

## 2018-10-31 VITALS
WEIGHT: 166 LBS | SYSTOLIC BLOOD PRESSURE: 92 MMHG | BODY MASS INDEX: 27.66 KG/M2 | HEIGHT: 65 IN | DIASTOLIC BLOOD PRESSURE: 65 MMHG | TEMPERATURE: 98.2 F

## 2018-10-31 DIAGNOSIS — Z96.641 STATUS POST TOTAL HIP REPLACEMENT, RIGHT: Primary | ICD-10-CM

## 2018-10-31 DIAGNOSIS — Z96.641 STATUS POST TOTAL HIP REPLACEMENT, RIGHT: ICD-10-CM

## 2018-10-31 PROCEDURE — 73502 X-RAY EXAM HIP UNI 2-3 VIEWS: CPT | Mod: TC

## 2018-10-31 PROCEDURE — 99024 POSTOP FOLLOW-UP VISIT: CPT | Performed by: ORTHOPAEDIC SURGERY

## 2018-10-31 ASSESSMENT — PAIN SCALES - GENERAL: PAINLEVEL: NO PAIN (1)

## 2018-10-31 NOTE — PROGRESS NOTES
Orthopedic Clinic Post-Operative Note    CHIEF COMPLAINT:   Chief Complaint   Patient presents with     Surgical Followup     DOS: 10/16/2018~Right total hip arthroplasty~15 days postop       HISTORY OF PRESENT ILLNESS  Doing well.  Pain is well controlled.  She is been weaned down on her oxygen.  She is been using Tylenol.  Using compression hose as well as aspirin.  Home physical therapy is going well.    Patient's past medical, surgical, social and family histories reviewed.     Past Medical History:   Diagnosis Date     CKD (chronic kidney disease) stage 2, GFR 60-89 ml/min 10/19/2015     Headache(784.0)     hx.of Migraines     Lumbago      Type II or unspecified type diabetes mellitus without mention of complication, not stated as uncontrolled      Unspecified essential hypertension        Past Surgical History:   Procedure Laterality Date     ARTHROPLASTY HIP Right 10/16/2018    Procedure: right total hip arthroplasty;  Surgeon: Terrell Ervin DO;  Location: [facility] OR     C LIGATE FALLOPIAN TUBE       [facility] LAPAROSCOPY, SURGICAL; CHOLECYSTECTOMY  2000    Cholecystectomy, Laparoscopic     HC REMOVE TONSILS/ADENOIDS,<13 Y/O      T & A <11y.o.       Medications:    Current Outpatient Prescriptions on File Prior to Visit:  acetaminophen (TYLENOL) 325 MG tablet Take 3 tablets (975 mg) by mouth every 8 hours as needed for mild pain   aspirin 325 MG EC tablet Take 1 tablet (325 mg) by mouth 2 times daily Take 1 tablet twice a day for 42 days for clot prevention. Do not take any NSAIDs or other aspirin products during this time.  After 42 days may return to any previous NSAID or aspirin therapy.   docusate sodium (COLACE) 100 MG capsule Take 1 capsule (100 mg) by mouth 2 times daily as needed for constipation   gabapentin (NEURONTIN) 600 MG tablet Take 2 tablets (1,200 mg) by mouth 3 times daily   lisinopril (PRINIVIL/ZESTRIL) 20 MG tablet Take 1 tablet (20 mg) by mouth daily   metFORMIN (GLUCOPHAGE) 500 MG tablet  "Take 1 tablet (500 mg) by mouth 2 times daily (with meals)   oxyCODONE IR (ROXICODONE) 5 MG tablet Take 1-2 tablets (5-10 mg) by mouth every 4 hours as needed for moderate to severe pain   simvastatin (ZOCOR) 5 MG tablet TAKE ONE TABLET BY MOUTH EVERY NIGHT AT BEDTIME   loratadine (CLARITIN) 10 MG tablet Take 1 tablet (10 mg) by mouth daily   ondansetron (ZOFRAN-ODT) 4 MG ODT tab Take 1 tablet (4 mg) by mouth every 8 hours as needed for nausea or vomiting   tiZANidine (ZANAFLEX) 2 MG tablet Take 1 tablet (2 mg) by mouth 3 times daily as needed for muscle spasms (pain)     No current facility-administered medications on file prior to visit.     Allergies   Allergen Reactions     Mold      sneezing, coughing , runny nose, watery eyes & red,     No Known Drug Allergies        Social History     Occupational History      [employer]     Social History Main Topics     Smoking status: Current Every Day Smoker     Packs/day: 0.25     Years: 20.00     Types: Cigarettes     Last attempt to quit: 7/10/2012     Smokeless tobacco: Never Used      Comment: 2-3 cigs daily     Alcohol use Yes      Comment: couple times monthly     Drug use: No     Sexual activity: No       Family History   Problem Relation Age of Onset     HEART DISEASE Mother      Pacemaker     Alzheimer Disease Father      Dementia/Loss of memory     Cerebrovascular Disease Maternal Grandmother      HEART DISEASE Maternal Grandfather      MI     Diabetes Paternal Grandmother        REVIEW OF SYSTEMS  General: negative for, night sweats, dizziness, fatigue  Resp: No shortness of breath and no cough  CV: negative for chest pain, syncope or near-syncope  GI: negative for nausea, vomiting and diarrhea  : negative for dysuria and hematuria  Musculoskeletal: as above  Neurologic: negative for syncope   Hematologic: negative for bleeding disorder    Physical Exam:  Vitals: BP 92/65  Temp 98.2  F (36.8  C) (Oral)  Ht 5' 5\" (1.651 m)  Wt 166 lb (75.3 " kg)  LMP  (LMP Unknown)  BMI 27.62 kg/m2  BMI= Body mass index is 27.62 kg/(m^2).  Constitutional: healthy, alert and no acute distress   Psychiatric: mentation appears normal and affect normal/bright  NEURO: no focal deficits  SKIN: .healing well, well approximated skin edges, without signs of infection including no erythema, incision breakdown or purlent drainage  JOINT/EXTREMITIES: Distal neurovascular intact.  Able to go from a seated to a standing position with no difficulty.  Compartments are soft and compressible with minimal edema along the hip.  GAIT: antalgic    Diagnostic Modalities:  right hip X-ray: The prosthesis has acceptable alignment. No fractures or dislocations. Prosthesis is well seated with no evidence of loosening  Independent visualization of the images was performed.      Impression:   Chief Complaint   Patient presents with     Surgical Followup     DOS: 10/16/2018~Right total hip arthroplasty~15 days postop   Doing well    Plan:   Activity: Take frequent breaks and elevate the extremity  Staples/Sutures out: Not applicable.  Pain controlled: Yes. Continue to use: Recommend transition off oxygen continue Tylenol and Robaxin.  Immobilzation: No  Physical Therapy: passive range of motion, active range of motion, endurance  Rest, Ice, Elevation, Compression, aspirin 325 twice daily  Return to clinic 4, week(s), or sooner as needed for changes.    Re-x-ray on return: No    Rayshawn Ervin D.O.

## 2018-10-31 NOTE — MR AVS SNAPSHOT
"              After Visit Summary   10/31/2018    Tracy Mcclelland    MRN: [id number]           Patient Information     Date Of Birth          1945        Visit Information        Provider Department      10/31/2018 10:10 AM Terrell Ervin DO [facility]        Today's Diagnoses     Status post total hip replacement, right    -  1       Follow-ups after your visit        Your next 10 appointments already scheduled     Oct 31, 2018 10:10 AM CDT   Return Visit with Terrell Ervin DO   [facility] ([facility])    [address] [facility] [id number] [phone]            Nov 06, 2018 10:00 AM CST   Ortho Eval with Marianne Moreno, PT   [facility] ([facility])    [address] [facility] [address] [phone]              Who to contact     If you have questions or need follow up information about today's clinic visit or your schedule please contact [facility] directly at [phone].  Normal or non-critical lab and imaging results will be communicated to you by MyChart, letter or phone within 4 business days after the clinic has received the results. If you do not hear from us within 7 days, please contact the clinic through MyChart or phone. If you have a critical or abnormal lab result, we will notify you by phone as soon as possible.  Submit refill requests through [employer] or call your pharmacy and they will forward the refill request to us. Please allow 3 business days for your refill to be completed.          Additional Information About Your Visit        Care EveryWhere ID     This is your Care EveryWhere ID. This could be used by other organizations to access your [place] medical records  [id number]        Your Vitals Were     Temperature Height Last Period BMI (Body Mass Index)          98.2  F (36.8  C) (Oral) 1.651 m (5' 5\") (LMP Unknown) 27.62 kg/m2         Blood Pressure from " Last 3 Encounters:   10/31/18 92/65   10/17/18 130/58   10/10/18 138/82    Weight from Last 3 Encounters:   10/31/18 75.3 kg (166 lb)   10/16/18 75.6 kg (166 lb 10.7 oz)   10/10/18 74.2 kg (163 lb 8 oz)               Primary Care Provider Office Phone # Fax #    MACHO Cifuentes [phone]       [address] [facility] [address]        Equal Access to Services     [facility] : Hadii aad ku hadasho Soomaali, waaxda luqadaha, qaybta kaalmada adeegyada, waxay bladimirin hayliana davies . So [facility] [phone].    ATENCIÓN: Si habla español, tiene a johnson disposición servicios gratuitos de asistencia lingüística. Ll[facility] [phone].    We comply with applicable federal civil rights laws and [place] laws. We do not discriminate on the basis of race, color, national origin, age, disability, sex, sexual orientation, or gender identity.            Thank you!     Thank you for choosing [facility]  for your care. Our goal is always to provide you with excellent care. Hearing back from our patients is one way we can continue to improve our services. Please take a few minutes to complete the written survey that you may receive in the mail after your visit with us. Thank you!             Your Updated Medication List - Protect others around you: Learn how to safely use, store and throw away your medicines at www.disposemymeds.org.          This list is accurate as of 10/31/18  9:53 AM.  Always use your most recent med list.                   Brand Name Dispense Instructions for use Diagnosis    acetaminophen 325 MG tablet    TYLENOL    100 tablet    Take 3 tablets (975 mg) by mouth every 8 hours as needed for mild pain    Status post total hip replacement, right       aspirin 325 MG EC tablet     84 tablet    Take 1 tablet (325 mg) by mouth 2 times daily Take 1 tablet twice a day for 42 days for clot prevention. Do not take any NSAIDs or other aspirin products during this time.   After 42 days may return to any previous NSAID or aspirin therapy.    Status post total hip replacement, right       docusate sodium 100 MG capsule    COLACE    60 capsule    Take 1 capsule (100 mg) by mouth 2 times daily as needed for constipation    Status post total hip replacement, right       gabapentin 600 MG tablet    NEURONTIN    360 tablet    Take 2 tablets (1,200 mg) by mouth 3 times daily    Type 2 diabetes mellitus with stage 2 chronic kidney disease, without long-term current use of insulin (H)       lisinopril 20 MG tablet    PRINIVIL/ZESTRIL    90 tablet    Take 1 tablet (20 mg) by mouth daily    Benign essential hypertension       loratadine 10 MG tablet    CLARITIN    30 tablet    Take 1 tablet (10 mg) by mouth daily    Middle ear effusion, right       metFORMIN 500 MG tablet    GLUCOPHAGE    180 tablet    Take 1 tablet (500 mg) by mouth 2 times daily (with meals)    Type 2 diabetes mellitus with stage 2 chronic kidney disease, without long-term current use of insulin (H)       ondansetron 4 MG ODT tab    ZOFRAN-ODT    20 tablet    Take 1 tablet (4 mg) by mouth every 8 hours as needed for nausea or vomiting    Status post total hip replacement, right       oxyCODONE IR 5 MG tablet    ROXICODONE    50 tablet    Take 1-2 tablets (5-10 mg) by mouth every 4 hours as needed for moderate to severe pain    Status post total hip replacement, right       simvastatin 5 MG tablet    ZOCOR    90 tablet    TAKE ONE TABLET BY MOUTH EVERY NIGHT AT BEDTIME    Hyperlipidemia with target LDL less than 100       tiZANidine 2 MG tablet    ZANAFLEX    60 tablet    Take 1 tablet (2 mg) by mouth 3 times daily as needed for muscle spasms (pain)    Status post total hip replacement, right

## 2018-10-31 NOTE — LETTER
10/31/2018         RE: Tracy Mcclelland  [address] [facility] [address]        Dear Colleague,    Thank you for referring your patient, Tracy Mcclelland, to the [facility]. Please see a copy of my visit note below.    Orthopedic Clinic Post-Operative Note    CHIEF COMPLAINT:   Chief Complaint   Patient presents with     Surgical Followup     DOS: 10/16/2018~Right total hip arthroplasty~15 days postop       HISTORY OF PRESENT ILLNESS  Doing well.  Pain is well controlled.  She is been weaned down on her oxygen.  She is been using Tylenol.  Using compression hose as well as aspirin.  Home physical therapy is going well.    Patient's past medical, surgical, social and family histories reviewed.     Past Medical History:   Diagnosis Date     CKD (chronic kidney disease) stage 2, GFR 60-89 ml/min 10/19/2015     Headache(784.0)     hx.of Migraines     Lumbago      Type II or unspecified type diabetes mellitus without mention of complication, not stated as uncontrolled      Unspecified essential hypertension        Past Surgical History:   Procedure Laterality Date     ARTHROPLASTY HIP Right 10/16/2018    Procedure: right total hip arthroplasty;  Surgeon: Terrell Ervin DO;  Location: [facility]     C LIGATE FALLOPIAN TUBE       HC LAPAROSCOPY, SURGICAL; CHOLECYSTECTOMY  2000    Cholecystectomy, Laparoscopic     HC REMOVE TONSILS/ADENOIDS,<12 Y/O      T & A <11y.o.       Medications:    Current Outpatient Prescriptions on File Prior to Visit:  acetaminophen (TYLENOL) 325 MG tablet Take 3 tablets (975 mg) by mouth every 8 hours as needed for mild pain   aspirin 325 MG EC tablet Take 1 tablet (325 mg) by mouth 2 times daily Take 1 tablet twice a day for 42 days for clot prevention. Do not take any NSAIDs or other aspirin products during this time.  After 42 days may return to any previous NSAID or aspirin therapy.   docusate sodium (COLACE) 100 MG capsule Take 1 capsule (100 mg) by mouth 2 times  daily as needed for constipation   gabapentin (NEURONTIN) 600 MG tablet Take 2 tablets (1,200 mg) by mouth 3 times daily   lisinopril (PRINIVIL/ZESTRIL) 20 MG tablet Take 1 tablet (20 mg) by mouth daily   metFORMIN (GLUCOPHAGE) 500 MG tablet Take 1 tablet (500 mg) by mouth 2 times daily (with meals)   oxyCODONE IR (ROXICODONE) 5 MG tablet Take 1-2 tablets (5-10 mg) by mouth every 4 hours as needed for moderate to severe pain   simvastatin (ZOCOR) 5 MG tablet TAKE ONE TABLET BY MOUTH EVERY NIGHT AT BEDTIME   loratadine (CLARITIN) 10 MG tablet Take 1 tablet (10 mg) by mouth daily   ondansetron (ZOFRAN-ODT) 4 MG ODT tab Take 1 tablet (4 mg) by mouth every 8 hours as needed for nausea or vomiting   tiZANidine (ZANAFLEX) 2 MG tablet Take 1 tablet (2 mg) by mouth 3 times daily as needed for muscle spasms (pain)     No current facility-administered medications on file prior to visit.     Allergies   Allergen Reactions     Mold      sneezing, coughing , runny nose, watery eyes & red,     No Known Drug Allergies        Social History     Occupational History      [employer]     Social History Main Topics     Smoking status: Current Every Day Smoker     Packs/day: 0.25     Years: 20.00     Types: Cigarettes     Last attempt to quit: 7/10/2012     Smokeless tobacco: Never Used      Comment: 2-3 cigs daily     Alcohol use Yes      Comment: couple times monthly     Drug use: No     Sexual activity: No       Family History   Problem Relation Age of Onset     HEART DISEASE Mother      Pacemaker     Alzheimer Disease Father      Dementia/Loss of memory     Cerebrovascular Disease Maternal Grandmother      HEART DISEASE Maternal Grandfather      MI     Diabetes Paternal Grandmother        REVIEW OF SYSTEMS  General: negative for, night sweats, dizziness, fatigue  Resp: No shortness of breath and no cough  CV: negative for chest pain, syncope or near-syncope  GI: negative for nausea, vomiting and diarrhea  :  "negative for dysuria and hematuria  Musculoskeletal: as above  Neurologic: negative for syncope   Hematologic: negative for bleeding disorder    Physical Exam:  Vitals: BP 92/65  Temp 98.2  F (36.8  C) (Oral)  Ht 5' 5\" (1.651 m)  Wt 166 lb (75.3 kg)  LMP  (LMP Unknown)  BMI 27.62 kg/m2  BMI= Body mass index is 27.62 kg/(m^2).  Constitutional: healthy, alert and no acute distress   Psychiatric: mentation appears normal and affect normal/bright  NEURO: no focal deficits  SKIN: .healing well, well approximated skin edges, without signs of infection including no erythema, incision breakdown or purlent drainage  JOINT/EXTREMITIES: Distal neurovascular intact.  Able to go from a seated to a standing position with no difficulty.  Compartments are soft and compressible with minimal edema along the hip.  GAIT: antalgic    Diagnostic Modalities:  right hip X-ray: The prosthesis has acceptable alignment. No fractures or dislocations. Prosthesis is well seated with no evidence of loosening  Independent visualization of the images was performed.      Impression:   Chief Complaint   Patient presents with     Surgical Followup     DOS: 10/16/2018~Right total hip arthroplasty~15 days postop   Doing well    Plan:   Activity: Take frequent breaks and elevate the extremity  Staples/Sutures out: Not applicable.  Pain controlled: Yes. Continue to use: Recommend transition off oxygen continue Tylenol and Robaxin.  Immobilzation: No  Physical Therapy: passive range of motion, active range of motion, endurance  Rest, Ice, Elevation, Compression, aspirin 325 twice daily  Return to clinic 4, week(s), or sooner as needed for changes.    Re-x-ray on return: No    Rayshawn Ervin D.O.    Again, thank you for allowing me to participate in the care of your patient.        Sincerely,        Terrell Ervin, DO    "

## 2018-11-06 ENCOUNTER — HOSPITAL ENCOUNTER (OUTPATIENT)
Dept: PHYSICAL THERAPY | Facility: OTHER | Age: 73
Setting detail: THERAPIES SERIES
End: 2018-11-06
Attending: ORTHOPAEDIC SURGERY
Payer: MEDICARE

## 2018-11-06 DIAGNOSIS — Z96.641 STATUS POST TOTAL HIP REPLACEMENT, RIGHT: ICD-10-CM

## 2018-11-06 PROCEDURE — G8978 MOBILITY CURRENT STATUS: HCPCS | Mod: GP,CK | Performed by: PHYSICAL THERAPIST

## 2018-11-06 PROCEDURE — 40000718 ZZHC STATISTIC PT DEPARTMENT ORTHO VISIT: Performed by: PHYSICAL THERAPIST

## 2018-11-06 PROCEDURE — 97161 PT EVAL LOW COMPLEX 20 MIN: CPT | Mod: GP | Performed by: PHYSICAL THERAPIST

## 2018-11-06 PROCEDURE — 97110 THERAPEUTIC EXERCISES: CPT | Mod: GP | Performed by: PHYSICAL THERAPIST

## 2018-11-06 PROCEDURE — G8979 MOBILITY GOAL STATUS: HCPCS | Mod: GP,CI | Performed by: PHYSICAL THERAPIST

## 2018-11-06 NOTE — PROGRESS NOTES
[facility]          OUTPATIENT PHYSICAL THERAPY ORTHOPEDIC EVALUATION  PLAN OF TREATMENT FOR OUTPATIENT REHABILITATION  (COMPLETE FOR INITIAL CLAIMS ONLY)  Patient's Last Name, First Name, M.I.  YOB: 1945  Tracy Mcclelland       Provider s Name:  [facility]   Medical Record No.  [id number]   Start of Care Date:  11/06/18   Onset Date:  10/16/18   Type:     _X__PT   ___OT   ___SLP Medical Diagnosis:  right EAMON 10/18/18     PT Diagnosis:  right EAMON 10/16/18   Visits from SOC:  1      _________________________________________________________________________________  Plan of Treatment/Functional Goals:  balance training, gait training, ROM, strengthening, stretching           Goals  Goal Identifier: 1  Goal Description: instruction in HEP and compliant with it 5 of 7 days   Target Date: 01/06/19    Goal Identifier: 2  Goal Description: patient to be able to progress to cane with gait instead of w/walker   Target Date: 01/06/19    Goal Identifier: 3  Goal Description: patient to be able to go to [employer] game   Target Date: 01/06/19                                                           Therapy Frequency:     Predicted Duration of Therapy Intervention:  1x week x 1-2 months     Marianne Moreno, PT                 I CERTIFY THE NEED FOR THESE SERVICES FURNISHED UNDER        THIS PLAN OF TREATMENT AND WHILE UNDER MY CARE     (Physician co-signature of this document indicates review and certification of the therapy plan).                       Certification Date From:  11/06/18   Certification Date To:  01/06/19    Referring Provider:  kimberly crane MD    Initial Assessment        See Epic Evaluation Start of Care Date: 11/06/18

## 2018-11-06 NOTE — PROGRESS NOTES
11/06/18 1000   General Information   Type of Visit Initial OP Ortho PT Evaluation   Start of Care Date 11/06/18   Referring Physician kimberly crane MD   Patient/Family Goals Statement hip rehab   Orders Evaluate and Treat   Date of Order 10/18/18   Insurance Type Medicare   Medical Diagnosis right EAMON 10/18/18   Surgical/Medical history reviewed Yes   Precautions/Limitations no known precautions/limitations   Weight-Bearing Status - LLE full weight-bearing   Weight-Bearing Status - RLE full weight-bearing   Body Part(s)   Body Part(s) Hip   Presentation and Etiology   Pertinent history of current problem (include personal factors and/or comorbidities that impact the POC) patient reports she had right hip pain for years that progressively got worse . had right EAMON 10/16/2018. PMH none reported . DM . lives alone in mobilie home with 3 stairs to enter with railling . is able to independely do her home . has family to help her with driving . currenty using w/walker    Impairments A. Pain;F. Decreased strength and endurance;H. Impaired gait   Functional Limitations perform activities of daily living;perform desired leisure / sports activities   Symptom Location right EAMON   Onset date of current episode/exacerbation 10/16/18   Chronicity New   Pain rating (0-10 point scale) Best (/10);Worst (/10)   Best (/10) 0/10   Worst (/10) 3/10   Pain quality B. Dull;C. Aching   Frequency of pain/symptoms C. With activity   Pain/symptoms exacerbated by B. Walking;J. ADL;K. Home tasks   Pain/symptoms eased by C. Rest   Progression of symptoms since onset: Improved   Prior Level of Function   Functional Level Prior Comment like to watch [employer]    Current Level of Function   Current Community Support Family/friend caregiver   Patient role/employment history F. Retired   Fall Risk Screen   Fall screen completed by PT   Have you fallen 2 or more times in the past year? No   Have you fallen and had an injury in the past year? No   Is  patient a fall risk? No   Hip Objective Findings   Side (if bilateral, select both right and left) Right   Gait/Locomotion w/walker    Hip ROM Comments s/p right EAMON reviewed precauctions   Right Knee Extension Strength has good AROM in all directions for strength    Planned Therapy Interventions   Planned Therapy Interventions balance training;gait training;ROM;strengthening;stretching   Clinical Impression   Criteria for Skilled Therapeutic Interventions Met yes, treatment indicated   PT Diagnosis right EAMON 10/16/18   Functional limitations due to impairments LEFS 37/80   Clinical Presentation Stable/Uncomplicated   Clinical Decision Making (Complexity) Low complexity   Predicted Duration of Therapy Intervention (days/wks) 1x week x 1-2 months    Risk & Benefits of therapy have been explained Yes   Patient, Family & other staff in agreement with plan of care Yes   Clinical Impression Comments patient seen s/p right EAMON 10/16/18 presents with limited strength and gait , Rx is exercises in clinic and progression of HEP    Education Assessment   Preferred Learning Style Listening;Reading;Demonstration   Barriers to Learning No barriers   ORTHO GOALS   PT Ortho Eval Goals 1;2;3   Ortho Goal 1   Goal Identifier 1   Goal Description instruction in HEP and compliant with it 5 of 7 days    Target Date 01/06/19   Ortho Goal 2   Goal Identifier 2   Goal Description patient to be able to progress to cane with gait instead of w/walker    Target Date 01/06/19   Ortho Goal 3   Goal Identifier 3   Goal Description patient to be able to go to [employer] game    Target Date 01/06/19   Total Evaluation Time   Total Evaluation Time 15   Therapy Certification   Certification date from 11/06/18   Certification date to 01/06/19   Medical Diagnosis right EAMON 10/18/18

## 2018-11-13 ENCOUNTER — HOSPITAL ENCOUNTER (OUTPATIENT)
Dept: PHYSICAL THERAPY | Facility: OTHER | Age: 73
Setting detail: THERAPIES SERIES
End: 2018-11-13
Attending: NURSE PRACTITIONER
Payer: MEDICARE

## 2018-11-13 PROCEDURE — 97110 THERAPEUTIC EXERCISES: CPT | Mod: GP | Performed by: PHYSICAL THERAPIST

## 2018-11-13 PROCEDURE — 40000718 ZZHC STATISTIC PT DEPARTMENT ORTHO VISIT: Performed by: PHYSICAL THERAPIST

## 2018-11-21 ENCOUNTER — HOSPITAL ENCOUNTER (OUTPATIENT)
Dept: PHYSICAL THERAPY | Facility: OTHER | Age: 73
Setting detail: THERAPIES SERIES
End: 2018-11-21
Attending: NURSE PRACTITIONER
Payer: MEDICARE

## 2018-11-21 PROCEDURE — 97110 THERAPEUTIC EXERCISES: CPT | Mod: GP | Performed by: PHYSICAL THERAPIST

## 2018-11-21 PROCEDURE — G8979 MOBILITY GOAL STATUS: HCPCS | Mod: GP,CI | Performed by: PHYSICAL THERAPIST

## 2018-11-21 PROCEDURE — 40000718 ZZHC STATISTIC PT DEPARTMENT ORTHO VISIT: Performed by: PHYSICAL THERAPIST

## 2018-11-21 PROCEDURE — G8980 MOBILITY D/C STATUS: HCPCS | Mod: GP,CI | Performed by: PHYSICAL THERAPIST

## 2018-11-28 ENCOUNTER — OFFICE VISIT (OUTPATIENT)
Dept: ORTHOPEDICS | Facility: CLINIC | Age: 73
End: 2018-11-28
Payer: COMMERCIAL

## 2018-11-28 VITALS
WEIGHT: 162.5 LBS | HEART RATE: 98 BPM | DIASTOLIC BLOOD PRESSURE: 82 MMHG | BODY MASS INDEX: 27.07 KG/M2 | HEIGHT: 65 IN | SYSTOLIC BLOOD PRESSURE: 130 MMHG

## 2018-11-28 DIAGNOSIS — Z96.641 STATUS POST TOTAL HIP REPLACEMENT, RIGHT: Primary | ICD-10-CM

## 2018-11-28 PROCEDURE — 99024 POSTOP FOLLOW-UP VISIT: CPT | Performed by: ORTHOPAEDIC SURGERY

## 2018-11-28 ASSESSMENT — PAIN SCALES - GENERAL: PAINLEVEL: MILD PAIN (2)

## 2018-11-28 NOTE — LETTER
11/28/2018         RE: Tracy Mcclelland  [address] [facility] [address]        Dear Colleague,    Thank you for referring your patient, Tracy Mcclelland, to the [facility]. Please see a copy of my visit note below.    Orthopedic Clinic Post-Operative Note    CHIEF COMPLAINT:   Chief Complaint   Patient presents with     RECHECK     right hip follow up     Surgical Followup     DOS: 10/16/2018~Right total hip arthroplasty~6 weeks postop       HISTORY OF PRESENT ILLNESS  Doing well.  No pain.  Happy with her progress.  Patient's past medical, surgical, social and family histories reviewed.     Past Medical History:   Diagnosis Date     CKD (chronic kidney disease) stage 2, GFR 60-89 ml/min 10/19/2015     Headache(784.0)     hx.of Migraines     Lumbago      Type II or unspecified type diabetes mellitus without mention of complication, not stated as uncontrolled      Unspecified essential hypertension        Past Surgical History:   Procedure Laterality Date     ARTHROPLASTY HIP Right 10/16/2018    Procedure: right total hip arthroplasty;  Surgeon: Terrell Ervin DO;  Location: [facility]     C LIGATE FALLOPIAN TUBE       HC LAPAROSCOPY, SURGICAL; CHOLECYSTECTOMY  2000    Cholecystectomy, Laparoscopic     HC REMOVE TONSILS/ADENOIDS,<12 Y/O      T & A <11y.o.       Medications:    Current Outpatient Prescriptions on File Prior to Visit:  acetaminophen (TYLENOL) 325 MG tablet Take 3 tablets (975 mg) by mouth every 8 hours as needed for mild pain   aspirin 325 MG EC tablet Take 1 tablet (325 mg) by mouth 2 times daily Take 1 tablet twice a day for 42 days for clot prevention. Do not take any NSAIDs or other aspirin products during this time.  After 42 days may return to any previous NSAID or aspirin therapy.   docusate sodium (COLACE) 100 MG capsule Take 1 capsule (100 mg) by mouth 2 times daily as needed for constipation   gabapentin (NEURONTIN) 600 MG tablet Take 2 tablets (1,200 mg) by mouth 3  "times daily   lisinopril (PRINIVIL/ZESTRIL) 20 MG tablet Take 1 tablet (20 mg) by mouth daily   loratadine (CLARITIN) 10 MG tablet Take 1 tablet (10 mg) by mouth daily   metFORMIN (GLUCOPHAGE) 500 MG tablet Take 1 tablet (500 mg) by mouth 2 times daily (with meals)   simvastatin (ZOCOR) 5 MG tablet TAKE ONE TABLET BY MOUTH EVERY NIGHT AT BEDTIME   tiZANidine (ZANAFLEX) 2 MG tablet Take 1 tablet (2 mg) by mouth 3 times daily as needed for muscle spasms (pain)     No current facility-administered medications on file prior to visit.     Allergies   Allergen Reactions     Mold      sneezing, coughing , runny nose, watery eyes & red,     No Known Drug Allergies        Social History     Occupational History      [employer]     Social History Main Topics     Smoking status: Current Every Day Smoker     Packs/day: 0.25     Years: 20.00     Types: Cigarettes     Last attempt to quit: 7/10/2012     Smokeless tobacco: Never Used      Comment: 2-3 cigs daily     Alcohol use Yes      Comment: couple times monthly     Drug use: No     Sexual activity: No       Family History   Problem Relation Age of Onset     Heart Disease Mother      Pacemaker     Alzheimer Disease Father      Dementia/Loss of memory     Cerebrovascular Disease Maternal Grandmother      Heart Disease Maternal Grandfather      MI     Diabetes Paternal Grandmother        REVIEW OF SYSTEMS  General: negative for, night sweats, dizziness, fatigue  Resp: No shortness of breath and no cough  CV: negative for chest pain, syncope or near-syncope  GI: negative for nausea, vomiting and diarrhea  : negative for dysuria and hematuria  Musculoskeletal: as above  Neurologic: negative for syncope   Hematologic: negative for bleeding disorder    Physical Exam:  Vitals: /82 (BP Location: Right arm, Patient Position: Sitting, Cuff Size: Adult Large)  Pulse 98  Ht 5' 5\" (1.651 m)  Wt 162 lb 8 oz (73.7 kg)  LMP  (LMP Unknown)  BMI 27.04 kg/m2  BMI= " Body mass index is 27.04 kg/(m^2).  Constitutional: healthy, alert and no acute distress   Psychiatric: mentation appears normal and affect normal/bright  NEURO: no focal deficits  SKIN: .well healed, no erythema, no incision breakdown and no drainage.  JOINT/EXTREMITIES: Unrestricted active motion.  No peripheral edema.  Distal neurovascular intact.  Good strength throughout the hip girdle  GAIT: non-antalgic    Diagnostic Modalities:  None today.  Independent visualization of the images was performed.      Impression:   Chief Complaint   Patient presents with     RECHECK     right hip follow up     Surgical Followup     DOS: 10/16/2018~Right total hip arthroplasty~6 weeks postop   Doing well    Plan:   Transition to home therapy.  Discontinue aspirin and compression hose.  Wean from cane as needed.    Return to clinic 6, week(s), or sooner as needed for changes.    Re-x-ray on return: Yes.    Rayshawn Ervin D.O.    Again, thank you for allowing me to participate in the care of your patient.        Sincerely,        Terrell Ervin, DO

## 2018-11-28 NOTE — MR AVS SNAPSHOT
"              After Visit Summary   11/28/2018    Tracy Mcclelland    MRN: [id number]           Patient Information     Date Of Birth          1945        Visit Information        Provider Department      11/28/2018 11:20 AM Terrell Ervin, DO [facility]         Follow-ups after your visit        Your next 10 appointments already scheduled     Nov 29, 2018 10:30 AM CST   Ortho Treatment with Marianne Moreno, PT   [facility]    [address] [facility] [address] [phone]              Who to contact     If you have questions or need follow up information about today's clinic visit or your schedule please contact [facility] directly at [phone].  Normal or non-critical lab and imaging results will be communicated to you by MyChart, letter or phone within 4 business days after the clinic has received the results. If you do not hear from us within 7 days, please contact the clinic through MyChart or phone. If you have a critical or abnormal lab result, we will notify you by phone as soon as possible.  Submit refill requests through [facility] or call your pharmacy and they will forward the refill request to us. Please allow 3 business days for your refill to be completed.          Additional Information About Your Visit        Care EveryWhere ID     This is your Care EveryWhere ID. This could be used by other organizations to access your [facility] medical records  [id number]        Your Vitals Were     Pulse Height Last Period BMI (Body Mass Index)          98 1.651 m (5' 5\") (LMP Unknown) 27.04 kg/m2         Blood Pressure from Last 3 Encounters:   11/28/18 130/82   10/31/18 92/65   10/17/18 130/58    Weight from Last 3 Encounters:   11/28/18 73.7 kg (162 lb 8 oz)   10/31/18 75.3 kg (166 lb)   10/16/18 75.6 kg (166 lb 10.7 oz)              Today, you had the following     No orders found for display       Primary Care Provider " Office Phone # Fax #    MACHO Cifuentes [phone]       [address] [facility] [address]        Equal Access to Services     LIU KURTZ : Hadii aad ku hadrehano Somicaali, waaxda luqadaha, qaybta kaalmada adenailayada, tatyana pelaezjoseph wildenaila hoganандрей conde. So [facility] [phone].    ATENCIÓN: Si habla español, tiene a johnson disposición servicios gratuitos de asistencia lingüística. Llame al [phone].    We comply with applicable federal civil rights laws and [place] laws. We do not discriminate on the basis of race, color, national origin, age, disability, sex, sexual orientation, or gender identity.            Thank you!     Thank you for choosing [facility]  for your care. Our goal is always to provide you with excellent care. Hearing back from our patients is one way we can continue to improve our services. Please take a few minutes to complete the written survey that you may receive in the mail after your visit with us. Thank you!             Your Updated Medication List - Protect others around you: Learn how to safely use, store and throw away your medicines at www.disposemymeds.org.          This list is accurate as of 11/28/18 11:34 AM.  Always use your most recent med list.                   Brand Name Dispense Instructions for use Diagnosis    acetaminophen 325 MG tablet    TYLENOL    100 tablet    Take 3 tablets (975 mg) by mouth every 8 hours as needed for mild pain    Status post total hip replacement, right       aspirin 325 MG EC tablet    ASA    84 tablet    Take 1 tablet (325 mg) by mouth 2 times daily Take 1 tablet twice a day for 42 days for clot prevention. Do not take any NSAIDs or other aspirin products during this time.  After 42 days may return to any previous NSAID or aspirin therapy.    Status post total hip replacement, right       docusate sodium 100 MG capsule    COLACE    60 capsule    Take 1 capsule (100 mg) by mouth 2 times daily as needed  for constipation    Status post total hip replacement, right       gabapentin 600 MG tablet    NEURONTIN    360 tablet    Take 2 tablets (1,200 mg) by mouth 3 times daily    Type 2 diabetes mellitus with stage 2 chronic kidney disease, without long-term current use of insulin (H)       lisinopril 20 MG tablet    PRINIVIL/ZESTRIL    90 tablet    Take 1 tablet (20 mg) by mouth daily    Benign essential hypertension       loratadine 10 MG tablet    CLARITIN    30 tablet    Take 1 tablet (10 mg) by mouth daily    Middle ear effusion, right       metFORMIN 500 MG tablet    GLUCOPHAGE    180 tablet    Take 1 tablet (500 mg) by mouth 2 times daily (with meals)    Type 2 diabetes mellitus with stage 2 chronic kidney disease, without long-term current use of insulin (H)       simvastatin 5 MG tablet    ZOCOR    90 tablet    TAKE ONE TABLET BY MOUTH EVERY NIGHT AT BEDTIME    Hyperlipidemia with target LDL less than 100       tiZANidine 2 MG tablet    ZANAFLEX    60 tablet    Take 1 tablet (2 mg) by mouth 3 times daily as needed for muscle spasms (pain)    Status post total hip replacement, right

## 2018-11-28 NOTE — PROGRESS NOTES
Orthopedic Clinic Post-Operative Note    CHIEF COMPLAINT:   Chief Complaint   Patient presents with     RECHECK     right hip follow up     Surgical Followup     DOS: 10/16/2018~Right total hip arthroplasty~6 weeks postop       HISTORY OF PRESENT ILLNESS  Doing well.  No pain.  Happy with her progress.  Patient's past medical, surgical, social and family histories reviewed.     Past Medical History:   Diagnosis Date     CKD (chronic kidney disease) stage 2, GFR 60-89 ml/min 10/19/2015     Headache(784.0)     hx.of Migraines     Lumbago      Type II or unspecified type diabetes mellitus without mention of complication, not stated as uncontrolled      Unspecified essential hypertension        Past Surgical History:   Procedure Laterality Date     ARTHROPLASTY HIP Right 10/16/2018    Procedure: right total hip arthroplasty;  Surgeon: Terrell Ervin DO;  Location: [facility]     C LIGATE FALLOPIAN TUBE       HC LAPAROSCOPY, SURGICAL; CHOLECYSTECTOMY  2000    Cholecystectomy, Laparoscopic     HC REMOVE TONSILS/ADENOIDS,<13 Y/O      T & A <11y.o.       Medications:    Current Outpatient Prescriptions on File Prior to Visit:  acetaminophen (TYLENOL) 325 MG tablet Take 3 tablets (975 mg) by mouth every 8 hours as needed for mild pain   aspirin 325 MG EC tablet Take 1 tablet (325 mg) by mouth 2 times daily Take 1 tablet twice a day for 42 days for clot prevention. Do not take any NSAIDs or other aspirin products during this time.  After 42 days may return to any previous NSAID or aspirin therapy.   docusate sodium (COLACE) 100 MG capsule Take 1 capsule (100 mg) by mouth 2 times daily as needed for constipation   gabapentin (NEURONTIN) 600 MG tablet Take 2 tablets (1,200 mg) by mouth 3 times daily   lisinopril (PRINIVIL/ZESTRIL) 20 MG tablet Take 1 tablet (20 mg) by mouth daily   loratadine (CLARITIN) 10 MG tablet Take 1 tablet (10 mg) by mouth daily   metFORMIN (GLUCOPHAGE) 500 MG tablet Take 1 tablet (500 mg) by mouth  "2 times daily (with meals)   simvastatin (ZOCOR) 5 MG tablet TAKE ONE TABLET BY MOUTH EVERY NIGHT AT BEDTIME   tiZANidine (ZANAFLEX) 2 MG tablet Take 1 tablet (2 mg) by mouth 3 times daily as needed for muscle spasms (pain)     No current facility-administered medications on file prior to visit.     Allergies   Allergen Reactions     Mold      sneezing, coughing , runny nose, watery eyes & red,     No Known Drug Allergies        Social History     Occupational History      [employer]     Social History Main Topics     Smoking status: Current Every Day Smoker     Packs/day: 0.25     Years: 20.00     Types: Cigarettes     Last attempt to quit: 7/10/2012     Smokeless tobacco: Never Used      Comment: 2-3 cigs daily     Alcohol use Yes      Comment: couple times monthly     Drug use: No     Sexual activity: No       Family History   Problem Relation Age of Onset     Heart Disease Mother      Pacemaker     Alzheimer Disease Father      Dementia/Loss of memory     Cerebrovascular Disease Maternal Grandmother      Heart Disease Maternal Grandfather      MI     Diabetes Paternal Grandmother        REVIEW OF SYSTEMS  General: negative for, night sweats, dizziness, fatigue  Resp: No shortness of breath and no cough  CV: negative for chest pain, syncope or near-syncope  GI: negative for nausea, vomiting and diarrhea  : negative for dysuria and hematuria  Musculoskeletal: as above  Neurologic: negative for syncope   Hematologic: negative for bleeding disorder    Physical Exam:  Vitals: /82 (BP Location: Right arm, Patient Position: Sitting, Cuff Size: Adult Large)  Pulse 98  Ht 5' 5\" (1.651 m)  Wt 162 lb 8 oz (73.7 kg)  LMP  (LMP Unknown)  BMI 27.04 kg/m2  BMI= Body mass index is 27.04 kg/(m^2).  Constitutional: healthy, alert and no acute distress   Psychiatric: mentation appears normal and affect normal/bright  NEURO: no focal deficits  SKIN: .well healed, no erythema, no incision breakdown and " no drainage.  JOINT/EXTREMITIES: Unrestricted active motion.  No peripheral edema.  Distal neurovascular intact.  Good strength throughout the hip girdle  GAIT: non-antalgic    Diagnostic Modalities:  None today.  Independent visualization of the images was performed.      Impression:   Chief Complaint   Patient presents with     RECHECK     right hip follow up     Surgical Followup     DOS: 10/16/2018~Right total hip arthroplasty~6 weeks postop   Doing well    Plan:   Transition to home therapy.  Discontinue aspirin and compression hose.  Wean from cane as needed.    Return to clinic 6, week(s), or sooner as needed for changes.    Re-x-ray on return: Yes.    Rayshawn Ervin D.O.

## 2018-12-03 DIAGNOSIS — N18.2 TYPE 2 DIABETES MELLITUS WITH STAGE 2 CHRONIC KIDNEY DISEASE, WITHOUT LONG-TERM CURRENT USE OF INSULIN (H): ICD-10-CM

## 2018-12-03 DIAGNOSIS — I10 BENIGN ESSENTIAL HYPERTENSION: ICD-10-CM

## 2018-12-03 DIAGNOSIS — E11.22 TYPE 2 DIABETES MELLITUS WITH STAGE 2 CHRONIC KIDNEY DISEASE, WITHOUT LONG-TERM CURRENT USE OF INSULIN (H): ICD-10-CM

## 2018-12-03 NOTE — PROGRESS NOTES
Outpatient Physical Therapy Discharge Note     Patient: Tracy Mcclelland  : 1945    Beginning/End Dates of Reporting Period:  2018 to 12/3/2018    Referring Provider: kimberly Johnson Diagnosis: right EAMON      Client Self Report: no concerns starting to walk without walker     Objective Measurements:      patient seen for 3 rx sessions for exercises in clinic and progression of HEP at last Rx she was completing the following   bridges , SKC , QS , SLR , standing at counter mini squatts , marching and hip abduction right . biodex 15 , bosu 5 minutes   She cancelled her last Rx as she was doing great         Goals:  Goal Identifier 1   Goal Description instruction in HEP and compliant with it 5 of 7 days    Target Date 19   Date Met      Progress:     Goal Identifier 2   Goal Description patient to be able to progress to cane with gait instead of w/walker    Target Date 19   Date Met      Progress:     Goal Identifier 3   Goal Description patient to be able to go to [employer] game    Target Date 19   Date Met      Progress:       Progress Toward Goals:   Progress this reporting period: doing great and meeting all therapy goals           Plan:  Discharge from therapy.    Discharge:    Reason for Discharge: Patient has met all goals.    Equipment Issued: none    Discharge Plan: Patient to continue home program.

## 2018-12-03 NOTE — ADDENDUM NOTE
Encounter addended by: Marianne Moreno, PT on: 12/3/2018  2:32 PM<BR>     Actions taken: Episode resolved, Flowsheet data copied forward, Flowsheet accepted, Charge Capture section accepted, Sign clinical note

## 2018-12-04 RX ORDER — LISINOPRIL 20 MG/1
TABLET ORAL
Qty: 90 TABLET | Refills: 0 | Status: SHIPPED | OUTPATIENT
Start: 2018-12-04 | End: 2019-03-04

## 2018-12-04 NOTE — TELEPHONE ENCOUNTER
Routing refill request to provider for review/approval because:  Labs not current:  LDL    FRANKY StinsonN, RN  [facility]

## 2018-12-04 NOTE — TELEPHONE ENCOUNTER
"Requested Prescriptions   Pending Prescriptions Disp Refills     lisinopril (PRINIVIL/ZESTRIL) 20 MG tablet [Pharmacy Med Name: LISINOPRIL 20MG TABS] 90 tablet 0    Last Written Prescription Date:  8/6/18  Last Fill Quantity: 90,  # refills: 0   Last office visit: 10/3/2018 with prescribing provider:  10/3/18   Future Office Visit:   Next 5 appointments (look out 90 days)     Jan 09, 2019 10:40 AM CST   [facility]    [address] [place] [address] [phone]                Sig: TAKE ONE TABLET BY MOUTH EVERY DAY    ACE Inhibitors (Including Combos) Protocol Passed    12/3/2018 11:28 AM       Passed - Blood pressure under 140/90 in past 12 months    BP Readings from Last 3 Encounters:   11/28/18 130/82   10/31/18 92/65   10/17/18 130/58                Passed - Recent (12 mo) or future (30 days) visit within the authorizing provider's specialty    Patient had office visit in the last 12 months or has a visit in the next 30 days with authorizing provider or within the authorizing provider's specialty.  See \"Patient Info\" tab in inbasket, or \"Choose Columns\" in Meds & Orders section of the refill encounter.             Passed - Patient is age 18 or older       Passed - No active pregnancy on record       Passed - Normal serum creatinine on file in past 12 months    Recent Labs   Lab Test  10/03/18   1106   CR  0.72            Passed - Normal serum potassium on file in past 12 months    Recent Labs   Lab Test  10/03/18   1106   POTASSIUM  4.2            Passed - No positive pregnancy test in past 12 months        metFORMIN (GLUCOPHAGE) 500 MG tablet [Pharmacy Med Name: METFORMIN HCL 500MG TABS] 180 tablet 0    Last Written Prescription Date:  8/6/18  Last Fill Quantity: 180,  # refills: 0   Last office visit: 10/3/2018 with prescribing provider:  10/3/18   Future Office Visit:   Next 5 appointments (look out 90 " "days)     Jan 09, 2019 10:40 AM CST   [facility]    [address] [facility] [address] [phone]                Sig: TAKE ONE TABLET BY MOUTH TWICE A DAY WITH MEALS    Biguanide Agents Failed    12/3/2018 11:28 AM       Failed - Patient has documented LDL within the past 12 mos.    Recent Labs   Lab Test  07/27/17   0940   LDL  91            Passed - Blood pressure less than 140/90 in past 6 months    BP Readings from Last 3 Encounters:   11/28/18 130/82   10/31/18 92/65   10/17/18 130/58                Passed - Patient has had a Microalbumin in the past 15 mos.    Recent Labs   Lab Test  08/10/18   1115   MICROL  20   UMALCR  24.20            Passed - Patient is age 10 or older       Passed - Patient has documented A1c within the specified period of time.    If HgbA1C is 8 or greater, it needs to be on file within the past 3 months.  If less than 8, must be on file within the past 6 months.     Recent Labs   Lab Test  10/03/18   1106   A1C  7.1*            Passed - Patient's CR is NOT>1.4 OR Patient's EGFR is NOT<45 within past 12 mos.    Recent Labs   Lab Test  10/03/18   1106   GFRESTIMATED  80   GFRESTBLACK  >90       Recent Labs   Lab Test  10/03/18   1106   CR  0.72            Passed - Patient does NOT have a diagnosis of CHF.       Passed - Patient is not pregnant       Passed - Patient has not had a positive pregnancy test within the past 12 mos.        Passed - Recent (6 mo) or future (30 days) visit within the authorizing provider's specialty    Patient had office visit in the last 6 months or has a visit in the next 30 days with authorizing provider or within the authorizing provider's specialty.  See \"Patient Info\" tab in inbasket, or \"Choose Columns\" in Meds & Orders section of the refill encounter.            "

## 2019-01-09 ENCOUNTER — OFFICE VISIT (OUTPATIENT)
Dept: ORTHOPEDICS | Facility: CLINIC | Age: 74
End: 2019-01-09
Payer: COMMERCIAL

## 2019-01-09 ENCOUNTER — ANCILLARY PROCEDURE (OUTPATIENT)
Dept: GENERAL RADIOLOGY | Facility: CLINIC | Age: 74
End: 2019-01-09
Payer: COMMERCIAL

## 2019-01-09 VITALS
SYSTOLIC BLOOD PRESSURE: 120 MMHG | WEIGHT: 161.3 LBS | BODY MASS INDEX: 28.58 KG/M2 | HEIGHT: 63 IN | DIASTOLIC BLOOD PRESSURE: 74 MMHG

## 2019-01-09 DIAGNOSIS — Z96.641 STATUS POST TOTAL HIP REPLACEMENT, RIGHT: ICD-10-CM

## 2019-01-09 DIAGNOSIS — Z96.641 STATUS POST TOTAL HIP REPLACEMENT, RIGHT: Primary | ICD-10-CM

## 2019-01-09 PROCEDURE — 73502 X-RAY EXAM HIP UNI 2-3 VIEWS: CPT | Mod: TC

## 2019-01-09 PROCEDURE — 99024 POSTOP FOLLOW-UP VISIT: CPT | Performed by: ORTHOPAEDIC SURGERY

## 2019-01-09 ASSESSMENT — PAIN SCALES - GENERAL: PAINLEVEL: NO PAIN (0)

## 2019-01-09 ASSESSMENT — MIFFLIN-ST. JEOR: SCORE: 1204.74

## 2019-01-09 NOTE — LETTER
1/9/2019         RE: Tracy Mcclelland  [address] [facility] [address]        Dear Colleague,    Thank you for referring your patient, Tracy Mcclelland, to the [facility]. Please see a copy of my visit note below.    Orthopedic Clinic Post-Operative Note    CHIEF COMPLAINT:   Chief Complaint   Patient presents with     RECHECK     right hip follow up     Surgical Followup     DOS: 10/16/2018~Right total hip arthroplasty~12 weeks postop       HISTORY OF PRESENT ILLNESS  Extremely happy with her hip.  No pain.  Feels like her endurance is returning.    Patient's past medical, surgical, social and family histories reviewed.     Past Medical History:   Diagnosis Date     CKD (chronic kidney disease) stage 2, GFR 60-89 ml/min 10/19/2015     Headache(784.0)     hx.of Migraines     Lumbago      Type II or unspecified type diabetes mellitus without mention of complication, not stated as uncontrolled      Unspecified essential hypertension        Past Surgical History:   Procedure Laterality Date     ARTHROPLASTY HIP Right 10/16/2018    Procedure: right total hip arthroplasty;  Surgeon: Terrell Ervin DO;  Location: [facility]     C LIGATE FALLOPIAN TUBE       HC LAPAROSCOPY, SURGICAL; CHOLECYSTECTOMY  2000    Cholecystectomy, Laparoscopic     HC REMOVE TONSILS/ADENOIDS,<13 Y/O      T & A <11y.o.       Medications:    Current Outpatient Medications on File Prior to Visit:  acetaminophen (TYLENOL) 325 MG tablet Take 3 tablets (975 mg) by mouth every 8 hours as needed for mild pain   gabapentin (NEURONTIN) 600 MG tablet Take 2 tablets (1,200 mg) by mouth 3 times daily   lisinopril (PRINIVIL/ZESTRIL) 20 MG tablet TAKE ONE TABLET BY MOUTH EVERY DAY   metFORMIN (GLUCOPHAGE) 500 MG tablet TAKE ONE TABLET BY MOUTH TWICE A DAY WITH MEALS   simvastatin (ZOCOR) 5 MG tablet TAKE ONE TABLET BY MOUTH EVERY NIGHT AT BEDTIME   loratadine (CLARITIN) 10 MG tablet Take 1 tablet (10 mg) by mouth daily (Patient not  "taking: Reported on 1/9/2019)     No current facility-administered medications on file prior to visit.     Allergies   Allergen Reactions     Mold      sneezing, coughing , runny nose, watery eyes & red,     No Known Drug Allergies        Social History     Occupational History     Employer: [employer]   Tobacco Use     Smoking status: Current Every Day Smoker     Packs/day: 0.25     Years: 20.00     Pack years: 5.00     Types: Cigarettes     Smokeless tobacco: Never Used     Tobacco comment: 2-3 cigs daily   Substance and Sexual Activity     Alcohol use: Yes     Comment: couple times monthly     Drug use: No     Sexual activity: No       Family History   Problem Relation Age of Onset     Heart Disease Mother         Pacemaker     Alzheimer Disease Father         Dementia/Loss of memory     Cerebrovascular Disease Maternal Grandmother      Heart Disease Maternal Grandfather         MI     Diabetes Paternal Grandmother        REVIEW OF SYSTEMS  General: negative for, night sweats, dizziness, fatigue  Resp: No shortness of breath and no cough  CV: negative for chest pain, syncope or near-syncope  GI: negative for nausea, vomiting and diarrhea  : negative for dysuria and hematuria  Musculoskeletal: as above  Neurologic: negative for syncope   Hematologic: negative for bleeding disorder    Physical Exam:  Vitals: /74   Ht 1.607 m (5' 3.25\")   Wt 73.2 kg (161 lb 4.8 oz)   LMP  (LMP Unknown)   BMI 28.35 kg/m     BMI= Body mass index is 28.35 kg/m .  Constitutional: healthy, alert and no acute distress   Psychiatric: mentation appears normal and affect normal/bright  NEURO: no focal deficits  SKIN: .well healed, no erythema, no incision breakdown and no drainage.  JOINT/EXTREMITIES: Full motion.  No evidence of instability.  Distal neurovascular intact.  GAIT: not tested     Diagnostic Modalities:  right hip X-ray: The prosthesis has acceptable alignment. No fractures or dislocations. " Prosthesis is well seated with no evidence of loosening  Independent visualization of the images was performed.      Impression:   Chief Complaint   Patient presents with     RECHECK     right hip follow up     Surgical Followup     DOS: 10/16/2018~Right total hip arthroplasty~12 weeks postop   Doing well.    Plan:   Continue home exercise program.  Plan to see her back at the year anniversary.  Sooner for any questions or concerns or changes.  Return to clinic 9 months, or sooner as needed for changes.    Re-x-ray on return: Yes.    Rayshawn Ervin D.O.    Again, thank you for allowing me to participate in the care of your patient.        Sincerely,        Terrell Ervin, DO

## 2019-01-09 NOTE — PROGRESS NOTES
Orthopedic Clinic Post-Operative Note    CHIEF COMPLAINT:   Chief Complaint   Patient presents with     RECHECK     right hip follow up     Surgical Followup     DOS: 10/16/2018~Right total hip arthroplasty~12 weeks postop       HISTORY OF PRESENT ILLNESS  Extremely happy with her hip.  No pain.  Feels like her endurance is returning.    Patient's past medical, surgical, social and family histories reviewed.     Past Medical History:   Diagnosis Date     CKD (chronic kidney disease) stage 2, GFR 60-89 ml/min 10/19/2015     Headache(784.0)     hx.of Migraines     Lumbago      Type II or unspecified type diabetes mellitus without mention of complication, not stated as uncontrolled      Unspecified essential hypertension        Past Surgical History:   Procedure Laterality Date     ARTHROPLASTY HIP Right 10/16/2018    Procedure: right total hip arthroplasty;  Surgeon: Terrell Ervin DO;  Location: [facility]     C LIGATE FALLOPIAN TUBE       HC LAPAROSCOPY, SURGICAL; CHOLECYSTECTOMY  2000    Cholecystectomy, Laparoscopic     HC REMOVE TONSILS/ADENOIDS,<13 Y/O      T & A <11y.o.       Medications:    Current Outpatient Medications on File Prior to Visit:  acetaminophen (TYLENOL) 325 MG tablet Take 3 tablets (975 mg) by mouth every 8 hours as needed for mild pain   gabapentin (NEURONTIN) 600 MG tablet Take 2 tablets (1,200 mg) by mouth 3 times daily   lisinopril (PRINIVIL/ZESTRIL) 20 MG tablet TAKE ONE TABLET BY MOUTH EVERY DAY   metFORMIN (GLUCOPHAGE) 500 MG tablet TAKE ONE TABLET BY MOUTH TWICE A DAY WITH MEALS   simvastatin (ZOCOR) 5 MG tablet TAKE ONE TABLET BY MOUTH EVERY NIGHT AT BEDTIME   loratadine (CLARITIN) 10 MG tablet Take 1 tablet (10 mg) by mouth daily (Patient not taking: Reported on 1/9/2019)     No current facility-administered medications on file prior to visit.     Allergies   Allergen Reactions     Mold      sneezing, coughing , runny nose, watery eyes & red,     No Known Drug Allergies   "      Social History     Occupational History     Employer: [employer]   Tobacco Use     Smoking status: Current Every Day Smoker     Packs/day: 0.25     Years: 20.00     Pack years: 5.00     Types: Cigarettes     Smokeless tobacco: Never Used     Tobacco comment: 2-3 cigs daily   Substance and Sexual Activity     Alcohol use: Yes     Comment: couple times monthly     Drug use: No     Sexual activity: No       Family History   Problem Relation Age of Onset     Heart Disease Mother         Pacemaker     Alzheimer Disease Father         Dementia/Loss of memory     Cerebrovascular Disease Maternal Grandmother      Heart Disease Maternal Grandfather         MI     Diabetes Paternal Grandmother        REVIEW OF SYSTEMS  General: negative for, night sweats, dizziness, fatigue  Resp: No shortness of breath and no cough  CV: negative for chest pain, syncope or near-syncope  GI: negative for nausea, vomiting and diarrhea  : negative for dysuria and hematuria  Musculoskeletal: as above  Neurologic: negative for syncope   Hematologic: negative for bleeding disorder    Physical Exam:  Vitals: /74   Ht 1.607 m (5' 3.25\")   Wt 73.2 kg (161 lb 4.8 oz)   LMP  (LMP Unknown)   BMI 28.35 kg/m    BMI= Body mass index is 28.35 kg/m .  Constitutional: healthy, alert and no acute distress   Psychiatric: mentation appears normal and affect normal/bright  NEURO: no focal deficits  SKIN: .well healed, no erythema, no incision breakdown and no drainage.  JOINT/EXTREMITIES: Full motion.  No evidence of instability.  Distal neurovascular intact.  GAIT: not tested     Diagnostic Modalities:  right hip X-ray: The prosthesis has acceptable alignment. No fractures or dislocations. Prosthesis is well seated with no evidence of loosening  Independent visualization of the images was performed.      Impression:   Chief Complaint   Patient presents with     RECHECK     right hip follow up     Surgical Followup     DOS: " 10/16/2018~Right total hip arthroplasty~12 weeks postop   Doing well.    Plan:   Continue home exercise program.  Plan to see her back at the year anniversary.  Sooner for any questions or concerns or changes.  Return to clinic 9 months, or sooner as needed for changes.    Re-x-ray on return: Yes.    Rayshawn Ervin D.O.

## 2019-02-18 DIAGNOSIS — E11.22 TYPE 2 DIABETES MELLITUS WITH STAGE 2 CHRONIC KIDNEY DISEASE, WITHOUT LONG-TERM CURRENT USE OF INSULIN (H): ICD-10-CM

## 2019-02-18 DIAGNOSIS — N18.2 TYPE 2 DIABETES MELLITUS WITH STAGE 2 CHRONIC KIDNEY DISEASE, WITHOUT LONG-TERM CURRENT USE OF INSULIN (H): ICD-10-CM

## 2019-02-19 RX ORDER — GABAPENTIN 600 MG/1
TABLET ORAL
Qty: 360 TABLET | Refills: 1 | Status: SHIPPED | OUTPATIENT
Start: 2019-02-19 | End: 2019-06-15

## 2019-02-19 NOTE — TELEPHONE ENCOUNTER
Routing refill request to provider for review/approval because:  Drug not on the G refill protocol     gabapentin  Last Written Prescription Date:  10/3/2018  Last Fill Quantity: 360,  # refills: 1   Last office visit: 10/3/2018 with prescribing provider:  10/3/2018   Future Office Visit:      Requested Prescriptions   Pending Prescriptions Disp Refills     gabapentin (NEURONTIN) 600 MG tablet [Pharmacy Med Name: GABAPENTIN 600MG TABS] 360 tablet 1     Sig: TAKE TWO TABLETS BY MOUTH THREE TIMES A DAY    There is no refill protocol information for this order          Cecilia Mandujano RN on 2/19/2019 at 10:05 AM

## 2019-03-04 DIAGNOSIS — E78.5 HYPERLIPIDEMIA WITH TARGET LDL LESS THAN 100: ICD-10-CM

## 2019-03-04 DIAGNOSIS — N18.2 TYPE 2 DIABETES MELLITUS WITH STAGE 2 CHRONIC KIDNEY DISEASE, WITHOUT LONG-TERM CURRENT USE OF INSULIN (H): ICD-10-CM

## 2019-03-04 DIAGNOSIS — I10 BENIGN ESSENTIAL HYPERTENSION: ICD-10-CM

## 2019-03-04 DIAGNOSIS — E11.22 TYPE 2 DIABETES MELLITUS WITH STAGE 2 CHRONIC KIDNEY DISEASE, WITHOUT LONG-TERM CURRENT USE OF INSULIN (H): ICD-10-CM

## 2019-03-06 RX ORDER — LISINOPRIL 20 MG/1
TABLET ORAL
Qty: 90 TABLET | Refills: 0 | Status: SHIPPED | OUTPATIENT
Start: 2019-03-06 | End: 2019-06-03

## 2019-03-06 RX ORDER — SIMVASTATIN 5 MG
TABLET ORAL
Qty: 90 TABLET | Refills: 0 | Status: SHIPPED | OUTPATIENT
Start: 2019-03-06 | End: 2019-06-03

## 2019-03-06 NOTE — TELEPHONE ENCOUNTER
"Requested Prescriptions   Pending Prescriptions Disp Refills     lisinopril (PRINIVIL/ZESTRIL) 20 MG tablet [Pharmacy Med Name: LISINOPRIL 20MG TABS] 90 tablet 0    Last Written Prescription Date:  12/4/18  Last Fill Quantity: 90,  # refills: 0   Last office visit: 10/3/2018 with prescribing provider:  10/3/18   Future Office Visit:     Sig: TAKE ONE TABLET BY MOUTH ONCE DAILY    ACE Inhibitors (Including Combos) Protocol Passed - 3/4/2019  1:27 PM       Passed - Blood pressure under 140/90 in past 12 months    BP Readings from Last 3 Encounters:   01/09/19 120/74   11/28/18 130/82   10/31/18 92/65                Passed - Recent (12 mo) or future (30 days) visit within the authorizing provider's specialty    Patient had office visit in the last 12 months or has a visit in the next 30 days with authorizing provider or within the authorizing provider's specialty.  See \"Patient Info\" tab in inbasket, or \"Choose Columns\" in Meds & Orders section of the refill encounter.             Passed - Medication is active on med list       Passed - Patient is age 18 or older       Passed - No active pregnancy on record       Passed - Normal serum creatinine on file in past 12 months    Recent Labs   Lab Test 10/03/18  1106   CR 0.72            Passed - Normal serum potassium on file in past 12 months    Recent Labs   Lab Test 10/03/18  1106   POTASSIUM 4.2            Passed - No positive pregnancy test within past 12 months        simvastatin (ZOCOR) 5 MG tablet [Pharmacy Med Name: SIMVASTATIN 5MG TABS] 90 tablet 1    Last Written Prescription Date:  8/6/18  Last Fill Quantity: 90,  # refills: 1   Last office visit: 10/3/2018 with prescribing provider:  10/3/18   Future Office Visit:     Sig: TAKE ONE TABLET BY MOUTH EVERY NIGHT AT BEDTIME    Statins Protocol Failed - 3/4/2019  1:27 PM       Failed - LDL on file in past 12 months    Recent Labs   Lab Test 07/27/17  0940   LDL 91            Passed - No abnormal creatine kinase in " "past 12 months    No lab results found.            Passed - Recent (12 mo) or future (30 days) visit within the authorizing provider's specialty    Patient had office visit in the last 12 months or has a visit in the next 30 days with authorizing provider or within the authorizing provider's specialty.  See \"Patient Info\" tab in inbasket, or \"Choose Columns\" in Meds & Orders section of the refill encounter.             Passed - Medication is active on med list       Passed - Patient is age 18 or older       Passed - No active pregnancy on record       Passed - No positive pregnancy test in past 12 months        metFORMIN (GLUCOPHAGE) 500 MG tablet [Pharmacy Med Name: METFORMIN HCL 500MG TABS] 180 tablet 0    Last Written Prescription Date:  12/4/18  Last Fill Quantity: 180,  # refills: 0   Last office visit: 10/3/2018 with prescribing provider:  10/3/18   Future Office Visit:     Sig: TAKE ONE TABLET BY MOUTH TWO TIMES A DAY WITH MEALS    Biguanide Agents Failed - 3/4/2019  1:27 PM       Failed - Patient has documented LDL within the past 12 mos.    Recent Labs   Lab Test 07/27/17  0940   LDL 91            Passed - Blood pressure less than 140/90 in past 6 months    BP Readings from Last 3 Encounters:   01/09/19 120/74   11/28/18 130/82   10/31/18 92/65                Passed - Patient has had a Microalbumin in the past 15 mos.    Recent Labs   Lab Test 08/10/18  1115   MICROL 20   UMALCR 24.20            Passed - Patient is age 10 or older       Passed - Patient has documented A1c within the specified period of time.    If HgbA1C is 8 or greater, it needs to be on file within the past 3 months.  If less than 8, must be on file within the past 6 months.     Recent Labs   Lab Test 10/03/18  1106   A1C 7.1*            Passed - Patient's CR is NOT>1.4 OR Patient's EGFR is NOT<45 within past 12 mos.    Recent Labs   Lab Test 10/03/18  1106   GFRESTIMATED 80   GFRESTBLACK >90       Recent Labs   Lab Test 10/03/18  1106 " "  CR 0.72            Passed - Patient does NOT have a diagnosis of CHF.       Passed - Medication is active on med list       Passed - Patient is not pregnant       Passed - Patient has not had a positive pregnancy test within the past 12 mos.        Passed - Recent (6 mo) or future (30 days) visit within the authorizing provider's specialty    Patient had office visit in the last 6 months or has a visit in the next 30 days with authorizing provider or within the authorizing provider's specialty.  See \"Patient Info\" tab in inbasket, or \"Choose Columns\" in Meds & Orders section of the refill encounter.            "

## 2019-03-06 NOTE — TELEPHONE ENCOUNTER
Routing refill request to provider for review/approval because:  Labs not current:  LDL    FRANKY StinsonN, RN  [facility]

## 2019-03-21 ENCOUNTER — OFFICE VISIT (OUTPATIENT)
Dept: FAMILY MEDICINE | Facility: OTHER | Age: 74
End: 2019-03-21
Payer: COMMERCIAL

## 2019-03-21 VITALS
SYSTOLIC BLOOD PRESSURE: 124 MMHG | HEART RATE: 91 BPM | RESPIRATION RATE: 18 BRPM | TEMPERATURE: 98.2 F | DIASTOLIC BLOOD PRESSURE: 70 MMHG | OXYGEN SATURATION: 100 %

## 2019-03-21 DIAGNOSIS — H61.23 BILATERAL IMPACTED CERUMEN: ICD-10-CM

## 2019-03-21 DIAGNOSIS — Z12.31 ENCOUNTER FOR SCREENING MAMMOGRAM FOR BREAST CANCER: ICD-10-CM

## 2019-03-21 DIAGNOSIS — M62.838 NECK MUSCLE SPASM: ICD-10-CM

## 2019-03-21 DIAGNOSIS — E11.22 TYPE 2 DIABETES MELLITUS WITH CHRONIC KIDNEY DISEASE, WITHOUT LONG-TERM CURRENT USE OF INSULIN, UNSPECIFIED CKD STAGE (H): Primary | ICD-10-CM

## 2019-03-21 LAB — HBA1C MFR BLD: 6.9 % (ref 0–5.6)

## 2019-03-21 PROCEDURE — 36415 COLL VENOUS BLD VENIPUNCTURE: CPT | Performed by: NURSE PRACTITIONER

## 2019-03-21 PROCEDURE — 99214 OFFICE O/P EST MOD 30 MIN: CPT | Performed by: NURSE PRACTITIONER

## 2019-03-21 PROCEDURE — 83036 HEMOGLOBIN GLYCOSYLATED A1C: CPT | Performed by: NURSE PRACTITIONER

## 2019-03-21 RX ORDER — PREDNISONE 20 MG/1
40 TABLET ORAL DAILY
Qty: 10 TABLET | Refills: 0 | Status: SHIPPED | OUTPATIENT
Start: 2019-03-21 | End: 2019-04-10

## 2019-03-21 ASSESSMENT — PAIN SCALES - GENERAL: PAINLEVEL: MODERATE PAIN (5)

## 2019-03-21 NOTE — PROGRESS NOTES
SUBJECTIVE:   Tracy Mcclelland is a 73 year old female who presents to clinic today for the following health issues:      RESPIRATORY SYMPTOMS      Duration: 4 days     Description  ear pain left    Severity: severe     Accompanying signs and symptoms: having problems hearing out of the ear on the phone, feeling very full. Also having some neck pain and feels that her neck is very stiff and hard to turn.     History (predisposing factors):  none    Precipitating or alleviating factors: None    Therapies tried and outcome:  none    Neck Pain      Duration: 1-2 weeks    Description:  Location: neck and upper back  Radiation: none    Intensity:  mild    Accompanying signs and symptoms: neck stiffness, pain    History (similar episodes/previous evaluation): None    Precipitating or alleviating factors: None    Therapies tried and outcome: None         Problem list and histories reviewed & adjusted, as indicated.  Additional history: as documented    Patient Active Problem List   Diagnosis     Hyperlipidemia with target LDL less than 100     Microalbuminuria     Tennis Elbow-left     Tobacco use disorder     Type 2 diabetes mellitus with diabetic chronic kidney disease (H)     Hypertension, goal below 140/90     Granuloma annulare     Hereditary and idiopathic peripheral neuropathy     CKD (chronic kidney disease) stage 2, GFR 60-89 ml/min     Primary osteoarthritis of right hip     Status post total hip replacement, right     Past Surgical History:   Procedure Laterality Date     ARTHROPLASTY HIP Right 10/16/2018    Procedure: right total hip arthroplasty;  Surgeon: Terrell Ervin DO;  Location: [facility]     C LIGATE FALLOPIAN TUBE       HC LAPAROSCOPY, SURGICAL; CHOLECYSTECTOMY  2000    Cholecystectomy, Laparoscopic     HC REMOVE TONSILS/ADENOIDS,<13 Y/O      T & A <11y.o.       Social History     Tobacco Use     Smoking status: Current Every Day Smoker     Packs/day: 0.25     Years: 20.00     Pack years: 5.00      Types: Cigarettes     Smokeless tobacco: Never Used     Tobacco comment: 2-3 cigs daily   Substance Use Topics     Alcohol use: Yes     Comment: couple times monthly     Family History   Problem Relation Age of Onset     Heart Disease Mother         Pacemaker     Alzheimer Disease Father         Dementia/Loss of memory     Cerebrovascular Disease Maternal Grandmother      Heart Disease Maternal Grandfather         MI     Diabetes Paternal Grandmother          Current Outpatient Medications   Medication Sig Dispense Refill     acetaminophen (TYLENOL) 325 MG tablet Take 3 tablets (975 mg) by mouth every 8 hours as needed for mild pain 100 tablet 1     gabapentin (NEURONTIN) 600 MG tablet TAKE TWO TABLETS BY MOUTH THREE TIMES A  tablet 1     lisinopril (PRINIVIL/ZESTRIL) 20 MG tablet TAKE ONE TABLET BY MOUTH ONCE DAILY 90 tablet 0     metFORMIN (GLUCOPHAGE) 500 MG tablet TAKE ONE TABLET BY MOUTH TWO TIMES A DAY WITH MEALS 180 tablet 0     simvastatin (ZOCOR) 5 MG tablet TAKE ONE TABLET BY MOUTH EVERY NIGHT AT BEDTIME 90 tablet 0     loratadine (CLARITIN) 10 MG tablet Take 1 tablet (10 mg) by mouth daily (Patient not taking: Reported on 1/9/2019) 30 tablet 1     Allergies   Allergen Reactions     Mold      sneezing, coughing , runny nose, watery eyes & red,     No Known Drug Allergies      BP Readings from Last 3 Encounters:   03/21/19 124/70   01/09/19 120/74   11/28/18 130/82    Wt Readings from Last 3 Encounters:   01/09/19 73.2 kg (161 lb 4.8 oz)   11/28/18 73.7 kg (162 lb 8 oz)   10/31/18 75.3 kg (166 lb)                    Reviewed and updated as needed this visit by clinical staff  Tobacco  Allergies  Meds  Med Hx  Surg Hx  Fam Hx  Soc Hx      Reviewed and updated as needed this visit by Provider         ROS:  Constitutional, HEENT, cardiovascular, pulmonary, gi and gu systems are negative, except as otherwise noted.    OBJECTIVE:     /70 (BP Location: Right arm, Patient Position: Sitting,  Cuff Size: Adult Regular)   Pulse 91   Temp 98.2  F (36.8  C) (Temporal)   Resp 18   LMP  (LMP Unknown)   SpO2 100%   There is no height or weight on file to calculate BMI.  GENERAL: alert, no distress and elderly  HENT: normal cephalic/atraumatic, both ears: occluded with wax, nose and mouth without ulcers or lesions, oropharynx clear and oral mucous membranes moist  NECK: no adenopathy and neck muscle tightness noted.  ROM is limited due to pain.   RESP: lungs clear to auscultation - no rales, rhonchi or wheezes  CV: regular rate and rhythm, normal S1 S2, no S3 or S4, no murmur, click or rub, no peripheral edema and peripheral pulses strong  MS: no gross musculoskeletal defects noted, no edema    Diagnostic Test Results:  Pending     Both ears were irrigated by nursing staff with a large amount of cerumen removed.  Repeat examination showed normal TMs.    ASSESSMENT/PLAN:       1. Type 2 diabetes mellitus with chronic kidney disease, without long-term current use of insulin, unspecified CKD stage (H)  Chronic, controlled.  No change in treatment plan.   Due for labs today.   - Hemoglobin A1c    2. Neck muscle spasm  - predniSONE (DELTASONE) 20 MG tablet; Take 40 mg by mouth daily for 5 days.  Dispense: 10 tablet; Refill: 0    3. Encounter for screening mammogram for breast cancer  - *MA Screening Digital Bilateral; Future    4. Bilateral impacted cerumen  This was removed by nursing staff.  Her hearing returned to normal.        See Patient Instructions    MACHO Cifuentes [facility]

## 2019-03-21 NOTE — LETTER
March 22, 2019      Tracy Mcclelland  [address] [place] [id number]        Dear ,    We are writing to inform you of your test results.    Her A1C came back at 6.9, which is very good.     Resulted Orders   Hemoglobin A1c   Result Value Ref Range    Hemoglobin A1C 6.9 (H) 0 - 5.6 %      Comment:      Normal <5.7% Prediabetes 5.7-6.4%  Diabetes 6.5% or higher - adopted from ADA   consensus guidelines.         If you have any questions or concerns, please call the clinic at the number listed above.       Sincerely,        MACHO Cifuentes CNP

## 2019-03-21 NOTE — PATIENT INSTRUCTIONS
Labs will be done today.   For normal results, you will receive a letter with the results in about 2 weeks.  If anything is abnormal or unexpected, someone from the clinic will call you.

## 2019-03-29 ENCOUNTER — TELEPHONE (OUTPATIENT)
Dept: FAMILY MEDICINE | Facility: OTHER | Age: 74
End: 2019-03-29

## 2019-03-29 DIAGNOSIS — M62.838 NECK MUSCLE SPASM: Primary | ICD-10-CM

## 2019-03-29 RX ORDER — PREDNISONE 20 MG/1
TABLET ORAL
Qty: 15 TABLET | Refills: 0 | Status: SHIPPED | OUTPATIENT
Start: 2019-03-29 | End: 2019-04-10

## 2019-03-29 NOTE — TELEPHONE ENCOUNTER
Called and spoke to the patient. She said she is having the same neck pain as when she saw Skylar. Patient said she took the Prednisone and had almost immediate relief. Patient said she felt fine while on the Prednisone. She finished the Prednisone Monday. She said Tuesday and Wednesday she felt semi OK. She said by yesterday the pain started back in and today the pain is right back to where it was. Patient said she is having the exact same symptoms as when she was seen. Can hardly move her neck from side to side. Back of the neck is the worst.     Patient is not sure what to do. She does report the Prednisone helped a lot. Wondering what Skylar would suggest.     Will route to provider to advise.     LAQUITA Stinson, RN  [facility]

## 2019-03-29 NOTE — TELEPHONE ENCOUNTER
Reason for Call:  Medication or medication refill:    Do you use a [facility]?  Name of the pharmacy and phone number for the current request:  [facility] - [phone]    Name of the medication requested: pain med    Other request: pt stated she is still having neck pain. Requesting a pain med    Can we leave a detailed message on this number? YES    Phone number patient can be reached at: Home number on file [phone] (home)    Best Time:     Call taken on 3/29/2019 at 9:02 AM by Krissy Ding

## 2019-03-29 NOTE — TELEPHONE ENCOUNTER
Called and spoke to the patient. Informed her of the message below. She understands and agrees with the plan of care.     LAQUITA Stinson, RN  [facility]

## 2019-03-29 NOTE — TELEPHONE ENCOUNTER
I sent over a longer course of Prednisone for her to take.  If that fails to help, schedule a follow up appointment.   Electronically signed by Skylar Gomez CNP.

## 2019-04-04 ENCOUNTER — TELEPHONE (OUTPATIENT)
Dept: FAMILY MEDICINE | Facility: OTHER | Age: 74
End: 2019-04-04

## 2019-04-04 DIAGNOSIS — Z12.11 SPECIAL SCREENING FOR MALIGNANT NEOPLASMS, COLON: ICD-10-CM

## 2019-04-04 DIAGNOSIS — E78.5 HYPERLIPIDEMIA WITH TARGET LDL LESS THAN 100: Primary | ICD-10-CM

## 2019-04-04 NOTE — TELEPHONE ENCOUNTER
Panel Management Review      Patient has the following on her problem list:     Diabetes    ASA: Not Required     Last A1C  Lab Results   Component Value Date    A1C 6.9 03/21/2019    A1C 7.1 10/03/2018    A1C 6.8 08/06/2018    A1C 6.9 02/14/2018    A1C 6.7 07/27/2017     A1C tested: Passed    Last LDL:    Lab Results   Component Value Date    CHOL 152 08/01/2016     Lab Results   Component Value Date    HDL 50 08/01/2016     Lab Results   Component Value Date    LDL 91 07/27/2017    LDL 83 08/01/2016     Lab Results   Component Value Date    TRIG 94 08/01/2016     Lab Results   Component Value Date    CHOLHDLRATIO 4.0 07/29/2014     Lab Results   Component Value Date    NHDL 102 08/01/2016       Is the patient on a Statin? YES             Is the patient on Aspirin? NO    Medications     HMG CoA Reductase Inhibitors     simvastatin (ZOCOR) 5 MG tablet             Last three blood pressure readings:  BP Readings from Last 3 Encounters:   03/21/19 124/70   01/09/19 120/74   11/28/18 130/82       Date of last diabetes office visit: 03/21/2019     Tobacco History:     History   Smoking Status     Current Every Day Smoker     Packs/day: 0.25     Years: 20.00     Types: Cigarettes   Smokeless Tobacco     Never Used     Comment: 2-3 cigs daily         Hypertension   Last three blood pressure readings:  BP Readings from Last 3 Encounters:   03/21/19 124/70   01/09/19 120/74   11/28/18 130/82     Blood pressure: Passed    HTN Guidelines:  Age 18-59 BP range:  Less than 140/90  Age 60-85 with Diabetes:  Less than 140/90  Age 60-85 without Diabetes:  less than 150/90      Composite cancer screening  Chart review shows that this patient is due/due soon for the following Mammogram and Fecal Colorectal (FIT)  Summary:    Patient is due/failing the following:    FIT, LDL and MAMMOGRAM    Action needed:   Patient needs office visit for fasting labs and Patient needs referral/order: FIT.     Patient has Mammo scheduled for  4/25/2019    Type of outreach:    Phone, spoke to patient.  She would like to schedule appointment to follow up on medication and also have her fasting labs completed that day as well. Patient will  FIT test at appointment. Patient also needs a diabetic foot exam completed.     Questions for provider review:    None                                                                                                                                    Katie Lopez MA      Chart routed to Provider .

## 2019-04-10 ENCOUNTER — OFFICE VISIT (OUTPATIENT)
Dept: FAMILY MEDICINE | Facility: OTHER | Age: 74
End: 2019-04-10
Payer: COMMERCIAL

## 2019-04-10 VITALS
RESPIRATION RATE: 18 BRPM | WEIGHT: 159.13 LBS | TEMPERATURE: 97.2 F | HEART RATE: 59 BPM | DIASTOLIC BLOOD PRESSURE: 72 MMHG | SYSTOLIC BLOOD PRESSURE: 120 MMHG | BODY MASS INDEX: 27.97 KG/M2 | OXYGEN SATURATION: 99 %

## 2019-04-10 DIAGNOSIS — E78.5 HYPERLIPIDEMIA WITH TARGET LDL LESS THAN 100: ICD-10-CM

## 2019-04-10 DIAGNOSIS — I10 HYPERTENSION, GOAL BELOW 140/90: ICD-10-CM

## 2019-04-10 DIAGNOSIS — Z13.89 SCREENING FOR DIABETIC PERIPHERAL NEUROPATHY: ICD-10-CM

## 2019-04-10 DIAGNOSIS — M54.2 NECK PAIN: Primary | ICD-10-CM

## 2019-04-10 DIAGNOSIS — Z12.11 SPECIAL SCREENING FOR MALIGNANT NEOPLASMS, COLON: ICD-10-CM

## 2019-04-10 DIAGNOSIS — Z78.0 POST-MENOPAUSAL: ICD-10-CM

## 2019-04-10 DIAGNOSIS — F17.200 TOBACCO USE DISORDER: ICD-10-CM

## 2019-04-10 DIAGNOSIS — N18.2 CKD (CHRONIC KIDNEY DISEASE) STAGE 2, GFR 60-89 ML/MIN: ICD-10-CM

## 2019-04-10 LAB
ANION GAP SERPL CALCULATED.3IONS-SCNC: 11 MMOL/L (ref 3–14)
BUN SERPL-MCNC: 18 MG/DL (ref 7–30)
CALCIUM SERPL-MCNC: 9 MG/DL (ref 8.5–10.1)
CHLORIDE SERPL-SCNC: 102 MMOL/L (ref 94–109)
CHOLEST SERPL-MCNC: 213 MG/DL
CO2 SERPL-SCNC: 23 MMOL/L (ref 20–32)
CREAT SERPL-MCNC: 0.76 MG/DL (ref 0.52–1.04)
CRP SERPL-MCNC: 3.8 MG/L (ref 0–8)
ERYTHROCYTE [SEDIMENTATION RATE] IN BLOOD BY WESTERGREN METHOD: 8 MM/H (ref 0–30)
GFR SERPL CREATININE-BSD FRML MDRD: 76 ML/MIN/{1.73_M2}
GLUCOSE SERPL-MCNC: 135 MG/DL (ref 70–99)
HDLC SERPL-MCNC: 88 MG/DL
LDLC SERPL CALC-MCNC: 88 MG/DL
NONHDLC SERPL-MCNC: 125 MG/DL
POTASSIUM SERPL-SCNC: 4.7 MMOL/L (ref 3.4–5.3)
SODIUM SERPL-SCNC: 136 MMOL/L (ref 133–144)
TRIGL SERPL-MCNC: 187 MG/DL

## 2019-04-10 PROCEDURE — 80048 BASIC METABOLIC PNL TOTAL CA: CPT | Performed by: NURSE PRACTITIONER

## 2019-04-10 PROCEDURE — 36415 COLL VENOUS BLD VENIPUNCTURE: CPT | Performed by: NURSE PRACTITIONER

## 2019-04-10 PROCEDURE — 86140 C-REACTIVE PROTEIN: CPT | Performed by: NURSE PRACTITIONER

## 2019-04-10 PROCEDURE — 80061 LIPID PANEL: CPT | Performed by: NURSE PRACTITIONER

## 2019-04-10 PROCEDURE — 99214 OFFICE O/P EST MOD 30 MIN: CPT | Performed by: NURSE PRACTITIONER

## 2019-04-10 PROCEDURE — 85652 RBC SED RATE AUTOMATED: CPT | Performed by: NURSE PRACTITIONER

## 2019-04-10 RX ORDER — TRAMADOL HYDROCHLORIDE 50 MG/1
50 TABLET ORAL EVERY 6 HOURS PRN
Qty: 20 TABLET | Refills: 0 | Status: ON HOLD | OUTPATIENT
Start: 2019-04-10 | End: 2019-06-19

## 2019-04-10 ASSESSMENT — PAIN SCALES - GENERAL: PAINLEVEL: EXTREME PAIN (8)

## 2019-04-10 NOTE — PATIENT INSTRUCTIONS
Schedule the MRI and DEXA scan in [place].     Labs will be done today.   For normal results, you will receive a letter with the results in about 2 weeks.  If anything is abnormal or unexpected, someone from the clinic will call you.

## 2019-04-10 NOTE — PROGRESS NOTES
SUBJECTIVE:   Tracy Mcclelland is a 73 year old female who presents to clinic today for the following   health issues:      Neck/Shoulder Pain      Duration: started a few weeks ago    Description:  Location: right side neck and bilateral shoulders  Radiation: down both arms.     Intensity:  moderate    Accompanying signs and symptoms:     History (similar episodes/previous evaluation): None    Precipitating or alleviating factors: None    Therapies tried and outcome: Oral prednisone helps a lot, but symptoms return when she stops this.     No injury.  No previous neck issues.  It feels stiff in the AM.  Certain movements make this worse.  No other joint pains, erythema or swelling.     Due for fasting labs for her chronic conditions.       Additional history: as documented    Reviewed  and updated as needed this visit by clinical staff  Tobacco  Allergies  Meds  Med Hx  Surg Hx  Fam Hx  Soc Hx        Reviewed and updated as needed this visit by Provider         Patient Active Problem List   Diagnosis     Hyperlipidemia with target LDL less than 100     Microalbuminuria     Tennis Elbow-left     Tobacco use disorder     Type 2 diabetes mellitus with diabetic chronic kidney disease (H)     Hypertension, goal below 140/90     Granuloma annulare     Hereditary and idiopathic peripheral neuropathy     CKD (chronic kidney disease) stage 2, GFR 60-89 ml/min     Primary osteoarthritis of right hip     Status post total hip replacement, right     Past Surgical History:   Procedure Laterality Date     ARTHROPLASTY HIP Right 10/16/2018    Procedure: right total hip arthroplasty;  Surgeon: Terrell Ervin DO;  Location: [facility]     C LIGATE FALLOPIAN TUBE       HC LAPAROSCOPY, SURGICAL; CHOLECYSTECTOMY  2000    Cholecystectomy, Laparoscopic     HC REMOVE TONSILS/ADENOIDS,<13 Y/O      T & A <11y.o.       Social History     Tobacco Use     Smoking status: Current Every Day Smoker     Packs/day: 0.25     Years:  20.00     Pack years: 5.00     Types: Cigarettes     Smokeless tobacco: Never Used     Tobacco comment: 2-3 cigs daily   Substance Use Topics     Alcohol use: Yes     Comment: couple times monthly     Family History   Problem Relation Age of Onset     Heart Disease Mother         Pacemaker     Alzheimer Disease Father         Dementia/Loss of memory     Cerebrovascular Disease Maternal Grandmother      Heart Disease Maternal Grandfather         MI     Diabetes Paternal Grandmother          Current Outpatient Medications   Medication Sig Dispense Refill     acetaminophen (TYLENOL) 325 MG tablet Take 3 tablets (975 mg) by mouth every 8 hours as needed for mild pain 100 tablet 1     gabapentin (NEURONTIN) 600 MG tablet TAKE TWO TABLETS BY MOUTH THREE TIMES A  tablet 1     lisinopril (PRINIVIL/ZESTRIL) 20 MG tablet TAKE ONE TABLET BY MOUTH ONCE DAILY 90 tablet 0     metFORMIN (GLUCOPHAGE) 500 MG tablet TAKE ONE TABLET BY MOUTH TWO TIMES A DAY WITH MEALS 180 tablet 0     simvastatin (ZOCOR) 5 MG tablet TAKE ONE TABLET BY MOUTH EVERY NIGHT AT BEDTIME 90 tablet 0     loratadine (CLARITIN) 10 MG tablet Take 1 tablet (10 mg) by mouth daily (Patient not taking: Reported on 1/9/2019) 30 tablet 1     Allergies   Allergen Reactions     Mold      sneezing, coughing , runny nose, watery eyes & red,     No Known Drug Allergies      BP Readings from Last 3 Encounters:   04/10/19 120/72   03/21/19 124/70   01/09/19 120/74    Wt Readings from Last 3 Encounters:   04/10/19 72.2 kg (159 lb 2 oz)   01/09/19 73.2 kg (161 lb 4.8 oz)   11/28/18 73.7 kg (162 lb 8 oz)                    ROS:  Constitutional, HEENT, cardiovascular, pulmonary, gi and gu systems are negative, except as otherwise noted.    OBJECTIVE:     /72 (BP Location: Left arm, Patient Position: Sitting, Cuff Size: Adult Regular)   Pulse 59   Temp 97.2  F (36.2  C) (Temporal)   Resp 18   Wt 72.2 kg (159 lb 2 oz)   LMP  (LMP Unknown)   SpO2 99%    Breastfeeding? No   BMI 27.97 kg/m    Body mass index is 27.97 kg/m .  GENERAL: alert, no distress and elderly  NECK: no adenopathy, no asymmetry, masses, or scars and thyroid normal to palpation  RESP: lungs clear to auscultation - no rales, rhonchi or wheezes  CV: regular rate and rhythm, normal S1 S2, no S3 or S4, no murmur, click or rub  MS: no gross musculoskeletal defects noted, no edema    Diagnostic Test Results:  Pending     ASSESSMENT/PLAN:         1. Neck pain  I am going to obtain a MRI to rule out disc issues, but polymyalgia rheumatica is a possibility as well given her age and morning stiffness.  Will obtain labs and imaging.  I gave her a small amount of Tramadol to use in the meantime.  Discussed risks/benefits.    - MR Cervical Spine w/o Contrast; Future  - CRP, inflammation  - ESR: Erythrocyte sedimentation rate  - traMADol (ULTRAM) 50 MG tablet; Take 1 tablet (50 mg) by mouth every 6 hours as needed for severe pain  Dispense: 20 tablet; Refill: 0    2. Screening for diabetic peripheral neuropathy    3. CKD (chronic kidney disease) stage 2, GFR 60-89 ml/min    4. Hypertension, goal below 140/90  .[email or web address]  - Basic metabolic panel  (Ca, Cl, CO2, Creat, Gluc, K, Na, BUN)    5. Tobacco use disorder  - DEXA HIP/PELVIS/SPINE - Future; Future    6. Post-menopausal  - DEXA HIP/PELVIS/SPINE - Future; Future    7. Hyperlipidemia with target LDL less than 100  Chronic, controlled.  No change in treatment plan.   - Lipid panel reflex to direct LDL Fasting    8. Special screening for malignant neoplasms, colon  - Fecal colorectal cancer screen (FIT); Future    See Patient Instructions    MACHO Cifuentes [facility]

## 2019-04-11 ENCOUNTER — TELEPHONE (OUTPATIENT)
Dept: FAMILY MEDICINE | Facility: OTHER | Age: 74
End: 2019-04-11

## 2019-04-11 NOTE — TELEPHONE ENCOUNTER
----- Message from MACHO Cifuentes CNP sent at 4/11/2019  8:12 AM CDT -----  Please call patient.  Her labs were all basically normal, they don't show any reason for her pain.  She should have the MRI as planned.     Electronically signed by Skylar Gomez CNP.

## 2019-04-12 ENCOUNTER — TELEPHONE (OUTPATIENT)
Dept: FAMILY MEDICINE | Facility: OTHER | Age: 74
End: 2019-04-12

## 2019-04-12 NOTE — TELEPHONE ENCOUNTER
Spoke with patient and informed of message below. Patient understood. Will call if it does not help.     Katie Lopez MA

## 2019-04-12 NOTE — TELEPHONE ENCOUNTER
Have her try taking 2 pills at a time instead and see if this is more helpful.     Electronically signed by Skylar Gomez CNP.

## 2019-04-12 NOTE — TELEPHONE ENCOUNTER
Reason for Call:  Other prescription    Detailed comments: pt stated Tramadol is not working. Having a lot of pain. Can pt get a stronger med until she gets her MRI on 4/18?    Phone Number Patient can be reached at: Home number on file [phone] (home)    Best Time:     Can we leave a detailed message on this number? YES    Call taken on 4/12/2019 at 8:24 AM by Krissy Ding

## 2019-04-15 ENCOUNTER — TELEPHONE (OUTPATIENT)
Dept: FAMILY MEDICINE | Facility: OTHER | Age: 74
End: 2019-04-15

## 2019-04-15 DIAGNOSIS — M54.2 NECK PAIN: Primary | ICD-10-CM

## 2019-04-15 RX ORDER — PREDNISONE 20 MG/1
TABLET ORAL
Qty: 15 TABLET | Refills: 0 | Status: SHIPPED | OUTPATIENT
Start: 2019-04-15 | End: 2019-04-29

## 2019-04-15 NOTE — TELEPHONE ENCOUNTER
Called pt and relayed message. She had no further questions.  Lee Ann Davis, [facility]

## 2019-04-15 NOTE — TELEPHONE ENCOUNTER
Called and spoke to the patient. She said the Tramadol is making her way too dizzy and nauseated. She said it also does nothing for her pain. Patient said she has her MRI scheduled for Thursday and she needs something until then. She said she does not know that she wants to try anything stronger. She was hoping to get another round of Prednisone to get her through her MRI and then she is hoping she can get an injection. Patient said she is aware that the Prednisone can mess with her diabetes. She said all of her lab work turned out OK last week so she is willing to try the Prednisone again. She said the Prednisone was instant relief.     Will route to Skylar to review and advise.     LAQUITA Stinson, RN  [facility]

## 2019-04-15 NOTE — TELEPHONE ENCOUNTER
Please triage this.  Any stronger narcotics are likely going to make her more nauseated and dizzy.   Electronically signed by Skylar Gomez CNP.

## 2019-04-15 NOTE — TELEPHONE ENCOUNTER
Reason for Call:  Medication or medication refill:    Do you use a [facility]?  Name of the pharmacy and phone [facility] for the current request[facility] [phone]    Name of the medication requested:  Prednisone     Other request: Tracy called and asking for a different medication for her pain. States that Tramadol made her nauseated and dizzy.      Can we leave a detailed message on this number? YES    Phone number patient can be reached at: Cell number on file:    Telephone Information:   Mobile [phone]       Best Time:     Call taken on 4/15/2019 at 10:06 AM by Ariadne Martin

## 2019-04-18 ENCOUNTER — HOSPITAL ENCOUNTER (OUTPATIENT)
Dept: BONE DENSITY | Facility: CLINIC | Age: 74
Discharge: HOME | End: 2019-04-18
Attending: NURSE PRACTITIONER | Admitting: NURSE PRACTITIONER
Payer: MEDICARE

## 2019-04-18 ENCOUNTER — HOSPITAL ENCOUNTER (OUTPATIENT)
Dept: MRI IMAGING | Facility: CLINIC | Age: 74
End: 2019-04-18
Attending: NURSE PRACTITIONER
Payer: MEDICARE

## 2019-04-18 DIAGNOSIS — F17.200 TOBACCO USE DISORDER: ICD-10-CM

## 2019-04-18 DIAGNOSIS — Z78.0 POST-MENOPAUSAL: ICD-10-CM

## 2019-04-18 DIAGNOSIS — M54.2 NECK PAIN: ICD-10-CM

## 2019-04-18 PROCEDURE — 72141 MRI NECK SPINE W/O DYE: CPT

## 2019-04-18 PROCEDURE — 77080 DXA BONE DENSITY AXIAL: CPT

## 2019-04-18 NOTE — PROGRESS NOTES
Appropriate assistive devices provided during their visit. Yes    Exam table and/or cart  placed in the lowest position. Yes (Yes, No, N/A)    Brakes on tables/carts/wheelchairs used at all times. Yes (Yes, No, N/A)    Non slip footwear applied. NA (Yes, No, NA)    Patient was accompanied by staff throughout visit. Yes (Yes, No, N/A)    Equipment safety straps used. NA (Yes, No, N/A)    Assist with toileting. NA (Yes, No, N/A)

## 2019-04-19 DIAGNOSIS — M54.2 NECK PAIN: Primary | ICD-10-CM

## 2019-04-23 ENCOUNTER — ANCILLARY PROCEDURE (OUTPATIENT)
Dept: MAMMOGRAPHY | Facility: OTHER | Age: 74
End: 2019-04-23
Attending: NURSE PRACTITIONER
Payer: COMMERCIAL

## 2019-04-23 DIAGNOSIS — Z12.31 ENCOUNTER FOR SCREENING MAMMOGRAM FOR BREAST CANCER: ICD-10-CM

## 2019-04-23 PROCEDURE — 77067 SCR MAMMO BI INCL CAD: CPT | Mod: TC

## 2019-04-24 PROCEDURE — 82274 ASSAY TEST FOR BLOOD FECAL: CPT | Performed by: NURSE PRACTITIONER

## 2019-04-28 LAB — HEMOCCULT STL QL IA: NEGATIVE

## 2019-04-29 ENCOUNTER — TELEPHONE (OUTPATIENT)
Dept: FAMILY MEDICINE | Facility: OTHER | Age: 74
End: 2019-04-29

## 2019-04-29 DIAGNOSIS — Z12.11 SPECIAL SCREENING FOR MALIGNANT NEOPLASMS, COLON: ICD-10-CM

## 2019-04-29 DIAGNOSIS — M54.2 NECK PAIN: ICD-10-CM

## 2019-04-29 RX ORDER — PREDNISONE 20 MG/1
TABLET ORAL
Qty: 15 TABLET | Refills: 0 | Status: SHIPPED | OUTPATIENT
Start: 2019-04-29 | End: 2019-05-30

## 2019-04-29 NOTE — TELEPHONE ENCOUNTER
Reason for Call:  Medication or medication refill:    Do you use a [facility]?  Name of the pharmacy and phone number for the current request:  [facility] - [phone]    Name of the medication requested: Prednisone     Other request: Pt is having extreme neck/ arm & shoulder pain. She has been having to sleep in a chair. She has an appt with a specialist on Thursday. She feels she needs some for the meantime. She states the Tramadol does not work.     Can we leave a detailed message on this number? YES    Phone number patient can be reached at: Home number on file [phone] (home)    Best Time: any     Call taken on 4/29/2019 at 8:02 AM by Martita Duvall

## 2019-04-29 NOTE — RESULT ENCOUNTER NOTE
Please notify patient, call or letter    Your results were all normal or expected.  Please continue your current plan of care.     Electronically signed by Skylar Gomez CNP.

## 2019-04-29 NOTE — LETTER
April 29, 2019      Tracy Mcclelland  [address] [place] [id number]        Dear MsClara,    We are writing to inform you of your test results.    Your results were all normal or expected.  Please continue your current plan of care.     Resulted Orders   Fecal colorectal cancer screen (FIT)   Result Value Ref Range    Occult Blood Scn FIT Negative NEG^Negative       If you have any questions or concerns, please call the clinic at the number listed above.       Sincerely,        [facility]

## 2019-05-02 ENCOUNTER — OFFICE VISIT (OUTPATIENT)
Dept: NEUROSURGERY | Facility: CLINIC | Age: 74
End: 2019-05-02
Payer: COMMERCIAL

## 2019-05-02 ENCOUNTER — TELEPHONE (OUTPATIENT)
Dept: SURGERY | Facility: CLINIC | Age: 74
End: 2019-05-02

## 2019-05-02 VITALS
HEART RATE: 80 BPM | HEIGHT: 63 IN | SYSTOLIC BLOOD PRESSURE: 134 MMHG | WEIGHT: 159.13 LBS | DIASTOLIC BLOOD PRESSURE: 68 MMHG | BODY MASS INDEX: 28.2 KG/M2

## 2019-05-02 DIAGNOSIS — M54.12 CERVICAL RADICULOPATHY: Primary | ICD-10-CM

## 2019-05-02 PROCEDURE — 99214 OFFICE O/P EST MOD 30 MIN: CPT | Performed by: NURSE PRACTITIONER

## 2019-05-02 ASSESSMENT — PAIN SCALES - GENERAL: PAINLEVEL: MILD PAIN (2)

## 2019-05-02 ASSESSMENT — MIFFLIN-ST. JEOR: SCORE: 1194.88

## 2019-05-02 NOTE — PATIENT INSTRUCTIONS
-Physical therapy is ordered. They will contact you to schedule.  -Cervical injection ordered. They will contact you to schedule.  -Please contact the clinic if pain persists at [phone].

## 2019-05-02 NOTE — TELEPHONE ENCOUNTER
Contacted patient to schedule KEENA  Date: 5/17/19   Time:100pm  Dr. Avendaño    Instructed pt to have H&P and  for procedure.

## 2019-05-02 NOTE — PROGRESS NOTES
[facility]  Neurosurgery Clinic Visit      CC: neck and bilateral shoulder pain    Primary care Provider: Skylar Gomez      Reason For Visit:   I was asked by Skylar Gomez to consult on the patient for neck pain.      HPI: Tracy Mcclelland is a 73 year old female with a 6 week history of neck and shoulder pain. She denies any trauma. She states the pain originates in her neck and raidates to her bilateral shoulders equally. She denies aggravating factors. She states pain is alleviated with prednisone, tylenol, aspirin, and sitting in her chair. She states when pain is bad she is unable to pull up her underwear or pants due to pain. She denies paresthesias, weakness, and bowel/bladder complaints. She has not had any PT, injections, or surgery.    Pain at its worst 10      Past Medical History:   Diagnosis Date     CKD (chronic kidney disease) stage 2, GFR 60-89 ml/min 10/19/2015     Headache(784.0)     hx.of Migraines     Lumbago      Type II or unspecified type diabetes mellitus without mention of complication, not stated as uncontrolled      Unspecified essential hypertension        Past Medical History reviewed with patient during visit.    Past Surgical History:   Procedure Laterality Date     ARTHROPLASTY HIP Right 10/16/2018    Procedure: right total hip arthroplasty;  Surgeon: Terrell Ervin DO;  Location: [facility]     C LIGATE FALLOPIAN TUBE       HC LAPAROSCOPY, SURGICAL; CHOLECYSTECTOMY  2000    Cholecystectomy, Laparoscopic     HC REMOVE TONSILS/ADENOIDS,<10 Y/O      T & A <11y.o.     Past Surgical History reviewed with patient during visit.    Current Outpatient Medications   Medication     acetaminophen (TYLENOL) 325 MG tablet     gabapentin (NEURONTIN) 600 MG tablet     lisinopril (PRINIVIL/ZESTRIL) 20 MG tablet     metFORMIN (GLUCOPHAGE) 500 MG tablet     predniSONE (DELTASONE) 20 MG tablet     simvastatin (ZOCOR) 5 MG tablet     loratadine (CLARITIN) 10 MG tablet     No  current facility-administered medications for this visit.        Allergies   Allergen Reactions     Mold      sneezing, coughing , runny nose, watery eyes & red,     No Known Drug Allergies        Social History     Socioeconomic History     Marital status:      Spouse name: Not on file     Number of children: 3     Years of education: 12     Highest education level: Not on file   Occupational History     Employer: [email or web address]   Social Needs     Financial resource strain: Not on file     Food insecurity:     Worry: Not on file     Inability: Not on file     Transportation needs:     Medical: Not on file     Non-medical: Not on file   Tobacco Use     Smoking status: Current Every Day Smoker     Packs/day: 0.25     Years: 20.00     Pack years: 5.00     Types: Cigarettes     Smokeless tobacco: Never Used     Tobacco comment: 2-3 cigs daily   Substance and Sexual Activity     Alcohol use: Yes     Comment: couple times monthly     Drug use: No     Sexual activity: Never   Lifestyle     Physical activity:     Days per week: Not on file     Minutes per session: Not on file     Stress: Not on file   Relationships     Social connections:     Talks on phone: Not on file     Gets together: Not on file     Attends Church service: Not on file     Active member of club or organization: Not on file     Attends meetings of clubs or organizations: Not on file     Relationship status: Not on file     Intimate partner violence:     Fear of current or ex partner: Not on file     Emotionally abused: Not on file     Physically abused: Not on file     Forced sexual activity: Not on file   Other Topics Concern      Service No     Blood Transfusions No     Caffeine Concern No     Occupational Exposure No     Hobby Hazards No     Sleep Concern No     Stress Concern No     Weight Concern No     Special Diet No     Back Care No     Exercise No     Bike Helmet No     Comment: does not ride bike     Seat Belt  Yes     Self-Exams Yes     Parent/sibling w/ CABG, MI or angioplasty before 65F 55M? No   Social History Narrative     Not on file       Family History   Problem Relation Age of Onset     Heart Disease Mother         Pacemaker     Alzheimer Disease Father         Dementia/Loss of memory     Cerebrovascular Disease Maternal Grandmother      Heart Disease Maternal Grandfather         MI     Diabetes Paternal Grandmother          ROS: 10 point ROS neg other than the symptoms noted above in the HPI.    Vital Signs:       Examination:  Constitutional:  Alert, well nourished, NAD.  Memory: recent and remote memory   HEENT: Normocephalic, atraumatic.   Pulm:  Without shortness of breath   CV:  No pitting edema of BLE.    Neurological:  Awake  Alert  Oriented x 3  Speech clear  Motor exam   Shoulder Abduction:  Right:  5/5   Left:  5/5  Biceps:                      Right:  5/5   Left:  5/5  Triceps:                     Right:  5/5   Left:  5/5  Wrist Extensors:       Right:  5/5   Left:  5/5  Wrist Flexors:           Right:  5/5   Left:  5/5  Intrinsics:                   Right:  5/5   Left:  5/5     Sensation normal to bilateral upper and lower extremities  Muscle tone to bilateral upper and lower extremities   Gait: Able to stand from a seated position. Normal non-antalgic, non-myelopathic gait.  Able to heel/toe walk without loss of balance  Cervical examination reveals good range of motion.  No tenderness to palpation of the cervical spine or paraspinous muscles bilaterally.    Imaging: Cervical MRI 4/18/2019  FINDINGS: The cervical and visualized upper thoracic spinal cord and  the cervicomedullary junction all appear intrinsically normal. There  is exaggerated kyphosis of the upper cervical spine, and there is a  subtle degenerative anterolisthesis of C4 on C5. Alignment is  otherwise normal. There is arthritic change and some soft tissue  thickening around the atlantoaxial joint with a possible erosion at  the dorsal  aspect of the base of the dens (image 7 of series 2 and 3).  This could indicate synovial-based arthritis, and clinical correlation  is recommended. No acute-appearing bony abnormality.     C2-C3: Normal disc space. No central or foraminal stenosis. Moderate  posterior facet degenerative changes on the left.     C3-C4: Normal disc space. Mild posterior facet degenerative changes  bilaterally. No significant central or foraminal stenosis.     C4-C5: Normal disc space and no central stenosis. Moderate to advanced  left and mild to moderate right posterior facet degenerative changes  with minimal anterolisthesis. At least mild left foraminal stenosis,  and the right foramen is normal.     C5-C6: Chronic advanced degenerative disc space narrowing with  prominent diffuse disc bulging but no acute-appearing disc protrusion.  Mild overall central stenosis. Moderate right and severe left  foraminal stenosis from endplate/uncinate spurring. Mild posterior  facet degenerative changes on the left.     C6-C7: Chronic advanced degenerative disc space narrowing with diffuse  disc bulging and endplate spurring but no acute disc protrusion. At  least mild overall central stenosis. Moderate bilateral foraminal  stenosis from endplate/uncinate spurring. Posterior facets are  unremarkable.     C7-T1: Normal.     T1-T2, T2-T3, T3-T4: Normal.     The paraspinal soft tissues appear normal to the extent visualized.                                                                      IMPRESSION:  1. Chronic advanced degenerative disc disease at C5-C6 and C6-C7 with  mild central stenosis. There is moderate right and severe left  foraminal stenosis at C5-C6 and moderate bilateral foraminal stenosis  at C6-C7.  2. Posterior facet degenerative changes at multiple levels as  described above. This results in a subtle degenerative anterolisthesis  of C4 on C5 with at least mild left foraminal stenosis at this level.  3. Arthritic changes of the  atlantoaxial joint with a possible erosive  change on the dens which could indicate synovial-based arthritis.    Assessment/Plan:   Tracy Mcclelland is a 73 year old female with a 6 week history of neck and shoulder pain. She denies any trauma. She states the pain originates in her neck and raidiates to her bilateral shoulders equally. Discussed cervical MRI results. Recommended PT and a cervical injection. She is agreeable. Instructed her to contact the clinic if pain persists. She verbalized understanding and agreement.     Patient Instructions   -Physical therapy is ordered. They will contact you to schedule.  -Cervical injection ordered. They will contact you to schedule.  -Please contact the clinic if pain persists at [phone].      Mikayla Mckeon, CNP  [facility]  [address] [address]    Tel [phone]  Pager [phone]

## 2019-05-02 NOTE — NURSING NOTE
"Tracy Mcclelland is a 73 year old female who presents for:  Chief Complaint   Patient presents with     Neurologic Problem     Neck pain         Initial Vitals:  /68 (BP Location: Left arm, Patient Position: Chair, Cuff Size: Adult Large)   Pulse 80   Ht 1.607 m (5' 3.25\")   Wt 72.2 kg (159 lb 2 oz)   LMP  (LMP Unknown)   BMI 27.97 kg/m   Estimated body mass index is 27.97 kg/m  as calculated from the following:    Height as of this encounter: 1.607 m (5' 3.25\").    Weight as of this encounter: 72.2 kg (159 lb 2 oz).. Body surface area is 1.8 meters squared. BP completed using cuff size: large  Mild Pain (2)        Nursing Comments:         Martita Lora  "

## 2019-05-02 NOTE — LETTER
5/2/2019         RE: Tracy Mcclelland  [address] [facility] [address]        Dear Colleague,    Thank you for referring your patient, Tracy Mcclelland, to the [facility]. Please see a copy of my visit note below.    [facility]  Neurosurgery Clinic Visit      CC: neck and bilateral shoulder pain    Primary care Provider: Skylar Gomez      Reason For Visit:   I was asked by Skylar Gomez to consult on the patient for neck pain.      HPI: Tracy Mcclelland is a 73 year old female with a 6 week history of neck and shoulder pain. She denies any trauma. She states the pain originates in her neck and raidates to her bilateral shoulders equally. She denies aggravating factors. She states pain is alleviated with prednisone, tylenol, aspirin, and sitting in her chair. She states when pain is bad she is unable to pull up her underwear or pants due to pain. She denies paresthesias, weakness, and bowel/bladder complaints. She has not had any PT, injections, or surgery.    Pain at its worst 10      Past Medical History:   Diagnosis Date     CKD (chronic kidney disease) stage 2, GFR 60-89 ml/min 10/19/2015     Headache(784.0)     hx.of Migraines     Lumbago      Type II or unspecified type diabetes mellitus without mention of complication, not stated as uncontrolled      Unspecified essential hypertension        Past Medical History reviewed with patient during visit.    Past Surgical History:   Procedure Laterality Date     ARTHROPLASTY HIP Right 10/16/2018    Procedure: right total hip arthroplasty;  Surgeon: Terrell Ervin DO;  Location: [facility]     C LIGATE FALLOPIAN TUBE       [facility] LAPAROSCOPY, SURGICAL; CHOLECYSTECTOMY  2000    Cholecystectomy, Laparoscopic     HC REMOVE TONSILS/ADENOIDS,<13 Y/O      T & A <11y.o.     Past Surgical History reviewed with patient during visit.    Current Outpatient Medications   Medication     acetaminophen (TYLENOL) 325 MG tablet     gabapentin  (NEURONTIN) 600 MG tablet     lisinopril (PRINIVIL/ZESTRIL) 20 MG tablet     metFORMIN (GLUCOPHAGE) 500 MG tablet     predniSONE (DELTASONE) 20 MG tablet     simvastatin (ZOCOR) 5 MG tablet     loratadine (CLARITIN) 10 MG tablet     No current facility-administered medications for this visit.        Allergies   Allergen Reactions     Mold      sneezing, coughing , runny nose, watery eyes & red,     No Known Drug Allergies        Social History     Socioeconomic History     Marital status:      Spouse name: Not on file     Number of children: 3     Years of education: 12     Highest education level: Not on file   Occupational History     Employer: [employer]   Social Needs     Financial resource strain: Not on file     Food insecurity:     Worry: Not on file     Inability: Not on file     Transportation needs:     Medical: Not on file     Non-medical: Not on file   Tobacco Use     Smoking status: Current Every Day Smoker     Packs/day: 0.25     Years: 20.00     Pack years: 5.00     Types: Cigarettes     Smokeless tobacco: Never Used     Tobacco comment: 2-3 cigs daily   Substance and Sexual Activity     Alcohol use: Yes     Comment: couple times monthly     Drug use: No     Sexual activity: Never   Lifestyle     Physical activity:     Days per week: Not on file     Minutes per session: Not on file     Stress: Not on file   Relationships     Social connections:     Talks on phone: Not on file     Gets together: Not on file     Attends Alevism service: Not on file     Active member of club or organization: Not on file     Attends meetings of clubs or organizations: Not on file     Relationship status: Not on file     Intimate partner violence:     Fear of current or ex partner: Not on file     Emotionally abused: Not on file     Physically abused: Not on file     Forced sexual activity: Not on file   Other Topics Concern      Service No     Blood Transfusions No     Caffeine Concern  No     Occupational Exposure No     Hobby Hazards No     Sleep Concern No     Stress Concern No     Weight Concern No     Special Diet No     Back Care No     Exercise No     Bike Helmet No     Comment: does not ride bike     Seat Belt Yes     Self-Exams Yes     Parent/sibling w/ CABG, MI or angioplasty before 65F 55M? No   Social History Narrative     Not on file       Family History   Problem Relation Age of Onset     Heart Disease Mother         Pacemaker     Alzheimer Disease Father         Dementia/Loss of memory     Cerebrovascular Disease Maternal Grandmother      Heart Disease Maternal Grandfather         MI     Diabetes Paternal Grandmother          ROS: 10 point ROS neg other than the symptoms noted above in the HPI.    Vital Signs:       Examination:  Constitutional:  Alert, well nourished, NAD.  Memory: recent and remote memory   HEENT: Normocephalic, atraumatic.   Pulm:  Without shortness of breath   CV:  No pitting edema of BLE.    Neurological:  Awake  Alert  Oriented x 3  Speech clear  Motor exam   Shoulder Abduction:  Right:  5/5   Left:  5/5  Biceps:                      Right:  5/5   Left:  5/5  Triceps:                     Right:  5/5   Left:  5/5  Wrist Extensors:       Right:  5/5   Left:  5/5  Wrist Flexors:           Right:  5/5   Left:  5/5  Intrinsics:                   Right:  5/5   Left:  5/5     Sensation normal to bilateral upper and lower extremities  Muscle tone to bilateral upper and lower extremities   Gait: Able to stand from a seated position. Normal non-antalgic, non-myelopathic gait.  Able to heel/toe walk without loss of balance  Cervical examination reveals good range of motion.  No tenderness to palpation of the cervical spine or paraspinous muscles bilaterally.    Imaging: Cervical MRI 4/18/2019  FINDINGS: The cervical and visualized upper thoracic spinal cord and  the cervicomedullary junction all appear intrinsically normal. There  is exaggerated kyphosis of the upper  cervical spine, and there is a  subtle degenerative anterolisthesis of C4 on C5. Alignment is  otherwise normal. There is arthritic change and some soft tissue  thickening around the atlantoaxial joint with a possible erosion at  the dorsal aspect of the base of the dens (image 7 of series 2 and 3).  This could indicate synovial-based arthritis, and clinical correlation  is recommended. No acute-appearing bony abnormality.     C2-C3: Normal disc space. No central or foraminal stenosis. Moderate  posterior facet degenerative changes on the left.     C3-C4: Normal disc space. Mild posterior facet degenerative changes  bilaterally. No significant central or foraminal stenosis.     C4-C5: Normal disc space and no central stenosis. Moderate to advanced  left and mild to moderate right posterior facet degenerative changes  with minimal anterolisthesis. At least mild left foraminal stenosis,  and the right foramen is normal.     C5-C6: Chronic advanced degenerative disc space narrowing with  prominent diffuse disc bulging but no acute-appearing disc protrusion.  Mild overall central stenosis. Moderate right and severe left  foraminal stenosis from endplate/uncinate spurring. Mild posterior  facet degenerative changes on the left.     C6-C7: Chronic advanced degenerative disc space narrowing with diffuse  disc bulging and endplate spurring but no acute disc protrusion. At  least mild overall central stenosis. Moderate bilateral foraminal  stenosis from endplate/uncinate spurring. Posterior facets are  unremarkable.     C7-T1: Normal.     T1-T2, T2-T3, T3-T4: Normal.     The paraspinal soft tissues appear normal to the extent visualized.                                                                      IMPRESSION:  1. Chronic advanced degenerative disc disease at C5-C6 and C6-C7 with  mild central stenosis. There is moderate right and severe left  foraminal stenosis at C5-C6 and moderate bilateral foraminal stenosis  at  C6-C7.  2. Posterior facet degenerative changes at multiple levels as  described above. This results in a subtle degenerative anterolisthesis  of C4 on C5 with at least mild left foraminal stenosis at this level.  3. Arthritic changes of the atlantoaxial joint with a possible erosive  change on the dens which could indicate synovial-based arthritis.    Assessment/Plan:   Tracy Mcclelland is a 73 year old female with a 6 week history of neck and shoulder pain. She denies any trauma. She states the pain originates in her neck and raidiates to her bilateral shoulders equally. Discussed cervical MRI results. Recommended PT and a cervical injection. She is agreeable. Instructed her to contact the clinic if pain persists. She verbalized understanding and agreement.     Patient Instructions   -Physical therapy is ordered. They will contact you to schedule.  -Cervical injection ordered. They will contact you to schedule.  -Please contact the clinic if pain persists at [phone].      Mikayla Mckeon CNP  [facility]  [address] [address]    Tel [phone]  Pager [phone]      Again, thank you for allowing me to participate in the care of your patient.        Sincerely,        Mikayla Mckeon NP

## 2019-05-10 ENCOUNTER — OFFICE VISIT (OUTPATIENT)
Dept: FAMILY MEDICINE | Facility: OTHER | Age: 74
End: 2019-05-10
Payer: COMMERCIAL

## 2019-05-10 VITALS
SYSTOLIC BLOOD PRESSURE: 98 MMHG | RESPIRATION RATE: 16 BRPM | BODY MASS INDEX: 28.53 KG/M2 | HEART RATE: 101 BPM | WEIGHT: 161 LBS | OXYGEN SATURATION: 95 % | TEMPERATURE: 97.6 F | HEIGHT: 63 IN | DIASTOLIC BLOOD PRESSURE: 58 MMHG

## 2019-05-10 DIAGNOSIS — G89.29 CHRONIC NECK PAIN: ICD-10-CM

## 2019-05-10 DIAGNOSIS — Z01.818 PREOP GENERAL PHYSICAL EXAM: Primary | ICD-10-CM

## 2019-05-10 DIAGNOSIS — M54.2 CHRONIC NECK PAIN: ICD-10-CM

## 2019-05-10 PROCEDURE — 99214 OFFICE O/P EST MOD 30 MIN: CPT | Performed by: NURSE PRACTITIONER

## 2019-05-10 ASSESSMENT — PAIN SCALES - GENERAL: PAINLEVEL: NO PAIN (0)

## 2019-05-10 ASSESSMENT — MIFFLIN-ST. JEOR: SCORE: 1203.38

## 2019-05-10 NOTE — PROGRESS NOTES
[facility]  [address] [facility] [address]  [phone]  Dept: [phone]    PRE-OP EVALUATION:  Today's date: 5/10/2019    Tracy Mcclelland (: 1945) presents for pre-operative evaluation assessment as requested by Dr. Avendaño.  She requires evaluation and anesthesia risk assessment prior to undergoing surgery/procedure for treatment of neck/shoulder pain; getting neck injection .    Proposed Surgery/ Procedure: Steroid injection  Date of Surgery/ Procedure: 19  Time of Surgery/ Procedure: 12pm  Hospital/Surgical Facility: [facility]  Primary Physician: Skylar Gomez  Type of Anesthesia Anticipated: to be determined    Patient has a Health Care Directive or Living Will:  YES     1. NO - Do you have a history of heart attack, stroke, stent, bypass or surgery on an artery in the head, neck, heart or legs?  2. NO - Do you ever have any pain or discomfort in your chest?  3. NO - Do you have a history of  Heart Failure?  4. NO - Are you troubled by shortness of breath when: walking on the level, up a slight hill or at night?  5. NO - Do you currently have a cold, bronchitis or other respiratory infection?  6. NO - Do you have a cough, shortness of breath or wheezing?  7. NO - Do you sometimes get pains in the calves of your legs when you walk?  8. NO - Do you or anyone in your family have previous history of blood clots?  9. NO - Do you or does anyone in your family have a serious bleeding problem such as prolonged bleeding following surgeries or cuts?  10. NO - Have you ever had problems with anemia or been told to take iron pills?  11. NO - Have you had any abnormal blood loss such as black, tarry or bloody stools, or abnormal vaginal bleeding?  12. NO - Have you ever had a blood transfusion?  13. NO - Have you or any of your relatives ever had problems with anesthesia?  14. NO - Do you have sleep apnea, excessive snoring or daytime drowsiness?  15. NO - Do you have any  prosthetic heart valves?  16. YES - DO YOU HAVE PROSTHETIC JOINTS? Right hip  17. NO - Is there any chance that you may be pregnant?      HPI:     HPI related to upcoming procedure: chronic neck pain       See problem list for active medical problems.  Problems all longstanding and stable, except as noted/documented.  See ROS for pertinent symptoms related to these conditions.                                                                                                                                                          .    MEDICAL HISTORY:     Patient Active Problem List    Diagnosis Date Noted     Status post total hip replacement, right 10/16/2018     Priority: Medium     Primary osteoarthritis of right hip 07/02/2018     Priority: Medium     CKD (chronic kidney disease) stage 2, GFR 60-89 ml/min 10/19/2015     Priority: Medium     Hereditary and idiopathic peripheral neuropathy 07/02/2015     Priority: Medium     Problem list name updated by automated process. Provider to review       Granuloma annulare 08/07/2014     Priority: Medium     Right dorsal hand       Hypertension, goal below 140/90 07/17/2012     Priority: Medium     Type 2 diabetes mellitus with diabetic chronic kidney disease (H) 11/02/2011     Priority: Medium     Tobacco use disorder 10/07/2010     Priority: Medium     Tennis Elbow-left 07/28/2010     Priority: Medium     Microalbuminuria 01/29/2010     Priority: Medium     Hyperlipidemia with target LDL less than 100 01/28/2010     Priority: Medium     Diagnosis updated by automated process. Provider to review and confirm.        Past Medical History:   Diagnosis Date     CKD (chronic kidney disease) stage 2, GFR 60-89 ml/min 10/19/2015     Headache(784.0)     hx.of Migraines     Lumbago      Type II or unspecified type diabetes mellitus without mention of complication, not stated as uncontrolled      Unspecified essential hypertension      Past Surgical History:   Procedure Laterality  Date     ARTHROPLASTY HIP Right 10/16/2018    Procedure: right total hip arthroplasty;  Surgeon: Terrell Ervin DO;  Location: [facility]     C LIGATE FALLOPIAN TUBE       HC LAPAROSCOPY, SURGICAL; CHOLECYSTECTOMY  2000    Cholecystectomy, Laparoscopic     HC REMOVE TONSILS/ADENOIDS,<12 Y/O      T & A <11y.o.     Current Outpatient Medications   Medication Sig Dispense Refill     acetaminophen (TYLENOL) 325 MG tablet Take 3 tablets (975 mg) by mouth every 8 hours as needed for mild pain 100 tablet 1     gabapentin (NEURONTIN) 600 MG tablet TAKE TWO TABLETS BY MOUTH THREE TIMES A  tablet 1     lisinopril (PRINIVIL/ZESTRIL) 20 MG tablet TAKE ONE TABLET BY MOUTH ONCE DAILY 90 tablet 0     metFORMIN (GLUCOPHAGE) 500 MG tablet TAKE ONE TABLET BY MOUTH TWO TIMES A DAY WITH MEALS 180 tablet 0     predniSONE (DELTASONE) 20 MG tablet Take 40 mg by mouth daily for 3 days, THEN 30 mg daily for 3 days, THEN 20 mg daily for 3 days, THEN 10 mg daily for 3 days. 15 tablet 0     simvastatin (ZOCOR) 5 MG tablet TAKE ONE TABLET BY MOUTH EVERY NIGHT AT BEDTIME 90 tablet 0     loratadine (CLARITIN) 10 MG tablet Take 1 tablet (10 mg) by mouth daily (Patient not taking: Reported on 5/10/2019) 30 tablet 1     OTC products: Aspirin, Steroids and tylenol    Allergies   Allergen Reactions     Mold      sneezing, coughing , runny nose, watery eyes & red,     No Known Drug Allergies       Latex Allergy: NO    Social History     Tobacco Use     Smoking status: Current Every Day Smoker     Packs/day: 0.25     Years: 20.00     Pack years: 5.00     Types: Cigarettes     Smokeless tobacco: Never Used     Tobacco comment: 2-3 cigs daily   Substance Use Topics     Alcohol use: Yes     Comment: couple times monthly     History   Drug Use No       REVIEW OF SYSTEMS:   CONSTITUTIONAL: NEGATIVE for fever, chills, change in weight  INTEGUMENTARY/SKIN: NEGATIVE for worrisome rashes, moles or lesions  EYES: NEGATIVE for vision changes or  "irritation  ENT/MOUTH: NEGATIVE for ear, mouth and throat problems  RESP: NEGATIVE for significant cough or SOB  BREAST: NEGATIVE for masses, tenderness or discharge  CV: NEGATIVE for chest pain, palpitations or peripheral edema  GI: NEGATIVE for nausea, abdominal pain, heartburn, or change in bowel habits  : NEGATIVE for frequency, dysuria, or hematuria  MUSCULOSKELETAL: NEGATIVE for significant arthralgias or myalgia, chronic neck pain as above   NEURO: NEGATIVE for weakness, dizziness or paresthesias  ENDOCRINE: NEGATIVE for temperature intolerance, skin/hair changes  HEME: NEGATIVE for bleeding problems  PSYCHIATRIC: NEGATIVE for changes in mood or affect    EXAM:   BP 94/58   Pulse 101   Temp 97.6  F (36.4  C) (Temporal)   Resp 16   Ht 1.607 m (5' 3.25\")   Wt 73 kg (161 lb)   LMP  (LMP Unknown)   SpO2 95%   BMI 28.29 kg/m      GENERAL APPEARANCE: alert, no distress and elderly     EYES: EOMI, PERRL     HENT: ear canals and TM's normal and nose and mouth without ulcers or lesions     NECK: no adenopathy, no asymmetry, masses, or scars and thyroid normal to palpation     RESP: lungs clear to auscultation - no rales, rhonchi or wheezes     CV: regular rates and rhythm, normal S1 S2, no S3 or S4 and no murmur, click or rub     ABDOMEN:  soft, nontender, no HSM or masses and bowel sounds normal     MS: extremities normal- no gross deformities noted, no evidence of inflammation in joints, FROM in all extremities.     SKIN: no suspicious lesions or rashes     NEURO: Normal strength and tone, sensory exam grossly normal, mentation intact and speech normal     PSYCH: mentation appears normal. and affect normal/bright     LYMPHATICS: No cervical adenopathy    DIAGNOSTICS:   EKG from 10/3/18: appears normal, NSR, normal axis, normal intervals, no acute ST/T changes c/w ischemia, there are no prior tracings available      Recent Labs   Lab Test 04/10/19  1018 03/21/19  1216 10/18/18  0630 10/17/18  0556 " 10/03/18  1106   HGB  --   --  11.1* 10.3* 15.3   PLT  --   --   --   --  221     --   --   --  137   POTASSIUM 4.7  --   --   --  4.2   CR 0.76  --   --   --  0.72   A1C  --  6.9*  --   --  7.1*        IMPRESSION:   Reason for surgery/procedure: chronic neck pain     The proposed surgical procedure is considered INTERMEDIATE risk.    REVISED CARDIAC RISK INDEX  The patient has the following serious cardiovascular risks for perioperative complications such as (MI, PE, VFib and 3  AV Block):  No serious cardiac risks  INTERPRETATION: 0 risks: Class I (very low risk - 0.4% complication rate)    The patient has the following additional risks for perioperative complications:  No identified additional risks      ICD-10-CM    1. Preop general physical exam Z01.818    2. Chronic neck pain M54.2     G89.29        RECOMMENDATIONS:       --Patient is to take all scheduled medications on the day of surgery EXCEPT for modifications listed below.    Anticoagulant or Antiplatelet Medication Use  ASPIRIN: Discontinue ASA 7-10 days prior to procedure to reduce bleeding risk.  It should be resumed post-operatively.        ACE Inhibitor or Angiotensin Receptor Blocker (ARB) Use  Ace inhibitor or Angiotensin Receptor Blocker (ARB) and should HOLD this medication for the 24 hours prior to surgery.      APPROVAL GIVEN to proceed with proposed procedure, without further diagnostic evaluation       Signed Electronically by: MACHO Cifuentes CNP    Copy of this evaluation report is provided to requesting physician.    [facility] Preop Guidelines    Revised Cardiac Risk Index

## 2019-05-13 ENCOUNTER — HOSPITAL ENCOUNTER (OUTPATIENT)
Dept: PHYSICAL THERAPY | Facility: OTHER | Age: 74
Setting detail: THERAPIES SERIES
End: 2019-05-13
Attending: NURSE PRACTITIONER
Payer: MEDICARE

## 2019-05-13 DIAGNOSIS — M54.12 CERVICAL RADICULOPATHY: ICD-10-CM

## 2019-05-13 PROCEDURE — 97161 PT EVAL LOW COMPLEX 20 MIN: CPT | Mod: GP | Performed by: PHYSICAL THERAPIST

## 2019-05-13 PROCEDURE — 97110 THERAPEUTIC EXERCISES: CPT | Mod: GP | Performed by: PHYSICAL THERAPIST

## 2019-05-13 PROCEDURE — 97014 ELECTRIC STIMULATION THERAPY: CPT | Mod: GP | Performed by: PHYSICAL THERAPIST

## 2019-05-13 PROCEDURE — 97010 HOT OR COLD PACKS THERAPY: CPT | Mod: GP | Performed by: PHYSICAL THERAPIST

## 2019-05-13 NOTE — PROGRESS NOTES
[facility]          OUTPATIENT PHYSICAL THERAPY ORTHOPEDIC EVALUATION  PLAN OF TREATMENT FOR OUTPATIENT REHABILITATION  (COMPLETE FOR INITIAL CLAIMS ONLY)  Patient's Last Name, First Name, M.I.  YOB: 1945  Tracy Mcclelland       Provider s Name:  [facility]   Medical Record No.  [id number]   Start of Care Date:  05/13/19   Onset Date:  03/13/19   Type:     _X__PT   ___OT   ___SLP Medical Diagnosis:  cervical radiculopathy M54.12     PT Diagnosis:  neck stiffness and B shoulder pain    Visits from SOC:  1      _________________________________________________________________________________  Plan of Treatment/Functional Goals:  ADL retraining, prosthetic fitting/training, strengthening, stretching        modalities as needed   Goals  Goal Identifier: 1  Goal Description: instruction in HEP and compliant with it 5 of 7 days   Target Date: 08/13/19    Goal Identifier: 2  Goal Description: patient to be able to use her B UE without significant pain , currently spadi 23.85 goal is 15   Target Date: 08/13/19                                                                      Therapy Frequency:     Predicted Duration of Therapy Intervention:  1-2x week x 2-3 months     Marianne Moreno, PT                 I CERTIFY THE NEED FOR THESE SERVICES FURNISHED UNDER        THIS PLAN OF TREATMENT AND WHILE UNDER MY CARE     (Physician co-signature of this document indicates review and certification of the therapy plan).                       Certification Date From:  05/13/19   Certification Date To:  08/13/19    Referring Provider:  isauro george NP    Initial Assessment        See Epic Evaluation Start of Care Date: 05/13/19

## 2019-05-13 NOTE — PROGRESS NOTES
05/13/19 1300   General Information   Type of Visit Initial OP Ortho PT Evaluation   Start of Care Date 05/13/19   Referring Physician isauro george NP   Patient/Family Goals Statement neck rehab    Orders Evaluate and Treat   Date of Order 05/02/19   Certification Required? Yes   Medical Diagnosis cervical radiculopathy M54.12   Surgical/Medical history reviewed Yes   Precautions/Limitations no known precautions/limitations   Body Part(s)   Body Part(s) Cervical Spine   Presentation and Etiology   Pertinent history of current problem (include personal factors and/or comorbidities that impact the POC) patient reports that she has had B shoulder down to wrist pain right worse than left pain not numb or tingling and stiffness in her neck . worse in the morning and gets alittle better as day goes by . denies headache . PMH right EAMON oct 2018 . DM, takes HTN and cholesertal pills    Impairments A. Pain;F. Decreased strength and endurance   Functional Limitations perform activities of daily living;perform desired leisure / sports activities   Symptom Location neck and B UE    Onset date of current episode/exacerbation 03/13/19   Chronicity New   Pain rating (0-10 point scale) Best (/10);Worst (/10)   Best (/10) 2/10   Worst (/10) 9/10   Pain quality B. Dull;A. Sharp;C. Aching   Frequency of pain/symptoms A. Constant   Pain/symptoms are: Worse in the morning   Pain/symptoms exacerbated by J. ADL;K. Home tasks  (using her arms )   Pain/symptoms eased by C. Rest;I. OTC medication(s);G. Heat;H. Cold   Progression of symptoms since onset: Unchanged   Current / Previous Interventions   Diagnostic Tests: MRI   Prior Level of Function   Functional Level Prior Comment like to watch [employer]    Current Level of Function   Current Community Support Family/friend caregiver   Patient role/employment history F. Retired   Fall Risk Screen   Fall screen completed by PT   Have you fallen 2 or more times in the past year? No    Have you fallen and had an injury in the past year? No   Is patient a fall risk? No   Cervical Spine   Posture all cervial motion is stiff  , all shoulder motion is painfull    Cervical Flexion ROM 40    Cervical Extension ROM 35   Cervical Right Side Bending ROM 40   Cervical Left Side Bending ROM 40   Cervical Right Rotation ROM 55   Cervical Left Rotation ROM 55   Shoulder AROM Screen right shoulder flexion 110 left 90 , external rotation right 15 left 10  B internal rotation laterial trunk  all motions limited by paiin    Shoulder/Wrist/Hand Strength Comments shoulder flexion , extension , abduction , internal rotation 4+/5 no pain , external rotation 4-/5 and pain and weak    Palpation pain is in B shoulders , neck feels stiff and crunchy   Planned Therapy Interventions   Planned Therapy Interventions ADL retraining;prosthetic fitting/training;strengthening;stretching   Planned Modality Interventions   Planned Modality Interventions Comments modalities as needed    Clinical Impression   Criteria for Skilled Therapeutic Interventions Met yes, treatment indicated   PT Diagnosis neck stiffness and B shoulder pain    Functional limitations due to impairments NDI 8 , spadi 23.86   Clinical Presentation Stable/Uncomplicated   Clinical Presentation Rationale patient seen due to c/o neck stiff crunchy and pain with use of B UE, presents with limited AROM , strength and function due to both and pain , Rx is exercises in clinic and HEP , modalities in clinic    Clinical Decision Making (Complexity) Moderate complexity   Predicted Duration of Therapy Intervention (days/wks) 1-2x week x 2-3 months    Risk & Benefits of therapy have been explained Yes   Patient, Family & other staff in agreement with plan of care Yes   Education Assessment   Preferred Learning Style Listening;Reading;Demonstration   Barriers to Learning No barriers   ORTHO GOALS   PT Ortho Eval Goals 1;2   Ortho Goal 1   Goal Identifier 1   Goal  Description instruction in HEP and compliant with it 5 of 7 days    Target Date 08/13/19   Ortho Goal 2   Goal Identifier 2   Goal Description patient to be able to use her B UE without significant pain , currently spadi 23.85 goal is 15    Target Date 08/13/19   Total Evaluation Time   PT Eval, Low Complexity Minutes (00875) 15   Therapy Certification   Certification date from 05/13/19   Certification date to 08/13/19   Medical Diagnosis cervical radiculopathy M54.12      05/13/19 1300   General Information   Type of Visit Initial OP Ortho PT Evaluation   Start of Care Date 05/13/19   Referring Physician isauro george NP   Patient/Family Goals Statement neck rehab    Orders Evaluate and Treat   Date of Order 05/02/19   Certification Required? Yes   Medical Diagnosis cervical radiculopathy M54.12   Surgical/Medical history reviewed Yes   Precautions/Limitations no known precautions/limitations   Body Part(s)   Body Part(s) Cervical Spine   Presentation and Etiology   Pertinent history of current problem (include personal factors and/or comorbidities that impact the POC) patient reports that she has had B shoulder down to wrist pain right worse than left pain not numb or tingling and stiffness in her neck . worse in the morning and gets alittle better as day goes by . denies headache . PMH right EAMON oct 2018 . DM, takes HTN and cholesertal pills    Impairments A. Pain;F. Decreased strength and endurance   Functional Limitations perform activities of daily living;perform desired leisure / sports activities   Symptom Location neck and B UE    Onset date of current episode/exacerbation 03/13/19   Chronicity New   Pain rating (0-10 point scale) Best (/10);Worst (/10)   Best (/10) 2/10   Worst (/10) 9/10   Pain quality B. Dull;A. Sharp;C. Aching   Frequency of pain/symptoms A. Constant   Pain/symptoms are: Worse in the morning   Pain/symptoms exacerbated by J. ADL;K. Home tasks  (using her arms )   Pain/symptoms  eased by C. Rest;I. OTC medication(s);G. Heat;H. Cold   Progression of symptoms since onset: Unchanged   Current / Previous Interventions   Diagnostic Tests: MRI   Prior Level of Function   Functional Level Prior Comment like to watch grandkids    Current Level of Function   Current Community Support Family/friend caregiver   Patient role/employment history F. Retired   Fall Risk Screen   Fall screen completed by PT   Have you fallen 2 or more times in the past year? No   Have you fallen and had an injury in the past year? No   Is patient a fall risk? No   Cervical Spine   Posture all cervial motion is stiff  , all shoulder motion is painfull    Cervical Flexion ROM 40    Cervical Extension ROM 35   Cervical Right Side Bending ROM 40   Cervical Left Side Bending ROM 40   Cervical Right Rotation ROM 55   Cervical Left Rotation ROM 55   Shoulder AROM Screen right shoulder flexion 110 left 90 , external rotation right 15 left 10  B internal rotation laterial trunk  all motions limited by paiin    Shoulder/Wrist/Hand Strength Comments shoulder flexion , extension , abduction , internal rotation 4+/5 no pain , external rotation 4-/5 and pain and weak    Palpation pain is in B shoulders , neck feels stiff and crunchy   Planned Therapy Interventions   Planned Therapy Interventions ADL retraining;prosthetic fitting/training;strengthening;stretching   Planned Modality Interventions   Planned Modality Interventions Comments modalities as needed    Clinical Impression   Criteria for Skilled Therapeutic Interventions Met yes, treatment indicated   PT Diagnosis neck stiffness and B shoulder pain    Functional limitations due to impairments NDI 8 , spadi 23.86   Clinical Presentation Stable/Uncomplicated   Clinical Presentation Rationale patient seen due to c/o neck stiff crunchy and pain with use of B UE, presents with limited AROM , strength and function due to both and pain , Rx is exercises in clinic and HEP , modalities in  clinic    Clinical Decision Making (Complexity) Moderate complexity   Predicted Duration of Therapy Intervention (days/wks) 1-2x week x 2-3 months    Risk & Benefits of therapy have been explained Yes   Patient, Family & other staff in agreement with plan of care Yes   Education Assessment   Preferred Learning Style Listening;Reading;Demonstration   Barriers to Learning No barriers   ORTHO GOALS   PT Ortho Eval Goals 1;2   Ortho Goal 1   Goal Identifier 1   Goal Description instruction in HEP and compliant with it 5 of 7 days    Target Date 08/13/19   Ortho Goal 2   Goal Identifier 2   Goal Description patient to be able to use her B UE without significant pain , currently spadi 23.85 goal is 15    Target Date 08/13/19   Total Evaluation Time   PT Julia, Low Complexity Minutes (72256) 15   Therapy Certification   Certification date from 05/13/19   Certification date to 08/13/19   Medical Diagnosis cervical radiculopathy M54.12

## 2019-05-17 ENCOUNTER — ANESTHESIA EVENT (OUTPATIENT)
Dept: SURGERY | Facility: CLINIC | Age: 74
End: 2019-05-17
Payer: MEDICARE

## 2019-05-17 ENCOUNTER — ANESTHESIA (OUTPATIENT)
Dept: SURGERY | Facility: CLINIC | Age: 74
End: 2019-05-17
Payer: MEDICARE

## 2019-05-17 ENCOUNTER — HOSPITAL ENCOUNTER (OUTPATIENT)
Dept: GENERAL RADIOLOGY | Facility: CLINIC | Age: 74
End: 2019-05-17
Attending: ANESTHESIOLOGY | Admitting: ANESTHESIOLOGY
Payer: MEDICARE

## 2019-05-17 ENCOUNTER — HOSPITAL ENCOUNTER (OUTPATIENT)
Facility: CLINIC | Age: 74
Discharge: HOME | End: 2019-05-17
Attending: ANESTHESIOLOGY | Admitting: ANESTHESIOLOGY
Payer: MEDICARE

## 2019-05-17 VITALS
SYSTOLIC BLOOD PRESSURE: 106 MMHG | TEMPERATURE: 97.5 F | DIASTOLIC BLOOD PRESSURE: 52 MMHG | OXYGEN SATURATION: 98 % | RESPIRATION RATE: 16 BRPM | HEART RATE: 74 BPM

## 2019-05-17 DIAGNOSIS — M54.12 CERVICAL RADICULOPATHY: ICD-10-CM

## 2019-05-17 LAB
GLUCOSE BLDC GLUCOMTR-MCNC: 123 MG/DL (ref 70–99)
GLUCOSE BLDC GLUCOMTR-MCNC: 138 MG/DL (ref 70–99)

## 2019-05-17 PROCEDURE — 82962 GLUCOSE BLOOD TEST: CPT

## 2019-05-17 PROCEDURE — 40000277 XR SURGERY CARM FLUORO LESS THAN 5 MIN W STILLS: Mod: TC

## 2019-05-17 PROCEDURE — 25000125 ZZHC RX 250: Performed by: NURSE ANESTHETIST, CERTIFIED REGISTERED

## 2019-05-17 PROCEDURE — 25000128 H RX IP 250 OP 636: Performed by: NURSE ANESTHETIST, CERTIFIED REGISTERED

## 2019-05-17 PROCEDURE — 25000128 H RX IP 250 OP 636: Performed by: ANESTHESIOLOGY

## 2019-05-17 PROCEDURE — 62321 NJX INTERLAMINAR CRV/THRC: CPT | Performed by: ANESTHESIOLOGY

## 2019-05-17 PROCEDURE — 37000008 ZZH ANESTHESIA TECHNICAL FEE, 1ST 30 MIN: Performed by: ANESTHESIOLOGY

## 2019-05-17 RX ORDER — PROPOFOL 10 MG/ML
INJECTION, EMULSION INTRAVENOUS PRN
Status: DISCONTINUED | OUTPATIENT
Start: 2019-05-17 | End: 2019-05-17

## 2019-05-17 RX ORDER — LIDOCAINE HYDROCHLORIDE 20 MG/ML
INJECTION, SOLUTION INFILTRATION; PERINEURAL PRN
Status: DISCONTINUED | OUTPATIENT
Start: 2019-05-17 | End: 2019-05-17

## 2019-05-17 RX ORDER — IOPAMIDOL 612 MG/ML
INJECTION, SOLUTION INTRATHECAL PRN
Status: DISCONTINUED | OUTPATIENT
Start: 2019-05-17 | End: 2019-05-17 | Stop reason: HOSPADM

## 2019-05-17 RX ORDER — TRIAMCINOLONE ACETONIDE 40 MG/ML
INJECTION, SUSPENSION INTRA-ARTICULAR; INTRAMUSCULAR PRN
Status: DISCONTINUED | OUTPATIENT
Start: 2019-05-17 | End: 2019-05-17 | Stop reason: HOSPADM

## 2019-05-17 RX ADMIN — PROPOFOL 50 MG: 10 INJECTION, EMULSION INTRAVENOUS at 12:50

## 2019-05-17 RX ADMIN — PROPOFOL 40 MG: 10 INJECTION, EMULSION INTRAVENOUS at 12:52

## 2019-05-17 RX ADMIN — PROPOFOL 30 MG: 10 INJECTION, EMULSION INTRAVENOUS at 12:54

## 2019-05-17 RX ADMIN — LIDOCAINE HYDROCHLORIDE 1 ML: 10 INJECTION, SOLUTION EPIDURAL; INFILTRATION; INTRACAUDAL; PERINEURAL at 12:17

## 2019-05-17 RX ADMIN — LIDOCAINE HYDROCHLORIDE 40 MG: 20 INJECTION, SOLUTION INFILTRATION; PERINEURAL at 12:50

## 2019-05-17 ASSESSMENT — LIFESTYLE VARIABLES: TOBACCO_USE: 1

## 2019-05-17 NOTE — OP NOTE
CHIEF COMPLAINT: Neck pain secondary to cervical spondylosis and cervical disc degeneration  PROCEDURE: C6-7 Interlaminar epidural steroid injection using fluoroscopic guidance with contrast dye.   PROCEDURE DETAILS: After written informed consent was obtained from the patient, the patient was escorted to the procedure room.  The patient was placed in the prone position.  A  time out  was conducted to verify patient identity, procedure to be performed, side, site, allergies and any special requirements.  The skin over the neck and upper back region were prepped and draped in normal sterile fashion. Fluoroscopy was used to identify the C6-7 interspace in an AP view and the skin was anesthetized with 2 mL of 1% lidocaine with bicarbonate buffer.  A 20-gauge 3-1/2 inch Tuohy needle was advanced using the loss of resistance technique with preservative free normal saline with fluoroscopic guidance. After negative aspiration for CSF and blood, 1.5 cc of Isovue contrast dye was injected revealing the appropriate cervical epidurogram without evidence of intrathecal or intravascular spread. Following this, a 3-mL solution of 40 mg of triamcinolone with 2 cc preservative-free normal saline was slowly injected.  After injection of the medication, as the needle tip was withdrawn, it was flushed with local anesthetic.  The patient was monitored with blood pressure and pulse oximetry machines with the assistance of an RN throughout the procedure.  The patient was alert and responsive to questions throughout the procedure.   The patient tolerated the procedure well and was observed in the post-procedural area.  The patient was dismissed without apparent complications.     DIAGNOSIS:  1. Neck pain secondary to cervical spondylosis and cervical disc degeneration  PLAN:  1. Performed a C6-7 interlaminar epidural steroid injection.   2. The patient was instructed to call the [facility] if today's procedure is not  helpful.    Carlos Avendaño MD  Diplomate of the American Board of Anesthesiology, Pain Medicine

## 2019-05-17 NOTE — ANESTHESIA PREPROCEDURE EVALUATION
Anesthesia Evaluation     . Pt has had prior anesthetic. Type: MAC and General    No history of anesthetic complications          ROS/MED HX    ENT/Pulmonary:     (+)tobacco use, Current use 0.25 ppdx 20 years packs/day  , . .    Neurologic:     (+)other neuro Peripheral neuropathy    Cardiovascular:     (+) Dyslipidemia, hypertension-range: <140 / 90, ---. : . . . :. . Previous cardiac testing date:results:date: results:ECG reviewed date: results:NSR, normal axis, normal intervals, no acute ST/T changes c/w ischemia date: results:          METS/Exercise Tolerance:     Hematologic:         Musculoskeletal:   (+) arthritis,  other musculoskeletal- osteoarthritis right hip - presents for right EAMON      GI/Hepatic:  - neg GI/hepatic ROS      (-) GERD   Renal/Genitourinary: Comment: Stage II CRI    (+) chronic renal disease, type: CRI, Pt does not require dialysis, Pt has no history of transplant,       Endo:     (+) type II DM Not using insulin - not using insulin pump not previously admitted for DM/DKA Diabetic complications: nephropathy neuropathy, .      Psychiatric:  - neg psychiatric ROS       Infectious Disease:  - neg infectious disease ROS       Malignancy:      - no malignancy   Other:    (+) No chance of pregnancy   - neg other ROS                 Physical Exam  Normal systems: cardiovascular, pulmonary and dental    Airway   Mallampati: II  TM distance: >3 FB  Neck ROM: full    Dental     Cardiovascular   Rhythm and rate: regular and normal  (-) no murmur    Pulmonary    breath sounds clear to auscultation    Other findings: Last dose of Lisinopril was  Last GLC was  Last dose Metformin was                    Anesthesia Plan      History & Physical Review  History and physical reviewed and following examination; no interval change.    ASA Status:  3 .    NPO Status:  > 6 hours    Plan for MAC and Spinal with Propofol induction. Maintenance will be TIVA.  Reason for MAC:  Deep or markedly invasive procedure  (G8)  PONV prophylaxis:  Ondansetron (or other 5HT-3) and Dexamethasone or Solumedrol       Postoperative Care  Postoperative pain management:  IV analgesics and Oral pain medications.      Consents  Anesthetic plan, risks, benefits and alternatives discussed with:  Patient.  Use of blood products discussed: No .   .                          .

## 2019-05-17 NOTE — ANESTHESIA CARE TRANSFER NOTE
Patient: Tracy Mcclelland    Procedure(s):  Epidural Steroid Injection Bilateral Cervical 6-7    Diagnosis: cervcal radiculopathy  Diagnosis Additional Information: No value filed.    Anesthesia Type:   MAC, Spinal     Note:  Airway :Nasal Cannula  Patient transferred to:Phase II  Handoff Report: Identifed the Patient, Identified the Reponsible Provider, Reviewed the pertinent medical history, Discussed the surgical course, Reviewed Intra-OP anesthesia mangement and issues during anesthesia, Set expectations for post-procedure period and Allowed opportunity for questions and acknowledgement of understanding      Vitals: (Last set prior to Anesthesia Care Transfer)    CRNA VITALS  5/17/2019 1229 - 5/17/2019 1307      5/17/2019             Resp Rate (observed):  24                Electronically Signed By: MACHO Dahl CRNA  May 17, 2019  1:07 PM

## 2019-05-17 NOTE — DISCHARGE INSTRUCTIONS
Home Care Instructions                Procedure:  Epidural Steroid Injection or Joint injection    Activity:    Rest today    Do not work today    Resume normal activity tomorrow    Pain:    You may experience soreness at the injection site for one or two days    You may use an ice pack for 20 minutes every 2 hours for the first 24 hours    You may use a heating pad after the first 24 hours    You may use Tylenol  (acetaminophen) every 4 hours or other pain medicines as directed by your physician    Safety  Sedation medicine, if given may remain active for many hours.    It is important for the next 24 hours that you do not:    Drive a car    Operate machines or power tools    Consume alcohol, including beer    Sign any important papers or legal documents    You may experience numbness radiating into your legs or arms, (depending on the procedure location)  This numbness may last several hours.  Until the numb sensation returns to normal please use caution in walking, climbing stairs, stepping out of your vehicle, etc.    Common side effects of steroids:  Not everyone will experience corticosteroid side effects. If side effects are experienced they will gradually subside in the 7-10 day period following an injection.    Most common side effects include:    Flushed face and/or chest    Feeling of warmth, particularly in face but could be overall feeling of warmth    Increased blood sugar in diabetic patients    Menstrual irregularities may occur.  If taking hormone based birth control an alternate method of birth control is recommended    Sleep disturbances and/or mood swings are possible    Leg cramps    Please contact us if you have:  Severe pain   Fever more than 101.5 degrees Fahrenheit  Signs of infection (redness, swelling or drainage)      If you have questions during normal business hours (8am-5pm Monday-Friday) contact the [facility] at [phone]. If you need help after hours, we recommend that  you go to a hospital emergency room or dial 911.

## 2019-05-17 NOTE — ANESTHESIA POSTPROCEDURE EVALUATION
Patient: Tracy Mcclelland    Procedure(s):  Epidural Steroid Injection Bilateral Cervical 6-7    Diagnosis:cervcal radiculopathy  Diagnosis Additional Information: No value filed.    Anesthesia Type:  MAC, Spinal    Note:  Anesthesia Post Evaluation    Patient location during evaluation: Phase 2  Patient participation: Able to fully participate in evaluation  Level of consciousness: awake  Pain management: adequate  Airway patency: patent  Cardiovascular status: acceptable and hemodynamically stable  Respiratory status: acceptable, room air and nonlabored ventilation  Hydration status: stable  PONV: none     Anesthetic complications: None    Comments: Patient was happy with the anesthesia care received and no anesthesia related complications were noted.  I will follow up with the patient again if it is needed.        Last vitals:  Vitals:    05/17/19 1205 05/17/19 1302   BP: 110/62 93/59   Resp: 16 16   Temp: 97.5  F (36.4  C)    SpO2:  99%         Electronically Signed By: MACHO Dahl CRNA  May 17, 2019  1:16 PM

## 2019-05-20 ENCOUNTER — HOSPITAL ENCOUNTER (OUTPATIENT)
Dept: PHYSICAL THERAPY | Facility: OTHER | Age: 74
Setting detail: THERAPIES SERIES
End: 2019-05-20
Attending: NURSE PRACTITIONER
Payer: MEDICARE

## 2019-05-20 PROCEDURE — 97010 HOT OR COLD PACKS THERAPY: CPT | Mod: GP | Performed by: PHYSICAL THERAPIST

## 2019-05-20 PROCEDURE — 97014 ELECTRIC STIMULATION THERAPY: CPT | Mod: GP | Performed by: PHYSICAL THERAPIST

## 2019-05-20 NOTE — ADDENDUM NOTE
Encounter addended by: Marianne Moreno, PT on: 5/20/2019 7:51 AM   Actions taken: Flowsheet accepted

## 2019-05-28 ENCOUNTER — HOSPITAL ENCOUNTER (OUTPATIENT)
Dept: PHYSICAL THERAPY | Facility: OTHER | Age: 74
Setting detail: THERAPIES SERIES
End: 2019-05-28
Attending: NURSE PRACTITIONER
Payer: MEDICARE

## 2019-05-28 PROCEDURE — 97010 HOT OR COLD PACKS THERAPY: CPT | Mod: GP | Performed by: PHYSICAL THERAPIST

## 2019-05-28 PROCEDURE — 97014 ELECTRIC STIMULATION THERAPY: CPT | Mod: GP | Performed by: PHYSICAL THERAPIST

## 2019-05-30 ENCOUNTER — TELEPHONE (OUTPATIENT)
Dept: NEUROSURGERY | Facility: CLINIC | Age: 74
End: 2019-05-30

## 2019-05-30 ENCOUNTER — TELEPHONE (OUTPATIENT)
Dept: FAMILY MEDICINE | Facility: OTHER | Age: 74
End: 2019-05-30

## 2019-05-30 DIAGNOSIS — M54.2 NECK PAIN: ICD-10-CM

## 2019-05-30 RX ORDER — PREDNISONE 20 MG/1
TABLET ORAL
Qty: 15 TABLET | Refills: 0 | Status: SHIPPED | OUTPATIENT
Start: 2019-05-30 | End: 2019-06-10

## 2019-05-30 NOTE — TELEPHONE ENCOUNTER
Spoke to patient she had WILSON on 5/17, no relief not even a few days. She is in therapy in [place] once a week. PT is going well and does heat treatment there and at home exercises.    She reports Neck pain that radiates to bilateral shoulder and arms. Left arm is >. States she has weakness in both arms, issues with  in both hands. Hard to grasp door handles.     Patient was given prednisone 4/29/-5/11 from PCP. She said when she was on the prednisone it really helped and pain was mostly gone.     Will discuss with care team next steps.

## 2019-05-30 NOTE — TELEPHONE ENCOUNTER
Mikayla Mckeon CNP recommends patient follow-up with Dr Jefferson in clinic to discuss possible surgical options. Patient to contact her PCP if needing anything stronger than OTC pain relievers. We do not manage medications only for acute post op pain.     Called patient and informed her of above. She verbalized understanding and was transferred to scheduling.

## 2019-05-30 NOTE — TELEPHONE ENCOUNTER
Reason for Call:  Other call back    Detailed comments: Patient states her neck is still very painful after injection 2 weeks ago.  Patient is asking if this is normal?  Please call     Phone Number Patient can be reached at: Home number on file [phone] (home)    Best Time: any    Can we leave a detailed message on this number? YES    Call taken on 5/30/2019 at 8:35 AM by Princess Linares

## 2019-05-30 NOTE — TELEPHONE ENCOUNTER
Reason for Call:  Medication or medication refill:    Do you use a [facility]?  Name of the pharmacy and phone number for the current request:  [facility] [place] - [phone]    Name of the medication requested: Prednisone    Other request: pt is having a lot of pain. Pt spoke to Dr Jefferson's nurse. Pt was told to contact  to see if pt could get a rx for pain until pt sees Dr Jefferson. Pt has an appt 6/6     Can we leave a detailed message on this number? YES    Phone number patient can be reached at: Home number on file [phone] (home)    Best Time:     Call taken on 5/30/2019 at 1:02 PM by Krissy Ding

## 2019-05-30 NOTE — TELEPHONE ENCOUNTER
Patient notified of Rx. Patient voiced understanding.   Carrillo Bauer LPN,  May 30, 2019 2:12 PM,  [facility]

## 2019-06-03 ENCOUNTER — HOSPITAL ENCOUNTER (OUTPATIENT)
Dept: PHYSICAL THERAPY | Facility: OTHER | Age: 74
Setting detail: THERAPIES SERIES
End: 2019-06-03
Attending: NURSE PRACTITIONER
Payer: MEDICARE

## 2019-06-03 DIAGNOSIS — I10 BENIGN ESSENTIAL HYPERTENSION: ICD-10-CM

## 2019-06-03 DIAGNOSIS — E78.5 HYPERLIPIDEMIA WITH TARGET LDL LESS THAN 100: ICD-10-CM

## 2019-06-03 DIAGNOSIS — N18.2 TYPE 2 DIABETES MELLITUS WITH STAGE 2 CHRONIC KIDNEY DISEASE, WITHOUT LONG-TERM CURRENT USE OF INSULIN (H): ICD-10-CM

## 2019-06-03 DIAGNOSIS — E11.22 TYPE 2 DIABETES MELLITUS WITH STAGE 2 CHRONIC KIDNEY DISEASE, WITHOUT LONG-TERM CURRENT USE OF INSULIN (H): ICD-10-CM

## 2019-06-03 PROCEDURE — 97014 ELECTRIC STIMULATION THERAPY: CPT | Mod: GP | Performed by: PHYSICAL THERAPIST

## 2019-06-03 PROCEDURE — 97010 HOT OR COLD PACKS THERAPY: CPT | Mod: GP | Performed by: PHYSICAL THERAPIST

## 2019-06-03 RX ORDER — LISINOPRIL 20 MG/1
TABLET ORAL
Qty: 90 TABLET | Refills: 0 | Status: SHIPPED | OUTPATIENT
Start: 2019-06-03 | End: 2019-08-23

## 2019-06-03 RX ORDER — SIMVASTATIN 5 MG
TABLET ORAL
Qty: 90 TABLET | Refills: 0 | Status: SHIPPED | OUTPATIENT
Start: 2019-06-03 | End: 2019-08-23

## 2019-06-03 NOTE — TELEPHONE ENCOUNTER
"Requested Prescriptions   Pending Prescriptions Disp Refills     lisinopril (PRINIVIL/ZESTRIL) 20 MG tablet [Pharmacy Med Name: LISINOPRIL 20MG TABS] 90 tablet 0     Sig: TAKE ONE TABLET BY MOUTH ONCE DAILY     Next 5 appointments (look out 90 days)    Jun 06, 2019 10:45 AM CDT  Return Visit with Joe Jefferson MD  [facility] [address] [place] [id number] [phone]             ACE Inhibitors (Including Combos) Protocol Passed - 6/3/2019 10:21 AM        Passed - Blood pressure under 140/90 in past 12 months     BP Readings from Last 3 Encounters:   05/17/19 106/52   05/10/19 98/58   05/02/19 134/68                 Passed - Recent (12 mo) or future (30 days) visit within the authorizing provider's specialty     Patient had office visit in the last 12 months or has a visit in the next 30 days with authorizing provider or within the authorizing provider's specialty.  See \"Patient Info\" tab in inbasket, or \"Choose Columns\" in Meds & Orders section of the refill encounter.              Passed - Medication is active on med list        Passed - Patient is age 18 or older        Passed - No active pregnancy on record        Passed - Normal serum creatinine on file in past 12 months     Recent Labs   Lab Test 04/10/19  1018   CR 0.76             Passed - Normal serum potassium on file in past 12 months     Recent Labs   Lab Test 04/10/19  1018   POTASSIUM 4.7             Passed - No positive pregnancy test within past 12 months        simvastatin (ZOCOR) 5 MG tablet [Pharmacy Med Name: SIMVASTATIN 5MG TABS] 90 tablet 0     Sig: TAKE ONE TABLET BY MOUTH EVERY NIGHT AT BEDTIME   Last Written Prescription Date:  3/6/19  Last Fill Quantity: 90,  # refills: 0   Last office visit: 5/10/2019 with prescribing provider:  5/10/19   Future Office Visit:     Statins Protocol Passed - 6/3/2019 10:21 AM        Passed - LDL on file in past 12 months     Recent Labs   Lab " "Test 04/10/19  1018   LDL 88             Passed - No abnormal creatine kinase in past 12 months     No lab results found.             Passed - Recent (12 mo) or future (30 days) visit within the authorizing provider's specialty     Patient had office visit in the last 12 months or has a visit in the next 30 days with authorizing provider or within the authorizing provider's specialty.  See \"Patient Info\" tab in inbasket, or \"Choose Columns\" in Meds & Orders section of the refill encounter.              Passed - Medication is active on med list        Passed - Patient is age 18 or older        Passed - No active pregnancy on record        Passed - No positive pregnancy test in past 12 months        metFORMIN (GLUCOPHAGE) 500 MG tablet [Pharmacy Med Name: METFORMIN HCL 500MG TABS] 180 tablet 0     Sig: TAKE ONE TABLET BY MOUTH TWICE A DAY WITH MEALS   Last Written Prescription Date:  3/6/19  Last Fill Quantity: 180,  # refills: 0   Last office visit: 5/10/2019 with prescribing provider:  5/10/19   Future Office Visit:     Biguanide Agents Passed - 6/3/2019 10:21 AM        Passed - Blood pressure less than 140/90 in past 6 months     BP Readings from Last 3 Encounters:   05/17/19 106/52   05/10/19 98/58   05/02/19 134/68                 Passed - Patient has documented LDL within the past 12 mos.     Recent Labs   Lab Test 04/10/19  1018   LDL 88             Passed - Patient has had a Microalbumin in the past 15 mos.     Recent Labs   Lab Test 08/10/18  1115   MICROL 20   UMALCR 24.20             Passed - Patient is age 10 or older        Passed - Patient has documented A1c within the specified period of time.     If HgbA1C is 8 or greater, it needs to be on file within the past 3 months.  If less than 8, must be on file within the past 6 months.     Recent Labs   Lab Test 03/21/19  1216   A1C 6.9*             Passed - Patient's CR is NOT>1.4 OR Patient's EGFR is NOT<45 within past 12 mos.     Recent Labs   Lab Test " "04/10/19  1018   GFRESTIMATED 76   GFRESTBLACK 89       Recent Labs   Lab Test 04/10/19  1018   CR 0.76             Passed - Patient does NOT have a diagnosis of CHF.        Passed - Medication is active on med list        Passed - Patient is not pregnant        Passed - Patient has not had a positive pregnancy test within the past 12 mos.         Passed - Recent (6 mo) or future (30 days) visit within the authorizing provider's specialty     Patient had office visit in the last 6 months or has a visit in the next 30 days with authorizing provider or within the authorizing provider's specialty.  See \"Patient Info\" tab in inbasket, or \"Choose Columns\" in Meds & Orders section of the refill encounter.            "

## 2019-06-03 NOTE — TELEPHONE ENCOUNTER
Prescription approved per [facility] Refill Protocol.    FRANKY StinsonN, RN  [facility]

## 2019-06-06 ENCOUNTER — OFFICE VISIT (OUTPATIENT)
Dept: NEUROSURGERY | Facility: CLINIC | Age: 74
End: 2019-06-06
Payer: COMMERCIAL

## 2019-06-06 VITALS — TEMPERATURE: 96.6 F | WEIGHT: 159 LBS | BODY MASS INDEX: 28.17 KG/M2 | HEIGHT: 63 IN

## 2019-06-06 DIAGNOSIS — M54.12 CERVICAL RADICULOPATHY: Primary | ICD-10-CM

## 2019-06-06 PROCEDURE — 99213 OFFICE O/P EST LOW 20 MIN: CPT | Performed by: NEUROLOGICAL SURGERY

## 2019-06-06 ASSESSMENT — MIFFLIN-ST. JEOR: SCORE: 1194.31

## 2019-06-06 NOTE — PROGRESS NOTES
Patient Education    Education included but not limited to:  - Surgical risks: blood clots, urinating difficulties, nerve damage, infection.  - Pre-operative physical with primary care physician within 30 days of surgical date.   - Pre-operative clearance from other pertaining specialties.   - Discontinue NSAIDS x 7 days prior to surgical date.   - Discontinue Plavix x 7-10 days prior to surgical date, stay off Plavix 3-4 days post operatively. Discontinue Xarelto 5-7 days prior to surgery. Discontinue coumadin/warfarin 5-7 days prior to surgery. INR needs to be <1.4.  -Do not begin taking NSAIDs (Advil, Motrin, Ibuprofen, Nuprin, Diclofenac, Meloxicam, Aleve, Celebrex, Aspirin, etc.) until 6 weeks after surgery if you had a fusion. May cause bleeding and interfere with bone healing.    -May try Tylenol for pain.  - Smoking cessation: if you smoke, we advise you to stop immediately, through recovery to improve healing. Smoking Cessation handout provided.  -Discussed being off work after surgery, short term disability, FMLA, etc.   -Forms to be completed    -Pre-op timeline: NPO, shower, medications    -Hospital stay: Checking in, surgery, recovery room, hospital room.    - Post operative pain management: narcotics, muscle relaxants, ice, etc.   -No driving while taking narcotics     -Post operative incision care:   Keep your incision clean and dry.   Okay to shower. No submerging in water until incision healed.   Watch for signs of infection and notify clinic if drainage or fever develops.   - Post operative activity limitations recommended until follow up appointment: no lifting > 10 pounds; limited bending, twisting, overhead reaching.  -If a brace is required per Dr. Jefferson, [facility] will fit you for the brace in the hospital.  - Follow up appointments: Suture/staple removal at 2 weeks post op (TLIF, PSF, crani). (**for Coflex: 6 week post op with xray, 3 months post op.) 6 week post op, 3 months post op, 6  months post op, 1 year post op. You will need to an xray before each appointment. Please call to schedule follow up appointment at [phone].   - Education book was also given to the patient for further review.      Patient verbalized understanding of above instructions. All questions were answered to the best of my ability and the patient's satisfaction. Patient advised to call with any additional questions or concerns.    Yessica Huynh RN ([facility] Nurse)  [facility]  [address] [address]  T:  [phone]  F:  [phone]

## 2019-06-06 NOTE — LETTER
"    6/6/2019         RE: Tracy Mcclelland  [address] [facility] [address]        Dear Colleague,    Thank you for referring your patient, Tracy Mcclelland, to the [facility]. Please see a copy of my visit note below.    rTacy Mcclelland is a 73 year old female who presents for:  Chief Complaint   Patient presents with     Neurologic Problem     discuss possible cervical surgical options         Initial Vitals:  Temp 96.6  F (35.9  C) (Temporal)   Ht 5' 3.25\" (1.607 m)   Wt 159 lb (72.1 kg)   LMP  (LMP Unknown)   BMI 27.94 kg/m    Estimated body mass index is 27.94 kg/m  as calculated from the following:    Height as of this encounter: 5' 3.25\" (1.607 m).    Weight as of this encounter: 159 lb (72.1 kg).. Body surface area is 1.79 meters squared. BP completed using cuff size: NA (Not Taken)  Data Unavailable        Nursing Comments:         Darren Rosen [facility]         Patient Education    Education included but not limited to:  - Surgical risks: blood clots, urinating difficulties, nerve damage, infection.  - Pre-operative physical with primary care physician within 30 days of surgical date.   - Pre-operative clearance from other pertaining specialties.   - Discontinue NSAIDS x 7 days prior to surgical date.   - Discontinue Plavix x 7-10 days prior to surgical date, stay off Plavix 3-4 days post operatively. Discontinue Xarelto 5-7 days prior to surgery. Discontinue coumadin/warfarin 5-7 days prior to surgery. INR needs to be <1.4.  -Do not begin taking NSAIDs (Advil, Motrin, Ibuprofen, Nuprin, Diclofenac, Meloxicam, Aleve, Celebrex, Aspirin, etc.) until 6 weeks after surgery if you had a fusion. May cause bleeding and interfere with bone healing.    -May try Tylenol for pain.  - Smoking cessation: if you smoke, we advise you to stop immediately, through recovery to improve healing. Smoking Cessation handout provided.  -Discussed being off work after surgery, short term disability, FMLA, " etc.   -Forms to be completed    -Pre-op timeline: NPO, shower, medications    -Hospital stay: Checking in, surgery, recovery room, hospital room.    - Post operative pain management: narcotics, muscle relaxants, ice, etc.   -No driving while taking narcotics     -Post operative incision care:   Keep your incision clean and dry.   Okay to shower. No submerging in water until incision healed.   Watch for signs of infection and notify clinic if drainage or fever develops.   - Post operative activity limitations recommended until follow up appointment: no lifting > 10 pounds; limited bending, twisting, overhead reaching.  -If a brace is required per Dr. Jefferson, [facility] will fit you for the brace in the hospital.  - Follow up appointments: Suture/staple removal at 2 weeks post op (TLIF, PSF, crani). (**for Coflex: 6 week post op with xray, 3 months post op.) 6 week post op, 3 months post op, 6 months post op, 1 year post op. You will need to an xray before each appointment. Please call to schedule follow up appointment at [phone].   - Education book was also given to the patient for further review.      Patient verbalized understanding of above instructions. All questions were answered to the best of my ability and the patient's satisfaction. Patient advised to call with any additional questions or concerns.    Yessica Huynh RN ([facility] Nurse)  [facility]  [address] [address]  T:  [phone]  F:  [phone]      Tracy Mcclelland is a 73 year old female with a 6 week history of neck and shoulder pain. She denies any trauma. She states the pain originates in her neck and raidates to her bilateral shoulders equally. She denies aggravating factors. She states pain is alleviated with prednisone, tylenol, aspirin, and sitting in her chair. She states when pain is bad she is unable to pull up her underwear or pants due to pain.    Returns for  follow up.  Sharp, aching neck pain with 8/10 radiation to the bilateral shoulders, arms, and forearms.  Shoulder abduction weakness.  Left>right severity.  PT and KEENA without improvement.    Past Medical History:   Diagnosis Date     CKD (chronic kidney disease) stage 2, GFR 60-89 ml/min 10/19/2015     Headache(784.0)     hx.of Migraines     Lumbago      Type II or unspecified type diabetes mellitus without mention of complication, not stated as uncontrolled      Unspecified essential hypertension        Past Medical History reviewed with patient during visit.    Past Surgical History:   Procedure Laterality Date     ARTHROPLASTY HIP Right 10/16/2018    Procedure: right total hip arthroplasty;  Surgeon: Terrell Ervin DO;  Location: [facility] OR     C LIGATE FALLOPIAN TUBE       [facility] LAPAROSCOPY, SURGICAL; CHOLECYSTECTOMY  2000    Cholecystectomy, Laparoscopic     HC REMOVE TONSILS/ADENOIDS,<13 Y/O      T & A <11y.o.     INJECT EPIDURAL CERVICAL N/A 5/17/2019    Procedure: Epidural Steroid Injection Bilateral Cervical 6-7;  Surgeon: Carlos Avendaño MD;  Location: [facility] OR     Past Surgical History reviewed with patient during visit.    Current Outpatient Medications   Medication     acetaminophen (TYLENOL) 325 MG tablet     gabapentin (NEURONTIN) 600 MG tablet     lisinopril (PRINIVIL/ZESTRIL) 20 MG tablet     loratadine (CLARITIN) 10 MG tablet     metFORMIN (GLUCOPHAGE) 500 MG tablet     predniSONE (DELTASONE) 20 MG tablet     simvastatin (ZOCOR) 5 MG tablet     No current facility-administered medications for this visit.        Allergies   Allergen Reactions     Mold      sneezing, coughing , runny nose, watery eyes & red,     No Known Drug Allergies        Social History     Socioeconomic History     Marital status:      Spouse name: Not on file     Number of children: 3     Years of education: 12     Highest education level: Not on file   Occupational History     Employer: [employer]    Social Needs     Financial resource strain: Not on file     Food insecurity:     Worry: Not on file     Inability: Not on file     Transportation needs:     Medical: Not on file     Non-medical: Not on file   Tobacco Use     Smoking status: Current Every Day Smoker     Packs/day: 0.25     Years: 20.00     Pack years: 5.00     Types: Cigarettes     Smokeless tobacco: Never Used     Tobacco comment: 2-3 cigs daily   Substance and Sexual Activity     Alcohol use: Yes     Comment: couple times monthly     Drug use: No     Sexual activity: Never   Lifestyle     Physical activity:     Days per week: Not on file     Minutes per session: Not on file     Stress: Not on file   Relationships     Social connections:     Talks on phone: Not on file     Gets together: Not on file     Attends Taoism service: Not on file     Active member of club or organization: Not on file     Attends meetings of clubs or organizations: Not on file     Relationship status: Not on file     Intimate partner violence:     Fear of current or ex partner: Not on file     Emotionally abused: Not on file     Physically abused: Not on file     Forced sexual activity: Not on file   Other Topics Concern      Service No     Blood Transfusions No     Caffeine Concern No     Occupational Exposure No     Hobby Hazards No     Sleep Concern No     Stress Concern No     Weight Concern No     Special Diet No     Back Care No     Exercise No     Bike Helmet No     Comment: does not ride bike     Seat Belt Yes     Self-Exams Yes     Parent/sibling w/ CABG, MI or angioplasty before 65F 55M? No   Social History Narrative     Not on file       Family History   Problem Relation Age of Onset     Heart Disease Mother         Pacemaker     Alzheimer Disease Father         Dementia/Loss of memory     Cerebrovascular Disease Maternal Grandmother      Heart Disease Maternal Grandfather         MI     Diabetes Paternal Grandmother          ROS: 10 point ROS neg  other than the symptoms noted above in the HPI.    Vital Signs:       Examination:  Constitutional:  Alert, well nourished, NAD.  Memory: recent and remote memory   HEENT: Normocephalic, atraumatic.   Pulm:  Without shortness of breath   CV:  No pitting edema of BLE.    Neurological:  Awake  Alert  Oriented x 3  Speech clear  Motor exam   Shoulder Abduction:  Right:  5/5   Left:  4+/5  Biceps:                      Right:  5/5   Left:  4+/5  Triceps:                     Right:  5/5   Left:  5/5  Wrist Extensors:       Right:  5/5   Left:  5/5  Wrist Flexors:           Right:  5/5   Left:  5/5  Intrinsics:                   Right:  5/5   Left:  5/5     Sensation normal to bilateral upper and lower extremities  Muscle tone to bilateral upper and lower extremities   Gait: Able to stand from a seated position. Normal non-antalgic, non-myelopathic gait.  Able to heel/toe walk without loss of balance  Cervical examination reveals good range of motion.  No tenderness to palpation of the cervical spine or paraspinous muscles bilaterally.    Imaging: Cervical MRI 4/18/2019  FINDINGS: The cervical and visualized upper thoracic spinal cord and  the cervicomedullary junction all appear intrinsically normal. There  is exaggerated kyphosis of the upper cervical spine, and there is a  subtle degenerative anterolisthesis of C4 on C5. Alignment is  otherwise normal. There is arthritic change and some soft tissue  thickening around the atlantoaxial joint with a possible erosion at  the dorsal aspect of the base of the dens (image 7 of series 2 and 3).  This could indicate synovial-based arthritis, and clinical correlation  is recommended. No acute-appearing bony abnormality.     C2-C3: Normal disc space. No central or foraminal stenosis. Moderate  posterior facet degenerative changes on the left.     C3-C4: Normal disc space. Mild posterior facet degenerative changes  bilaterally. No significant central or foraminal  stenosis.     C4-C5: Normal disc space and no central stenosis. Moderate to advanced  left and mild to moderate right posterior facet degenerative changes  with minimal anterolisthesis. At least mild left foraminal stenosis,  and the right foramen is normal.     C5-C6: Chronic advanced degenerative disc space narrowing with  prominent diffuse disc bulging but no acute-appearing disc protrusion.  Mild overall central stenosis. Moderate right and severe left  foraminal stenosis from endplate/uncinate spurring. Mild posterior  facet degenerative changes on the left.     C6-C7: Chronic advanced degenerative disc space narrowing with diffuse  disc bulging and endplate spurring but no acute disc protrusion. At  least mild overall central stenosis. Moderate bilateral foraminal  stenosis from endplate/uncinate spurring. Posterior facets are  unremarkable.     C7-T1: Normal.     T1-T2, T2-T3, T3-T4: Normal.     The paraspinal soft tissues appear normal to the extent visualized.                                                                      IMPRESSION:  1. Chronic advanced degenerative disc disease at C5-C6 and C6-C7 with  mild central stenosis. There is moderate right and severe left  foraminal stenosis at C5-C6 and moderate bilateral foraminal stenosis  at C6-C7.  2. Posterior facet degenerative changes at multiple levels as  described above. This results in a subtle degenerative anterolisthesis  of C4 on C5 with at least mild left foraminal stenosis at this level.  3. Arthritic changes of the atlantoaxial joint with a possible erosive  change on the dens which could indicate synovial-based arthritis.    Assessment/Plan:   Tracy Mcclelland is a 73 year old female with a 6 week history of neck and shoulder pain. She denies any trauma. She states the pain originates in her neck and raidiates to her bilateral shoulders equally.     Will plan for C5-6 ACDF  Risks and benefits discussed    Again, thank you for allowing me to  participate in the care of your patient.        Sincerely,        Joe Jefferson MD

## 2019-06-06 NOTE — PROGRESS NOTES
Tracy Mcclelland is a 73 year old female with a 6 week history of neck and shoulder pain. She denies any trauma. She states the pain originates in her neck and raidates to her bilateral shoulders equally. She denies aggravating factors. She states pain is alleviated with prednisone, tylenol, aspirin, and sitting in her chair. She states when pain is bad she is unable to pull up her underwear or pants due to pain.    Returns for follow up.  Sharp, aching neck pain with 8/10 radiation to the bilateral shoulders, arms, and forearms.  Shoulder abduction weakness.  Left>right severity.  PT and KEENA without improvement.    Past Medical History:   Diagnosis Date     CKD (chronic kidney disease) stage 2, GFR 60-89 ml/min 10/19/2015     Headache(784.0)     hx.of Migraines     Lumbago      Type II or unspecified type diabetes mellitus without mention of complication, not stated as uncontrolled      Unspecified essential hypertension        Past Medical History reviewed with patient during visit.    Past Surgical History:   Procedure Laterality Date     ARTHROPLASTY HIP Right 10/16/2018    Procedure: right total hip arthroplasty;  Surgeon: Terrell Ervin DO;  Location: [facility] OR     C LIGATE FALLOPIAN TUBE       HC LAPAROSCOPY, SURGICAL; CHOLECYSTECTOMY  2000    Cholecystectomy, Laparoscopic     HC REMOVE TONSILS/ADENOIDS,<11 Y/O      T & A <11y.o.     INJECT EPIDURAL CERVICAL N/A 5/17/2019    Procedure: Epidural Steroid Injection Bilateral Cervical 6-7;  Surgeon: Carlos Avendaño MD;  Location: [facility] OR     Past Surgical History reviewed with patient during visit.    Current Outpatient Medications   Medication     acetaminophen (TYLENOL) 325 MG tablet     gabapentin (NEURONTIN) 600 MG tablet     lisinopril (PRINIVIL/ZESTRIL) 20 MG tablet     loratadine (CLARITIN) 10 MG tablet     metFORMIN (GLUCOPHAGE) 500 MG tablet     predniSONE (DELTASONE) 20 MG tablet     simvastatin (ZOCOR) 5 MG tablet     No current  facility-administered medications for this visit.        Allergies   Allergen Reactions     Mold      sneezing, coughing , runny nose, watery eyes & red,     No Known Drug Allergies        Social History     Socioeconomic History     Marital status:      Spouse name: Not on file     Number of children: 3     Years of education: 12     Highest education level: Not on file   Occupational History     Employer: [employer]   Social Needs     Financial resource strain: Not on file     Food insecurity:     Worry: Not on file     Inability: Not on file     Transportation needs:     Medical: Not on file     Non-medical: Not on file   Tobacco Use     Smoking status: Current Every Day Smoker     Packs/day: 0.25     Years: 20.00     Pack years: 5.00     Types: Cigarettes     Smokeless tobacco: Never Used     Tobacco comment: 2-3 cigs daily   Substance and Sexual Activity     Alcohol use: Yes     Comment: couple times monthly     Drug use: No     Sexual activity: Never   Lifestyle     Physical activity:     Days per week: Not on file     Minutes per session: Not on file     Stress: Not on file   Relationships     Social connections:     Talks on phone: Not on file     Gets together: Not on file     Attends Hoahaoism service: Not on file     Active member of club or organization: Not on file     Attends meetings of clubs or organizations: Not on file     Relationship status: Not on file     Intimate partner violence:     Fear of current or ex partner: Not on file     Emotionally abused: Not on file     Physically abused: Not on file     Forced sexual activity: Not on file   Other Topics Concern      Service No     Blood Transfusions No     Caffeine Concern No     Occupational Exposure No     Hobby Hazards No     Sleep Concern No     Stress Concern No     Weight Concern No     Special Diet No     Back Care No     Exercise No     Bike Helmet No     Comment: does not ride bike     Seat Belt Yes      Self-Exams Yes     Parent/sibling w/ CABG, MI or angioplasty before 65F 55M? No   Social History Narrative     Not on file       Family History   Problem Relation Age of Onset     Heart Disease Mother         Pacemaker     Alzheimer Disease Father         Dementia/Loss of memory     Cerebrovascular Disease Maternal Grandmother      Heart Disease Maternal Grandfather         MI     Diabetes Paternal Grandmother          ROS: 10 point ROS neg other than the symptoms noted above in the HPI.    Vital Signs:       Examination:  Constitutional:  Alert, well nourished, NAD.  Memory: recent and remote memory   HEENT: Normocephalic, atraumatic.   Pulm:  Without shortness of breath   CV:  No pitting edema of BLE.    Neurological:  Awake  Alert  Oriented x 3  Speech clear  Motor exam   Shoulder Abduction:  Right:  5/5   Left:  4+/5  Biceps:                      Right:  5/5   Left:  4+/5  Triceps:                     Right:  5/5   Left:  5/5  Wrist Extensors:       Right:  5/5   Left:  5/5  Wrist Flexors:           Right:  5/5   Left:  5/5  Intrinsics:                   Right:  5/5   Left:  5/5     Sensation normal to bilateral upper and lower extremities  Muscle tone to bilateral upper and lower extremities   Gait: Able to stand from a seated position. Normal non-antalgic, non-myelopathic gait.  Able to heel/toe walk without loss of balance  Cervical examination reveals good range of motion.  No tenderness to palpation of the cervical spine or paraspinous muscles bilaterally.    Imaging: Cervical MRI 4/18/2019  FINDINGS: The cervical and visualized upper thoracic spinal cord and  the cervicomedullary junction all appear intrinsically normal. There  is exaggerated kyphosis of the upper cervical spine, and there is a  subtle degenerative anterolisthesis of C4 on C5. Alignment is  otherwise normal. There is arthritic change and some soft tissue  thickening around the atlantoaxial joint with a possible erosion at  the dorsal  aspect of the base of the dens (image 7 of series 2 and 3).  This could indicate synovial-based arthritis, and clinical correlation  is recommended. No acute-appearing bony abnormality.     C2-C3: Normal disc space. No central or foraminal stenosis. Moderate  posterior facet degenerative changes on the left.     C3-C4: Normal disc space. Mild posterior facet degenerative changes  bilaterally. No significant central or foraminal stenosis.     C4-C5: Normal disc space and no central stenosis. Moderate to advanced  left and mild to moderate right posterior facet degenerative changes  with minimal anterolisthesis. At least mild left foraminal stenosis,  and the right foramen is normal.     C5-C6: Chronic advanced degenerative disc space narrowing with  prominent diffuse disc bulging but no acute-appearing disc protrusion.  Mild overall central stenosis. Moderate right and severe left  foraminal stenosis from endplate/uncinate spurring. Mild posterior  facet degenerative changes on the left.     C6-C7: Chronic advanced degenerative disc space narrowing with diffuse  disc bulging and endplate spurring but no acute disc protrusion. At  least mild overall central stenosis. Moderate bilateral foraminal  stenosis from endplate/uncinate spurring. Posterior facets are  unremarkable.     C7-T1: Normal.     T1-T2, T2-T3, T3-T4: Normal.     The paraspinal soft tissues appear normal to the extent visualized.                                                                      IMPRESSION:  1. Chronic advanced degenerative disc disease at C5-C6 and C6-C7 with  mild central stenosis. There is moderate right and severe left  foraminal stenosis at C5-C6 and moderate bilateral foraminal stenosis  at C6-C7.  2. Posterior facet degenerative changes at multiple levels as  described above. This results in a subtle degenerative anterolisthesis  of C4 on C5 with at least mild left foraminal stenosis at this level.  3. Arthritic changes of the  atlantoaxial joint with a possible erosive  change on the dens which could indicate synovial-based arthritis.    Assessment/Plan:   Tracy Mcclelland is a 73 year old female with a 6 week history of neck and shoulder pain. She denies any trauma. She states the pain originates in her neck and raidiates to her bilateral shoulders equally.     Will plan for C5-6 ACDF  Risks and benefits discussed

## 2019-06-06 NOTE — PROGRESS NOTES
"Tracy Mcclelland is a 73 year old female who presents for:  Chief Complaint   Patient presents with     Neurologic Problem     discuss possible cervical surgical options         Initial Vitals:  Temp 96.6  F (35.9  C) (Temporal)   Ht 5' 3.25\" (1.607 m)   Wt 159 lb (72.1 kg)   LMP  (LMP Unknown)   BMI 27.94 kg/m   Estimated body mass index is 27.94 kg/m  as calculated from the following:    Height as of this encounter: 5' 3.25\" (1.607 m).    Weight as of this encounter: 159 lb (72.1 kg).. Body surface area is 1.79 meters squared. BP completed using cuff size: NA (Not Taken)  Data Unavailable        Nursing Comments:         Darren Rosen CMA    "

## 2019-06-06 NOTE — PATIENT INSTRUCTIONS
Surgery scheduled at [facility] for C5-6 ACDF (anterior cervical discectomy and fusion)       Pre-Operative:  -Surgical risks: blood clots in the leg or lung, problems urinating, nerve damage, drainage from the incision, infection, stiffness.  - Pre-operative physical with primary care physician within 30 days of surgical date.   -Stop all solid foods and liquids 8 hours before surgery.    -Shower procedure: Please shower with antibacterial soap the night before surgery and the morning of surgery. Refer to information sheet in folder.   - Discontinue Aspirin, NSAIDs (Advil, Ibuprofen, Naproxen, Nuprin, Diclofenac, Meloxicam, Aleve, Celebrex) x 7 days prior to surgical date. After surgery, do not begin taking these medications until given clearance. May cause bleeding and interfere with bone healing.  - Discontinue Plavix x 7-10 days prior to surgical date, stay off Plavix 3-4 days post operatively. Discontinue Xarelto 5-7 days prior to surgery. Discontinue coumadin/warfarin 5-7 days prior to surgery. INR needs to be <1.4  - May take Tylenol for pain.  Smoking Cessation: You are advised to quit smoking immediately through recovery to help with healing and reduce risk of complications.      Post-Operative:  -Same day procedure  - Post operative pain may require pain medications and muscle relaxants. You will receive medications upon discharge.  -Do NOT drive while taking narcotic pain medication.  -Post operative incision care- Watch for signs of infection: redness, swelling, warmth, drainage, and fever of 101 degrees or higher. Notify clinic [phone].  -Keep incision clean and dry. You may shower. No submerging incision in water such as pools, hot tubs, baths for at least 8 weeks or until incision is healed.   - Post operative activity limitations for 6 weeks after surgery: no lifting > 10 pounds, limited bending, twisting, or overhead reaching. You will be re-evaluated at your follow up appointments.    -If a brace is required per Dr. Jefferson, [facility] will fit you for the brace in the hospital.  -If you are currently employed, you will need to be off work for recovery and healing. Please fax any FMLA/short term disability paperwork to [phone]. You may call our clinic when you'd like to return to work and we can provide a work letter.   - Follow up appointments: 6 week post op, 3 months post op, 6 months post op, 1 year post op. You will need to an xray before each appointment. Please call to schedule these appointments at [phone].  -Surgery folder provided to patient.    Yessica HIGGINS RN ([facility] Nurse)  [facility]  [address] [address]  T:  [phone]  F:  [phone]

## 2019-06-07 ENCOUNTER — TELEPHONE (OUTPATIENT)
Dept: FAMILY MEDICINE | Facility: OTHER | Age: 74
End: 2019-06-07

## 2019-06-07 NOTE — TELEPHONE ENCOUNTER
Please call and reschedule patient's pre-op on 6/10/19.  It appears a DR ONLY slot was used in error.  A pre-op cannot be done in that slot.  If she needs to be fit in, give her the 2:00 slot instead.  It is held for her.     Electronically signed by Skylar Gomez CNP.

## 2019-06-10 ENCOUNTER — OFFICE VISIT (OUTPATIENT)
Dept: FAMILY MEDICINE | Facility: OTHER | Age: 74
End: 2019-06-10
Payer: COMMERCIAL

## 2019-06-10 VITALS
DIASTOLIC BLOOD PRESSURE: 60 MMHG | SYSTOLIC BLOOD PRESSURE: 118 MMHG | WEIGHT: 162 LBS | HEIGHT: 63 IN | TEMPERATURE: 98.3 F | HEART RATE: 104 BPM | OXYGEN SATURATION: 95 % | BODY MASS INDEX: 28.7 KG/M2 | RESPIRATION RATE: 16 BRPM

## 2019-06-10 DIAGNOSIS — M54.2 CHRONIC NECK PAIN: ICD-10-CM

## 2019-06-10 DIAGNOSIS — Z01.818 PREOP GENERAL PHYSICAL EXAM: Primary | ICD-10-CM

## 2019-06-10 DIAGNOSIS — G89.29 CHRONIC NECK PAIN: ICD-10-CM

## 2019-06-10 DIAGNOSIS — E11.22 TYPE 2 DIABETES MELLITUS WITH CHRONIC KIDNEY DISEASE, WITHOUT LONG-TERM CURRENT USE OF INSULIN, UNSPECIFIED CKD STAGE (H): ICD-10-CM

## 2019-06-10 LAB
HBA1C MFR BLD: 7.8 % (ref 0–5.6)
TSH SERPL DL<=0.005 MIU/L-ACNC: 1.49 MU/L (ref 0.4–4)

## 2019-06-10 PROCEDURE — 36415 COLL VENOUS BLD VENIPUNCTURE: CPT | Performed by: NURSE PRACTITIONER

## 2019-06-10 PROCEDURE — 84443 ASSAY THYROID STIM HORMONE: CPT | Performed by: NURSE PRACTITIONER

## 2019-06-10 PROCEDURE — 83036 HEMOGLOBIN GLYCOSYLATED A1C: CPT | Performed by: NURSE PRACTITIONER

## 2019-06-10 PROCEDURE — 99214 OFFICE O/P EST MOD 30 MIN: CPT | Performed by: NURSE PRACTITIONER

## 2019-06-10 ASSESSMENT — PAIN SCALES - GENERAL: PAINLEVEL: MILD PAIN (2)

## 2019-06-10 ASSESSMENT — MIFFLIN-ST. JEOR: SCORE: 1207.92

## 2019-06-10 NOTE — LETTER
June 11, 2019      Tracy Mcclelland  [address] [place] [id number]        Dear ,    We are writing to inform you of your test results.    Thyroid labs were normal.  A1C has jumped up quite a bit to 7.8, likely as a result of the oral steroids she has been on.  We will recheck this again in 3 months.     Electronically signed by Skylar Gomez CNP.     Resulted Orders   Hemoglobin A1c   Result Value Ref Range    Hemoglobin A1C 7.8 (H) 0 - 5.6 %      Comment:      Normal <5.7% Prediabetes 5.7-6.4%  Diabetes 6.5% or higher - adopted from ADA   consensus guidelines.     TSH with free T4 reflex   Result Value Ref Range    TSH 1.49 0.40 - 4.00 mU/L       If you have any questions or concerns, please call the clinic at the number listed above.       Sincerely,        MACHO Cifuentes CNP

## 2019-06-10 NOTE — PATIENT INSTRUCTIONS
Hold your Lisinopril the morning of surgery.     Hold your Metformin if you are not eating.     Okay to restart your Aspirin right after.   Stop 7-10 days prior to surgery     Okay to use Ibuprofen or Aleve for arthritis pain, but stop 2-3 days prior to surgery     Labs will be done today.   For normal results, you will receive a letter with the results in about 2 weeks.  If anything is abnormal or unexpected, someone from the clinic will call you.        Before Your Surgery      Call your surgeon if there is any change in your health. This includes signs of a cold or flu (such as a sore throat, runny nose, cough, rash or fever).    Do not smoke, drink alcohol or take over the counter medicine (unless your surgeon or primary care doctor tells you to) for the 24 hours before and after surgery.    If you take prescribed drugs: Follow your doctor s orders about which medicines to take and which to stop until after surgery.    Eating and drinking prior to surgery: follow the instructions from your surgeon    Take a shower or bath the night before surgery. Use the soap your surgeon gave you to gently clean your skin. If you do not have soap from your surgeon, use your regular soap. Do not shave or scrub the surgery site.  Wear clean pajamas and have clean sheets on your bed.

## 2019-06-11 ENCOUNTER — TELEPHONE (OUTPATIENT)
Dept: NEUROSURGERY | Facility: CLINIC | Age: 74
End: 2019-06-11

## 2019-06-11 NOTE — TELEPHONE ENCOUNTER
Type of surgery: C5-6 ACDF  Location of surgery: [facility] OR  Date and time of surgery: 06/19/2019 at 10:00am  Surgeon: MARCO Jefferson  Pre-Op Appt Date: 06/10/2019 MACHO Gomez CNP ([facility])  Post-Op Appt Date: 08/02/2019   Packet sent out: Yes  Pre-cert/Authorization completed:  Yes  Date: 06/10/2019 [place]

## 2019-06-15 DIAGNOSIS — E11.22 TYPE 2 DIABETES MELLITUS WITH STAGE 2 CHRONIC KIDNEY DISEASE, WITHOUT LONG-TERM CURRENT USE OF INSULIN (H): ICD-10-CM

## 2019-06-15 DIAGNOSIS — N18.2 TYPE 2 DIABETES MELLITUS WITH STAGE 2 CHRONIC KIDNEY DISEASE, WITHOUT LONG-TERM CURRENT USE OF INSULIN (H): ICD-10-CM

## 2019-06-17 RX ORDER — GABAPENTIN 600 MG/1
TABLET ORAL
Qty: 360 TABLET | Refills: 1 | Status: SHIPPED | OUTPATIENT
Start: 2019-06-17 | End: 2019-10-12

## 2019-06-17 NOTE — TELEPHONE ENCOUNTER
Requested Prescriptions   Pending Prescriptions Disp Refills     gabapentin (NEURONTIN) 600 MG tablet [Pharmacy Med Name: GABAPENTIN 600MG TABS] 360 tablet 1     Sig: TAKE TWO TABLETS BY MOUTH THREE TIMES A DAY       There is no refill protocol information for this order

## 2019-06-17 NOTE — TELEPHONE ENCOUNTER
gabapentin (NEURONTIN) 600 MG tablet      Last Written Prescription Date:  2/19/19  Last Fill Quantity: 360,   # refills: 1  Last Office Visit: 6/10/19  Future Office visit:    Next 5 appointments (look out 90 days)    Aug 02, 2019 10:00 AM CDT  [facility] Visit with Walter Chapman PA-C  [facility] [address] [place] [id number] [phone]           Routing refill request to provider for review/approval because:  Drug not on the [facility], [facility] or [facility] refill protocol or controlled substance

## 2019-06-18 NOTE — ADDENDUM NOTE
Encounter addended by: Marianne Moreno, PT on: 6/18/2019 2:58 PM   Actions taken: Episode resolved, Sign clinical note

## 2019-06-18 NOTE — PROGRESS NOTES
Outpatient Physical Therapy Discharge Note     Patient: Tracy Mcclelland  : 1945    Beginning/End Dates of Reporting Period:  2019 to 2019    Referring Provider: isauro Santana Diagnosis: neck and shoulder pain      Client Self Report: is going to Dr Li braxton     Objective Measurements:      patient seen for 4 Rx sessions for use of IFC and hot pack in clinic and instruction in HEP at last Rx she was comleting the following   chin tucks 10x , yellow theraband internal and external rotation 10x 2x day            Goals:  Goal Identifier 1   Goal Description instruction in HEP and compliant with it 5 of 7 days    Target Date 19   Date Met      Progress:     Goal Identifier 2   Goal Description patient to be able to use her B UE without significant pain , currently spadi 23.85 goal is 15    Target Date 19   Date Met      Progress:       Progress Toward Goals:   Progress this reporting period: patient is very compliant with HEP and is getting stronger , the modalities were awesome and always made her feel much better but overall she continued to have significant pain and limited function   She was going to see Dr parker to discuss her options           Plan:  Discharge from therapy.    Discharge:    Reason for Discharge: No further expectation of progress.  Patient has failed to schedule further appointments.    Equipment Issued: none    Discharge Plan: Patient to continue home program.

## 2019-06-19 ENCOUNTER — ANESTHESIA EVENT (OUTPATIENT)
Dept: SURGERY | Facility: CLINIC | Age: 74
End: 2019-06-19
Payer: MEDICARE

## 2019-06-19 ENCOUNTER — HOSPITAL ENCOUNTER (OUTPATIENT)
Facility: CLINIC | Age: 74
Discharge: HOME | End: 2019-06-19
Attending: NEUROLOGICAL SURGERY | Admitting: NEUROLOGICAL SURGERY
Payer: MEDICARE

## 2019-06-19 ENCOUNTER — ANESTHESIA (OUTPATIENT)
Dept: SURGERY | Facility: CLINIC | Age: 74
End: 2019-06-19
Payer: MEDICARE

## 2019-06-19 ENCOUNTER — HOSPITAL ENCOUNTER (OUTPATIENT)
Dept: GENERAL RADIOLOGY | Facility: CLINIC | Age: 74
End: 2019-06-19
Attending: NEUROLOGICAL SURGERY | Admitting: NEUROLOGICAL SURGERY
Payer: MEDICARE

## 2019-06-19 VITALS
DIASTOLIC BLOOD PRESSURE: 55 MMHG | RESPIRATION RATE: 16 BRPM | OXYGEN SATURATION: 96 % | HEART RATE: 76 BPM | TEMPERATURE: 97.5 F | HEIGHT: 63 IN | WEIGHT: 162 LBS | BODY MASS INDEX: 28.7 KG/M2 | SYSTOLIC BLOOD PRESSURE: 105 MMHG

## 2019-06-19 DIAGNOSIS — E78.5 HYPERLIPIDEMIA WITH TARGET LDL LESS THAN 100: ICD-10-CM

## 2019-06-19 DIAGNOSIS — M54.12 CERVICAL RADICULOPATHY: ICD-10-CM

## 2019-06-19 DIAGNOSIS — M54.2 NECK PAIN: ICD-10-CM

## 2019-06-19 DIAGNOSIS — Z98.1 S/P CERVICAL SPINAL FUSION: Primary | ICD-10-CM

## 2019-06-19 LAB
GLUCOSE BLDC GLUCOMTR-MCNC: 127 MG/DL (ref 70–99)
GLUCOSE BLDC GLUCOMTR-MCNC: 141 MG/DL (ref 70–99)

## 2019-06-19 PROCEDURE — 27810325 ZZHC OR IMPLANT OTHER OPNP: Performed by: NEUROLOGICAL SURGERY

## 2019-06-19 PROCEDURE — 71000014 ZZH RECOVERY PHASE 1 LEVEL 2 FIRST HR: Performed by: NEUROLOGICAL SURGERY

## 2019-06-19 PROCEDURE — 22853 INSJ BIOMECHANICAL DEVICE: CPT | Mod: AS | Performed by: PHYSICIAN ASSISTANT

## 2019-06-19 PROCEDURE — 25800030 ZZH RX IP 258 OP 636: Performed by: NURSE ANESTHETIST, CERTIFIED REGISTERED

## 2019-06-19 PROCEDURE — 36000071 ZZH SURGERY LEVEL 5 W FLUORO 1ST 30 MIN: Performed by: NEUROLOGICAL SURGERY

## 2019-06-19 PROCEDURE — 25000125 ZZHC RX 250: Performed by: NURSE ANESTHETIST, CERTIFIED REGISTERED

## 2019-06-19 PROCEDURE — 22845 INSERT SPINE FIXATION DEVICE: CPT | Mod: AS | Performed by: PHYSICIAN ASSISTANT

## 2019-06-19 PROCEDURE — 40000306 ZZH STATISTIC PRE PROC ASSESS II: Performed by: NEUROLOGICAL SURGERY

## 2019-06-19 PROCEDURE — 22551 ARTHRD ANT NTRBDY CERVICAL: CPT | Performed by: NEUROLOGICAL SURGERY

## 2019-06-19 PROCEDURE — 27210794 ZZH OR GENERAL SUPPLY STERILE: Performed by: NEUROLOGICAL SURGERY

## 2019-06-19 PROCEDURE — 36000069 ZZH SURGERY LEVEL 5 EA 15 ADDTL MIN: Performed by: NEUROLOGICAL SURGERY

## 2019-06-19 PROCEDURE — 22551 ARTHRD ANT NTRBDY CERVICAL: CPT | Mod: AS | Performed by: PHYSICIAN ASSISTANT

## 2019-06-19 PROCEDURE — 37000008 ZZH ANESTHESIA TECHNICAL FEE, 1ST 30 MIN: Performed by: NEUROLOGICAL SURGERY

## 2019-06-19 PROCEDURE — 25000128 H RX IP 250 OP 636: Performed by: NURSE ANESTHETIST, CERTIFIED REGISTERED

## 2019-06-19 PROCEDURE — 27211024 ZZHC OR SUPPLY OTHER OPNP: Performed by: NEUROLOGICAL SURGERY

## 2019-06-19 PROCEDURE — A9270 NON-COVERED ITEM OR SERVICE: HCPCS | Mod: GY | Performed by: PHYSICIAN ASSISTANT

## 2019-06-19 PROCEDURE — 25000132 ZZH RX MED GY IP 250 OP 250 PS 637: Mod: GY | Performed by: PHYSICIAN ASSISTANT

## 2019-06-19 PROCEDURE — 71000027 ZZH RECOVERY PHASE 2 EACH 15 MINS: Performed by: NEUROLOGICAL SURGERY

## 2019-06-19 PROCEDURE — C1713 ANCHOR/SCREW BN/BN,TIS/BN: HCPCS | Performed by: NEUROLOGICAL SURGERY

## 2019-06-19 PROCEDURE — 40000277 XR SURGERY CARM FLUORO LESS THAN 5 MIN W STILLS: Mod: TC

## 2019-06-19 PROCEDURE — 82962 GLUCOSE BLOOD TEST: CPT | Mod: 91

## 2019-06-19 PROCEDURE — 25000566 ZZH SEVOFLURANE, EA 15 MIN: Performed by: NEUROLOGICAL SURGERY

## 2019-06-19 PROCEDURE — 22853 INSJ BIOMECHANICAL DEVICE: CPT | Performed by: NEUROLOGICAL SURGERY

## 2019-06-19 PROCEDURE — 22845 INSERT SPINE FIXATION DEVICE: CPT | Mod: 59 | Performed by: NEUROLOGICAL SURGERY

## 2019-06-19 PROCEDURE — 25000128 H RX IP 250 OP 636: Performed by: NEUROLOGICAL SURGERY

## 2019-06-19 PROCEDURE — 25000128 H RX IP 250 OP 636: Performed by: PHYSICIAN ASSISTANT

## 2019-06-19 PROCEDURE — 37000009 ZZH ANESTHESIA TECHNICAL FEE, EACH ADDTL 15 MIN: Performed by: NEUROLOGICAL SURGERY

## 2019-06-19 PROCEDURE — 25000125 ZZHC RX 250: Performed by: NEUROLOGICAL SURGERY

## 2019-06-19 DEVICE — IMPLANTABLE DEVICE: Type: IMPLANTABLE DEVICE | Site: SPINE CERVICAL | Status: FUNCTIONAL

## 2019-06-19 RX ORDER — SODIUM CHLORIDE, SODIUM LACTATE, POTASSIUM CHLORIDE, CALCIUM CHLORIDE 600; 310; 30; 20 MG/100ML; MG/100ML; MG/100ML; MG/100ML
INJECTION, SOLUTION INTRAVENOUS CONTINUOUS
Status: DISCONTINUED | OUTPATIENT
Start: 2019-06-19 | End: 2019-06-19 | Stop reason: HOSPADM

## 2019-06-19 RX ORDER — CEFAZOLIN SODIUM 2 G/100ML
2 INJECTION, SOLUTION INTRAVENOUS
Status: COMPLETED | OUTPATIENT
Start: 2019-06-19 | End: 2019-06-19

## 2019-06-19 RX ORDER — HYDROXYZINE HYDROCHLORIDE 10 MG/1
10 TABLET, FILM COATED ORAL
Status: DISCONTINUED | OUTPATIENT
Start: 2019-06-19 | End: 2019-06-19 | Stop reason: HOSPADM

## 2019-06-19 RX ORDER — PROPOFOL 10 MG/ML
INJECTION, EMULSION INTRAVENOUS PRN
Status: DISCONTINUED | OUTPATIENT
Start: 2019-06-19 | End: 2019-06-19

## 2019-06-19 RX ORDER — CEFAZOLIN SODIUM 1 G/3ML
1 INJECTION, POWDER, FOR SOLUTION INTRAMUSCULAR; INTRAVENOUS SEE ADMIN INSTRUCTIONS
Status: DISCONTINUED | OUTPATIENT
Start: 2019-06-19 | End: 2019-06-19 | Stop reason: HOSPADM

## 2019-06-19 RX ORDER — FENTANYL CITRATE 50 UG/ML
25-50 INJECTION, SOLUTION INTRAMUSCULAR; INTRAVENOUS
Status: DISCONTINUED | OUTPATIENT
Start: 2019-06-19 | End: 2019-06-19 | Stop reason: HOSPADM

## 2019-06-19 RX ORDER — ALBUTEROL SULFATE 0.83 MG/ML
2.5 SOLUTION RESPIRATORY (INHALATION) EVERY 4 HOURS PRN
Status: DISCONTINUED | OUTPATIENT
Start: 2019-06-19 | End: 2019-06-19 | Stop reason: HOSPADM

## 2019-06-19 RX ORDER — METOPROLOL TARTRATE 1 MG/ML
1-2 INJECTION, SOLUTION INTRAVENOUS EVERY 5 MIN PRN
Status: DISCONTINUED | OUTPATIENT
Start: 2019-06-19 | End: 2019-06-19 | Stop reason: HOSPADM

## 2019-06-19 RX ORDER — LIDOCAINE 40 MG/G
CREAM TOPICAL
Status: DISCONTINUED | OUTPATIENT
Start: 2019-06-19 | End: 2019-06-19 | Stop reason: HOSPADM

## 2019-06-19 RX ORDER — NALOXONE HYDROCHLORIDE 0.4 MG/ML
.1-.4 INJECTION, SOLUTION INTRAMUSCULAR; INTRAVENOUS; SUBCUTANEOUS
Status: DISCONTINUED | OUTPATIENT
Start: 2019-06-19 | End: 2019-06-19 | Stop reason: HOSPADM

## 2019-06-19 RX ORDER — HYDROXYZINE HYDROCHLORIDE 25 MG/1
25 TABLET, FILM COATED ORAL
Status: DISCONTINUED | OUTPATIENT
Start: 2019-06-19 | End: 2019-06-19 | Stop reason: HOSPADM

## 2019-06-19 RX ORDER — ONDANSETRON 2 MG/ML
INJECTION INTRAMUSCULAR; INTRAVENOUS PRN
Status: DISCONTINUED | OUTPATIENT
Start: 2019-06-19 | End: 2019-06-19

## 2019-06-19 RX ORDER — OXYCODONE HYDROCHLORIDE 5 MG/1
5-10 TABLET ORAL EVERY 4 HOURS PRN
Qty: 40 TABLET | Refills: 0 | Status: SHIPPED | OUTPATIENT
Start: 2019-06-19 | End: 2019-06-25

## 2019-06-19 RX ORDER — ONDANSETRON 2 MG/ML
4 INJECTION INTRAMUSCULAR; INTRAVENOUS EVERY 30 MIN PRN
Status: DISCONTINUED | OUTPATIENT
Start: 2019-06-19 | End: 2019-06-19 | Stop reason: HOSPADM

## 2019-06-19 RX ORDER — LIDOCAINE HYDROCHLORIDE 20 MG/ML
INJECTION, SOLUTION INFILTRATION; PERINEURAL PRN
Status: DISCONTINUED | OUTPATIENT
Start: 2019-06-19 | End: 2019-06-19

## 2019-06-19 RX ORDER — HYDRALAZINE HYDROCHLORIDE 20 MG/ML
2.5-5 INJECTION INTRAMUSCULAR; INTRAVENOUS EVERY 10 MIN PRN
Status: DISCONTINUED | OUTPATIENT
Start: 2019-06-19 | End: 2019-06-19 | Stop reason: HOSPADM

## 2019-06-19 RX ORDER — ONDANSETRON 4 MG/1
4 TABLET, ORALLY DISINTEGRATING ORAL
Status: DISCONTINUED | OUTPATIENT
Start: 2019-06-19 | End: 2019-06-19 | Stop reason: HOSPADM

## 2019-06-19 RX ORDER — MEPERIDINE HYDROCHLORIDE 25 MG/ML
12.5 INJECTION INTRAMUSCULAR; INTRAVENOUS; SUBCUTANEOUS
Status: DISCONTINUED | OUTPATIENT
Start: 2019-06-19 | End: 2019-06-19 | Stop reason: HOSPADM

## 2019-06-19 RX ORDER — HYDROXYZINE HYDROCHLORIDE 50 MG/ML
50 INJECTION, SOLUTION INTRAMUSCULAR EVERY 6 HOURS PRN
Status: DISCONTINUED | OUTPATIENT
Start: 2019-06-19 | End: 2019-06-19 | Stop reason: HOSPADM

## 2019-06-19 RX ORDER — FENTANYL CITRATE 50 UG/ML
INJECTION, SOLUTION INTRAMUSCULAR; INTRAVENOUS PRN
Status: DISCONTINUED | OUTPATIENT
Start: 2019-06-19 | End: 2019-06-19

## 2019-06-19 RX ORDER — OXYCODONE HYDROCHLORIDE 5 MG/1
5-10 TABLET ORAL
Status: COMPLETED | OUTPATIENT
Start: 2019-06-19 | End: 2019-06-19

## 2019-06-19 RX ORDER — DIMENHYDRINATE 50 MG/ML
25 INJECTION, SOLUTION INTRAMUSCULAR; INTRAVENOUS
Status: DISCONTINUED | OUTPATIENT
Start: 2019-06-19 | End: 2019-06-19 | Stop reason: HOSPADM

## 2019-06-19 RX ORDER — ONDANSETRON 4 MG/1
4 TABLET, ORALLY DISINTEGRATING ORAL EVERY 30 MIN PRN
Status: DISCONTINUED | OUTPATIENT
Start: 2019-06-19 | End: 2019-06-19 | Stop reason: HOSPADM

## 2019-06-19 RX ORDER — LIDOCAINE HYDROCHLORIDE AND EPINEPHRINE 10; 10 MG/ML; UG/ML
INJECTION, SOLUTION INFILTRATION; PERINEURAL PRN
Status: DISCONTINUED | OUTPATIENT
Start: 2019-06-19 | End: 2019-06-19 | Stop reason: HOSPADM

## 2019-06-19 RX ORDER — DIMENHYDRINATE 50 MG/ML
INJECTION, SOLUTION INTRAMUSCULAR; INTRAVENOUS PRN
Status: DISCONTINUED | OUTPATIENT
Start: 2019-06-19 | End: 2019-06-19

## 2019-06-19 RX ORDER — METHOCARBAMOL 750 MG/1
750 TABLET, FILM COATED ORAL
Status: DISCONTINUED | OUTPATIENT
Start: 2019-06-19 | End: 2019-06-19 | Stop reason: HOSPADM

## 2019-06-19 RX ORDER — AMOXICILLIN 250 MG
1-2 CAPSULE ORAL DAILY PRN
Qty: 30 TABLET | Refills: 1 | Status: SHIPPED | OUTPATIENT
Start: 2019-06-19 | End: 2019-09-20

## 2019-06-19 RX ORDER — HYDROMORPHONE HYDROCHLORIDE 1 MG/ML
.3-.5 INJECTION, SOLUTION INTRAMUSCULAR; INTRAVENOUS; SUBCUTANEOUS EVERY 10 MIN PRN
Status: DISCONTINUED | OUTPATIENT
Start: 2019-06-19 | End: 2019-06-19 | Stop reason: HOSPADM

## 2019-06-19 RX ORDER — TIZANIDINE 2 MG/1
2 TABLET ORAL 3 TIMES DAILY
Qty: 60 TABLET | Refills: 1 | Status: SHIPPED | OUTPATIENT
Start: 2019-06-19 | End: 2019-08-22

## 2019-06-19 RX ADMIN — SODIUM CHLORIDE, POTASSIUM CHLORIDE, SODIUM LACTATE AND CALCIUM CHLORIDE: 600; 310; 30; 20 INJECTION, SOLUTION INTRAVENOUS at 07:49

## 2019-06-19 RX ADMIN — PHENYLEPHRINE HYDROCHLORIDE 200 MCG: 10 INJECTION INTRAVENOUS at 08:59

## 2019-06-19 RX ADMIN — FENTANYL CITRATE 50 MCG: 50 INJECTION, SOLUTION INTRAMUSCULAR; INTRAVENOUS at 08:42

## 2019-06-19 RX ADMIN — ROCURONIUM BROMIDE 40 MG: 10 INJECTION INTRAVENOUS at 08:50

## 2019-06-19 RX ADMIN — FENTANYL CITRATE 50 MCG: 50 INJECTION, SOLUTION INTRAMUSCULAR; INTRAVENOUS at 09:29

## 2019-06-19 RX ADMIN — LIDOCAINE HYDROCHLORIDE 1 ML: 10 INJECTION, SOLUTION EPIDURAL; INFILTRATION; INTRACAUDAL; PERINEURAL at 07:49

## 2019-06-19 RX ADMIN — CEFAZOLIN SODIUM 2 G: 2 INJECTION, SOLUTION INTRAVENOUS at 09:10

## 2019-06-19 RX ADMIN — SUGAMMADEX 200 MG: 100 INJECTION, SOLUTION INTRAVENOUS at 10:06

## 2019-06-19 RX ADMIN — SODIUM CHLORIDE, POTASSIUM CHLORIDE, SODIUM LACTATE AND CALCIUM CHLORIDE: 600; 310; 30; 20 INJECTION, SOLUTION INTRAVENOUS at 09:24

## 2019-06-19 RX ADMIN — LIDOCAINE HYDROCHLORIDE 50 MG: 20 INJECTION, SOLUTION INFILTRATION; PERINEURAL at 08:50

## 2019-06-19 RX ADMIN — PROPOFOL 150 MG: 10 INJECTION, EMULSION INTRAVENOUS at 08:50

## 2019-06-19 RX ADMIN — DIMENHYDRINATE 25 MG: 50 INJECTION, SOLUTION INTRAMUSCULAR; INTRAVENOUS at 09:13

## 2019-06-19 RX ADMIN — PHENYLEPHRINE HYDROCHLORIDE 200 MCG: 10 INJECTION INTRAVENOUS at 09:13

## 2019-06-19 RX ADMIN — MIDAZOLAM 1 MG: 1 INJECTION INTRAMUSCULAR; INTRAVENOUS at 08:42

## 2019-06-19 RX ADMIN — OXYCODONE HYDROCHLORIDE 5 MG: 5 TABLET ORAL at 11:50

## 2019-06-19 RX ADMIN — ONDANSETRON 4 MG: 2 INJECTION INTRAMUSCULAR; INTRAVENOUS at 09:44

## 2019-06-19 ASSESSMENT — LIFESTYLE VARIABLES: TOBACCO_USE: 1

## 2019-06-19 ASSESSMENT — MIFFLIN-ST. JEOR: SCORE: 1207.92

## 2019-06-19 NOTE — ANESTHESIA PREPROCEDURE EVALUATION
Anesthesia Evaluation     . Pt has had prior anesthetic. Type: MAC and General    No history of anesthetic complications          ROS/MED HX    ENT/Pulmonary:     (+)tobacco use (quit 2 weeks ago), Current use 0.25 ppdx 20 years packs/day  , . .    Neurologic: Comment: Pt has neck pain with numbness and pain down bilat arms L>R    (+)other neuro Peripheral neuropathy    Cardiovascular:     (+) Dyslipidemia, hypertension-range: <140 / 90, ---. : . . . :. . Previous cardiac testing date:results:date: results:ECG reviewed date: results:NSR, normal axis, normal intervals, no acute ST/T changes c/w ischemia date: results:          METS/Exercise Tolerance:     Hematologic:         Musculoskeletal:   (+) arthritis,  other musculoskeletal- osteoarthritis right hip - presents for right EAMON      GI/Hepatic:  - neg GI/hepatic ROS      (-) GERD   Renal/Genitourinary: Comment: Stage II CRI    (+) chronic renal disease, type: CRI, Pt does not require dialysis, Pt has no history of transplant,       Endo:     (+) type II DM Not using insulin - not using insulin pump Normal glucose range: was 140's this AM not previously admitted for DM/DKA Diabetic complications: nephropathy neuropathy, .      Psychiatric:  - neg psychiatric ROS       Infectious Disease:  - neg infectious disease ROS       Malignancy:      - no malignancy   Other:    (+) No chance of pregnancy   - neg other ROS                 Physical Exam  Normal systems: cardiovascular, pulmonary and dental    Airway   Mallampati: II  TM distance: >3 FB  Neck ROM: full    Dental     Cardiovascular   Rhythm and rate: regular and normal  (-) no murmur    Pulmonary    breath sounds clear to auscultation                        Anesthesia Plan      History & Physical Review  History and physical reviewed and following examination; no interval change.    ASA Status:  3 .    NPO Status:  > 6 hours    Plan for General and ETT with Propofol and Intravenous induction. Maintenance will  be Inhalation and Balanced.    PONV prophylaxis:  Ondansetron (or other 5HT-3) and Meclizine or Dimenhydrinate  Blood sugar was 140's this AM      Postoperative Care  Postoperative pain management:  IV analgesics and Oral pain medications.      Consents  Anesthetic plan, risks, benefits and alternatives discussed with:  Patient.  Use of blood products discussed: No .   .                          .

## 2019-06-19 NOTE — ANESTHESIA CARE TRANSFER NOTE
Patient: Tracy Mcclelland    Procedure(s):  cervical 5-6 anterior cervical discectomy fusion    Diagnosis: cervical radiculopathy  Diagnosis Additional Information: No value filed.    Anesthesia Type:   General, ETT     Note:  Airway :Face Mask  Patient transferred to:PACU  Handoff Report: Identifed the Patient, Identified the Reponsible Provider, Reviewed the pertinent medical history, Discussed the surgical course, Reviewed Intra-OP anesthesia mangement and issues during anesthesia, Set expectations for post-procedure period and Allowed opportunity for questions and acknowledgement of understanding      Vitals: (Last set prior to Anesthesia Care Transfer)    CRNA VITALS  6/19/2019 0952 - 6/19/2019 1032      6/19/2019             Pulse:  88    SpO2:  100 %    Resp Rate (observed):  6  (Abnormal)                 Electronically Signed By: MACHO Dahl CRNA  June 19, 2019  10:32 AM

## 2019-06-19 NOTE — OP NOTE
Date of surgery: 6/19/2019  Surgeon: Joe Jefferson MD  Assistant: HAKAN Fuller  Note: Walter Chapman was present for and assisted with the entire surgery, and his/her role as an assistant was crucial for aid in positioning, exposure, suctioning, retraction, and closure    Preoperative diagnosis: Cervical radiculopathy  Postoperative diagnosis: Cervical radiculopathy    Procedure:  1.  C5-6 anterior discectomy and interbody arthrodesis  3.  C5-6 insertion of intervertebral graft  5.  C5 to C6 anterior spinal instrumentation with insertion of Medtronic Zevo plate and vertebral body screws  6.  Use of intraoperative microscope and fluoroscopy    EBL: 25 mL    Indications: 73-year-old female who presented with progressive neck pain and bilateral arm pain.  MRI demonstrated advanced disk degeneration at C5-6 and foraminal stenosis.  Non-surgical management without improvement.  Risks, benefits, indications, and alternatives were discussed with the patient and family in detail.  All their questions were answered, and they wished to proceed with surgery.    Description of surgery: The patient was positioned supine.  Sterile prepping and draping procedures were performed.  Antibiotics were administered and timeout was performed.  A right horizontal neck incision was performed.  The monopolar was used to divide the platysma, and the Metzenbaum scissors were used to create a plane in the fascia medial to the sternocleidomastoid muscle.  Blunt dissection was used to come down upon the anterior cervical spine.  The spinal needle was used to verify the correct level.  The monopolar was used to elevate the longus coli, and the Trimline retractor was inserted.  The 15 blade was used to perform an annulotomy in the C5-6 disc space.  A complete discectomy was performed with combination of the high-speed drill, curettes, and pituitary rongeurs.  Interbody arthrodesis was performed with a high-speed drill.  The posterior osteophytes  were removed with the drill, and the posterior longitudinal ligament was removed with the Kerrison rongeurs, with decompression of the bilateral neural foramina.  An intervertebral graft was delivered in the disc space at C5-6.  A Medtronic Zevo plate was positioned, and fixed into place with bilateral vertebral body screws at C5 and C6.  Hemostasis was achieved.  Antibiotic irrigation was performed.  The platysma and dermal layers were closed with 3-0 Vicryl sutures, and the skin was closed with a running subcuticular stitch.  There were no intraprocedural complications.

## 2019-06-19 NOTE — BRIEF OP NOTE
[facility]    Brief Operative Note    Pre-operative diagnosis: cervical radiculopathy  Post-operative diagnosis same  Procedure: Procedure(s):  cervical 5-6 anterior cervical discectomy fusion  Surgeon: Surgeon(s) and Role:     * Joe Jefferson MD - Primary     * Walter Chapman PA-C - Assisting  Anesthesia: General   Estimated blood loss: 25 mL  Drains: None  Specimens: * No specimens in log *  Findings:   None.  Complications: None.  Implants:    Implant Name Type Inv. Item Serial No.  Lot No. LRB No. Used   vitoss [id number]    MARIIA [id number] N/A 1   Medtronic Sofamor Danek     [id number] N/A 1   st screws    [employer]  N/A 4   plate    [employer]  N/A 1          Walter Chapman PA-C  [facility]

## 2019-06-19 NOTE — ANESTHESIA POSTPROCEDURE EVALUATION
Patient: Tracy Mcclelland    Procedure(s):  cervical 5-6 anterior cervical discectomy fusion    Diagnosis:cervical radiculopathy  Diagnosis Additional Information: No value filed.    Anesthesia Type:  General, ETT    Note:  Anesthesia Post Evaluation    Patient location during evaluation: Phase 2  Patient participation: Able to fully participate in evaluation  Level of consciousness: awake  Pain management: adequate  Airway patency: patent  Cardiovascular status: acceptable and hemodynamically stable  Respiratory status: acceptable, room air and nonlabored ventilation  Hydration status: stable  PONV: none     Anesthetic complications: None    Comments: Patient was happy with the anesthesia care received and no anesthesia related complications were noted.  I will follow up with the patient again if it is needed.        Last vitals:  Vitals:    06/19/19 1115 06/19/19 1130 06/19/19 1145   BP: 124/62 132/79 129/83   Pulse: 73 77 79   Resp: 18 18 18   Temp: 97.5  F (36.4  C)     SpO2: 95% 96% 95%         Electronically Signed By: MACHO Dahl CRNA  June 19, 2019  1:22 PM

## 2019-06-19 NOTE — DISCHARGE INSTRUCTIONS
Spine Clinic at [facility]   Dr. Jefferson Discharge Instructions Following Spine Surgery   [phone] ([facility] Office)   Monday - Friday; 8:00 AM - 4:00 PM   In General:   After you have had surgery on your spine, remember do not twist, or excessively flex or extend the area that you had surgery. These activities can prevent healing. Pain is normal and to be expected following surgery. Please call our office to schedule your appointment follow up appointment.   Bowel Care:   Many people have constipation (hard stools) after surgery. To help prevent constipation: Drink plenty of fluid (8-10 glasses/day); Eat more fiber, such as whole grain bread, bran cereal, and fruits and vegetables; Stay active by walking; Over the counter stool softener may also help.   Medications:   Spine surgery and pain management is unique to all patients. You will generally be given medications for pain, muscle spasms or tightness, and for constipation during the immediate post op period. It is important that you use these as prescribed. Please remember to bring your pill bottles to all of your appointments. Avoid driving while taking narcotic pain medications. Avoid alcoholic beverages while taking narcotic pain medications. You can use ice to areas of pain as needed, 20 minutes at a time. Changing positions and walking will help loosen your muscles as well. No NSAIDs (Ibuprofen, Advil, Motrin, Aleve, Naproxen) for 7 days.   Driving:   No driving while on narcotic pain medications. It is state law not to drive while under the influence of a drug to a degree which renders you incapable of safely driving. The narcotic medication you will be taking after surgery falls under this category.   Activity:   After surgery, most people feel less pain than they have had in a long time. Walking and light activities will help you regain the use of your muscles. You are encouraged to walk: start with short walks 5-10 minutes at a time  for 4-5 times per day and increase as tolerated. Stair climbing as tolerated, we recommend you use the railing. No lifting greater than 10 pounds: approximately equal to one gallon of milk. No twisting, bending in the area you have had surgery. No housework, vacuuming, laundry, leaf raking, lawn mowing, or snow removal. Wear your brace (if ordered) as directed.   Showers:   If you have sutures or staples you may shower two days after surgery. It is ok to let water run over your incision but do not touch or scrub on the incision. Pat dry immediately after showering. If there is a dressing in place, you may remove it 2 days after surgery. If you were closed with Derma malcolm (glue), you may shower without covering the incision. No baths, hot tubs, or pool activity for at least 6 weeks.   Nutrition:   In general, your diet restrictions will not change with your surgery. You may need to eat small frequent meals initially until your appetite returns. Eat plenty of high fiber foods and drink plenty of fluids. If you do not have a fluid restriction from or prior to surgery, we recommend 6-8 (8oz) glasses of water per day. Other fluids are fine, but water is best. Nausea is not uncommon; it is a common side effect to many pain medications. We recommend that you take the pain medications with food, if this does not improve your symptoms, please call us.   Smoking:   For proper healing it is required that you quit using all tobacco products. This includes smoking, chewing, nicotine gums, and nicotine patches.   Follow-Up Appointment   Neurosurgery Appointment with Dia Green PA-C or Walter Chapman PA-C at the clinic in 6 weeks. Call [phone] for appointment.   Call Dr. Jefferson at [phone] if these occur: Drainage from your incision, increased pain/redness/swelling, temperatures greater than 101.5, increased leg pain or swelling or unrelieved headaches   Go to the nearest Emergency Room if you experience: chest pain, shortness  of breath, neck swelling or swallowing problems    [facility] Anesthesia  Adult Discharge Orders & Instructions     For 24 hours after surgery    1. Get plenty of rest.  A responsible adult must stay with you for at least 24 hours after you leave the hospital.   2. Do not drive or use heavy equipment.  If you have weakness or tingling, don't drive or use heavy equipment until this feeling goes away.  3. Do not drink alcohol.  4. Avoid strenuous or risky activities.  Ask for help when climbing stairs.   5. You may feel lightheaded.  If so, sit for a few minutes before standing.  Have someone help you get up.   6. You may have a slight fever. Call the doctor if your fever is over 100 F (37.7 C) (taken under the tongue) or lasts longer than 24 hours.  7. You may have a dry mouth, a sore throat, muscle aches or trouble sleeping.  These should go away after 24 hours.  8. Do not make important or legal decisions.  We don t expect you to have any problems from the surgery or treatment you had today. Just in case, here s what to do if you have pain, upset stomach (nausea), bleeding or infection:  Pain:  Take medicines your doctor has prescribed or over-the-counter medicine they have suggested. Resting and using ice packs can help, too. For surgery on an arm or leg, raise it on a pillow to ease swelling. Call your doctor if these methods don t work.  Copyright Isaac [employer], Licensed under [employer]  Upset stomach (nausea):  Take anti-nausea medicine approved by your doctor. Drink clear liquids like apple juice, ginger ale, broth or 7-Up. Be sure to drink enough fluids. Rest can help, too. Move to normal foods when you re ready. Bleeding:  In the first 24 hours, you may see a little blood on your dressing, about the size of a quarter. You don t need to worry about this much blood, but if the blood spot keeps getting bigger:    Put pressure on the wound if you can, AND    Call your  doctor.  Copyright [employer] Licensed under [employer]  Fever/Infection: Please call your doctor if you have any of these signs:    Redness    Swelling    Wound feels warm    Pain gets worse    Bad-smelling fluid leaks from wound    Fever or chills  Call your doctor for any of the followin.  It has been over 8 to 10 hours since surgery and you are still not able to urinate (pass water).    2.  Headache for over 24 hours.    3.  Numbness, tingling or weakness in your legs the day after surgery (if you had spinal anesthesia).    24 Hour Nurse advice line: [phone]

## 2019-06-20 ENCOUNTER — TELEPHONE (OUTPATIENT)
Dept: NEUROSURGERY | Facility: OTHER | Age: 74
End: 2019-06-20

## 2019-06-20 NOTE — TELEPHONE ENCOUNTER
Called and spoke with patient. Patient is S/P C5-6 ACDF on 6/19/19. After surgery patient is doing well but reports some left shoulder pain, neck muscle soreness,  incisional pain. Explained that is typical for post op pain and will get better as she heals. Patient reports being dizzy from her meds and fell last night . She didn't get hurt she said, her son was there to check on her. No additional pain from fall. Denies hitting neck or head. Advised to take 1 tab oxycodone rather than 2. Using walker now and being very careful. Patient has been eating and drinking well. Patient has been urinating with no issues or concerns and advised patient to increase fluid, fiber intake,OTC stool softener . No issues or concerns with incision. Reminded patient to watch for s/sx of infection, reviewed incision care and post op restrictions. Pain rating 2/10. Pain is controlled by oxycodone and zanaflex. Reminded patient to schedule 6 week post op visit. All questions were answered to the best of my ability and the patient's satisfaction. Patient advised to call with any additional questions or concerns.

## 2019-06-20 NOTE — PLAN OF CARE
[facility] Same Day Surgery  Discharge Call Back  Tracy Mcclelland  1945  MRN: [id number]  Home: [phone] (home)   PCP: Skylar Gomez    We are calling to see how you're doing since your surgery/procedure with us?   Comments: doing okay  Clinical Questions  1. Have you had time to look at your discharge instructions? Do you have any questions in regards to the instructions?   Comment: yes, no  2. Do you feel your pain is being controlled with the regimen the surgeon sent you home on? (ie: prescription medications, over the counter pain medications, ice packs)   Comments: yes  3. Have you noticed any drainage on your dressing? Do you know what to do if you have bleeding as a result of your procedure?   Comments: no  4. Have you had any nausea/vomiting? Do you know how to treat this?   Comment: no  5. Have you had any signs/symptoms of infection? (ie: fever, swelling, heat, drainage or redness) Do you know what to do if you have?   Comment: no  6. Do you have a follow up appointment made with your surgeon? Do you have a number to contact them at if you need it?   Comment: yes    You may be randomly selected to fill out a [facility] survey. We would appreciate you taking the time to fill this out. It is important to us if you would answer all of the questions on the survey.

## 2019-06-20 NOTE — PROVIDER NOTIFICATION
"Patient called for their post-op follow up. Patient notified writer that she has been dizzy and has \"slid\" to the floor two times, both requiring her son to come help her up. Writer went over medications with patient finding out that they were taking tizanidine thinking that is was prescribed for dizziness when actually it can cause dizziness. No reports of leg weakness/unstableness. Dr. Jefferson was called and felt that it was medication related. No concerns for intervention at this point.     Eleni Nichols RN  "

## 2019-06-25 DIAGNOSIS — Z98.1 S/P CERVICAL SPINAL FUSION: ICD-10-CM

## 2019-06-25 RX ORDER — OXYCODONE HYDROCHLORIDE 5 MG/1
5 TABLET ORAL EVERY 4 HOURS PRN
Qty: 40 TABLET | Refills: 0 | Status: SHIPPED | OUTPATIENT
Start: 2019-06-25 | End: 2019-07-02

## 2019-06-25 NOTE — TELEPHONE ENCOUNTER
Prescription Refill Request    Medication: Oxycodone     Surgical procedure/date: S/p ACDF 6/19    Current regimen/number of tabs per day: 1 tablet every 4 hours    Last refill:  6/19    Pain scale today (0-10):  2-10/10     location: Patient is requesting for Rx to be mailed to to the [facility] pharmacy     Any patient questions or concerns: Patient is reporting right chin numbness and BUE pain 10/10 at times. She states the pain is the same as before surgery. Reassured patient that it can take time for pain to go away after surgery. Advised patient to continue with pain medications 1 every 4 hours, tylenol, ice, and add muscle relaxant in between pain medications doses if she tolerates. Patient instructed to return call if pain isn't improving.    Will forward request to care team for approval.

## 2019-07-02 ENCOUNTER — TELEPHONE (OUTPATIENT)
Dept: NEUROSURGERY | Facility: OTHER | Age: 74
End: 2019-07-02

## 2019-07-02 DIAGNOSIS — Z98.1 S/P CERVICAL SPINAL FUSION: ICD-10-CM

## 2019-07-02 RX ORDER — OXYCODONE HYDROCHLORIDE 5 MG/1
5 TABLET ORAL EVERY 4 HOURS PRN
Qty: 40 TABLET | Refills: 0 | Status: SHIPPED | OUTPATIENT
Start: 2019-07-02 | End: 2019-08-23

## 2019-07-02 NOTE — TELEPHONE ENCOUNTER
Reason for Call:  Other call back    Detailed comments: she is having a lot of pain after her surgery from 2 weeks ago. she feels the same kind of pain as before surgery. please call to advise her what to do . she would like to know if she needs to come in and if she needs a refill on pain meds.   Phone Number Patient can be reached at: Home number on file [phone] (home)    Best Time: asap    Can we leave a detailed message on this number? YES    Call taken on 7/2/2019 at 9:39 AM by Alison Sanchez

## 2019-07-02 NOTE — TELEPHONE ENCOUNTER
Contacted patient to discuss below concerns. Pain: same pain as before surgery. Had relief for 1-2 days post-op, and then pain came back. Pain is located in neck, arms, shoulders. Rates 8/10. Advised patient that it can take time for pain to go away after surgery. Encouraged her to utilize Tylenol and ice/heat.     Pt mentioned that she has only had one bowel movement since surgery and had an episode of nausea/vomiting this AM. Has been passing gas and abdomen does not feel distended. Is taking Miralax and stool softeners. Instructed pt to watch for any new N/V or distention and to go to the ED if she develops these symptoms.    6/19 post op C5-C6 anterior cervical discectomy fusion.     Oxycodone last refilled 6/25. Pt is taking 1 tablet every 4 hours, has enough for 3-4 days. Will send refill request. Rx approved and mailed to [facility] pharmacy. Pt notified may take up to 5 business days.

## 2019-07-29 ENCOUNTER — TELEPHONE (OUTPATIENT)
Dept: FAMILY MEDICINE | Facility: OTHER | Age: 74
End: 2019-07-29

## 2019-07-29 ENCOUNTER — TELEPHONE (OUTPATIENT)
Dept: NEUROSURGERY | Facility: CLINIC | Age: 74
End: 2019-07-29

## 2019-07-29 DIAGNOSIS — Z98.1 S/P CERVICAL SPINAL FUSION: Primary | ICD-10-CM

## 2019-07-29 RX ORDER — METHYLPREDNISOLONE 4 MG
TABLET, DOSE PACK ORAL
Qty: 21 TABLET | Refills: 0 | Status: SHIPPED | OUTPATIENT
Start: 2019-07-29 | End: 2019-08-23

## 2019-07-29 NOTE — TELEPHONE ENCOUNTER
Patient is calling to report a increase in pain S/p C5-6 ACDF on 6/19. She is reporting bilateral neck, arm, and left shoulder pain. She states that the pain medications do not help with the pain and is requesting a steroid to help with the pain. Will forward to care team for review.

## 2019-07-29 NOTE — TELEPHONE ENCOUNTER
Spoke with patient and informed of message below. Patient understood and will contact her surgeon.     Katie Lopez MA

## 2019-07-29 NOTE — TELEPHONE ENCOUNTER
She needs to contact her surgeon, typically steroids are not recommended after surgery as they can impair healing.      Electronically signed by Skylar Gomez CNP.

## 2019-07-29 NOTE — TELEPHONE ENCOUNTER
Called and informed patient that Dr. Jefferson did approve a medrol dosepak, e-scribed to [facility].

## 2019-07-29 NOTE — TELEPHONE ENCOUNTER
Reason for Call:  Medication or medication refill:    Do you use a [facility]?  Name of the pharmacy and phone number for the current request:  [facility] - [phone]    Name of the medication requested: Prednisone    Other request: pt is having a lot of pain after her surgery. Wondering if [facility] can give her Prednisone until her post op on 8/2/19.     Can we leave a detailed message on this number? YES    Phone number patient can be reached at: Home number on file [phone] (home)    Best Time:     Call taken on 7/29/2019 at 8:18 AM by Krissy Ding

## 2019-07-31 DIAGNOSIS — Z98.1 S/P CERVICAL SPINAL FUSION: Primary | ICD-10-CM

## 2019-08-02 ENCOUNTER — ANCILLARY PROCEDURE (OUTPATIENT)
Dept: GENERAL RADIOLOGY | Facility: CLINIC | Age: 74
End: 2019-08-02
Attending: PHYSICIAN ASSISTANT
Payer: COMMERCIAL

## 2019-08-02 ENCOUNTER — OFFICE VISIT (OUTPATIENT)
Dept: NEUROSURGERY | Facility: CLINIC | Age: 74
End: 2019-08-02
Payer: COMMERCIAL

## 2019-08-02 VITALS
SYSTOLIC BLOOD PRESSURE: 104 MMHG | OXYGEN SATURATION: 97 % | DIASTOLIC BLOOD PRESSURE: 56 MMHG | HEART RATE: 93 BPM | TEMPERATURE: 97 F

## 2019-08-02 DIAGNOSIS — Z98.1 S/P CERVICAL SPINAL FUSION: Primary | ICD-10-CM

## 2019-08-02 DIAGNOSIS — Z98.1 S/P CERVICAL SPINAL FUSION: ICD-10-CM

## 2019-08-02 PROCEDURE — 72040 X-RAY EXAM NECK SPINE 2-3 VW: CPT | Mod: TC

## 2019-08-02 PROCEDURE — 99024 POSTOP FOLLOW-UP VISIT: CPT | Performed by: PHYSICIAN ASSISTANT

## 2019-08-02 ASSESSMENT — PAIN SCALES - GENERAL: PAINLEVEL: MILD PAIN (3)

## 2019-08-02 NOTE — PROGRESS NOTES
[facility]  Neurosurgery followup:    HPI: 74-year-old female, 6 weeks out from C5-6 ACDF.  She has done well.  She does have some residual neck pain.  Radiating arm pain has improved.    Exam:  Constitutional:  Alert, well nourished, NAD.  HEENT: Normocephalic, atraumatic.   Pulm:  Without shortness of breath   CV:  No pitting edema of BLE.     Neurological:  Awake  Alert  Oriented x 3  Motor exam:     Shoulder Abduction:  Right:  5/5    Left:  5/5  Biceps:                      Right:  5/5    Left:  5/5  Triceps:                     Right:  5/5    Left:  5/5  Wrist Extensors:       Right:  5/5    Left:  5/5  Wrist Flexors:           Right:  5/5    Left:  5/5  Intrinsics:                  Right:  5/5    Left:  5/5     Able to spontaneously move U/E bilaterally  Sensation intact throughout all U/E dermatomes  Incision: Healing nicely  Imaging: AP and lateral views show the instrumentation to be in stable position, with no concern for convocation.    A/P:   6-week status post C5-6 ACDF.  She is doing well.  She should continue to gradually increase her activity.  She will follow-up with us in 6 weeks for repeat imaging and evaluation.  She voiced agreement and understanding.    - May increase lifting restriction to 20 pounds until 3 months post-op.      - Call the clinic at [phone] for increasing redness, swelling or pus draining from the incision, increased pain or any other questions and concerns.        Walter Chapman PA-C  [facility]  [address] [address]    Tel [phone]  Pager [phone]

## 2019-08-02 NOTE — NURSING NOTE
"Tracy Mcclelland is a 74 year old female who presents for:  Chief Complaint   Patient presents with     Surgical Followup     s/p cervical fusion DOS: 6/19/19        Initial Vitals:  /56   Pulse 93   Temp 97  F (36.1  C) (Temporal)   LMP  (LMP Unknown)   SpO2 97%  Estimated body mass index is 28.47 kg/m  as calculated from the following:    Height as of 6/19/19: 1.607 m (5' 3.25\").    Weight as of 6/19/19: 73.5 kg (162 lb).. There is no height or weight on file to calculate BSA. BP completed using cuff size: regular  Mild Pain (3)        Nursing Comments:         Clarissa Monique CMA    "

## 2019-08-02 NOTE — LETTER
8/2/2019         RE: Tracy Mcclelland  [address] [place] [address]        Dear Colleague,    Thank you for referring your patient, Tracy Mcclelland, to the [facility]. Please see a copy of my visit note below.    [facility]  Neurosurgery followup:    HPI: 74-year-old female, 6 weeks out from C5-6 ACDF.  She has done well.  She does have some residual neck pain.  Radiating arm pain has improved.    Exam:  Constitutional:  Alert, well nourished, NAD.  HEENT: Normocephalic, atraumatic.   Pulm:  Without shortness of breath   CV:  No pitting edema of BLE.     Neurological:  Awake  Alert  Oriented x 3  Motor exam:     Shoulder Abduction:  Right:  5/5    Left:  5/5  Biceps:                      Right:  5/5    Left:  5/5  Triceps:                     Right:  5/5    Left:  5/5  Wrist Extensors:       Right:  5/5    Left:  5/5  Wrist Flexors:           Right:  5/5    Left:  5/5  Intrinsics:                  Right:  5/5    Left:  5/5     Able to spontaneously move U/E bilaterally  Sensation intact throughout all U/E dermatomes  Incision: Healing nicely  Imaging: AP and lateral views show the instrumentation to be in stable position, with no concern for convocation.    A/P:   6-week status post C5-6 ACDF.  She is doing well.  She should continue to gradually increase her activity.  She will follow-up with us in 6 weeks for repeat imaging and evaluation.  She voiced agreement and understanding.    - May increase lifting restriction to 20 pounds until 3 months post-op.      - Call the clinic at [phone] for increasing redness, swelling or pus draining from the incision, increased pain or any other questions and concerns.        Walter Chapman PA-C  [facility]  [address]  [address] [place] [address]    Tel [phone]  Pager [phone]      Again, thank you for allowing me to participate in the care of your patient.         Sincerely,        Walter Chapman PA-C

## 2019-08-07 ENCOUNTER — TELEPHONE (OUTPATIENT)
Dept: NEUROSURGERY | Facility: CLINIC | Age: 74
End: 2019-08-07

## 2019-08-07 DIAGNOSIS — Z98.1 S/P CERVICAL SPINAL FUSION: Primary | ICD-10-CM

## 2019-08-07 DIAGNOSIS — M54.12 CERVICAL RADICULOPATHY: ICD-10-CM

## 2019-08-07 NOTE — TELEPHONE ENCOUNTER
Patient is calling to get set up for steroid injections in her shoulders. Per patient she states that at her LOV on 8/2/19 Walter CRAIN recommended she get injections in her shoulders if her pain returned. On review of office note from 8/2 I do not see any indication or mention of these injections. Patient states she is having bilateral shoulder and upper arm pain, will forward to provider to review.

## 2019-08-08 NOTE — TELEPHONE ENCOUNTER
Left detailed message with patient informing her that Walter CRAIN is recommending trigger point injections with pain management or Dr. Mejia with orthopedics in [place]. Order placed with pain management.

## 2019-08-12 ENCOUNTER — OFFICE VISIT (OUTPATIENT)
Dept: PALLIATIVE MEDICINE | Facility: CLINIC | Age: 74
End: 2019-08-12
Payer: COMMERCIAL

## 2019-08-12 VITALS
DIASTOLIC BLOOD PRESSURE: 60 MMHG | WEIGHT: 152.5 LBS | SYSTOLIC BLOOD PRESSURE: 112 MMHG | HEIGHT: 63 IN | BODY MASS INDEX: 27.02 KG/M2 | TEMPERATURE: 96.8 F

## 2019-08-12 DIAGNOSIS — M79.18 MYOFASCIAL PAIN: Primary | ICD-10-CM

## 2019-08-12 PROCEDURE — 20552 NJX 1/MLT TRIGGER POINT 1/2: CPT | Performed by: PHYSICIAN ASSISTANT

## 2019-08-12 RX ORDER — BUPIVACAINE HYDROCHLORIDE 5 MG/ML
4 INJECTION, SOLUTION PERINEURAL ONCE
Status: COMPLETED | OUTPATIENT
Start: 2019-08-12 | End: 2019-08-12

## 2019-08-12 RX ADMIN — BUPIVACAINE HYDROCHLORIDE 20 MG: 5 INJECTION, SOLUTION PERINEURAL at 11:32

## 2019-08-12 ASSESSMENT — PAIN SCALES - GENERAL: PAINLEVEL: SEVERE PAIN (7)

## 2019-08-12 ASSESSMENT — MIFFLIN-ST. JEOR: SCORE: 1159.83

## 2019-08-12 NOTE — PATIENT INSTRUCTIONS
[facility]   Post Procedure Instructions    Today you had:  trigger point injections         Medications used:  lidocaine   bupivicaine           Go to the emergency room if you develop any shortness of breath    Monitor the injection sites for signs and symptoms of infection-fever, chills, redness, swelling, warmth, or drainage to areas.    You may have soreness at injection sites for up to 24 hours.    You may apply ice to the painful areas to help minimize the discomfort of the needle pokes.    Do not apply heat to sites for at least 12 hours.    You may use anti-inflammatory medications or Tylenol for pain control if necessary    Pain Clinic phone number during work hours Monday-Friday:  [phone]    After hours provider line: [phone]

## 2019-08-12 NOTE — PROGRESS NOTES
Pre Procedure Diagnosis: myofascial pain  Post procedure Diagnosis: Same  Procedure performed: trigger point injections  Anesthesia: none  Complications: none  Operators: Kristen Rosenthal PA-C     Indications:   Tracy Mcclelland is a 74 year old female with a history of neck pain.  Exam shows myofascial pain of the muscle groups listed below and they have tried conservative treatment including physical therapy, epidural steroid injection and medications.    Options/alternatives, benefits and risks were discussed with the patient including bleeding, infection, tissue trauma and pnuemothorax.  Questions were answered to her satisfaction and she agrees to proceed. Voluntary informed consent was obtained and signed.     Vitals were reviewed: Yes  Allergies were reviewed:  Yes   Medications were reviewed:  Yes   Pre-procedure pain score: 7/10    Procedure:  After getting informed consent, a Pause for the Cause was performed.    Trigger points were identified by patient, and marked when appropriate.  The area was prepped with Chloroprep.    Using clean technique, injections were completed using a 25G, 1.5 inch needle.  After negative aspiration, injection was completed.  A total of 4 locations were injected.  When possible, tissue was retracted from the chest wall to avoid lung injury.    Muscle groups injected:  Bilateral trapezius     Injection solution contained:  4ml of 1% lidocaine and 4ml of 0.5% bupivacaine.    Hemostasis was achieved, the area was cleaned, and bandaids were placed when appropriate.  The patient tolerated the procedure well.  Breath sounds were normal.      Post-procedure pain score: 2/10  Follow-up includes:   -repeat prn  -Follow up with referring provider      CLOTILDE Will  [facility]

## 2019-08-22 DIAGNOSIS — Z98.1 S/P CERVICAL SPINAL FUSION: ICD-10-CM

## 2019-08-22 RX ORDER — TIZANIDINE 2 MG/1
2 TABLET ORAL 3 TIMES DAILY PRN
Qty: 45 TABLET | Refills: 0 | Status: SHIPPED | OUTPATIENT
Start: 2019-08-22 | End: 2019-09-20

## 2019-08-22 NOTE — TELEPHONE ENCOUNTER
Per Vinod CRAIN Yes that's fine. Probably could use zanaflex or robaxin. Nursing called to discuss with patient. Per patient happy to try a muscle relaxant but does not want a refill of narcotic. Order placed. Discussed with patient to call back if her pain continues. Pt. Agreed with plan.

## 2019-08-22 NOTE — TELEPHONE ENCOUNTER
Patient is S/p ACDF 6/19. LOV with Walter Chapman 08/02/19. On 08/07/19, she was having continued pain in her shoulders and Walter recommended trigger point injections. Pt. Stated this provided relief for one day then pain returned. Describing neck pain, bilateral arm radiating pain at 10/10. Taking Tylenol extra strength for pain.  Nursing reassured her it takes time to heal. Encouraged ice. Will discuss refill of muscle relaxant with provider. Encouraged her to continue to follow restrictions and to let her body continue to heal.

## 2019-08-23 ENCOUNTER — OFFICE VISIT (OUTPATIENT)
Dept: FAMILY MEDICINE | Facility: OTHER | Age: 74
End: 2019-08-23
Payer: COMMERCIAL

## 2019-08-23 VITALS
TEMPERATURE: 97.4 F | HEART RATE: 105 BPM | OXYGEN SATURATION: 99 % | BODY MASS INDEX: 27.25 KG/M2 | WEIGHT: 155.06 LBS | SYSTOLIC BLOOD PRESSURE: 118 MMHG | RESPIRATION RATE: 20 BRPM | DIASTOLIC BLOOD PRESSURE: 78 MMHG

## 2019-08-23 DIAGNOSIS — E78.5 HYPERLIPIDEMIA WITH TARGET LDL LESS THAN 100: ICD-10-CM

## 2019-08-23 DIAGNOSIS — E11.22 TYPE 2 DIABETES MELLITUS WITH STAGE 2 CHRONIC KIDNEY DISEASE, WITHOUT LONG-TERM CURRENT USE OF INSULIN (H): ICD-10-CM

## 2019-08-23 DIAGNOSIS — I10 BENIGN ESSENTIAL HYPERTENSION: ICD-10-CM

## 2019-08-23 DIAGNOSIS — Z00.00 ENCOUNTER FOR MEDICARE ANNUAL WELLNESS EXAM: Primary | ICD-10-CM

## 2019-08-23 DIAGNOSIS — N18.2 TYPE 2 DIABETES MELLITUS WITH STAGE 2 CHRONIC KIDNEY DISEASE, WITHOUT LONG-TERM CURRENT USE OF INSULIN (H): ICD-10-CM

## 2019-08-23 PROCEDURE — G0439 PPPS, SUBSEQ VISIT: HCPCS | Performed by: NURSE PRACTITIONER

## 2019-08-23 RX ORDER — ACETAMINOPHEN 325 MG/1
975 TABLET ORAL EVERY 8 HOURS PRN
Qty: 100 TABLET | Refills: 1 | Status: CANCELLED | OUTPATIENT
Start: 2019-08-23

## 2019-08-23 RX ORDER — SIMVASTATIN 5 MG
5 TABLET ORAL AT BEDTIME
Qty: 90 TABLET | Refills: 3 | Status: SHIPPED | OUTPATIENT
Start: 2019-08-23 | End: 2020-08-25

## 2019-08-23 RX ORDER — LISINOPRIL 20 MG/1
20 TABLET ORAL DAILY
Qty: 90 TABLET | Refills: 1 | Status: SHIPPED | OUTPATIENT
Start: 2019-08-23 | End: 2019-11-20 | Stop reason: DRUGHIGH

## 2019-08-23 RX ORDER — LORATADINE 10 MG/1
10 TABLET ORAL DAILY
Qty: 30 TABLET | Refills: 1 | Status: CANCELLED | OUTPATIENT
Start: 2019-08-23

## 2019-08-23 ASSESSMENT — ACTIVITIES OF DAILY LIVING (ADL): CURRENT_FUNCTION: NO ASSISTANCE NEEDED

## 2019-08-23 NOTE — PROGRESS NOTES
"SUBJECTIVE:   Tracy Mcclelland is a 74 year old female who presents for Preventive Visit.    Are you in the first 12 months of your Medicare coverage?  No    Healthy Habits:    In general, how would you rate your overall health?  Good    Frequency of exercise:  1 day/week    Duration of exercise:  Less than 15 minutes    Do you usually eat at least 4 servings of fruit and vegetables a day, include whole grains    & fiber and avoid regularly eating high fat or \"junk\" foods?  Yes    Taking medications regularly:  Yes    Barriers to taking medications:  Not applicable    Medication side effects:  Not applicable    Ability to successfully perform activities of daily living:  No assistance needed    Home Safety:  No safety concerns identified    Hearing Impairment:  Difficulty following a conversation in a noisy restaurant or crowded room    In the past 6 months, have you been bothered by leaking of urine?  No    In general, how would you rate your overall mental or emotional health?  Excellent      PHQ-2 Total Score:    Additional concerns today:  No    Do you feel safe in your environment? Yes    Do you have a Health Care Directive? No: Advance care planning was reviewed with patient; patient declined at this time.    Fall risk     click delete button to remove this line now  Cognitive Screening   1) Repeat 3 items (Leader, Season, Table)    2) Clock draw: NORMAL  3) 3 item recall: Recalls 3 objects  Results: NORMAL clock, 1-2 items recalled: COGNITIVE IMPAIRMENT LESS LIKELY    Mini-CogTM Copyright JASIEL Fan. Licensed by the author for use in [facility]; reprinted with permission (saroj@.[facility]). All rights reserved.      Do you have sleep apnea, excessive snoring or daytime drowsiness?: no    Reviewed and updated as needed this visit by clinical staff  Tobacco  Allergies  Meds         Reviewed and updated as needed this visit by Provider        Social History     Tobacco Use     Smoking status: Current " Every Day Smoker     Packs/day: 0.25     Years: 20.00     Pack years: 5.00     Types: Cigarettes     Smokeless tobacco: Never Used     Tobacco comment: 2-3 cigs daily   Substance Use Topics     Alcohol use: Yes     Comment: couple times monthly     If you drink alcohol do you typically have >3 drinks per day or >7 drinks per week? No    Alcohol Use 7/27/2017   Prescreen: >3 drinks/day or >7 drinks/week? The patient does not drink >3 drinks per day nor >7 drinks per week.   No flowsheet data found.    Needs refills of her medications.  All conditions are chronic.     Current providers sharing in care for this patient include:   Patient Care Team:  Skylar Gomez APRN CNP as PCP - General (Nurse Practitioner - Family)  Skylar Gomez APRN CNP as Assigned PCP    The following health maintenance items are reviewed in Epic and correct as of today:  Health Maintenance   Topic Date Due     ZOSTER IMMUNIZATION (1 of 2) 06/20/1994     PNEUMOCOCCAL IMMUNIZATION 65+ LOW/MEDIUM RISK (2 of 2 - PCV13) 07/07/2011     EYE EXAM  04/24/2018     DIABETIC FOOT EXAM  02/14/2019     FALL RISK ASSESSMENT  08/06/2019     MICROALBUMIN  08/10/2019     MEDICARE ANNUAL WELLNESS VISIT  08/06/2019     INFLUENZA VACCINE (1) 09/01/2019     A1C  12/10/2019     BMP  04/10/2020     LIPID  04/10/2020     FIT  04/24/2020     DTAP/TDAP/TD IMMUNIZATION (3 - Td) 07/07/2020     MAMMO SCREENING  04/23/2021     TSH W/FREE T4 REFLEX  06/10/2021     ADVANCE CARE PLANNING  10/17/2023     DEXA  Completed     PHQ-2  Completed     IPV IMMUNIZATION  Aged Out     MENINGITIS IMMUNIZATION  Aged Out       Review of Systems  CONSTITUTIONAL: NEGATIVE for fever, chills, change in weight  INTEGUMENTARY/SKIN: NEGATIVE for worrisome rashes, moles or lesions  EYES: NEGATIVE for vision changes or irritation  ENT/MOUTH: NEGATIVE for ear, mouth and throat problems  RESP: NEGATIVE for significant cough or SOB  BREAST: NEGATIVE for masses, tenderness or discharge  CV:  "NEGATIVE for chest pain, palpitations or peripheral edema  GI: NEGATIVE for nausea, abdominal pain, heartburn, or change in bowel habits  : NEGATIVE for frequency, dysuria, or hematuria  MUSCULOSKELETAL: NEGATIVE for significant arthralgias or myalgia  NEURO: NEGATIVE for weakness, dizziness or paresthesias  ENDOCRINE: NEGATIVE for temperature intolerance, skin/hair changes  HEME: NEGATIVE for bleeding problems  PSYCHIATRIC: NEGATIVE for changes in mood or affect    OBJECTIVE:   /78 (BP Location: Left arm, Patient Position: Sitting, Cuff Size: Adult Regular)   Pulse 105   Temp 97.4  F (36.3  C) (Temporal)   Resp 20   Wt 70.3 kg (155 lb 1 oz)   LMP  (LMP Unknown)   SpO2 99%   Breastfeeding? No   BMI 27.25 kg/m   Estimated body mass index is 27.25 kg/m  as calculated from the following:    Height as of 8/12/19: 1.607 m (5' 3.25\").    Weight as of this encounter: 70.3 kg (155 lb 1 oz).  Physical Exam  GENERAL APPEARANCE: healthy, alert and no distress  EYES: Eyes grossly normal to inspection, PERRL and conjunctivae and sclerae normal  HENT: ear canals and TM's normal, nose and mouth without ulcers or lesions, oropharynx clear and oral mucous membranes moist  NECK: no adenopathy, no asymmetry, masses, or scars and thyroid normal to palpation  RESP: lungs clear to auscultation - no rales, rhonchi or wheezes  CV: regular rate and rhythm, normal S1 S2, no S3 or S4, no murmur, click or rub, no peripheral edema and peripheral pulses strong  ABDOMEN: soft, nontender, no hepatosplenomegaly, no masses and bowel sounds normal  MS: no musculoskeletal defects are noted and gait is age appropriate without ataxia  SKIN: no suspicious lesions or rashes  NEURO: Normal strength and tone, sensory exam grossly normal, mentation intact and speech normal  PSYCH: mentation appears normal and affect normal/bright    Diagnostic Test Results:  Pending     ASSESSMENT / PLAN:   1. Encounter for Medicare annual wellness exam    2. " "Benign essential hypertension  Chronic, controlled.  No change in treatment plan.   - lisinopril (PRINIVIL/ZESTRIL) 20 MG tablet; Take 1 tablet (20 mg) by mouth daily  Dispense: 90 tablet; Refill: 1    3. Type 2 diabetes mellitus with stage 2 chronic kidney disease, without long-term current use of insulin (H)  Chronic, controlled.  No change in treatment plan.   - metFORMIN (GLUCOPHAGE) 500 MG tablet; Take 1 tablet (500 mg) by mouth 2 times daily (with meals)  Dispense: 180 tablet; Refill: 1  - Albumin Random Urine Quantitative with Creat Ratio; Future    4. Hyperlipidemia with target LDL less than 100  Chronic, controlled.  No change in treatment plan.   - simvastatin (ZOCOR) 5 MG tablet; Take 1 tablet (5 mg) by mouth At Bedtime  Dispense: 90 tablet; Refill: 3        End of Life Planning:  Patient currently has an advanced directive: Yes.  Practitioner is supportive of decision.    COUNSELING:  Reviewed preventive health counseling, as reflected in patient instructions    Estimated body mass index is 27.25 kg/m  as calculated from the following:    Height as of 8/12/19: 1.607 m (5' 3.25\").    Weight as of this encounter: 70.3 kg (155 lb 1 oz).         reports that she has been smoking cigarettes.  She has a 5.00 pack-year smoking history. She has never used smokeless tobacco.  Tobacco Cessation Action Plan: Information offered: Patient not interested at this time    Appropriate preventive services were discussed with this patient, including applicable screening as appropriate for cardiovascular disease, diabetes, osteopenia/osteoporosis, and glaucoma.  As appropriate for age/gender, discussed screening for colorectal cancer, prostate cancer, breast cancer, and cervical cancer. Checklist reviewing preventive services available has been given to the patient.    Reviewed patients plan of care and provided an AVS. The Basic Care Plan (routine screening as documented in Health Maintenance) for Tracy meets the Care " Plan requirement. This Care Plan has been established and reviewed with the Patient.    Counseling Resources:  ATP IV Guidelines  Pooled Cohorts Equation Calculator  Breast Cancer Risk Calculator  FRAX Risk Assessment  ICSI Preventive Guidelines  Dietary Guidelines for Americans, 2010  USDA's MyPlate  ASA Prophylaxis  Lung CA Screening    In addition to the preventive visit, 25  minutes of the appointment were spent evaluating and developing a treatment plan for her additional concern(s).          MACHO Cifuentes [facility]    Identified Health Risks:

## 2019-09-20 ENCOUNTER — OFFICE VISIT (OUTPATIENT)
Dept: NEUROSURGERY | Facility: CLINIC | Age: 74
End: 2019-09-20
Payer: COMMERCIAL

## 2019-09-20 ENCOUNTER — ANCILLARY PROCEDURE (OUTPATIENT)
Dept: GENERAL RADIOLOGY | Facility: CLINIC | Age: 74
End: 2019-09-20
Attending: PHYSICIAN ASSISTANT
Payer: COMMERCIAL

## 2019-09-20 VITALS
SYSTOLIC BLOOD PRESSURE: 104 MMHG | BODY MASS INDEX: 27.64 KG/M2 | RESPIRATION RATE: 18 BRPM | TEMPERATURE: 97.6 F | DIASTOLIC BLOOD PRESSURE: 68 MMHG | OXYGEN SATURATION: 95 % | HEART RATE: 105 BPM | WEIGHT: 156 LBS | HEIGHT: 63 IN

## 2019-09-20 DIAGNOSIS — Z98.1 S/P CERVICAL SPINAL FUSION: ICD-10-CM

## 2019-09-20 DIAGNOSIS — Z98.1 S/P CERVICAL SPINAL FUSION: Primary | ICD-10-CM

## 2019-09-20 PROCEDURE — 72040 X-RAY EXAM NECK SPINE 2-3 VW: CPT | Mod: TC

## 2019-09-20 PROCEDURE — 99024 POSTOP FOLLOW-UP VISIT: CPT | Performed by: PHYSICIAN ASSISTANT

## 2019-09-20 ASSESSMENT — MIFFLIN-ST. JEOR: SCORE: 1175.7

## 2019-09-20 ASSESSMENT — PAIN SCALES - GENERAL: PAINLEVEL: SEVERE PAIN (7)

## 2019-09-20 NOTE — PROGRESS NOTES
[facility]  Neurosurgery followup:    HPI: 74-year-old female, 3 months out from C5-6 ACDF.  She is overall doing well, but does have some occasional radiating arm pain.  No symptoms at rest.  She has some neck stiffness as well.  No bowel or bladder changes.    Exam:  Constitutional:  Alert, well nourished, NAD.  HEENT: Normocephalic, atraumatic.   Pulm:  Without shortness of breath   CV:  No pitting edema of BLE.     Neurological:  Awake  Alert  Oriented x 3  Motor exam:     Shoulder Abduction:  Right:  5/5    Left:  5/5  Biceps:                      Right:  5/5    Left:  5/5  Triceps:                     Right:  5/5    Left:  5/5  Wrist Extensors:       Right:  5/5    Left:  5/5  Wrist Flexors:           Right:  5/5    Left:  5/5  Intrinsics:                  Right:  5/5    Left:  5/5     Able to spontaneously move U/E bilaterally  Sensation intact throughout all U/E dermatomes  Incision: Well-healed  Imaging: AP and lateral views show the instrumentation to be in stable position at C5-6, with no concern for complication.    A/P:   3-month status post C5-6 ACDF.  She is overall doing well, but does have some residual neck pain and stiffness.  Her x-rays look stable.  At this point we can let get her started with some physical therapy to help strengthen and support her cervical spine, and they can also do some soft tissue techniques.  We will see her back in 3 months for repeat evaluation.      Walter Chapman PA-C  [facility]  [address] [address]    Tel [phone]  Pager [phone]

## 2019-09-20 NOTE — NURSING NOTE
"Tracy Mcclelland is a 74 year old female who presents for:  Chief Complaint   Patient presents with     Surgical Followup     s/p C5-6 ACDF DOS: 6/19/19        Initial Vitals:  /68   Pulse 105   Temp 97.6  F (36.4  C) (Temporal)   Resp 18   Ht 5' 3.25\" (1.607 m)   Wt 156 lb (70.8 kg)   LMP  (LMP Unknown)   SpO2 95%   BMI 27.42 kg/m   Estimated body mass index is 27.42 kg/m  as calculated from the following:    Height as of this encounter: 5' 3.25\" (1.607 m).    Weight as of this encounter: 156 lb (70.8 kg).. Body surface area is 1.78 meters squared. BP completed using cuff size: regular  Severe Pain (7)        Nursing Comments:         Clarissa Monique    "

## 2019-09-20 NOTE — LETTER
9/20/2019         RE: Tracy Mcclelland  [address] [facility] [address]        Dear Colleague,    Thank you for referring your patient, Tracy Mcclelland, to the [facility]. Please see a copy of my visit note below.    [facility]  Neurosurgery followup:    HPI: 74-year-old female, 3 months out from C5-6 ACDF.  She is overall doing well, but does have some occasional radiating arm pain.  No symptoms at rest.  She has some neck stiffness as well.  No bowel or bladder changes.    Exam:  Constitutional:  Alert, well nourished, NAD.  HEENT: Normocephalic, atraumatic.   Pulm:  Without shortness of breath   CV:  No pitting edema of BLE.     Neurological:  Awake  Alert  Oriented x 3  Motor exam:     Shoulder Abduction:  Right:  5/5    Left:  5/5  Biceps:                      Right:  5/5    Left:  5/5  Triceps:                     Right:  5/5    Left:  5/5  Wrist Extensors:       Right:  5/5    Left:  5/5  Wrist Flexors:           Right:  5/5    Left:  5/5  Intrinsics:                  Right:  5/5    Left:  5/5     Able to spontaneously move U/E bilaterally  Sensation intact throughout all U/E dermatomes  Incision: Well-healed  Imaging: AP and lateral views show the instrumentation to be in stable position at C5-6, with no concern for complication.    A/P:   3-month status post C5-6 ACDF.  She is overall doing well, but does have some residual neck pain and stiffness.  Her x-rays look stable.  At this point we can let get her started with some physical therapy to help strengthen and support her cervical spine, and they can also do some soft tissue techniques.  We will see her back in 3 months for repeat evaluation.      Walter Chapman PA-C  [facility]  [address]  [address] [place] [address]    Tel [phone]  Pager [phone]      Again, thank you for allowing me to participate in the care of your patient.        Sincerely,        Walter  JIAN Chapman PA-C

## 2019-10-08 ENCOUNTER — HOSPITAL ENCOUNTER (OUTPATIENT)
Dept: PHYSICAL THERAPY | Facility: OTHER | Age: 74
Setting detail: THERAPIES SERIES
End: 2019-10-08
Attending: PHYSICIAN ASSISTANT
Payer: MEDICARE

## 2019-10-08 PROCEDURE — 97110 THERAPEUTIC EXERCISES: CPT | Mod: GP | Performed by: PHYSICAL THERAPIST

## 2019-10-08 PROCEDURE — 97010 HOT OR COLD PACKS THERAPY: CPT | Mod: GP | Performed by: PHYSICAL THERAPIST

## 2019-10-08 PROCEDURE — 97014 ELECTRIC STIMULATION THERAPY: CPT | Mod: GP | Performed by: PHYSICAL THERAPIST

## 2019-10-08 PROCEDURE — 97161 PT EVAL LOW COMPLEX 20 MIN: CPT | Mod: GP | Performed by: PHYSICAL THERAPIST

## 2019-10-08 NOTE — PROGRESS NOTES
[facility]          OUTPATIENT PHYSICAL THERAPY ORTHOPEDIC EVALUATION  PLAN OF TREATMENT FOR OUTPATIENT REHABILITATION  (COMPLETE FOR INITIAL CLAIMS ONLY)  Patient's Last Name, First Name, M.I.  YOB: 1945  Tracy Mcclelland       Provider s Name:  [facility]   Medical Record No.  [id number]   Start of Care Date:  10/08/19   Onset Date:  06/19/19   Type:     _X__PT   ___OT   ___SLP Medical Diagnosis:  C5-6 fusion 6/19/19 Z98.1     PT Diagnosis:  neck stiffness and B shoulder pain    Visits from SOC:  1      _________________________________________________________________________________  Plan of Treatment/Functional Goals:  ADL retraining, ROM, strengthening, stretching     Electrical stimulation, Hot packs  modalities   Goals  Goal Identifier: 1  Goal Description: instruction in HEP in and compliant with it 5 of 7 days to improve qualitity of life   Target Date: 01/08/20    Goal Identifier: 2  Goal Description: patient to have improved function in B UE to allow her to reach into cupboards   Target Date: 01/08/20    Goal Identifier: 3  Goal Description: patient to have reduction in pain level currently 5-9/10 goal is 3-6/10   Target Date: 01/08/20                                                           Therapy Frequency:     Predicted Duration of Therapy Intervention:  1-2x week x 2-3 months     Marianne Moreno, PT                 I CERTIFY THE NEED FOR THESE SERVICES FURNISHED UNDER        THIS PLAN OF TREATMENT AND WHILE UNDER MY CARE     (Physician co-signature of this document indicates review and certification of the therapy plan).                       Certification Date From:  10/08/19   Certification Date To:  01/08/20    Referring Provider:  gali MABRY    Initial Assessment        See Epic Evaluation Start of Care Date: 10/08/19

## 2019-10-08 NOTE — PROGRESS NOTES
10/08/19 1000   General Information   Type of Visit Initial OP Ortho PT Evaluation   Start of Care Date 10/08/19   Referring Physician gali bhatti PAC   Patient/Family Goals Statement neck rehab    Orders Evaluate and Treat   Orders Comment strengthening and soft tissue    Date of Order 09/20/19   Certification Required? Yes   Medical Diagnosis C5-6 fusion 6/19/19 Z98.1   Surgical/Medical history reviewed Yes   Precautions/Limitations no known precautions/limitations;right hip precautions   Body Part(s)   Body Part(s) Cervical Spine   Presentation and Etiology   Pertinent history of current problem (include personal factors and/or comorbidities that impact the POC) patient has had neck pain in her neck starting may 2019 had surgery june 19 2019 and since surgery neck pain and arm pain is about the same . B UE pain right worse than left right has OA B hands will go numb and tingling after staying in one position for longer than 10-20 minutes and at night hands will go to sleep sleeps on B sides . PMH right EAMON 10/2018 , DM .    Impairments A. Pain;D. Decreased ROM;F. Decreased strength and endurance   Functional Limitations perform activities of daily living;perform desired leisure / sports activities   Symptom Location neck and B shouldes    Onset date of current episode/exacerbation 06/19/19   Chronicity Chronic   Pain rating (0-10 point scale) Best (/10);Worst (/10)   Best (/10) neck 5/10 shoulders 6/10    Worst (/10) neck 8/10 shoulder 9/10   Pain quality A. Sharp;B. Dull;C. Aching   Frequency of pain/symptoms A. Constant   Pain/symptoms exacerbated by J. ADL;K. Home tasks   Pain/symptoms eased by C. Rest;E. Changing positions;F. Certain positions   Progression of symptoms since onset: Unchanged   Prior Level of Function   Functional Level Prior Comment unable to    Current Level of Function   Current Community Support Family/friend caregiver   Patient role/employment history F. Retired   Fall Risk Screen    Fall screen completed by PT   Have you fallen 2 or more times in the past year? No   Have you fallen and had an injury in the past year? No   Is patient a fall risk? No   Cervical Spine   Cervical Flexion ROM 40   Cervical Extension ROM 30   Cervical Right Side Bending ROM 25   Cervical Left Side Bending ROM 25   Cervical Right Rotation ROM 45 pain    Cervical Left Rotation ROM 40    Shoulder AROM Screen right 90 left 85    Shoulder/Wrist/Hand Strength Comments shoulder flexion , abduction , internal and external rotation 3+to 4-/5 pain , extension 4/5 no pain  elbow flexion and extension 4+/5   Cervical/Shoulder Special Tests Comments B hands go numb at night and after being in one position after 10-20 minutes   Planned Therapy Interventions   Planned Therapy Interventions ADL retraining;ROM;strengthening;stretching   Planned Modality Interventions   Planned Modality Interventions Electrical stimulation;Hot packs   Planned Modality Interventions Comments modalities    Clinical Impression   Criteria for Skilled Therapeutic Interventions Met yes, treatment indicated   PT Diagnosis neck stiffness and B shoulder pain    Functional limitations due to impairments spadi 54.62 NDI 18   Clinical Presentation Stable/Uncomplicated   Clinical Presentation Rationale patient seen s/p cervical fusion june 19 2019 and presents with pain in neck and worse in shoulders and UE , weakness in B shoulders , stiff in neck decrease function , Rx is HEP and exercises and use of hot pack and IFC to neck and shoulders    Clinical Decision Making (Complexity) Moderate complexity   Predicted Duration of Therapy Intervention (days/wks) 1-2x week x 2-3 months    Risk & Benefits of therapy have been explained Yes   Patient, Family & other staff in agreement with plan of care Yes   Education Assessment   Preferred Learning Style Listening;Reading;Demonstration   Barriers to Learning No barriers   ORTHO GOALS   PT Ortho Eval Goals 1;2;3   Ortho  Goal 1   Goal Identifier 1   Goal Description instruction in HEP in and compliant with it 5 of 7 days to improve qualitity of life    Target Date 01/08/20   Ortho Goal 2   Goal Identifier 2   Goal Description patient to have improved function in B UE to allow her to reach into cupboards    Target Date 01/08/20   Ortho Goal 3   Goal Identifier 3   Goal Description patient to have reduction in pain level currently 5-9/10 goal is 3-6/10    Target Date 01/08/20   Total Evaluation Time   PT Eval, Low Complexity Minutes (57750) 15   Therapy Certification   Certification date from 10/08/19   Certification date to 01/08/20   Medical Diagnosis C5-6 fusion 6/19/19 Z98.1

## 2019-10-12 DIAGNOSIS — N18.2 TYPE 2 DIABETES MELLITUS WITH STAGE 2 CHRONIC KIDNEY DISEASE, WITHOUT LONG-TERM CURRENT USE OF INSULIN (H): ICD-10-CM

## 2019-10-12 DIAGNOSIS — E11.22 TYPE 2 DIABETES MELLITUS WITH STAGE 2 CHRONIC KIDNEY DISEASE, WITHOUT LONG-TERM CURRENT USE OF INSULIN (H): ICD-10-CM

## 2019-10-14 ENCOUNTER — HOSPITAL ENCOUNTER (OUTPATIENT)
Dept: PHYSICAL THERAPY | Facility: OTHER | Age: 74
Setting detail: THERAPIES SERIES
End: 2019-10-14
Attending: PHYSICIAN ASSISTANT
Payer: MEDICARE

## 2019-10-14 DIAGNOSIS — Z98.1 S/P CERVICAL SPINAL FUSION: ICD-10-CM

## 2019-10-14 PROCEDURE — 97010 HOT OR COLD PACKS THERAPY: CPT | Mod: GP | Performed by: PHYSICAL THERAPIST

## 2019-10-14 PROCEDURE — 97014 ELECTRIC STIMULATION THERAPY: CPT | Mod: GP | Performed by: PHYSICAL THERAPIST

## 2019-10-14 RX ORDER — GABAPENTIN 600 MG/1
TABLET ORAL
Qty: 360 TABLET | Refills: 1 | Status: SHIPPED | OUTPATIENT
Start: 2019-10-14 | End: 2020-02-12

## 2019-10-23 ENCOUNTER — HOSPITAL ENCOUNTER (OUTPATIENT)
Dept: PHYSICAL THERAPY | Facility: OTHER | Age: 74
Setting detail: THERAPIES SERIES
End: 2019-10-23
Attending: PHYSICIAN ASSISTANT
Payer: MEDICARE

## 2019-10-23 PROCEDURE — 97014 ELECTRIC STIMULATION THERAPY: CPT | Mod: GP | Performed by: PHYSICAL THERAPIST

## 2019-10-23 PROCEDURE — 97110 THERAPEUTIC EXERCISES: CPT | Mod: GP | Performed by: PHYSICAL THERAPIST

## 2019-10-23 PROCEDURE — 97010 HOT OR COLD PACKS THERAPY: CPT | Mod: GP | Performed by: PHYSICAL THERAPIST

## 2019-10-30 ENCOUNTER — TELEPHONE (OUTPATIENT)
Dept: NEUROSURGERY | Facility: CLINIC | Age: 74
End: 2019-10-30

## 2019-10-30 ENCOUNTER — HOSPITAL ENCOUNTER (OUTPATIENT)
Dept: PHYSICAL THERAPY | Facility: OTHER | Age: 74
Setting detail: THERAPIES SERIES
End: 2019-10-30
Attending: PHYSICIAN ASSISTANT
Payer: MEDICARE

## 2019-10-30 PROCEDURE — 97014 ELECTRIC STIMULATION THERAPY: CPT | Mod: GP | Performed by: PHYSICAL THERAPIST

## 2019-10-30 PROCEDURE — 97010 HOT OR COLD PACKS THERAPY: CPT | Mod: GP | Performed by: PHYSICAL THERAPIST

## 2019-10-30 NOTE — TELEPHONE ENCOUNTER
Patient called with questions regarding new pain she is having her hands. She is s/p cervical 5-6 ACDF. She states she has been doing PT for 3 weeks and it has been helping. However, last week her physical therapist recommended she do some wrist exercises and that is when the pain started. Says the pain is in her bilateral hands, feels like pins and needles, left is worse than the right, can hardly move her hands. Informed her that I will route message to Walter Chapman to see if he has any further recommendations. No further questions or concerns at this time.

## 2019-11-06 ENCOUNTER — HOSPITAL ENCOUNTER (OUTPATIENT)
Dept: PHYSICAL THERAPY | Facility: OTHER | Age: 74
Setting detail: THERAPIES SERIES
End: 2019-11-06
Attending: PHYSICIAN ASSISTANT
Payer: MEDICARE

## 2019-11-06 PROCEDURE — 97010 HOT OR COLD PACKS THERAPY: CPT | Mod: GP | Performed by: PHYSICAL THERAPIST

## 2019-11-06 PROCEDURE — 97014 ELECTRIC STIMULATION THERAPY: CPT | Mod: GP | Performed by: PHYSICAL THERAPIST

## 2019-11-14 NOTE — TELEPHONE ENCOUNTER
Tracy called today stating that she is waiting for a call back in regards to some concerns she has with her hands. She states that she is s/p 6/19/19 C5-6 ACDF and on 9/20/19 she had her 3 month follow up with Walter Chapman PA-C and at that time things were going well. She states over the past approximately 2 weeks she has concerns in regards to her hands. She states that at this time she has bilateral hand swelling, numbness, weakness and pain in her hands. She states that she can not even lift her spoon to eat her cereal anymore. She rates her pain in both hands at a 10/10. She said she is taking 1000 mg of tylenol 3 times per day to help alleviate some of the pain. She mentioned she does suffer from Type 2 diabetes but does not think this is related to that as her feet are not swollen. She has a follow up scheduled to discuss these symptoms with her PCP on 11/20/19 as well but would really like a call back form us to get Vinod's opinion on this. She states that she should be reachable all day today but it is ok to leave a detailed message if unable to reach her.

## 2019-11-19 ENCOUNTER — HOSPITAL ENCOUNTER (EMERGENCY)
Facility: CLINIC | Age: 74
Discharge: HOME | End: 2019-11-19
Attending: EMERGENCY MEDICINE | Admitting: EMERGENCY MEDICINE
Payer: MEDICARE

## 2019-11-19 ENCOUNTER — APPOINTMENT (OUTPATIENT)
Dept: MRI IMAGING | Facility: CLINIC | Age: 74
End: 2019-11-19
Attending: EMERGENCY MEDICINE
Payer: MEDICARE

## 2019-11-19 VITALS
RESPIRATION RATE: 14 BRPM | BODY MASS INDEX: 28.29 KG/M2 | WEIGHT: 161 LBS | SYSTOLIC BLOOD PRESSURE: 97 MMHG | DIASTOLIC BLOOD PRESSURE: 70 MMHG | OXYGEN SATURATION: 93 % | TEMPERATURE: 97.6 F | HEART RATE: 84 BPM

## 2019-11-19 DIAGNOSIS — R20.0 NUMBNESS AND TINGLING IN BOTH HANDS: ICD-10-CM

## 2019-11-19 DIAGNOSIS — M79.89 BILATERAL HAND SWELLING: ICD-10-CM

## 2019-11-19 DIAGNOSIS — R20.2 NUMBNESS AND TINGLING IN BOTH HANDS: ICD-10-CM

## 2019-11-19 LAB
ALBUMIN SERPL-MCNC: 3.3 G/DL (ref 3.4–5)
ALP SERPL-CCNC: 67 U/L (ref 40–150)
ALT SERPL W P-5'-P-CCNC: 13 U/L (ref 0–50)
ANION GAP SERPL CALCULATED.3IONS-SCNC: 8 MMOL/L (ref 3–14)
AST SERPL W P-5'-P-CCNC: 15 U/L (ref 0–45)
BASOPHILS # BLD AUTO: 0 10E9/L (ref 0–0.2)
BASOPHILS NFR BLD AUTO: 0.4 %
BILIRUB SERPL-MCNC: 0.3 MG/DL (ref 0.2–1.3)
BUN SERPL-MCNC: 12 MG/DL (ref 7–30)
CALCIUM SERPL-MCNC: 8.3 MG/DL (ref 8.5–10.1)
CHLORIDE SERPL-SCNC: 101 MMOL/L (ref 94–109)
CO2 SERPL-SCNC: 25 MMOL/L (ref 20–32)
CREAT SERPL-MCNC: 0.7 MG/DL (ref 0.52–1.04)
DIFFERENTIAL METHOD BLD: NORMAL
EOSINOPHIL NFR BLD AUTO: 0.5 %
ERYTHROCYTE [DISTWIDTH] IN BLOOD BY AUTOMATED COUNT: 13.7 % (ref 10–15)
GFR SERPL CREATININE-BSD FRML MDRD: 85 ML/MIN/{1.73_M2}
GLUCOSE SERPL-MCNC: 108 MG/DL (ref 70–99)
HCT VFR BLD AUTO: 38.3 % (ref 35–47)
HGB BLD-MCNC: 12.6 G/DL (ref 11.7–15.7)
IMM GRANULOCYTES # BLD: 0 10E9/L (ref 0–0.4)
IMM GRANULOCYTES NFR BLD: 0.4 %
LYMPHOCYTES # BLD AUTO: 3.1 10E9/L (ref 0.8–5.3)
LYMPHOCYTES NFR BLD AUTO: 38.5 %
MCH RBC QN AUTO: 31.8 PG (ref 26.5–33)
MCHC RBC AUTO-ENTMCNC: 32.9 G/DL (ref 31.5–36.5)
MCV RBC AUTO: 97 FL (ref 78–100)
MONOCYTES # BLD AUTO: 0.6 10E9/L (ref 0–1.3)
MONOCYTES NFR BLD AUTO: 8 %
NEUTROPHILS # BLD AUTO: 4.2 10E9/L (ref 1.6–8.3)
NEUTROPHILS NFR BLD AUTO: 52.2 %
NRBC # BLD AUTO: 0 10*3/UL
NRBC BLD AUTO-RTO: 0 /100
NT-PROBNP SERPL-MCNC: 238 PG/ML (ref 0–1800)
PLATELET # BLD AUTO: 291 10E9/L (ref 150–450)
POTASSIUM SERPL-SCNC: 5 MMOL/L (ref 3.4–5.3)
PROT SERPL-MCNC: 6.9 G/DL (ref 6.8–8.8)
RBC # BLD AUTO: 3.96 10E12/L (ref 3.8–5.2)
SODIUM SERPL-SCNC: 134 MMOL/L (ref 133–144)
WBC # BLD AUTO: 8 10E9/L (ref 4–11)

## 2019-11-19 PROCEDURE — 99284 EMERGENCY DEPT VISIT MOD MDM: CPT | Mod: 25 | Performed by: EMERGENCY MEDICINE

## 2019-11-19 PROCEDURE — 83880 ASSAY OF NATRIURETIC PEPTIDE: CPT | Performed by: EMERGENCY MEDICINE

## 2019-11-19 PROCEDURE — 99284 EMERGENCY DEPT VISIT MOD MDM: CPT | Mod: Z6 | Performed by: EMERGENCY MEDICINE

## 2019-11-19 PROCEDURE — 72141 MRI NECK SPINE W/O DYE: CPT

## 2019-11-19 PROCEDURE — 85025 COMPLETE CBC W/AUTO DIFF WBC: CPT | Performed by: EMERGENCY MEDICINE

## 2019-11-19 PROCEDURE — 80053 COMPREHEN METABOLIC PANEL: CPT | Performed by: EMERGENCY MEDICINE

## 2019-11-19 PROCEDURE — 25000132 ZZH RX MED GY IP 250 OP 250 PS 637: Mod: GY | Performed by: EMERGENCY MEDICINE

## 2019-11-19 RX ORDER — OXYCODONE HYDROCHLORIDE 5 MG/1
5 TABLET ORAL
Status: COMPLETED | OUTPATIENT
Start: 2019-11-19 | End: 2019-11-19

## 2019-11-19 RX ORDER — PREDNISONE 10 MG/1
TABLET ORAL
Qty: 22 TABLET | Refills: 0 | Status: SHIPPED | OUTPATIENT
Start: 2019-11-19 | End: 2019-12-05

## 2019-11-19 RX ADMIN — OXYCODONE HYDROCHLORIDE 5 MG: 5 TABLET ORAL at 12:37

## 2019-11-19 NOTE — DISCHARGE INSTRUCTIONS
Continue Tylenol as needed.    Try to elevate your feet and hands as much as possible to decrease the swelling.    Start prednisone as prescribed.  Take with food or milk.    Follow-up at your scheduled appointment with your primary care provider and with neurosurgery.    I will send a message to your primary care provider about your blood pressures.  I have recommended that you hold off on taking your lisinopril tomorrow.  She may want to decrease the dosing.    Return for worsening, changes or concerns.    I hope that you start to improve quickly!!

## 2019-11-19 NOTE — ED AVS SNAPSHOT
[facility] Emergency Department  [address] [place] [address]  Phone:  [phone]  Fax:  [phone]                                    Tracy Mcclelland   MRN: [id number]    Department:  [facility] Emergency Department   Date of Visit:  11/19/2019           After Visit Summary Signature Page    I have received my discharge instructions, and my questions have been answered. I have discussed any challenges I see with this plan with the nurse or doctor.    ..........................................................................................................................................  Patient/Patient Representative Signature      ..........................................................................................................................................  Patient Representative Print Name and Relationship to Patient    ..................................................               ................................................  Date                                   Time    ..........................................................................................................................................  Reviewed by Signature/Title    ...................................................              ..............................................  Date                                               Time          22EPIC Rev 08/18

## 2019-11-19 NOTE — ED PROVIDER NOTES
History     Chief Complaint   Patient presents with     Joint Swelling     HPI  History per patient and medical records    This is a 74-year-old female with history of chronic kidney disease, hypertension, type 2 diabetes, hyperlipidemia, neck pain status post C5/6 anterior cervical discectomy/fusion 6/19/19 presenting with hand swelling.  Patient notes that she had bilateral arm pain associated with neck pain.  She had slow improvement of her symptoms postoperatively but notes that is soon as the arm pain improved she started getting bilateral hand pain.  She actually describes this as a numb/tingling/shooting pain in bilateral hands.  She notes that they have been swollen and sore for over 2 weeks.  She describes tightness in all of her fingers, none seem worse in the other although she might have increased discomfort in the right greater than the left.  She denies any wrist, elbow, shoulder pain.  No trauma.  She has been sitting in a chair with a footstool to try to sleep at night so that her arms can dangle which improved some of her discomfort.  She is now noted about a day of swelling in both feet.  No chest pain, shortness of breath.  No fevers or chills.  No nausea or vomiting.  She has had an episode of diarrhea for the last 2 days, none today.  No known kidney disease.  She is not on blood thinners or diuretics.  She has not been very active.  She does smoke.    Allergies:  Allergies   Allergen Reactions     Mold      sneezing, coughing , runny nose, watery eyes & red,     No Known Drug Allergies        Problem List:    Patient Active Problem List    Diagnosis Date Noted     Status post total hip replacement, right 10/16/2018     Priority: Medium     Primary osteoarthritis of right hip 07/02/2018     Priority: Medium     CKD (chronic kidney disease) stage 2, GFR 60-89 ml/min 10/19/2015     Priority: Medium     Hereditary and idiopathic peripheral neuropathy 07/02/2015     Priority: Medium     Problem  list name updated by automated process. Provider to review       Granuloma annulare 08/07/2014     Priority: Medium     Right dorsal hand       Hypertension, goal below 140/90 07/17/2012     Priority: Medium     Type 2 diabetes mellitus with diabetic chronic kidney disease (H) 11/02/2011     Priority: Medium     Tobacco use disorder 10/07/2010     Priority: Medium     Tennis Elbow-left 07/28/2010     Priority: Medium     Microalbuminuria 01/29/2010     Priority: Medium     Hyperlipidemia with target LDL less than 100 01/28/2010     Priority: Medium     Diagnosis updated by automated process. Provider to review and confirm.          Past Medical History:    Past Medical History:   Diagnosis Date     CKD (chronic kidney disease) stage 2, GFR 60-89 ml/min 10/19/2015     Headache(784.0)      Lumbago      Type II or unspecified type diabetes mellitus without mention of complication, not stated as uncontrolled      Unspecified essential hypertension        Past Surgical History:    Past Surgical History:   Procedure Laterality Date     ARTHROPLASTY HIP Right 10/16/2018    Procedure: right total hip arthroplasty;  Surgeon: Terrell Ervin DO;  Location: [facility]     C LIGATE FALLOPIAN TUBE       DISCECTOMY, FUSION CERVICAL ANTERIOR ONE LEVEL, COMBINED N/A 6/19/2019    Procedure: cervical 5-6 anterior cervical discectomy fusion;  Surgeon: Joe Jefferson MD;  Location: [facility] OR     HC LAPAROSCOPY, SURGICAL; CHOLECYSTECTOMY  2000    Cholecystectomy, Laparoscopic     HC REMOVE TONSILS/ADENOIDS,<13 Y/O      T & A <11y.o.     INJECT EPIDURAL CERVICAL N/A 5/17/2019    Procedure: Epidural Steroid Injection Bilateral Cervical 6-7;  Surgeon: Carlos Avendaño MD;  Location: [facility] OR       Family History:    Family History   Problem Relation Age of Onset     Heart Disease Mother         Pacemaker     Alzheimer Disease Father         Dementia/Loss of memory     Cerebrovascular Disease Maternal Grandmother      Heart Disease  Maternal Grandfather         MI     Diabetes Paternal Grandmother        Social History:  Marital Status:   [4]  Social History     Tobacco Use     Smoking status: Current Every Day Smoker     Packs/day: 0.25     Years: 20.00     Pack years: 5.00     Types: Cigarettes     Smokeless tobacco: Never Used     Tobacco comment: 2-3 cigs daily   Substance Use Topics     Alcohol use: Yes     Comment: couple times monthly     Drug use: No        Medications:    predniSONE (DELTASONE) 10 MG tablet  acetaminophen (TYLENOL) 325 MG tablet  aspirin (ASA) 81 MG tablet  gabapentin (NEURONTIN) 600 MG tablet  lisinopril (PRINIVIL/ZESTRIL) 20 MG tablet  loratadine (CLARITIN) 10 MG tablet  metFORMIN (GLUCOPHAGE) 500 MG tablet  simvastatin (ZOCOR) 5 MG tablet      Review of Systems   All other ROS reviewed and are negative or non-contributory except as stated in HPI.     Physical Exam   BP: 107/65  Pulse: 69  Temp: 97.6  F (36.4  C)  Resp: 14  Weight: 73 kg (161 lb)  SpO2: 93 %      Physical Exam  Vitals signs and nursing note reviewed.   Constitutional:       Appearance: Normal appearance. She is normal weight.      Comments: Older female sitting in the bed   HENT:      Head: Normocephalic.      Nose: Nose normal.      Mouth/Throat:      Mouth: Mucous membranes are moist.      Pharynx: Oropharynx is clear.   Eyes:      Extraocular Movements: Extraocular movements intact.      Conjunctiva/sclera: Conjunctivae normal.   Neck:      Musculoskeletal: Normal range of motion.      Comments: Surgical site is completely healed with little residual scar.  She has some paraspinous muscular tightness in the cervical spine.  Cardiovascular:      Rate and Rhythm: Normal rate and regular rhythm.      Pulses: Normal pulses.      Heart sounds: Normal heart sounds.   Pulmonary:      Effort: Pulmonary effort is normal.      Breath sounds: Normal breath sounds.   Musculoskeletal: Normal range of motion.      Comments: Bilateral pedal edema  Mild  swelling of bilateral hands.  Subjective tingling/numbness at all the fingertips.  Some possible decreased strength vs volitional decreased strength.  Normal wrist, shoulder, elbow bilaterally.   Skin:     General: Skin is warm and dry.   Neurological:      General: No focal deficit present.      Mental Status: She is alert and oriented to person, place, and time.   Psychiatric:         Mood and Affect: Mood normal.         Behavior: Behavior normal.         ED Course (with Medical Decision Making)    Pt seen and examined by me.  RN and EPIC notes reviewed.      Patient with bilateral hand numbness/tingling and some swelling.  She also has pedal edema.  No obvious significant findings on exam except for subjective decreased sensation, possibly slight decreased  strength.    I think that the pedal edema is from sitting in a chair and not ambulating.  Not sure if the bilateral hand symptoms are due to her neck or some other cause.  It would be unusual to acquire bilateral carpal tunnel such as short time.  She does have a few rheumatoid changes.    I did attempt to contact Walter Chapman, neurosurgery.  There is no one from there department on call at this point.  I have elected to do an MRI scan of the neck.  I will check some basic labs including BMP because of the edema.    Labs are unremarkable.   times shows interval fusion procedure and degenerative changes as as noted below.  They did not note any obvious acute abnormalities.  Will await follow-up appointment with neurosurgery for them to review these findings to see if there is any small abnormality on their read.  At this point, I recommend trying to elevate the feet, possibly elevate the hands.  She notes she usually has improvement when she has been on steroids so I am going to give her a prednisone taper.  Neurosurgery appointment already made.    Patient also has an appointment to follow-up with her primary care provider tomorrow.  In the ED she  has been mildly hypotensive.  Apparently she has noted her blood pressures have been on the lower side for a while now.  I am going to have her hold off on taking her lisinopril tomorrow until she follows up.  May need decreased dosing.  Return at anytime for worsening, changes or concerns.        Procedures    Results for orders placed or performed during the hospital encounter of 11/19/19 (from the past 24 hour(s))   NT pro BNP   Result Value Ref Range    N-Terminal Pro BNP Inpatient 238 0 - 1,800 pg/mL   CBC with platelets differential   Result Value Ref Range    WBC 8.0 4.0 - 11.0 10e9/L    RBC Count 3.96 3.8 - 5.2 10e12/L    Hemoglobin 12.6 11.7 - 15.7 g/dL    Hematocrit 38.3 35.0 - 47.0 %    MCV 97 78 - 100 fl    MCH 31.8 26.5 - 33.0 pg    MCHC 32.9 31.5 - 36.5 g/dL    RDW 13.7 10.0 - 15.0 %    Platelet Count 291 150 - 450 10e9/L    Diff Method Automated Method     % Neutrophils 52.2 %    % Lymphocytes 38.5 %    % Monocytes 8.0 %    % Eosinophils 0.5 %    % Basophils 0.4 %    % Immature Granulocytes 0.4 %    Nucleated RBCs 0 0 /100    Absolute Neutrophil 4.2 1.6 - 8.3 10e9/L    Absolute Lymphocytes 3.1 0.8 - 5.3 10e9/L    Absolute Monocytes 0.6 0.0 - 1.3 10e9/L    Absolute Basophils 0.0 0.0 - 0.2 10e9/L    Abs Immature Granulocytes 0.0 0 - 0.4 10e9/L    Absolute Nucleated RBC 0.0    Comprehensive metabolic panel   Result Value Ref Range    Sodium 134 133 - 144 mmol/L    Potassium 5.0 3.4 - 5.3 mmol/L    Chloride 101 94 - 109 mmol/L    Carbon Dioxide 25 20 - 32 mmol/L    Anion Gap 8 3 - 14 mmol/L    Glucose 108 (H) 70 - 99 mg/dL    Urea Nitrogen 12 7 - 30 mg/dL    Creatinine 0.70 0.52 - 1.04 mg/dL    GFR Estimate 85 >60 mL/min/[1.73_m2]    GFR Estimate If Black >90 >60 mL/min/[1.73_m2]    Calcium 8.3 (L) 8.5 - 10.1 mg/dL    Bilirubin Total 0.3 0.2 - 1.3 mg/dL    Albumin 3.3 (L) 3.4 - 5.0 g/dL    Protein Total 6.9 6.8 - 8.8 g/dL    Alkaline Phosphatase 67 40 - 150 U/L    ALT 13 0 - 50 U/L    AST 15 0 - 45 U/L    Cervical spine MRI w/o contrast    Narrative    MR CERVICAL SPINE WITHOUT CONTRAST 11/19/2019 11:56 AM     HISTORY: C5-C6 fusion (ACDF). Bilateral hand pain and swelling.    TECHNIQUE: Multiplanar multisequence images were obtained through the  cervical spine without contrast.    COMPARISON: 4/18/2019.    FINDINGS: During the interval, there has been a C5-C6 anterior  discectomy and fusion procedure. Posterior alignment is normal. There  is no evidence for craniovertebral or cervicomedullary junction  abnormality. The cervical cord is normal morphology and signal  characteristics. Disc space narrowing is present at C6-C7. Bone marrow  signal intensity is normal.    C1-C2: Normal    C2-C3: Normal.    C3-C4: Mild to moderate facet hypertrophy is present along with some  minimal disc bulging. There is some mild central canal stenosis.    C4-C5: Moderate left-sided facet hypertrophy is present. There is no  significant stenosis. Intervertebral disc is within normal limits.    C5-C6: There has been interval fusion anteriorly. There is no  stenosis.    C6-C7: Broad-based posterior osteophyte disc complex is present  causing mild-to-moderate central canal stenosis and moderate bilateral  neural foraminal stenosis.    C7-T1: Normal.    Paraspinal soft tissues: Unremarkable as visualized.      Impression    IMPRESSION:  1. Interval C5-C6 anterior fusion procedure.  2. C6-C7 degenerative disc disease with secondary mild-to-moderate  central canal stenosis and moderate bilateral neural foraminal  stenosis.  3. Mild degenerative central canal stenosis at C3-C4.    YAHAIRA REECE MD       Medications   oxyCODONE (ROXICODONE) tablet 5 mg (5 mg Oral Given 11/19/19 1237)       Assessments & Plan     I have reviewed the findings, diagnosis, plan and need for follow up with the patient.    Discharge Medication List as of 11/19/2019  1:22 PM      START taking these medications    Details   predniSONE (DELTASONE) 10 MG tablet Take 4  tablets daily for 2 days,  take 3 tablets daily for 2 days, take 2 tablet daily for 2 days, take once tablet for 2 days, then take 1/2 tablet daily for 2 days., Disp-22 tablet, R-0, Local Print             Final diagnoses:   Bilateral hand swelling   Numbness and tingling in both hands     Disposition: Patient discharged home in stable condition.  Plan as above.  Return for concerns.     Note: Chart documentation done in part with Dragon Voice Recognition software. Although reviewed after completion, some word and grammatical errors may remain.     11/19/2019   [facility] EMERGENCY DEPARTMENT     Roseanna De Anda MD  11/19/19 8993

## 2019-11-19 NOTE — ED TRIAGE NOTES
Patient arrives with complaints of swelling and pain to her hands, and also swelling to her ankles and feet. She says she has an appointment with Skylar Gomez tomorrow for the same. She is on Gabapentin and Metformin for Type 2 Diabetes. And has seen Dr Jefferson for the tingling in her hands.

## 2019-11-20 ENCOUNTER — OFFICE VISIT (OUTPATIENT)
Dept: FAMILY MEDICINE | Facility: OTHER | Age: 74
End: 2019-11-20
Payer: COMMERCIAL

## 2019-11-20 ENCOUNTER — HOSPITAL ENCOUNTER (OUTPATIENT)
Dept: PHYSICAL THERAPY | Facility: OTHER | Age: 74
Setting detail: THERAPIES SERIES
End: 2019-11-20
Attending: PHYSICIAN ASSISTANT
Payer: MEDICARE

## 2019-11-20 VITALS
HEART RATE: 76 BPM | SYSTOLIC BLOOD PRESSURE: 96 MMHG | DIASTOLIC BLOOD PRESSURE: 60 MMHG | HEIGHT: 63 IN | RESPIRATION RATE: 18 BRPM | WEIGHT: 158 LBS | OXYGEN SATURATION: 98 % | TEMPERATURE: 96.6 F | BODY MASS INDEX: 28 KG/M2

## 2019-11-20 DIAGNOSIS — M54.10 RADICULOPATHY AFFECTING UPPER EXTREMITY: Primary | ICD-10-CM

## 2019-11-20 DIAGNOSIS — I10 HYPERTENSION, GOAL BELOW 140/90: ICD-10-CM

## 2019-11-20 DIAGNOSIS — M79.89 LEG SWELLING: ICD-10-CM

## 2019-11-20 PROCEDURE — 97010 HOT OR COLD PACKS THERAPY: CPT | Mod: GP | Performed by: PHYSICAL THERAPIST

## 2019-11-20 PROCEDURE — 99214 OFFICE O/P EST MOD 30 MIN: CPT | Performed by: NURSE PRACTITIONER

## 2019-11-20 PROCEDURE — 97014 ELECTRIC STIMULATION THERAPY: CPT | Mod: GP | Performed by: PHYSICAL THERAPIST

## 2019-11-20 ASSESSMENT — MIFFLIN-ST. JEOR: SCORE: 1184.77

## 2019-11-20 ASSESSMENT — PAIN SCALES - GENERAL: PAINLEVEL: SEVERE PAIN (7)

## 2019-11-20 NOTE — PROGRESS NOTES
Subjective     Tracy Mcclelland is a 74 year old female who presents to clinic today for the following health issues:    HPI   ED/UC Followup:    Facility:  [facility] ER  Date of visit: 2019  Reason for visit: bilateral hand swelling and tingling/numbness  Current Status: improved slightly with prednisone     Excerpt from ER record:     ED Course (with Medical Decision Making)     Pt seen and examined by me.  RN and EPIC notes reviewed.       Patient with bilateral hand numbness/tingling and some swelling.  She also has pedal edema.  No obvious significant findings on exam except for subjective decreased sensation, possibly slight decreased  strength.     I think that the pedal edema is from sitting in a chair and not ambulating.  Not sure if the bilateral hand symptoms are due to her neck or some other cause.  It would be unusual to acquire bilateral carpal tunnel such as short time.  She does have a few rheumatoid changes.     I did attempt to contact Walter Chapman, neurosurgery.  There is no one from there department on call at this point.  I have elected to do an MRI scan of the neck.  I will check some basic labs including BMP because of the edema.     Labs are unremarkable.   times shows interval fusion procedure and degenerative changes as as noted below.  They did not note any obvious acute abnormalities.  Will await follow-up appointment with neurosurgery for them to review these findings to see if there is any small abnormality on their read.  At this point, I recommend trying to elevate the feet, possibly elevate the hands.  She notes she usually has improvement when she has been on steroids so I am going to give her a prednisone taper.  Neurosurgery appointment already made.     Patient also has an appointment to follow-up with her primary care provider tomorrow.  In the ED she has been mildly hypotensive.  Apparently she has noted her blood pressures have been on the lower side for a while now.   "I am going to have her hold off on taking her lisinopril tomorrow until she follows up.  May need decreased dosing.  Return at anytime for worsening, changes or concerns.      Has follow up with Neurosurgery scheduled on 11/25/19.  Her hand symptoms are improved since starting the oral Prednisone.  Thinks the LE swelling is slightly better.  No pain or radicular symptoms in the lower extremities.  Is fairly sedentary, walking only in her small trailer during the day.      Her blood pressure is low today as well. She is asymptomatic with this.  She did not stop her Lisinopril as directed by the ER physician as she didn't know she was supposed to do that. She took Lisinopril 20 mg this morning.          Review of Systems   ROS COMP: Constitutional, HEENT, cardiovascular, pulmonary, gi and gu systems are negative, except as otherwise noted.      Objective    BP 96/60   Pulse 76   Temp 96.6  F (35.9  C) (Temporal)   Resp 18   Ht 1.607 m (5' 3.25\")   Wt 71.7 kg (158 lb)   LMP  (LMP Unknown)   SpO2 98%   Breastfeeding No   BMI 27.77 kg/m    Body mass index is 27.77 kg/m .  Physical Exam   GENERAL: alert, no distress and elderly  NECK: no adenopathy  RESP: no rales , no rhonchi, no wheezes and decreased breath sounds throughout  CV: regular rate and rhythm, normal S1 S2, no S3 or S4, no murmur, click or rub  MS: trace bilateral LE edema  NEURO: Normal strength and tone, mentation intact and speech normal  Diabetic foot exam: normal DP and PT pulses, no trophic changes or ulcerative lesions and normal sensory exam    Diagnostic Test Results:  none         Assessment & Plan     1. Radiculopathy affecting upper extremity  Following up with Neurosurgery next week.  Continue on Prednisone as prescribed.     2. Hypertension, goal below 140/90  Upon chart review her BP has been trending downwards.  Will hold Lisinopril and recheck in 2 weeks with nurse visit.     3. Leg swelling  Likely due to inactivity/sedentary " "lifestyle.  Not severe today.  Labs reasurring.  Will monitor for now.         Tobacco Cessation:   reports that she has been smoking cigarettes. She has a 5.00 pack-year smoking history. She has never used smokeless tobacco.  Tobacco Cessation Action Plan: Information offered: Patient not interested at this time      BMI:   Estimated body mass index is 27.77 kg/m  as calculated from the following:    Height as of this encounter: 1.607 m (5' 3.25\").    Weight as of this encounter: 71.7 kg (158 lb).           See Patient Instructions    Return in about 5 days (around 11/25/2019) for Follow up.    MACHO Cifuentes [facility]      "

## 2019-11-20 NOTE — PATIENT INSTRUCTIONS
Stop your Lisinopril.     Have your blood pressure rechecked in with the [facility] nurse on 11/25/19 at 11:20 AM before you see Valentina.     Finish the Prednisone as prescribed.      Labs are due again in December.

## 2019-11-25 ENCOUNTER — OFFICE VISIT (OUTPATIENT)
Dept: NEUROSURGERY | Facility: CLINIC | Age: 74
End: 2019-11-25
Payer: COMMERCIAL

## 2019-11-25 VITALS
HEART RATE: 84 BPM | WEIGHT: 156.4 LBS | SYSTOLIC BLOOD PRESSURE: 100 MMHG | TEMPERATURE: 97.3 F | HEIGHT: 63 IN | DIASTOLIC BLOOD PRESSURE: 60 MMHG | OXYGEN SATURATION: 96 % | BODY MASS INDEX: 27.71 KG/M2

## 2019-11-25 DIAGNOSIS — M54.12 CERVICAL RADICULOPATHY: Primary | ICD-10-CM

## 2019-11-25 PROCEDURE — 99213 OFFICE O/P EST LOW 20 MIN: CPT | Performed by: PHYSICIAN ASSISTANT

## 2019-11-25 ASSESSMENT — MIFFLIN-ST. JEOR: SCORE: 1177.52

## 2019-11-25 ASSESSMENT — PAIN SCALES - GENERAL: PAINLEVEL: MODERATE PAIN (5)

## 2019-11-25 NOTE — LETTER
11/25/2019         RE: Tracy Mcclelland  [address] [facility] [address]        Dear Colleague,    Thank you for referring your patient, Tracy Mcclelland, to the [facility]. Please see a copy of my visit note below.    [facility]  Neurosurgery followup:    HPI: 74-year-old female, 5 months out from C5-6 ACDF with Dr. Jefferson.  She had done well until recently, when she began developing severe neck pain, exacerbated with any forward flexion or extension.  Her symptoms became bad enough that she went to the ER, and was given some oxycodone and was also sent for a new cervical spine MRI.  She states that she has a lot of pain and numbness in her bilaterally.  She states she is having increased difficulty with fine motor control.    Exam:  Constitutional:  Alert, well nourished, NAD.  HEENT: Normocephalic, atraumatic.   Pulm:  Without shortness of breath   CV:  No pitting edema of BLE.     Neurological:  Awake  Alert  Oriented x 3  Motor exam:     Shoulder Abduction:  Right:  5/5    Left:  5/5  Biceps:                      Right:  5/5    Left:  5/5  Triceps:                     Right:  5/5    Left:  5/5  Wrist Extensors:       Right:  5/5    Left:  5/5  Wrist Flexors:           Right:  5/5    Left:  5/5  Intrinsics:                  Right:  5/5    Left:  5/5     Able to spontaneously move U/E bilaterally  Sensation intact throughout all U/E dermatomes    Imaging: New cervical spine MRI was reviewed.  It does show her prior fusion at C5-6.  There is some adjacent segment disc degeneration at C6-7, with bilateral foraminal stenosis.    A/P:   5 months s/p C5-6 ACDF  Bilateral arm pain and numbness.     I did have a discussed the patient regarding her symptoms.  She is 5 months out from a cervical fusion at C5-6.  Her MRI now shows worsening adjacent segment disease at C6-7, with bilateral foraminal narrowing.  She had been to the ER and has been given some anti-inflammatories, prednisone, and  oxycodone.  She is still wondering what further treatment options she has.  At this point, I did recommend getting her in for an epidural injection, which she did wish to proceed with.  If she does not continue to improve symptomatically, I encouraged her to follow-up with Dr. Jefferson.  She voiced agreement and understanding.        Walter Chapman PA-C  [facility]  [address] [address]    Tel [phone]  Pager [phone]      Again, thank you for allowing me to participate in the care of your patient.        Sincerely,        Walter Chapman PA-C

## 2019-11-25 NOTE — PROGRESS NOTES
[facility]  Neurosurgery followup:    HPI: 74-year-old female, 5 months out from C5-6 ACDF with Dr. Jefferson.  She had done well until recently, when she began developing severe neck pain, exacerbated with any forward flexion or extension.  Her symptoms became bad enough that she went to the ER, and was given some oxycodone and was also sent for a new cervical spine MRI.  She states that she has a lot of pain and numbness in her bilaterally.  She states she is having increased difficulty with fine motor control.    Exam:  Constitutional:  Alert, well nourished, NAD.  HEENT: Normocephalic, atraumatic.   Pulm:  Without shortness of breath   CV:  No pitting edema of BLE.     Neurological:  Awake  Alert  Oriented x 3  Motor exam:     Shoulder Abduction:  Right:  5/5    Left:  5/5  Biceps:                      Right:  5/5    Left:  5/5  Triceps:                     Right:  5/5    Left:  5/5  Wrist Extensors:       Right:  5/5    Left:  5/5  Wrist Flexors:           Right:  5/5    Left:  5/5  Intrinsics:                  Right:  5/5    Left:  5/5     Able to spontaneously move U/E bilaterally  Sensation intact throughout all U/E dermatomes    Imaging: New cervical spine MRI was reviewed.  It does show her prior fusion at C5-6.  There is some adjacent segment disc degeneration at C6-7, with bilateral foraminal stenosis.    A/P:   5 months s/p C5-6 ACDF  Bilateral arm pain and numbness.     I did have a discussed the patient regarding her symptoms.  She is 5 months out from a cervical fusion at C5-6.  Her MRI now shows worsening adjacent segment disease at C6-7, with bilateral foraminal narrowing.  She had been to the ER and has been given some anti-inflammatories, prednisone, and oxycodone.  She is still wondering what further treatment options she has.  At this point, I did recommend getting her in for an epidural injection, which she did wish to proceed with.  If she does not continue to improve symptomatically,  I encouraged her to follow-up with Dr. Jefferson.  She voiced agreement and understanding.        Walter Chapman PA-C  [facility]  [address] [address]    Tel [phone]  Pager [phone]

## 2019-11-26 ENCOUNTER — TELEPHONE (OUTPATIENT)
Dept: SURGERY | Facility: CLINIC | Age: 74
End: 2019-11-26

## 2019-11-26 NOTE — TELEPHONE ENCOUNTER
Pt scheduled for Injection  Date:12/20/19  Time:12:30  Dr. Avendaño    Instructed pt to have H&P and  for procedure.

## 2019-12-04 NOTE — PROGRESS NOTES
Outpatient Physical Therapy Discharge Note     Patient: Tracy Mcclelland  : 1945    Beginning/End Dates of Reporting Period:  10/8/2019 to 2019    Referring Provider: gali bhatti PAC    Therapy Diagnosis: cervical fusion      Client Self Report: went to ED this week due to pins / needles in her hands , goes to neuro on monday     Objective Measurements:  Objective Measure: NDI 18 spadi 54.62 pain 5-9/10  at eval   Details: pain level 2-3/10 spadi 19.23 NDI 10       patient seen for 6 Rx sessions for use of modalities hot pack and IFC in clinic to B shoulders and instruction in HEP   supine press up 5x 1 lbs , sidelying internal and external rotation 1 lbs 10x , added 1 lb  wrist flexin , extension , supination/pronation 10x      Goals:  Goal Identifier 1   Goal Description instruction in HEP in and compliant with it 5 of 7 days to improve qualitity of life    Target Date 20   Date Met      Progress:     Goal Identifier 2   Goal Description patient to have improved function in B UE to allow her to reach into cupboards    Target Date 20   Date Met      Progress:     Goal Identifier 3   Goal Description patient to have reduction in pain level currently 5-9/10 goal is 3-6/10    Target Date 20   Date Met      Progress:                  Progress Toward Goals:   Progress this reporting period: patient was doing very well and slowly making progress and meeting her therapy goals but on her last Rx sessions she reported she had a flair of her pain and more numbness in B hands   She went to the ED and was going to follow up with neuro           Plan:  Discharge from therapy.    Discharge:    Reason for Discharge: No further expectation of progress.    Equipment Issued: none    Discharge Plan: Patient to continue home program. Follow up with neuro

## 2019-12-05 ENCOUNTER — TELEPHONE (OUTPATIENT)
Dept: FAMILY MEDICINE | Facility: OTHER | Age: 74
End: 2019-12-05

## 2019-12-05 DIAGNOSIS — M16.11 PRIMARY OSTEOARTHRITIS OF RIGHT HIP: Primary | ICD-10-CM

## 2019-12-05 RX ORDER — PREDNISONE 10 MG/1
TABLET ORAL
Qty: 22 TABLET | Refills: 0 | Status: SHIPPED | OUTPATIENT
Start: 2019-12-05 | End: 2019-12-16

## 2019-12-05 NOTE — TELEPHONE ENCOUNTER
Patient informed if message from provider. No further questions at this time. Josselyn Tomlinson LPN........12/5/2019 9:51 AM

## 2019-12-05 NOTE — TELEPHONE ENCOUNTER
Reason for Call:  Other prescription    Detailed comments: asking for Prednisone to help her hand pain and numbness until her cortisone shot on 12/20.  Hands are very sore and numb daily and she has trouble sleeping as well.    [facility]    Phone Number Patient can be reached at: Home number on file [phone] (home)    Best Time: any    Can we leave a detailed message on this number? YES - Tracy asks for a call to let her know if this can be prescribed for her.    Call taken on 12/5/2019 at 8:24 AM by Eleni Bradley

## 2019-12-16 ENCOUNTER — OFFICE VISIT (OUTPATIENT)
Dept: FAMILY MEDICINE | Facility: OTHER | Age: 74
End: 2019-12-16
Payer: COMMERCIAL

## 2019-12-16 VITALS
RESPIRATION RATE: 14 BRPM | SYSTOLIC BLOOD PRESSURE: 120 MMHG | OXYGEN SATURATION: 96 % | BODY MASS INDEX: 27.68 KG/M2 | HEART RATE: 96 BPM | TEMPERATURE: 97.8 F | WEIGHT: 157.5 LBS | DIASTOLIC BLOOD PRESSURE: 68 MMHG

## 2019-12-16 DIAGNOSIS — E11.22 TYPE 2 DIABETES MELLITUS WITH CHRONIC KIDNEY DISEASE, WITHOUT LONG-TERM CURRENT USE OF INSULIN, UNSPECIFIED CKD STAGE (H): ICD-10-CM

## 2019-12-16 DIAGNOSIS — G89.29 CHRONIC NECK PAIN: ICD-10-CM

## 2019-12-16 DIAGNOSIS — M54.2 CHRONIC NECK PAIN: ICD-10-CM

## 2019-12-16 DIAGNOSIS — Z01.818 PREOP GENERAL PHYSICAL EXAM: Primary | ICD-10-CM

## 2019-12-16 DIAGNOSIS — Z98.1 S/P CERVICAL SPINAL FUSION: Primary | ICD-10-CM

## 2019-12-16 LAB — HBA1C MFR BLD: 6.3 % (ref 0–5.6)

## 2019-12-16 PROCEDURE — 99214 OFFICE O/P EST MOD 30 MIN: CPT | Performed by: NURSE PRACTITIONER

## 2019-12-16 PROCEDURE — 83036 HEMOGLOBIN GLYCOSYLATED A1C: CPT | Performed by: NURSE PRACTITIONER

## 2019-12-16 PROCEDURE — 36415 COLL VENOUS BLD VENIPUNCTURE: CPT | Performed by: NURSE PRACTITIONER

## 2019-12-16 ASSESSMENT — PAIN SCALES - GENERAL: PAINLEVEL: NO PAIN (0)

## 2019-12-16 NOTE — PATIENT INSTRUCTIONS
Stop smoking.  Let us know if we can help with this.  There are multiple things we can do to assist you.     Bring back the urine when you can.         Before Your Surgery      Call your surgeon if there is any change in your health. This includes signs of a cold or flu (such as a sore throat, runny nose, cough, rash or fever).    Do not smoke, drink alcohol or take over the counter medicine (unless your surgeon or primary care doctor tells you to) for the 24 hours before and after surgery.    If you take prescribed drugs: Follow your doctor s orders about which medicines to take and which to stop until after surgery.    Eating and drinking prior to surgery: follow the instructions from your surgeon    Take a shower or bath the night before surgery. Use the soap your surgeon gave you to gently clean your skin. If you do not have soap from your surgeon, use your regular soap. Do not shave or scrub the surgery site.  Wear clean pajamas and have clean sheets on your bed.

## 2019-12-16 NOTE — LETTER
December 16, 2019      Tracy Mcclelland  [address] [place] [id number]        Dear ,    We are writing to inform you of your test results.    Your A1C came back improved at 6.3.  Recheck in 6 months    Resulted Orders   HEMOGLOBIN A1C   Result Value Ref Range    Hemoglobin A1C 6.3 (H) 0 - 5.6 %      Comment:      Normal <5.7% Prediabetes 5.7-6.4%  Diabetes 6.5% or higher - adopted from ADA   consensus guidelines.         If you have any questions or concerns, please call the clinic at the number listed above.       Sincerely,        MACHO Cifuentes CNP

## 2019-12-16 NOTE — PROGRESS NOTES
[facility]  [address] [facility] [address]  [phone]  Dept: [phone]    PRE-OP EVALUATION:  Today's date: 2019    Tracy Mcclelland (: 1945) presents for pre-operative evaluation assessment as requested by Carlos Avendaño MD.  She requires evaluation and anesthesia risk assessment prior to undergoing surgery/procedure for treatment of neck pain .    Proposed Surgery/ Procedure: Cervical Epidural Injection 6-  Date of Surgery/ Procedure: 19  Time of Surgery/ Procedure: 1230  Hospital/Surgical Facility: [facility]   Primary Physician: Skylar Gomez  Type of Anesthesia Anticipated: to be determined    Patient has a Health Care Directive or Living Will:  YES     1. NO - Do you have a history of heart attack, stroke, stent, bypass or surgery on an artery in the head, neck, heart or legs?  2. NO - Do you ever have any pain or discomfort in your chest?  3. NO - Do you have a history of  Heart Failure?  4. NO - Are you troubled by shortness of breath when: walking on the level, up a slight hill or at night?  5. NO - Do you currently have a cold, bronchitis or other respiratory infection?  6. NO - Do you have a cough, shortness of breath or wheezing?  7. NO - Do you sometimes get pains in the calves of your legs when you walk?  8. NO - Do you or anyone in your family have previous history of blood clots?  9. NO - Do you or does anyone in your family have a serious bleeding problem such as prolonged bleeding following surgeries or cuts?  10. NO - Have you ever had problems with anemia or been told to take iron pills?  11. NO - Have you had any abnormal blood loss such as black, tarry or bloody stools, or abnormal vaginal bleeding?  12. NO - Have you ever had a blood transfusion?  13. NO - Have you or any of your relatives ever had problems with anesthesia?  14. NO - Do you have sleep apnea, excessive snoring or daytime drowsiness?  15. NO - Do you have any  prosthetic heart valves?  16. YES right hip - Do you have prosthetic joints?  17. NO - Is there any chance that you may be pregnant?      HPI:     HPI related to upcoming procedure: chronic neck pain       See problem list for active medical problems.  Problems all longstanding and stable, except as noted/documented.  See ROS for pertinent symptoms related to these conditions.      MEDICAL HISTORY:     Patient Active Problem List    Diagnosis Date Noted     Status post total hip replacement, right 10/16/2018     Priority: Medium     Primary osteoarthritis of right hip 07/02/2018     Priority: Medium     CKD (chronic kidney disease) stage 2, GFR 60-89 ml/min 10/19/2015     Priority: Medium     Hereditary and idiopathic peripheral neuropathy 07/02/2015     Priority: Medium     Problem list name updated by automated process. Provider to review       Granuloma annulare 08/07/2014     Priority: Medium     Right dorsal hand       Hypertension, goal below 140/90 07/17/2012     Priority: Medium     Type 2 diabetes mellitus with diabetic chronic kidney disease (H) 11/02/2011     Priority: Medium     Tobacco use disorder 10/07/2010     Priority: Medium     Tennis Elbow-left 07/28/2010     Priority: Medium     Microalbuminuria 01/29/2010     Priority: Medium     Hyperlipidemia with target LDL less than 100 01/28/2010     Priority: Medium     Diagnosis updated by automated process. Provider to review and confirm.        Past Medical History:   Diagnosis Date     CKD (chronic kidney disease) stage 2, GFR 60-89 ml/min 10/19/2015     Headache(784.0)     hx.of Migraines     Lumbago      Type II or unspecified type diabetes mellitus without mention of complication, not stated as uncontrolled      Unspecified essential hypertension      Past Surgical History:   Procedure Laterality Date     ARTHROPLASTY HIP Right 10/16/2018    Procedure: right total hip arthroplasty;  Surgeon: Terrell Ervin DO;  Location:  OR       LIGATE FALLOPIAN TUBE       DISCECTOMY, FUSION CERVICAL ANTERIOR ONE LEVEL, COMBINED N/A 6/19/2019    Procedure: cervical 5-6 anterior cervical discectomy fusion;  Surgeon: Joe Jefferson MD;  Location:  OR     HC LAPAROSCOPY, SURGICAL; CHOLECYSTECTOMY  2000    Cholecystectomy, Laparoscopic     HC REMOVE TONSILS/ADENOIDS,<11 Y/O      T & A <11y.o.     INJECT EPIDURAL CERVICAL N/A 5/17/2019    Procedure: Epidural Steroid Injection Bilateral Cervical 6-7;  Surgeon: Carlos Avendaño MD;  Location:  OR     Current Outpatient Medications   Medication Sig Dispense Refill     acetaminophen (TYLENOL) 325 MG tablet Take 3 tablets (975 mg) by mouth every 8 hours as needed for mild pain 100 tablet 1     aspirin (ASA) 81 MG tablet Take 1 tablet (81 mg) by mouth daily       gabapentin (NEURONTIN) 600 MG tablet TAKE TWO TABLETS BY MOUTH THREE TIMES A  tablet 1     metFORMIN (GLUCOPHAGE) 500 MG tablet Take 1 tablet (500 mg) by mouth 2 times daily (with meals) 180 tablet 1     simvastatin (ZOCOR) 5 MG tablet Take 1 tablet (5 mg) by mouth At Bedtime 90 tablet 3     loratadine (CLARITIN) 10 MG tablet Take 1 tablet (10 mg) by mouth daily (Patient not taking: Reported on 12/16/2019) 30 tablet 1     OTC products: None, except as noted above    Allergies   Allergen Reactions     Mold      sneezing, coughing , runny nose, watery eyes & red,     No Known Drug Allergies       Latex Allergy: NO    Social History     Tobacco Use     Smoking status: Current Every Day Smoker     Packs/day: 0.25     Years: 20.00     Pack years: 5.00     Types: Cigarettes     Smokeless tobacco: Never Used     Tobacco comment: 2-3 cigs daily   Substance Use Topics     Alcohol use: Yes     Comment: couple times monthly     History   Drug Use No       REVIEW OF SYSTEMS:   CONSTITUTIONAL: NEGATIVE for fever, chills, change in weight  INTEGUMENTARY/SKIN: NEGATIVE for worrisome rashes, moles or lesions  EYES: NEGATIVE for vision changes or  irritation  ENT/MOUTH: NEGATIVE for ear, mouth and throat problems  RESP: NEGATIVE for significant cough or SOB  BREAST: NEGATIVE for masses, tenderness or discharge  CV: NEGATIVE for chest pain, palpitations or peripheral edema  GI: NEGATIVE for nausea, abdominal pain, heartburn, or change in bowel habits  : NEGATIVE for frequency, dysuria, or hematuria  MUSCULOSKELETAL: NEGATIVE for significant arthralgias or myalgia  NEURO: NEGATIVE for weakness, dizziness or paresthesias  ENDOCRINE: NEGATIVE for temperature intolerance, skin/hair changes  HEME: NEGATIVE for bleeding problems  PSYCHIATRIC: NEGATIVE for changes in mood or affect    EXAM:   /68 (BP Location: Left arm, Patient Position: Sitting, Cuff Size: Adult Regular)   Pulse 96   Temp 97.8  F (36.6  C) (Temporal)   Resp 14   Wt 71.4 kg (157 lb 8 oz)   LMP  (LMP Unknown)   SpO2 96%   Breastfeeding No   BMI 27.68 kg/m      GENERAL APPEARANCE: alert, elderly and overweight     EYES: EOMI, PERRL     HENT: ear canals and TM's normal and nose and mouth without ulcers or lesions     NECK: no adenopathy, no asymmetry, masses, or scars and thyroid normal to palpation     RESP: decreased breath sounds throughout.      CV: regular rates and rhythm, normal S1 S2, no S3 or S4 and no murmur, click or rub     ABDOMEN:  soft, nontender, no HSM or masses and bowel sounds normal     MS: extremities normal- no gross deformities noted, no evidence of inflammation in joints, FROM in all extremities.     SKIN: no suspicious lesions or rashes     NEURO: Normal strength and tone, sensory exam grossly normal, mentation intact and speech normal     PSYCH: mentation appears normal. and affect normal/bright     LYMPHATICS: No cervical adenopathy    DIAGNOSTICS:       Recent Labs   Lab Test 11/19/19  0931 06/10/19  1432 04/10/19  1018 03/21/19  1216 10/18/18  0630  10/03/18  1106   HGB 12.6  --   --   --  11.1*   < > 15.3     --   --   --   --   --  221     --   136  --   --   --  137   POTASSIUM 5.0  --  4.7  --   --   --  4.2   CR 0.70  --  0.76  --   --   --  0.72   A1C  --  7.8*  --  6.9*  --   --  7.1*    < > = values in this interval not displayed.        IMPRESSION:   Reason for surgery/procedure: chronic neck pain     The proposed surgical procedure is considered INTERMEDIATE risk.    REVISED CARDIAC RISK INDEX  The patient has the following serious cardiovascular risks for perioperative complications such as (MI, PE, VFib and 3  AV Block):  No serious cardiac risks  INTERPRETATION: 0 risks: Class I (very low risk - 0.4% complication rate)    The patient has the following additional risks for perioperative complications:  No identified additional risks      ICD-10-CM    1. Preop general physical exam Z01.818    2. Type 2 diabetes mellitus with chronic kidney disease, without long-term current use of insulin, unspecified CKD stage (H) E11.22 Albumin Random Urine Quantitative with Creat Ratio     HEMOGLOBIN A1C   3. Chronic neck pain M54.2     G89.29        RECOMMENDATIONS:         --Patient is to take all scheduled medications on the day of surgery EXCEPT for modifications listed below.    Anticoagulant or Antiplatelet Medication Use  ASPIRIN: Discontinue ASA 7-10 days prior to procedure to reduce bleeding risk.  It should be resumed post-operatively.        APPROVAL GIVEN to proceed with proposed procedure, without further diagnostic evaluation       Signed Electronically by: MACHO Cifuentes CNP    Copy of this evaluation report is provided to requesting physician.    [facility] Preop Guidelines    Revised Cardiac Risk Index

## 2019-12-17 DIAGNOSIS — E11.22 TYPE 2 DIABETES MELLITUS WITH CHRONIC KIDNEY DISEASE, WITHOUT LONG-TERM CURRENT USE OF INSULIN, UNSPECIFIED CKD STAGE (H): ICD-10-CM

## 2019-12-17 LAB
CREAT UR-MCNC: 79 MG/DL
MICROALBUMIN UR-MCNC: 11 MG/L
MICROALBUMIN/CREAT UR: 13.87 MG/G CR (ref 0–25)

## 2019-12-17 PROCEDURE — 82043 UR ALBUMIN QUANTITATIVE: CPT | Performed by: NURSE PRACTITIONER

## 2019-12-19 ENCOUNTER — ANESTHESIA EVENT (OUTPATIENT)
Dept: SURGERY | Facility: CLINIC | Age: 74
End: 2019-12-19
Payer: MEDICARE

## 2019-12-19 ASSESSMENT — LIFESTYLE VARIABLES: TOBACCO_USE: 1

## 2019-12-20 ENCOUNTER — HOSPITAL ENCOUNTER (OUTPATIENT)
Facility: CLINIC | Age: 74
Discharge: HOME | End: 2019-12-20
Attending: ANESTHESIOLOGY | Admitting: ANESTHESIOLOGY
Payer: MEDICARE

## 2019-12-20 ENCOUNTER — ANESTHESIA (OUTPATIENT)
Dept: SURGERY | Facility: CLINIC | Age: 74
End: 2019-12-20
Payer: MEDICARE

## 2019-12-20 ENCOUNTER — HOSPITAL ENCOUNTER (OUTPATIENT)
Dept: GENERAL RADIOLOGY | Facility: CLINIC | Age: 74
End: 2019-12-20
Attending: ANESTHESIOLOGY | Admitting: ANESTHESIOLOGY
Payer: MEDICARE

## 2019-12-20 VITALS
RESPIRATION RATE: 16 BRPM | TEMPERATURE: 97.6 F | OXYGEN SATURATION: 99 % | HEART RATE: 72 BPM | SYSTOLIC BLOOD PRESSURE: 117 MMHG | DIASTOLIC BLOOD PRESSURE: 70 MMHG

## 2019-12-20 DIAGNOSIS — M54.12 CERVICAL RADICULOPATHY: ICD-10-CM

## 2019-12-20 LAB
GLUCOSE BLDC GLUCOMTR-MCNC: 116 MG/DL (ref 70–99)
GLUCOSE BLDC GLUCOMTR-MCNC: 129 MG/DL (ref 70–99)

## 2019-12-20 PROCEDURE — 37000008 ZZH ANESTHESIA TECHNICAL FEE, 1ST 30 MIN: Performed by: ANESTHESIOLOGY

## 2019-12-20 PROCEDURE — 62321 NJX INTERLAMINAR CRV/THRC: CPT | Performed by: ANESTHESIOLOGY

## 2019-12-20 PROCEDURE — 25000128 H RX IP 250 OP 636: Performed by: ANESTHESIOLOGY

## 2019-12-20 PROCEDURE — 40000277 XR SURGERY CARM FLUORO LESS THAN 5 MIN W STILLS: Mod: TC

## 2019-12-20 PROCEDURE — 25000125 ZZHC RX 250: Performed by: NURSE ANESTHETIST, CERTIFIED REGISTERED

## 2019-12-20 PROCEDURE — 25000128 H RX IP 250 OP 636: Performed by: NURSE ANESTHETIST, CERTIFIED REGISTERED

## 2019-12-20 PROCEDURE — 82962 GLUCOSE BLOOD TEST: CPT

## 2019-12-20 RX ORDER — TRIAMCINOLONE ACETONIDE 40 MG/ML
INJECTION, SUSPENSION INTRA-ARTICULAR; INTRAMUSCULAR PRN
Status: DISCONTINUED | OUTPATIENT
Start: 2019-12-20 | End: 2019-12-20 | Stop reason: HOSPADM

## 2019-12-20 RX ORDER — SODIUM CHLORIDE, SODIUM LACTATE, POTASSIUM CHLORIDE, CALCIUM CHLORIDE 600; 310; 30; 20 MG/100ML; MG/100ML; MG/100ML; MG/100ML
INJECTION, SOLUTION INTRAVENOUS CONTINUOUS
Status: DISCONTINUED | OUTPATIENT
Start: 2019-12-20 | End: 2019-12-20 | Stop reason: HOSPADM

## 2019-12-20 RX ORDER — ONDANSETRON 4 MG/1
4 TABLET, ORALLY DISINTEGRATING ORAL EVERY 30 MIN PRN
Status: DISCONTINUED | OUTPATIENT
Start: 2019-12-20 | End: 2019-12-20 | Stop reason: HOSPADM

## 2019-12-20 RX ORDER — LIDOCAINE 40 MG/G
CREAM TOPICAL
Status: DISCONTINUED | OUTPATIENT
Start: 2019-12-20 | End: 2019-12-20 | Stop reason: HOSPADM

## 2019-12-20 RX ORDER — ONDANSETRON 2 MG/ML
4 INJECTION INTRAMUSCULAR; INTRAVENOUS EVERY 30 MIN PRN
Status: DISCONTINUED | OUTPATIENT
Start: 2019-12-20 | End: 2019-12-20 | Stop reason: HOSPADM

## 2019-12-20 RX ORDER — NALOXONE HYDROCHLORIDE 0.4 MG/ML
.1-.4 INJECTION, SOLUTION INTRAMUSCULAR; INTRAVENOUS; SUBCUTANEOUS
Status: DISCONTINUED | OUTPATIENT
Start: 2019-12-20 | End: 2019-12-20 | Stop reason: HOSPADM

## 2019-12-20 RX ORDER — LIDOCAINE HYDROCHLORIDE 20 MG/ML
INJECTION, SOLUTION INFILTRATION; PERINEURAL PRN
Status: DISCONTINUED | OUTPATIENT
Start: 2019-12-20 | End: 2019-12-20

## 2019-12-20 RX ORDER — PROPOFOL 10 MG/ML
INJECTION, EMULSION INTRAVENOUS PRN
Status: DISCONTINUED | OUTPATIENT
Start: 2019-12-20 | End: 2019-12-20

## 2019-12-20 RX ORDER — IOPAMIDOL 612 MG/ML
INJECTION, SOLUTION INTRATHECAL PRN
Status: DISCONTINUED | OUTPATIENT
Start: 2019-12-20 | End: 2019-12-20 | Stop reason: HOSPADM

## 2019-12-20 RX ADMIN — PROPOFOL 50 MG: 10 INJECTION, EMULSION INTRAVENOUS at 12:53

## 2019-12-20 RX ADMIN — LIDOCAINE HYDROCHLORIDE 40 MG: 20 INJECTION, SOLUTION INFILTRATION; PERINEURAL at 12:50

## 2019-12-20 RX ADMIN — PROPOFOL 50 MG: 10 INJECTION, EMULSION INTRAVENOUS at 12:51

## 2019-12-20 RX ADMIN — PROPOFOL 20 MG: 10 INJECTION, EMULSION INTRAVENOUS at 12:55

## 2019-12-20 NOTE — DISCHARGE INSTRUCTIONS
Home Care Instructions                Procedure: Epidural injection or joint injection    Activity:    Rest today    Do not work today    Resume normal activity tomorrow    Pain:    You may experience soreness at the injection site for 1 to 3 days.    You may use an ice pack for 20 minutes every 2 hours for the first 24 hours    You may use a heating pad after the first 24 hours    You may use Tylenol  (acetaminophen) every 4 hours or other pain medicines as directed by your physician    Safety  Sedation medicine, if given may remain active for many hours.    It is important for the next 24 hours that you do not:    Drive a car    Operate machines or power tools    Consume alcohol, including beer    Sign any important papers or legal documents    You may experience numbness radiating into your legs or arms, (depending on the procedure location)  This numbness may last several hours.  Until the numb sensation returns to normal please use caution in walking, climbing stairs, stepping out of your vehicle, etc.    Common side effects of steroids:  Not everyone will experience corticosteroid side effects. If side effects are experienced they will gradually subside in the 7-10 day period following an injection.    Most common side effects include:    Flushed face and/or chest    Feeling of warmth, particularly in face but could be overall feeling of warmth    Increased blood sugar in diabetic patients    Menstrual irregularities may occur.  If taking hormone based birth control an alternate method of birth control is recommended    Sleep disturbances and/or mood swings are possible    Leg cramps    Please contact us if you have:  Severe pain   Fever more than 101.5 degrees Fahrenheit  Signs of infection (redness, swelling or drainage)      If you have questions during normal business hours (8am-5pm Monday-Friday) contact the [facility] at [phone]. If you need help after hours, we recommend that you go to a  hospital emergency room or dial 911.

## 2019-12-20 NOTE — ANESTHESIA CARE TRANSFER NOTE
Patient: Tracy Mcclelland    Procedure(s):  Cervical Epidural Injection 6-7 bilateral    Diagnosis: Cervical radiculopathy [M54.12]  Diagnosis Additional Information: No value filed.    Anesthesia Type:   MAC     Note:  Airway :Nasal Cannula  Patient transferred to:Phase II  Handoff Report: Identifed the Patient, Identified the Reponsible Provider, Reviewed the pertinent medical history, Discussed the surgical course, Reviewed Intra-OP anesthesia mangement and issues during anesthesia, Set expectations for post-procedure period and Allowed opportunity for questions and acknowledgement of understanding      Vitals: (Last set prior to Anesthesia Care Transfer)    CRNA VITALS  12/20/2019 1233 - 12/20/2019 1308      12/20/2019             Resp Rate (observed):  (!) 6                Electronically Signed By: MACHO Ellis CRNA  December 20, 2019  1:08 PM

## 2019-12-20 NOTE — ANESTHESIA POSTPROCEDURE EVALUATION
Patient: Tracy Mcclelland    Procedure(s):  Cervical Epidural Injection 6-7 bilateral    Diagnosis:Cervical radiculopathy [M54.12]  Diagnosis Additional Information: No value filed.    Anesthesia Type:  MAC    Note:  Anesthesia Post Evaluation    Patient location during evaluation: Phase 2 and Bedside  Patient participation: Able to fully participate in evaluation  Level of consciousness: awake  Pain management: adequate  Airway patency: patent  Cardiovascular status: acceptable and hemodynamically stable  Respiratory status: acceptable, room air and nonlabored ventilation  Hydration status: stable  PONV: none     Anesthetic complications: None    Comments: Patient was happy with the anesthesia care received and no anesthesia related complications were noted.  I will follow up with the patient again if it is needed.        Last vitals:  Vitals:    12/20/19 1145   BP: (!) 141/73   Pulse: 79   Resp: 16   Temp: 97.6  F (36.4  C)   SpO2: 98%         Electronically Signed By: MACHO Ellis CRNA  December 20, 2019  1:08 PM

## 2019-12-20 NOTE — ANESTHESIA PREPROCEDURE EVALUATION
Anesthesia Pre-Procedure Evaluation    Patient: Tracy Mcclelland   MRN: [id number] : 1945          Preoperative Diagnosis: Cervical radiculopathy [M54.12]    Procedure(s):  Cervical Epidural Injection 6-7    Past Medical History:   Diagnosis Date     CKD (chronic kidney disease) stage 2, GFR 60-89 ml/min 10/19/2015     Headache(784.0)     hx.of Migraines     Lumbago      Type II or unspecified type diabetes mellitus without mention of complication, not stated as uncontrolled      Unspecified essential hypertension      Past Surgical History:   Procedure Laterality Date     ARTHROPLASTY HIP Right 10/16/2018    Procedure: right total hip arthroplasty;  Surgeon: Terrell Ervin DO;  Location: [facility]     C LIGATE FALLOPIAN TUBE       DISCECTOMY, FUSION CERVICAL ANTERIOR ONE LEVEL, COMBINED N/A 2019    Procedure: cervical 5-6 anterior cervical discectomy fusion;  Surgeon: Joe Jefferson MD;  Location: [facility] OR     HC LAPAROSCOPY, SURGICAL; CHOLECYSTECTOMY      Cholecystectomy, Laparoscopic     HC REMOVE TONSILS/ADENOIDS,<13 Y/O      T & A <11y.o.     INJECT EPIDURAL CERVICAL N/A 2019    Procedure: Epidural Steroid Injection Bilateral Cervical 6-7;  Surgeon: Carlos Avendaño MD;  Location: [facility] OR       Anesthesia Evaluation     . Pt has had prior anesthetic. Type: MAC and General    No history of anesthetic complications          ROS/MED HX    ENT/Pulmonary:     (+)tobacco use (quit 2 weeks ago), Current use 0.25 ppdx 20 years packs/day  , . .    Neurologic: Comment: Pt has neck pain with numbness and pain down bilat arms L>R    (+)other neuro Peripheral neuropathy    Cardiovascular:     (+) Dyslipidemia, hypertension-range: <140 / 90, ---. : . . . :. . Previous cardiac testing date:results:date: results:ECG reviewed date: results:NSR, normal axis, normal intervals, no acute ST/T changes c/w ischemia date: results:          METS/Exercise Tolerance:     Hematologic:  - neg hematologic  ROS        Musculoskeletal:   (+) arthritis,  other musculoskeletal- osteoarthritis right hip - presents for right EAMON      GI/Hepatic:  - neg GI/hepatic ROS      (-) GERD   Renal/Genitourinary: Comment: Stage II CRI    (+) chronic renal disease, type: CRI, Pt does not require dialysis, Pt has no history of transplant,       Endo:     (+) type II DM Last HgA1c: 7.8 date: 6/10/19 Not using insulin - not using insulin pump Normal glucose range: was 140's this AM not previously admitted for DM/DKA Diabetic complications: nephropathy neuropathy, .      Psychiatric:  - neg psychiatric ROS       Infectious Disease:  - neg infectious disease ROS       Malignancy:      - no malignancy   Other:    (+) No chance of pregnancy   - neg other ROS                      Physical Exam  Normal systems: cardiovascular and pulmonary    Airway   Mallampati: II  TM distance: >3 FB  Neck ROM: full    Dental   (+) caps    Cardiovascular   Rhythm and rate: regular and normal      Pulmonary    breath sounds clear to auscultation            Lab Results   Component Value Date    WBC 8.0 11/19/2019    HGB 12.6 11/19/2019    HCT 38.3 11/19/2019     11/19/2019    CRP 3.8 04/10/2019    SED 8 04/10/2019     11/19/2019    POTASSIUM 5.0 11/19/2019    CHLORIDE 101 11/19/2019    CO2 25 11/19/2019    BUN 12 11/19/2019    CR 0.70 11/19/2019     (H) 11/19/2019    BINH 8.3 (L) 11/19/2019    ALBUMIN 3.3 (L) 11/19/2019    PROTTOTAL 6.9 11/19/2019    ALT 13 11/19/2019    AST 15 11/19/2019    ALKPHOS 67 11/19/2019    BILITOTAL 0.3 11/19/2019    TSH 1.49 06/10/2019       Preop Vitals  BP Readings from Last 3 Encounters:   12/16/19 120/68   11/25/19 100/60   11/20/19 96/60    Pulse Readings from Last 3 Encounters:   12/16/19 96   11/25/19 84   11/20/19 76      Resp Readings from Last 3 Encounters:   12/16/19 14   11/20/19 18   11/19/19 14    SpO2 Readings from Last 3 Encounters:   12/16/19 96%   11/25/19 96%   11/20/19 98%      Temp Readings from  "Last 1 Encounters:   12/16/19 97.8  F (36.6  C) (Temporal)    Ht Readings from Last 1 Encounters:   11/25/19 1.607 m (5' 3.25\")      Wt Readings from Last 1 Encounters:   12/16/19 71.4 kg (157 lb 8 oz)    Estimated body mass index is 27.68 kg/m  as calculated from the following:    Height as of 11/25/19: 1.607 m (5' 3.25\").    Weight as of 12/16/19: 71.4 kg (157 lb 8 oz).       Anesthesia Plan      History & Physical Review  History and physical reviewed and following examination; no interval change.    ASA Status:  2 .    NPO Status:  > 8 hours    Plan for MAC Maintenance will be TIVA.  Reason for MAC:  Difficulty with conscious sedation (QS)         Postoperative Care      Consents  Anesthetic plan, risks, benefits and alternatives discussed with:  Patient.  Use of blood products discussed: No .   .                 MACHO Ellis CRNA  "

## 2019-12-20 NOTE — TELEPHONE ENCOUNTER
Consultation - Cardiothoracic Surgery   Poly  43 y o  male MRN: [id number]  Unit/Bed#: [facility] 421-01 Encounter: [id number]    Physician Requesting Consult: Jose De Jesus Galindo DO    Reason for Consult / Principal Problem: Type 1 NSTEMI, 3vCAD, cardiomyopathy  Inpatient consult to Cardiothoracic Surgery  Consult performed by: Nury Willams PA-C  Consult ordered by: John Dutton DO        History of Present Illness: Poly  is a 43y o  year old male with a PMH significant for uncontrolled DM2, HTN, HLD, obesity and medical non-compliance  Presented to [facility] ER for exertional chest pain/squeezing for past 5 days  Peak troponin 3 11, EKG with inverted T waves in inferolateral leads and slight ST elevations in V1-V3  Diagnosed with acute NSTEMI  Cardiac cath revealed MV CAD and EF 30-35%  For tx to [facility] for cardiac surgical evaluation  TTE performed showed EF 30%, global hypokinesis, grade 2 DD, and possible LV thrombus  As I sit with the patient now, he relates this above story to me   he currently denies and syncope, presyncope, CP, palpitations, SOB, dyspnea, PND, orthopnea or symptoms of fluid retention         Past Medical History:  Past Medical History:   Diagnosis Date    CAD (coronary artery disease)     Diabetes mellitus ([address] )     HLD (hyperlipidemia)     Hypertension          Past Surgical History:   Past Surgical History:   Procedure Laterality Date    ANKLE SURGERY Right     FRACTURE SURGERY           Family History:  Family History   Problem Relation Age of Onset    Cancer Mother         unknown         Social History:  Social History     Substance and Sexual Activity   Alcohol Use Never    Frequency: Never    Binge frequency: Never     Social History     Substance and Sexual Activity   Drug Use Yes    Types: Marijuana     Social History     Tobacco Use   Smoking Status Never Smoker   Smokeless Tobacco Never Used     Marital Status: Single    Currently working as a Returned call to patient to inform her that Walter CRAIN recommends she return to clinic to be evaluated. Appointment scheduled.    Please have her schedule a follow up appt with one of us. Thanks.     LG    Routing comment         granite counter-  Home Medications:   Prior to Admission medications    Medication Sig Start Date End Date Taking? Authorizing Provider   amLODIPine (NORVASC) 10 mg tablet Take 10 mg by mouth daily 12/16/16   Historical Provider, MD   hydrochlorothiazide (HYDRODIURIL) 50 mg tablet Take 50 mg by mouth every 24 hours    Historical Provider, MD   lisinopril (ZESTRIL) 40 mg tablet Take 40 mg by mouth daily    Historical Provider, MD   metFORMIN (GLUCOPHAGE) 1000 MG tablet Take 1,000 mg by mouth Every 12 hours    Historical Provider, MD   methocarbamol (ROBAXIN) 750 mg tablet Take 1 tablet (750 mg total) by mouth every 8 (eight) hours for 5 days 5/20/18 5/25/18  Gordo Stokes PA-C       Inpatient Medications:  Scheduled Meds:   Current Facility-Administered Medications:  amLODIPine 10 mg Oral Daily David Haas MD   aspirin 81 mg Oral Daily David Haas MD   atorvastatin 80 mg Oral Daily With Mary Jo Parker MD   insulin glargine 30 Units Subcutaneous HS David Haas MD   insulin lispro 1-6 Units Subcutaneous 4x Daily (AC & HS) David Haas MD   insulin lispro 10 Units Subcutaneous TID With Meals David Haas MD   lisinopril 40 mg Oral Daily David Haas MD   metoprolol tartrate 25 mg Oral Q12H [facility] David Haas MD   ondansetron 4 mg Intravenous Q6H PRN David Haas MD     Continuous Infusions:    PRN Meds:    ondansetron 4 mg Q6H PRN       Allergies:  No Known Allergies    Review of Systems:  Review of Systems   Constitutional: Negative for activity change, fatigue and fever  HENT: Negative for congestion, dental problem and sore throat  Respiratory: Positive for chest tightness  Negative for apnea, cough, choking, shortness of breath and wheezing  Cardiovascular: Positive for chest pain  Negative for palpitations and leg swelling  Gastrointestinal: Negative for abdominal distention, abdominal pain, anal bleeding, blood in stool, constipation, diarrhea and nausea  Genitourinary: Negative for difficulty urinating, dysuria and hematuria  Musculoskeletal: Negative for arthralgias, back pain, joint swelling and myalgias  Skin: Negative for color change, pallor, rash and wound  Neurological: Negative for dizziness, seizures, syncope, speech difficulty and weakness  Psychiatric/Behavioral: Negative for agitation and behavioral problems  The patient is not nervous/anxious  Vital Signs:     Vitals:    12/19/19 1954 12/19/19 2330 12/20/19 0333 12/20/19 0730   BP: 140/96 143/94 142/90 139/80   BP Location:  Left arm Left arm Left arm   Pulse: 101 89 97 91   Resp: 18 18 18 18   Temp: 98 7 °F (37 1 °C) 98 1 °F (36 7 °C) 98 3 °F (36 8 °C) 97 7 °F (36 5 °C)   TempSrc: Oral Oral Oral Oral   SpO2: 98% 99% 95% 96%   Weight: 125 kg (275 lb)      Height: 5' 9" (1 753 m)        Invasive Devices     Peripheral Intravenous Line            Peripheral IV 12/18/19 Right Antecubital 1 day                Physical Exam:  Physical Exam   Constitutional: He is oriented to person, place, and time  He appears well-developed and well-nourished  No distress  HENT:   Head: Normocephalic and atraumatic  Mouth/Throat: No oropharyngeal exudate  Eyes: Pupils are equal, round, and reactive to light  EOM are normal  Right eye exhibits no discharge  Left eye exhibits no discharge  No scleral icterus  Neck: Normal range of motion  Neck supple  No JVD present  Cardiovascular: Normal rate, regular rhythm, normal heart sounds and intact distal pulses  Exam reveals no friction rub  No murmur heard  Pulmonary/Chest: Effort normal and breath sounds normal  No respiratory distress  He has no wheezes  He has no rales  Abdominal: Soft  Bowel sounds are normal  He exhibits no distension and no mass  There is no guarding  Musculoskeletal: Normal range of motion  He exhibits no edema or deformity  Lymphadenopathy:     He has no cervical adenopathy     Neurological: He is alert and oriented to person, place, and time  No cranial nerve deficit  He exhibits normal muscle tone  Coordination normal    Skin: Skin is warm and dry  No rash noted  He is not diaphoretic  No erythema  No pallor  Psychiatric: He has a normal mood and affect  His behavior is normal  Judgment and thought content normal        Lab Results:     Results from last 7 days   Lab Units 12/18/19  1629   WBC Thousand/uL 10 84*   HEMOGLOBIN g/dL 16 3   HEMATOCRIT % 48 2   PLATELETS Thousands/uL 273     Results from last 7 days   Lab Units 12/18/19  1629   POTASSIUM mmol/L 4 0   CHLORIDE mmol/L 97*   CO2 mmol/L 29   BUN mg/dL 15   CREATININE mg/dL 1 18   CALCIUM mg/dL 9 5     Results from last 7 days   Lab Units 12/19/19  0823 12/18/19  2356 12/18/19  1629   INR   --   --  1 03   PTT seconds 37 25 30     Lab Results   Component Value Date    HGBA1C 10 1 (H) 12/18/2019     Lab Results   Component Value Date    TROPONINI 2 83 (H) 12/19/2019       Imaging Studies:     Cardiac Catheterization:EF 30-35%, dilated  LAD: 60-70%; Cx: OM2 90%; RCA: 50-60% diffuse, 90% RPDA    Echocardiogram: EF 30%, global hypokinesis, Possible LV thrombus  Mild AI, MR, TR  Grade 2 DD  I have personally reviewed pertinent reports  and I have shared these findings with the patient  Assessment:  Principal Diagnosis:   Severe coronary artery disease; Ongoing CABG workup  Severe Cardiomyopathy, cause undetermined  Hospital Problems:  Present on Admission:   Essential hypertension   NSTEMI (non-ST elevated myocardial infarction) [address] [address]   Diabetes mellitus type 2, uncontrolled ([facility] )   Snoring      Plan: Will need eventual surgical revascularization  Concerned with degree of CM compared to disease  ? LV thrombus  Consider consulting HF Cardiology  Will need cardiac MRI  Surgical plan TBD based on studies       SIGNATURE: Osmani Vora PA-C  DATE: December 20, 2019  TIME: 10:16 AM

## 2019-12-20 NOTE — OP NOTE
CHIEF COMPLAINT: Neck pain secondary to cervical spondylosis and cervical disc degeneration  PROCEDURE: C6-7 Interlaminar epidural steroid injection using fluoroscopic guidance with contrast dye.   PROCEDURE DETAILS: After written informed consent was obtained from the patient, the patient was escorted to the procedure room.  The patient was placed in the prone position.  A  time out  was conducted to verify patient identity, procedure to be performed, side, site, allergies and any special requirements.  The skin over the neck and upper back region were prepped and draped in normal sterile fashion. Fluoroscopy was used to identify the C6-7 interspace in an AP view and the skin was anesthetized with 2 mL of 1% lidocaine with bicarbonate buffer.  A 20-gauge 3-1/2 inch Tuohy needle was advanced using the loss of resistance technique with preservative free normal saline with fluoroscopic guidance. After negative aspiration for CSF and blood, 1.5 cc of Isovue contrast dye was injected revealing the appropriate cervical epidurogram without evidence of intrathecal or intravascular spread. Following this, a 3-mL solution of 40 mg of triamcinolone with 2 cc preservative-free normal saline was slowly injected.  After injection of the medication, as the needle tip was withdrawn, it was flushed with local anesthetic.  The patient was monitored with blood pressure and pulse oximetry machines with the assistance of an RN throughout the procedure.  The patient was alert and responsive to questions throughout the procedure.   The patient tolerated the procedure well and was observed in the post-procedural area.  The patient was dismissed without apparent complications.     DIAGNOSIS:  1. Neck pain secondary to cervical spondylosis and cervical disc degeneration  PLAN:  1. Performed a C6-7 interlaminar epidural steroid injection.   2. The patient was instructed to call the [place] [facility] if today's procedure is not  helpful.    Carlos Avendaño MD  Diplomate of the American Board of Anesthesiology, Pain Medicine

## 2020-01-13 ENCOUNTER — OFFICE VISIT (OUTPATIENT)
Dept: NEUROSURGERY | Facility: CLINIC | Age: 75
End: 2020-01-13
Payer: COMMERCIAL

## 2020-01-13 ENCOUNTER — ANCILLARY PROCEDURE (OUTPATIENT)
Dept: GENERAL RADIOLOGY | Facility: CLINIC | Age: 75
End: 2020-01-13
Attending: PHYSICIAN ASSISTANT
Payer: COMMERCIAL

## 2020-01-13 VITALS
HEIGHT: 63 IN | TEMPERATURE: 97.3 F | DIASTOLIC BLOOD PRESSURE: 68 MMHG | OXYGEN SATURATION: 98 % | SYSTOLIC BLOOD PRESSURE: 124 MMHG | WEIGHT: 159.4 LBS | BODY MASS INDEX: 28.24 KG/M2 | HEART RATE: 76 BPM

## 2020-01-13 DIAGNOSIS — R20.2 PARESTHESIA AND PAIN OF BOTH UPPER EXTREMITIES: Primary | ICD-10-CM

## 2020-01-13 DIAGNOSIS — M79.602 PARESTHESIA AND PAIN OF BOTH UPPER EXTREMITIES: Primary | ICD-10-CM

## 2020-01-13 DIAGNOSIS — M79.601 PARESTHESIA AND PAIN OF BOTH UPPER EXTREMITIES: Primary | ICD-10-CM

## 2020-01-13 DIAGNOSIS — Z98.1 S/P CERVICAL SPINAL FUSION: ICD-10-CM

## 2020-01-13 PROCEDURE — 72040 X-RAY EXAM NECK SPINE 2-3 VW: CPT | Mod: TC

## 2020-01-13 PROCEDURE — 99213 OFFICE O/P EST LOW 20 MIN: CPT | Performed by: PHYSICIAN ASSISTANT

## 2020-01-13 ASSESSMENT — MIFFLIN-ST. JEOR: SCORE: 1191.12

## 2020-01-13 NOTE — NURSING NOTE
"Tracy Mcclelland is a 74 year old female who presents for:  Chief Complaint   Patient presents with     Surgical Followup     s/p cervical fusion DOS: 6/19/19        Initial Vitals:  /68   Pulse 76   Temp 97.3  F (36.3  C) (Temporal)   Ht 5' 3.25\" (1.607 m)   Wt 159 lb 6.4 oz (72.3 kg)   LMP  (LMP Unknown)   SpO2 98%   BMI 28.01 kg/m   Estimated body mass index is 28.01 kg/m  as calculated from the following:    Height as of this encounter: 5' 3.25\" (1.607 m).    Weight as of this encounter: 159 lb 6.4 oz (72.3 kg).. Body surface area is 1.8 meters squared. BP completed using cuff size: regular  Data Unavailable    Nursing Comments:     Clarissa Monique    "

## 2020-01-13 NOTE — PROGRESS NOTES
[facility]  Neurosurgery followup:    HPI: 74-year-old female, 6 months out from C5-6 cervical fusion.  She is doing well, other than still having some gradually worsening numbness in her hands.  She has no radicular pain in her arms, and her neck pain is also improved a lot.  She is concerned about the numbness in her hands.    Exam:  Constitutional:  Alert, well nourished, NAD.  HEENT: Normocephalic, atraumatic.   Pulm:  Without shortness of breath   CV:  No pitting edema of BLE.     Neurological:  Awake  Alert  Oriented x 3  Motor exam:     Shoulder Abduction:  Right:  5/5    Left:  5/5  Biceps:                      Right:  5/5    Left:  5/5  Triceps:                     Right:  5/5    Left:  5/5  Wrist Extensors:       Right:  5/5    Left:  5/5  Wrist Flexors:           Right:  5/5    Left:  5/5  Intrinsics:                  Right:  5/5    Left:  5/5     Able to spontaneously move U/E bilaterally  Sensation intact throughout all U/E dermatomes  Incision: Well-healed  Imaging: AP and lateral views show her C5-6 instrumentation to be in stable position, with no concern for complication.    A/P:   6-month status post C5-6 ACDF.  She continues to gradually improve, with the exception of the numbness in her hands, which she thinks is worsening.  We will go ahead and check a upper extremity EMG for further evaluation.  She will follow-up with us to discuss the results.  She voiced agreement and understanding.          Walter Chapman PA-C  [facility]  [address] [address]    Tel [phone]  Pager [phone]

## 2020-01-13 NOTE — LETTER
1/13/2020         RE: Tracy Mcclelland  [address] [facility] [address]        Dear Colleague,    Thank you for referring your patient, Tracy Mcclelland, to the [facility]. Please see a copy of my visit note below.    [facility]  Neurosurgery followup:    HPI: 74-year-old female, 6 months out from C5-6 cervical fusion.  She is doing well, other than still having some gradually worsening numbness in her hands.  She has no radicular pain in her arms, and her neck pain is also improved a lot.  She is concerned about the numbness in her hands.    Exam:  Constitutional:  Alert, well nourished, NAD.  HEENT: Normocephalic, atraumatic.   Pulm:  Without shortness of breath   CV:  No pitting edema of BLE.     Neurological:  Awake  Alert  Oriented x 3  Motor exam:     Shoulder Abduction:  Right:  5/5    Left:  5/5  Biceps:                      Right:  5/5    Left:  5/5  Triceps:                     Right:  5/5    Left:  5/5  Wrist Extensors:       Right:  5/5    Left:  5/5  Wrist Flexors:           Right:  5/5    Left:  5/5  Intrinsics:                  Right:  5/5    Left:  5/5     Able to spontaneously move U/E bilaterally  Sensation intact throughout all U/E dermatomes  Incision: Well-healed  Imaging: AP and lateral views show her C5-6 instrumentation to be in stable position, with no concern for complication.    A/P:   6-month status post C5-6 ACDF.  She continues to gradually improve, with the exception of the numbness in her hands, which she thinks is worsening.  We will go ahead and check a upper extremity EMG for further evaluation.  She will follow-up with us to discuss the results.  She voiced agreement and understanding.          Walter Chapman PA-C  [facility]  [address]  [address] [place] [id number]    Tel [phone]  Pager [phone]      Again, thank you for allowing me to participate in the care of your patient.         Sincerely,        Walter Chapman PA-C

## 2020-01-28 ENCOUNTER — TELEPHONE (OUTPATIENT)
Dept: FAMILY MEDICINE | Facility: OTHER | Age: 75
End: 2020-01-28

## 2020-01-28 DIAGNOSIS — M54.2 NECK PAIN: Primary | ICD-10-CM

## 2020-01-28 NOTE — TELEPHONE ENCOUNTER
Reason for Call:  Medication or medication refill:    Do you use a [facility]?  Name of the pharmacy and phone number for the current request:  [facility] - [phone]    Name of the medication requested: Prednisone    Other request: Pt calling and states she would like a prescription for prednisone. States her hands are swollen. Please advise.     Can we leave a detailed message on this number? YES    Phone number patient can be reached at: Home number on file [phone] (home)    Best Time:     Call taken on 1/28/2020 at 8:52 AM by Patricia Machado

## 2020-01-28 NOTE — TELEPHONE ENCOUNTER
Spoke with patient and informed of message below from Dr. Braxton. Patient states faisal has seen patient before for this and she would like Faisal to address this. I informed patient Faisal is not in until 1/29/2020. She will wait for Faisal to refill medication she stated she will not set up appointment to be seen. I did advise that if her swelling gets worse that she should be seen in ED.     Katie Lopez MA

## 2020-01-28 NOTE — TELEPHONE ENCOUNTER
As it has been a while since the patient's primary care provider has seen her (and not for this problem), and as this is not an emergency, and as the patient's primary care provider should be back tomorrow, I would recommend she sets up an appointment.    Irais

## 2020-01-28 NOTE — TELEPHONE ENCOUNTER
Routing to covering provider to advise. Prednisone is not currently on medication list.     Katie Lopez MA

## 2020-01-29 RX ORDER — PREDNISONE 20 MG/1
40 TABLET ORAL DAILY
Qty: 10 TABLET | Refills: 0 | Status: SHIPPED | OUTPATIENT
Start: 2020-01-29 | End: 2020-04-20

## 2020-02-12 DIAGNOSIS — N18.2 TYPE 2 DIABETES MELLITUS WITH STAGE 2 CHRONIC KIDNEY DISEASE, WITHOUT LONG-TERM CURRENT USE OF INSULIN (H): ICD-10-CM

## 2020-02-12 DIAGNOSIS — E11.22 TYPE 2 DIABETES MELLITUS WITH STAGE 2 CHRONIC KIDNEY DISEASE, WITHOUT LONG-TERM CURRENT USE OF INSULIN (H): ICD-10-CM

## 2020-02-12 RX ORDER — GABAPENTIN 600 MG/1
TABLET ORAL
Qty: 360 TABLET | Refills: 1 | Status: SHIPPED | OUTPATIENT
Start: 2020-02-12 | End: 2020-06-09

## 2020-02-12 NOTE — TELEPHONE ENCOUNTER
Gabapentin      Last Written Prescription Date:  1014/2019  Last Fill Quantity: 360,   # refills: 1  Last Office Visit: 12/16/2019  Future Office visit:       Routing refill request to provider for review/approval because:  Drug not on the [facility], [facility] or [facility] refill protocol or controlled substance

## 2020-02-27 DIAGNOSIS — N18.2 TYPE 2 DIABETES MELLITUS WITH STAGE 2 CHRONIC KIDNEY DISEASE, WITHOUT LONG-TERM CURRENT USE OF INSULIN (H): ICD-10-CM

## 2020-02-27 DIAGNOSIS — E11.22 TYPE 2 DIABETES MELLITUS WITH STAGE 2 CHRONIC KIDNEY DISEASE, WITHOUT LONG-TERM CURRENT USE OF INSULIN (H): ICD-10-CM

## 2020-02-27 NOTE — TELEPHONE ENCOUNTER
"Requested Prescriptions   Pending Prescriptions Disp Refills     metFORMIN (GLUCOPHAGE) 500 MG tablet [Pharmacy Med Name: METFORMIN HCL 500MG TABS] 180 tablet 1     Sig: TAKE ONE TABLET BY MOUTH TWICE A DAY WITH MEALS   Last Written Prescription Date:  8/23/19  Last Fill Quantity: 180,  # refills: 1   Last office visit: 12/16/2019 with prescribing provider:  12/16/19   Future Office Visit:        Biguanide Agents Passed - 2/27/2020  9:37 AM        Passed - Blood pressure less than 140/90 in past 6 months     BP Readings from Last 3 Encounters:   01/13/20 124/68   12/20/19 117/70   12/16/19 120/68                 Passed - Patient has documented LDL within the past 12 mos.     Recent Labs   Lab Test 04/10/19  1018   LDL 88             Passed - Patient has had a Microalbumin in the past 15 mos.     Recent Labs   Lab Test 12/17/19  0832   MICROL 11   UMALCR 13.87             Passed - Patient is age 10 or older        Passed - Patient has documented A1c within the specified period of time.     If HgbA1C is 8 or greater, it needs to be on file within the past 3 months.  If less than 8, must be on file within the past 6 months.     Recent Labs   Lab Test 12/16/19  1104   A1C 6.3*             Passed - Patient's CR is NOT>1.4 OR Patient's EGFR is NOT<45 within past 12 mos.     Recent Labs   Lab Test 11/19/19  0931   GFRESTIMATED 85   GFRESTBLACK >90       Recent Labs   Lab Test 11/19/19  0931   CR 0.70             Passed - Patient does NOT have a diagnosis of CHF.        Passed - Medication is active on med list        Passed - Patient is not pregnant        Passed - Patient has not had a positive pregnancy test within the past 12 mos.         Passed - Recent (6 mo) or future (30 days) visit within the authorizing provider's specialty     Patient had office visit in the last 6 months or has a visit in the next 30 days with authorizing provider or within the authorizing provider's specialty.  See \"Patient Info\" tab in " "inbasket, or \"Choose Columns\" in Meds & Orders section of the refill encounter.            "

## 2020-02-27 NOTE — TELEPHONE ENCOUNTER
Prescription approved per [facility] Refill Protocol.    FRANKY StinsonN, RN  [facility]

## 2020-03-02 ENCOUNTER — TELEPHONE (OUTPATIENT)
Dept: FAMILY MEDICINE | Facility: OTHER | Age: 75
End: 2020-03-02

## 2020-03-02 DIAGNOSIS — M25.50 MULTIPLE JOINT PAIN: Primary | ICD-10-CM

## 2020-03-02 RX ORDER — PREDNISONE 20 MG/1
40 TABLET ORAL DAILY
Qty: 10 TABLET | Refills: 0 | Status: SHIPPED | OUTPATIENT
Start: 2020-03-02 | End: 2020-04-20

## 2020-03-02 NOTE — TELEPHONE ENCOUNTER
Reason for Call:  Other prescription    Detailed comments: Tracy is leaving for [place] this week and says her hands are tabby sore and painful, and she doesn't have her Neurology appointment until 03/20.  She is asking if she could get a prescription for some prednisone to help with the pain while she is on vacation.  She is going to [place] to visit her aunt.    [facility]    Phone Number Patient can be reached at: Home number on file [phone] (home)    Best Time: any    Can we leave a detailed message on this number? YES    Call taken on 3/2/2020 at 1:02 PM by Eleni Bradley

## 2020-03-20 ENCOUNTER — TRANSFERRED RECORDS (OUTPATIENT)
Dept: HEALTH INFORMATION MANAGEMENT | Facility: CLINIC | Age: 75
End: 2020-03-20

## 2020-03-23 ENCOUNTER — TELEPHONE (OUTPATIENT)
Dept: NEUROSURGERY | Facility: CLINIC | Age: 75
End: 2020-03-23

## 2020-03-23 NOTE — TELEPHONE ENCOUNTER
----- Message from Darrick Claros sent at 3/23/2020  1:21 PM CDT -----  Regarding: EMG results  EMG results received and scanned into the email. No upcoming appointment scheduled at this time.

## 2020-03-23 NOTE — TELEPHONE ENCOUNTER
Per Walter Chapman PA-C: Ok thanks. I would maybe see if Dr. Treadwell would like to take on the carpal tunnel and ulnar neuropathy, if not then would refer her to Neurology.    Will send message to Dr. Treadwell to see if he would like to see patient to evaluate for carpal tunnel and ulnar neuropathy. If not then will refer to neurology per recommendation of Vinod.

## 2020-04-20 ENCOUNTER — TELEPHONE (OUTPATIENT)
Dept: FAMILY MEDICINE | Facility: OTHER | Age: 75
End: 2020-04-20

## 2020-04-20 ENCOUNTER — VIRTUAL VISIT (OUTPATIENT)
Dept: FAMILY MEDICINE | Facility: CLINIC | Age: 75
End: 2020-04-20
Payer: COMMERCIAL

## 2020-04-20 DIAGNOSIS — E11.22 TYPE 2 DIABETES MELLITUS WITH CHRONIC KIDNEY DISEASE, WITHOUT LONG-TERM CURRENT USE OF INSULIN, UNSPECIFIED CKD STAGE (H): ICD-10-CM

## 2020-04-20 DIAGNOSIS — R60.0 LOCALIZED EDEMA: Primary | ICD-10-CM

## 2020-04-20 PROCEDURE — 99214 OFFICE O/P EST MOD 30 MIN: CPT | Mod: 95 | Performed by: INTERNAL MEDICINE

## 2020-04-20 RX ORDER — TRIAMTERENE AND HYDROCHLOROTHIAZIDE 37.5; 25 MG/1; MG/1
1 CAPSULE ORAL DAILY
Qty: 14 CAPSULE | Refills: 0 | Status: SHIPPED | OUTPATIENT
Start: 2020-04-20 | End: 2020-05-28

## 2020-04-20 NOTE — PROGRESS NOTES
"Tracy Mcclelland is a 74 year old female who is being evaluated via a billable telephone visit.      The patient has been notified of following:     \"This telephone visit will be conducted via a call between you and your physician/provider. We have found that certain health care needs can be provided without the need for a physical exam.  This service lets us provide the care you need with a short phone conversation.  If a prescription is necessary we can send it directly to your pharmacy.  If lab work is needed we can place an order for that and you can then stop by our lab to have the test done at a later time.    Telephone visits are billed at different rates depending on your insurance coverage. During this emergency period, for some insurers they may be billed the same as an in-person visit.  Please reach out to your insurance provider with any questions.    If during the course of the call the physician/provider feels a telephone visit is not appropriate, you will not be charged for this service.\"    Patient has given verbal consent for Telephone visit?  Yes    How would you like to obtain your AVS? Mail a copy    Subjective     Tracy Mcclelland is a 74 year old female who presents to clinic today for the following health issues:    Chief Complaint   Patient presents with     Edema     bilateral ankles and feet for 2 weeks                       Chief Complaint         The patient is a pleasant 74-year-old female who is seen virtually with complaints of bilateral lower extremity edema.  She has had this for 2 weeks, she notes no skin breakdown or bruising.  She denies any shortness of breath or orthopnea.  She has no previous cardiac history and has had no problems with congestive heart failure.  She does have type 2 diabetes and has excellent blood sugar control on metformin.  She is on statin and aspirin at this time.  She denies any polyuria or polydipsia.  She denies any symptoms of hyper or hypoglycemia.  She " is tolerating the medications without difficulty and having no side effects.  She notes that edema the lower extremities worsens as the day progresses.  It does not resolve by morning.  She does have multiple urinations at night suggesting that she is losing some fluid at night.  She does not have a home bathroom scale at this time.                           PAST, FAMILY,SOCIAL HISTORY:     Medical  History:   has a past medical history of CKD (chronic kidney disease) stage 2, GFR 60-89 ml/min (10/19/2015), Headache(784.0), Lumbago, Type II or unspecified type diabetes mellitus without mention of complication, not stated as uncontrolled, and Unspecified essential hypertension.     Surgical History:   has a past surgical history that includes LAPAROSCOPY, SURGICAL; CHOLECYSTECTOMY (2000); REMOVE TONSILS/ADENOIDS,<13 Y/O; LIGATE FALLOPIAN TUBE; Arthroplasty hip (Right, 10/16/2018); Inject epidural cervical (N/A, 5/17/2019); Discectomy, fusion cervical anterior one level, combined (N/A, 6/19/2019); and Inject epidural cervical (Bilateral, 12/20/2019).     Social History:   reports that she has been smoking cigarettes. She has a 5.00 pack-year smoking history. She has never used smokeless tobacco. She reports previous alcohol use. She reports that she does not use drugs.     Family History:  family history includes Alzheimer Disease in her father; Cerebrovascular Disease in her maternal grandmother; Diabetes in her paternal grandmother; Heart Disease in her maternal grandfather and mother.            MEDICATIONS  Current Outpatient Medications   Medication Sig Dispense Refill     acetaminophen (TYLENOL) 325 MG tablet Take 3 tablets (975 mg) by mouth every 8 hours as needed for mild pain 100 tablet 1     aspirin (ASA) 81 MG tablet Take 1 tablet (81 mg) by mouth daily       gabapentin (NEURONTIN) 600 MG tablet TAKE TWO TABLETS BY MOUTH THREE TIMES A  tablet 1     metFORMIN (GLUCOPHAGE) 500 MG tablet TAKE ONE TABLET  BY MOUTH TWICE A DAY WITH MEALS 180 tablet 0     simvastatin (ZOCOR) 5 MG tablet Take 1 tablet (5 mg) by mouth At Bedtime 90 tablet 3     triamterene-HCTZ (DYAZIDE) 37.5-25 MG capsule Take 1 capsule by mouth daily 14 capsule 0         --------------------------------------------------------------------------------------------------------------------                              Review of Systems       LUNGS: Pt denies: cough, excess sputum, hemoptysis, or shortness of breath.   HEART: Pt denies: chest pain, arrhythmia, syncope, tachy or bradyarrhythmia.  Does have a 2-3+ edema of lower extremities per her description.   GI: Pt denies: nausea, vomiting, diarrhea, constipation, melena, or hematochezia.   NEURO: Pt denies: seizures, strokes, diplopia, weakness, paraesthesias, or paralysis.   SKIN: Pt denies: itching, rashes, discoloration, or specific lesions of concern. Denies recent hair loss.   PSYCH: The patient denies significant depression, anxiety, mood imbalance. Specifically denies any suicidal ideation.                                    No physical examination is performed as this is a virtual visit.  The patient's voice is strong, there is no evidence of labored breathing or wheezing.  The patient is alert and appropriate and demonstrates no obvious behavioral irregularities.         Decision Making       1. Localized edema  We will start on Dyazide 1 pill daily.  Review of previous lab work demonstrates a GFR of 85 in November of last year.  Elevate feet as much as possible    - triamterene-HCTZ (DYAZIDE) 37.5-25 MG capsule; Take 1 capsule by mouth daily  Dispense: 14 capsule; Refill: 0    2. Type 2 diabetes mellitus with chronic kidney disease, without long-term current use of insulin, unspecified CKD stage (H)  Continue current therapy.  No ACE inhibitor as patient reportedly had significant decrease in renal function concurrent with ACE inhibitor usage.                             FOLLOW UP   I have  asked the patient to make an appointment for followup with me virtually in 1 week.            I have carefully explained the diagnosis and treatment options to the patient.  The patient has displayed an understanding of the above, and all subsequent questions were answered.      DO TIMUR Richards    Portions of this note were produced using [employer]  Although every attempt at real-time proof reading has been made, occasional grammar/syntax errors may have been missed.    Telephone time: 22 minutes

## 2020-04-20 NOTE — TELEPHONE ENCOUNTER
Reason for call:  Patient reporting a symptom    Symptom or request: swollen feet, ankle    Duration (how long have symptoms been present): 1 week    Have you been treated for this before? No    Additional comments: Tracy is having swelling in her feet and ankles, no known injury.    Phone Number patient can be reached at:  Home number on file [phone] (home)    Best Time:      Can we leave a detailed message on this number:  YES    Call taken on 4/20/2020 at 11:15 AM by Cathie Rodgers

## 2020-04-27 NOTE — TELEPHONE ENCOUNTER
Pt calling to give update on swollen feet per Dr Braxton's request. Pt states the swelling has gone down, not completely though. Please advise if she should keep taking medication prescribed.

## 2020-04-27 NOTE — TELEPHONE ENCOUNTER
The patient may discontinue the Dyazide at this time.  If she develops increased edema, she can take this on an as-needed basis but no more than once daily.    Irais

## 2020-05-04 ENCOUNTER — HOSPITAL ENCOUNTER (EMERGENCY)
Facility: CLINIC | Age: 75
Discharge: HOME | End: 2020-05-04
Attending: EMERGENCY MEDICINE | Admitting: EMERGENCY MEDICINE
Payer: MEDICARE

## 2020-05-04 ENCOUNTER — TELEPHONE (OUTPATIENT)
Dept: FAMILY MEDICINE | Facility: OTHER | Age: 75
End: 2020-05-04

## 2020-05-04 VITALS
BODY MASS INDEX: 27.77 KG/M2 | TEMPERATURE: 97.4 F | OXYGEN SATURATION: 96 % | WEIGHT: 158 LBS | HEART RATE: 87 BPM | SYSTOLIC BLOOD PRESSURE: 113 MMHG | RESPIRATION RATE: 18 BRPM | DIASTOLIC BLOOD PRESSURE: 85 MMHG

## 2020-05-04 DIAGNOSIS — M62.81 MUSCLE WEAKNESS (GENERALIZED): ICD-10-CM

## 2020-05-04 DIAGNOSIS — R53.83 FATIGUE, UNSPECIFIED TYPE: ICD-10-CM

## 2020-05-04 LAB
ALBUMIN SERPL-MCNC: 3.3 G/DL (ref 3.4–5)
ALBUMIN UR-MCNC: NEGATIVE MG/DL
ALP SERPL-CCNC: 60 U/L (ref 40–150)
ALT SERPL W P-5'-P-CCNC: 15 U/L (ref 0–50)
ANION GAP SERPL CALCULATED.3IONS-SCNC: 6 MMOL/L (ref 3–14)
APPEARANCE UR: ABNORMAL
AST SERPL W P-5'-P-CCNC: 18 U/L (ref 0–45)
BASOPHILS # BLD AUTO: 0 10E9/L (ref 0–0.2)
BASOPHILS NFR BLD AUTO: 0.4 %
BILIRUB SERPL-MCNC: 0.3 MG/DL (ref 0.2–1.3)
BILIRUB UR QL STRIP: NEGATIVE
BUN SERPL-MCNC: 16 MG/DL (ref 7–30)
CALCIUM SERPL-MCNC: 8 MG/DL (ref 8.5–10.1)
CHLORIDE SERPL-SCNC: 100 MMOL/L (ref 94–109)
CO2 SERPL-SCNC: 25 MMOL/L (ref 20–32)
COLOR UR AUTO: YELLOW
CREAT SERPL-MCNC: 0.94 MG/DL (ref 0.52–1.04)
CRP SERPL-MCNC: 17.5 MG/L (ref 0–8)
DIFFERENTIAL METHOD BLD: NORMAL
EOSINOPHIL NFR BLD AUTO: 0.6 %
ERYTHROCYTE [DISTWIDTH] IN BLOOD BY AUTOMATED COUNT: 12.8 % (ref 10–15)
GFR SERPL CREATININE-BSD FRML MDRD: 59 ML/MIN/{1.73_M2}
GLUCOSE SERPL-MCNC: 124 MG/DL (ref 70–99)
GLUCOSE UR STRIP-MCNC: NEGATIVE MG/DL
HCT VFR BLD AUTO: 42.5 % (ref 35–47)
HGB BLD-MCNC: 13.9 G/DL (ref 11.7–15.7)
HGB UR QL STRIP: NEGATIVE
HYALINE CASTS #/AREA URNS LPF: 4 /LPF (ref 0–2)
IMM GRANULOCYTES # BLD: 0 10E9/L (ref 0–0.4)
IMM GRANULOCYTES NFR BLD: 0.3 %
KETONES UR STRIP-MCNC: 5 MG/DL
LEUKOCYTE ESTERASE UR QL STRIP: ABNORMAL
LYMPHOCYTES # BLD AUTO: 3.3 10E9/L (ref 0.8–5.3)
LYMPHOCYTES NFR BLD AUTO: 42 %
MCH RBC QN AUTO: 32 PG (ref 26.5–33)
MCHC RBC AUTO-ENTMCNC: 32.7 G/DL (ref 31.5–36.5)
MCV RBC AUTO: 98 FL (ref 78–100)
MONOCYTES # BLD AUTO: 0.6 10E9/L (ref 0–1.3)
MONOCYTES NFR BLD AUTO: 8 %
MUCOUS THREADS #/AREA URNS LPF: PRESENT /LPF
NEUTROPHILS # BLD AUTO: 3.8 10E9/L (ref 1.6–8.3)
NEUTROPHILS NFR BLD AUTO: 48.7 %
NITRATE UR QL: NEGATIVE
NRBC # BLD AUTO: 0 10*3/UL
NRBC BLD AUTO-RTO: 0 /100
PH UR STRIP: 5 PH (ref 5–7)
PLATELET # BLD AUTO: 273 10E9/L (ref 150–450)
POTASSIUM SERPL-SCNC: 4.3 MMOL/L (ref 3.4–5.3)
PROT SERPL-MCNC: 7.2 G/DL (ref 6.8–8.8)
RBC # BLD AUTO: 4.34 10E12/L (ref 3.8–5.2)
RBC #/AREA URNS AUTO: 3 /HPF (ref 0–2)
SODIUM SERPL-SCNC: 131 MMOL/L (ref 133–144)
SOURCE: ABNORMAL
SP GR UR STRIP: 1.01 (ref 1–1.03)
SQUAMOUS #/AREA URNS AUTO: 3 /HPF (ref 0–1)
TSH SERPL DL<=0.005 MIU/L-ACNC: 2.54 MU/L (ref 0.4–4)
UROBILINOGEN UR STRIP-MCNC: 0 MG/DL (ref 0–2)
WBC # BLD AUTO: 7.8 10E9/L (ref 4–11)
WBC #/AREA URNS AUTO: 11 /HPF (ref 0–5)

## 2020-05-04 PROCEDURE — 86140 C-REACTIVE PROTEIN: CPT | Performed by: EMERGENCY MEDICINE

## 2020-05-04 PROCEDURE — 84443 ASSAY THYROID STIM HORMONE: CPT | Performed by: EMERGENCY MEDICINE

## 2020-05-04 PROCEDURE — 96360 HYDRATION IV INFUSION INIT: CPT | Performed by: EMERGENCY MEDICINE

## 2020-05-04 PROCEDURE — 80053 COMPREHEN METABOLIC PANEL: CPT | Performed by: EMERGENCY MEDICINE

## 2020-05-04 PROCEDURE — 87086 URINE CULTURE/COLONY COUNT: CPT | Performed by: EMERGENCY MEDICINE

## 2020-05-04 PROCEDURE — 99283 EMERGENCY DEPT VISIT LOW MDM: CPT | Mod: 25 | Performed by: EMERGENCY MEDICINE

## 2020-05-04 PROCEDURE — 25800030 ZZH RX IP 258 OP 636: Performed by: EMERGENCY MEDICINE

## 2020-05-04 PROCEDURE — 99282 EMERGENCY DEPT VISIT SF MDM: CPT | Mod: Z6 | Performed by: EMERGENCY MEDICINE

## 2020-05-04 PROCEDURE — 85025 COMPLETE CBC W/AUTO DIFF WBC: CPT | Performed by: EMERGENCY MEDICINE

## 2020-05-04 PROCEDURE — 81001 URINALYSIS AUTO W/SCOPE: CPT | Performed by: EMERGENCY MEDICINE

## 2020-05-04 RX ORDER — SODIUM CHLORIDE 9 MG/ML
1000 INJECTION, SOLUTION INTRAVENOUS CONTINUOUS
Status: DISCONTINUED | OUTPATIENT
Start: 2020-05-04 | End: 2020-05-04 | Stop reason: HOSPADM

## 2020-05-04 RX ADMIN — SODIUM CHLORIDE 500 ML: 9 INJECTION, SOLUTION INTRAVENOUS at 17:47

## 2020-05-04 NOTE — ED PROVIDER NOTES
History     Chief Complaint   Patient presents with     Fatigue     HPI  Tracy Mcclelland is a 74 year old female who presents to the emergency department complaining of generalized fatigue and generalized weakness.  This started over the last week but has become worse over last few days.  She usually walks with a walker and has needed it all the time whereas at baseline she does not need it all the time.  She feels weak on her feet.  She feels like it is difficult to maintain her balance.  She has not had a headache, slurred speech, this aphasia, dysarthria, chest pain, palpitations, abdominal pain, nausea, vomiting, diarrhea, focal neurologic weakness, sensory changes, urinary incontinence or fecal incontinence, numbness into the legs.  She has baseline low back pain that is unchanged.  She has been sleeping more than usual as well.    Allergies:  Allergies   Allergen Reactions     Mold      sneezing, coughing , runny nose, watery eyes & red,     No Known Drug Allergies        Problem List:    Patient Active Problem List    Diagnosis Date Noted     Status post total hip replacement, right 10/16/2018     Priority: Medium     Primary osteoarthritis of right hip 07/02/2018     Priority: Medium     CKD (chronic kidney disease) stage 2, GFR 60-89 ml/min 10/19/2015     Priority: Medium     Hereditary and idiopathic peripheral neuropathy 07/02/2015     Priority: Medium     Problem list name updated by automated process. Provider to review       Granuloma annulare 08/07/2014     Priority: Medium     Right dorsal hand       Hypertension, goal below 140/90 07/17/2012     Priority: Medium     Type 2 diabetes mellitus with diabetic chronic kidney disease (H) 11/02/2011     Priority: Medium     Tobacco use disorder 10/07/2010     Priority: Medium     Tennis Elbow-left 07/28/2010     Priority: Medium     Microalbuminuria 01/29/2010     Priority: Medium     Hyperlipidemia with target LDL less than 100 01/28/2010     Priority:  Medium     Diagnosis updated by automated process. Provider to review and confirm.          Past Medical History:    Past Medical History:   Diagnosis Date     CKD (chronic kidney disease) stage 2, GFR 60-89 ml/min 10/19/2015     Headache(784.0)      Lumbago      Type II or unspecified type diabetes mellitus without mention of complication, not stated as uncontrolled      Unspecified essential hypertension        Past Surgical History:    Past Surgical History:   Procedure Laterality Date     ARTHROPLASTY HIP Right 10/16/2018    Procedure: right total hip arthroplasty;  Surgeon: Terrell Ervin DO;  Location: [facility] OR     C LIGATE FALLOPIAN TUBE       DISCECTOMY, FUSION CERVICAL ANTERIOR ONE LEVEL, COMBINED N/A 6/19/2019    Procedure: cervical 5-6 anterior cervical discectomy fusion;  Surgeon: Joe Jefferson MD;  Location: [facility] OR     HC LAPAROSCOPY, SURGICAL; CHOLECYSTECTOMY  2000    Cholecystectomy, Laparoscopic     HC REMOVE TONSILS/ADENOIDS,<13 Y/O      T & A <11y.o.     INJECT EPIDURAL CERVICAL N/A 5/17/2019    Procedure: Epidural Steroid Injection Bilateral Cervical 6-7;  Surgeon: Carlos Avendaño MD;  Location: [facility] OR     INJECT EPIDURAL CERVICAL Bilateral 12/20/2019    Procedure: Cervical Epidural Injection 6-7 bilateral;  Surgeon: Carlos Avendaño MD;  Location: [facility] OR       Family History:    Family History   Problem Relation Age of Onset     Heart Disease Mother         Pacemaker     Alzheimer Disease Father         Dementia/Loss of memory     Cerebrovascular Disease Maternal Grandmother      Heart Disease Maternal Grandfather         MI     Diabetes Paternal Grandmother        Social History:  Marital Status:   [4]  Social History     Tobacco Use     Smoking status: Current Every Day Smoker     Packs/day: 0.25     Years: 20.00     Pack years: 5.00     Types: Cigarettes     Smokeless tobacco: Never Used     Tobacco comment: 2-3 cigs daily   Substance Use Topics     Alcohol use: Not Currently      Drug use: No        Medications:    acetaminophen (TYLENOL) 325 MG tablet  aspirin (ASA) 81 MG tablet  gabapentin (NEURONTIN) 600 MG tablet  metFORMIN (GLUCOPHAGE) 500 MG tablet  simvastatin (ZOCOR) 5 MG tablet  triamterene-HCTZ (DYAZIDE) 37.5-25 MG capsule          Review of Systems   All other systems reviewed and are negative.      Physical Exam   BP: 98/61  Pulse: 96  Temp: 97.4  F (36.3  C)  Resp: 18  Weight: 71.7 kg (158 lb)  SpO2: 98 %      Physical Exam  Vitals signs and nursing note reviewed.   Constitutional:       General: She is not in acute distress.     Appearance: She is well-developed. She is not diaphoretic.   HENT:      Head: Normocephalic and atraumatic.      Nose: Nose normal.      Mouth/Throat:      Mouth: Mucous membranes are moist.   Eyes:      General: No scleral icterus.  Neck:      Musculoskeletal: Normal range of motion and neck supple.   Cardiovascular:      Rate and Rhythm: Normal rate and regular rhythm.      Pulses: Normal pulses.      Heart sounds: Normal heart sounds.   Pulmonary:      Effort: Pulmonary effort is normal.      Breath sounds: Normal breath sounds.   Abdominal:      Tenderness: There is no abdominal tenderness.   Musculoskeletal: Normal range of motion.         General: No swelling, tenderness, deformity or signs of injury.      Right lower leg: No edema.      Left lower leg: No edema.   Skin:     General: Skin is warm and dry.      Capillary Refill: Capillary refill takes less than 2 seconds.      Coloration: Skin is not pale.      Findings: No erythema or rash.   Neurological:      General: No focal deficit present.      Mental Status: She is alert and oriented to person, place, and time.      Cranial Nerves: No cranial nerve deficit.      Comments: She has generalized weakness.  She has difficulty getting out of the bed but can walk independently in short careful steps.  She has full range of motion of all of her extremities.  She is able to straight leg raise  bilaterally and has good  strength and no sensory deficits.  Finger-to-nose is relatively normal.  Heel-to-shin is normal.   Psychiatric:         Mood and Affect: Mood normal.         ED Course        Procedures    IV established, IV fluids and labs drawn.  We will look at TSH, electrolytes, blood count to determine if we can find an etiology for her fatigue and generalized weakness.  She does not have any other symptoms suggestive coronavirus.       Results for orders placed or performed during the hospital encounter of 05/04/20 (from the past 24 hour(s))   CBC with platelets differential   Result Value Ref Range    WBC 7.8 4.0 - 11.0 10e9/L    RBC Count 4.34 3.8 - 5.2 10e12/L    Hemoglobin 13.9 11.7 - 15.7 g/dL    Hematocrit 42.5 35.0 - 47.0 %    MCV 98 78 - 100 fl    MCH 32.0 26.5 - 33.0 pg    MCHC 32.7 31.5 - 36.5 g/dL    RDW 12.8 10.0 - 15.0 %    Platelet Count 273 150 - 450 10e9/L    Diff Method Automated Method     % Neutrophils 48.7 %    % Lymphocytes 42.0 %    % Monocytes 8.0 %    % Eosinophils 0.6 %    % Basophils 0.4 %    % Immature Granulocytes 0.3 %    Nucleated RBCs 0 0 /100    Absolute Neutrophil 3.8 1.6 - 8.3 10e9/L    Absolute Lymphocytes 3.3 0.8 - 5.3 10e9/L    Absolute Monocytes 0.6 0.0 - 1.3 10e9/L    Absolute Basophils 0.0 0.0 - 0.2 10e9/L    Abs Immature Granulocytes 0.0 0 - 0.4 10e9/L    Absolute Nucleated RBC 0.0    Comprehensive metabolic panel   Result Value Ref Range    Sodium 131 (L) 133 - 144 mmol/L    Potassium 4.3 3.4 - 5.3 mmol/L    Chloride 100 94 - 109 mmol/L    Carbon Dioxide 25 20 - 32 mmol/L    Anion Gap 6 3 - 14 mmol/L    Glucose 124 (H) 70 - 99 mg/dL    Urea Nitrogen 16 7 - 30 mg/dL    Creatinine 0.94 0.52 - 1.04 mg/dL    GFR Estimate 59 (L) >60 mL/min/[1.73_m2]    GFR Estimate If Black 68 >60 mL/min/[1.73_m2]    Calcium 8.0 (L) 8.5 - 10.1 mg/dL    Bilirubin Total 0.3 0.2 - 1.3 mg/dL    Albumin 3.3 (L) 3.4 - 5.0 g/dL    Protein Total 7.2 6.8 - 8.8 g/dL    Alkaline  Phosphatase 60 40 - 150 U/L    ALT 15 0 - 50 U/L    AST 18 0 - 45 U/L   CRP inflammation   Result Value Ref Range    CRP Inflammation 17.5 (H) 0.0 - 8.0 mg/L   TSH with free T4 reflex   Result Value Ref Range    TSH 2.54 0.40 - 4.00 mU/L   UA reflex to Microscopic and Culture    Specimen: Midstream Urine   Result Value Ref Range    Color Urine Yellow     Appearance Urine Slightly Cloudy     Glucose Urine Negative NEG^Negative mg/dL    Bilirubin Urine Negative NEG^Negative    Ketones Urine 5 (A) NEG^Negative mg/dL    Specific Gravity Urine 1.014 1.003 - 1.035    Blood Urine Negative NEG^Negative    pH Urine 5.0 5.0 - 7.0 pH    Protein Albumin Urine Negative NEG^Negative mg/dL    Urobilinogen mg/dL 0.0 0.0 - 2.0 mg/dL    Nitrite Urine Negative NEG^Negative    Leukocyte Esterase Urine Moderate (A) NEG^Negative    Source Midstream Urine     RBC Urine 3 (H) 0 - 2 /HPF    WBC Urine 11 (H) 0 - 5 /HPF    Squamous Epithelial /HPF Urine 3 (H) 0 - 1 /HPF    Mucous Urine Present (A) NEG^Negative /LPF    Hyaline Casts 4 (H) 0 - 2 /LPF       Medications   0.9% sodium chloride BOLUS (0 mLs Intravenous Stopped 5/4/20 1910)     Followed by   sodium chloride 0.9% infusion (has no administration in time range)       Assessments & Plan (with Medical Decision Making)  Fatigue, uncertain etiology labs fairly unremarkable.  Return to ER precautions and follow-up precautions discussed.  We will also run a urine culture.  All questions were answered and the patient was discharged in stable condition.     I have reviewed the nursing notes.    I have reviewed the findings, diagnosis, plan and need for follow up with the patient.      Discharge Medication List as of 5/4/2020  8:22 PM          Final diagnoses:   Fatigue, unspecified type   Muscle weakness (generalized)       5/4/2020   [facility] EMERGENCY DEPARTMENT     Bryant Aldrich MD  05/04/20 4102

## 2020-05-04 NOTE — TELEPHONE ENCOUNTER
Called and spoke to the patient. She said for the past 4-5 days she has felt off. She said she can hardly walk because she feels so weak and shaky. She said her balance feels off. She has a very dry mouth. Patient said every muscle in her body aches.     Patient said she was having issues with swollen feet. She had a telephone visit with Dr. Braxton on 4/20/2020 and was given a water pill (Dyazide). Patient said she has been taking the water pills. She said she was tolerating them just fine to begin with. She has 5 tablets left of the #14 Dr. Braxton prescribed. Patient said her foot swelling is better but not completely resolved. She said she feels very unsteady on her feet. Denies dizziness/lightheadedness.     Patient said after a week of the water pills she did stop them because she felt the swelling was better. She restarted the pills about 3 days ago because she realized the swelling was coming back.     Patient is wondering what Skylar would recommend.     Will route to provider to advise.     LAQUITA Stinson, RN  [facility]

## 2020-05-04 NOTE — TELEPHONE ENCOUNTER
So she stopped the pills, but her symptoms did not improve?    Electronically signed by Skylar Gomez CNP.

## 2020-05-04 NOTE — ED TRIAGE NOTES
"Four days ago pt has been feeling more weak than she has.  She has had to start using her walker again since she has felt so weak. She feels tired and can fall asleep midafternoon and still sleep well at HS>  \"I feel rotten and ache all over.\"  She normally has a low bp.    "

## 2020-05-04 NOTE — TELEPHONE ENCOUNTER
She needs an in person visit.  I would have to recommend ER based on her age, but if she refuses, in clinic within the next 1-2 days is okay.    Electronically signed by Skylar Gomez CNP.

## 2020-05-04 NOTE — TELEPHONE ENCOUNTER
Reason for call:  Patient reporting a symptom    Symptom or request: dry mouth, body aches, balance issues    Duration (how long have symptoms been present): 5 days    Have you been treated for this before? No    Additional comments: Tracy has been experiencing dry mouth, body aches, balance issues, can she be triaged to see if she needs to be seen.    Phone Number patient can be reached at:  Home number on file [phone] (home)    Best Time:      Can we leave a detailed message on this number:  YES    Call taken on 5/4/2020 at 11:25 AM by Cathie Rodgers

## 2020-05-04 NOTE — TELEPHONE ENCOUNTER
She took the Dyazide for a week and thought the swelling had cleared up so she stopped the pills. She then noticed the swelling was coming back so she restarted the pills about 3 days ago. Symptoms started 4-5 days ago.     LAQUITA Stinson, RN  [facility]

## 2020-05-04 NOTE — TELEPHONE ENCOUNTER
Per Skylar Gomez, APRN CNP please triage patient. Josselyn Tomlinson LPN........5/4/2020 11:29 AM

## 2020-05-04 NOTE — ED AVS SNAPSHOT
[facility] Emergency Department  [address]   [place] [address]  Phone:  [phone]  Fax:  [phone]                                    Tracy Mcclelland   MRN: [id number]    Department:  [facility] Emergency Department   Date of Visit:  5/4/2020           After Visit Summary Signature Page    I have received my discharge instructions, and my questions have been answered. I have discussed any challenges I see with this plan with the nurse or doctor.    ..........................................................................................................................................  Patient/Patient Representative Signature      ..........................................................................................................................................  Patient Representative Print Name and Relationship to Patient    ..................................................               ................................................  Date                                   Time    ..........................................................................................................................................  Reviewed by Signature/Title    ...................................................              ..............................................  Date                                               Time          22EPIC Rev 08/18

## 2020-05-05 NOTE — DISCHARGE INSTRUCTIONS
Return to the emergency department if you develop new or worsening symptoms.  Your lab tests were reassuring today.  I am not sure the cause of your symptoms.  Please follow-up with your doctor if your symptoms persist.

## 2020-05-06 LAB
BACTERIA SPEC CULT: NO GROWTH
Lab: NORMAL
SPECIMEN SOURCE: NORMAL

## 2020-05-06 NOTE — RESULT ENCOUNTER NOTE
Final urine culture report is NEGATIVE per [facility] ED Lab Result protocol.    If NEGATIVE result, no change in treatment, per [facility] ED Lab Result protocol.

## 2020-05-11 ENCOUNTER — TELEPHONE (OUTPATIENT)
Dept: FAMILY MEDICINE | Facility: OTHER | Age: 75
End: 2020-05-11

## 2020-05-11 NOTE — TELEPHONE ENCOUNTER
Patient is unable to do a video visit.  She is ok with doing a telephone visit.  She would prefer to have this done this week and prefers to talk to Skylar Gomez.  She is wondering if there is any way that she can be seen or do a telephone visit this week.  Please advise.

## 2020-05-11 NOTE — TELEPHONE ENCOUNTER
Patient is scheduled with Skylar Gomez at 10:00 am for 40 minutes (telephone visit).    Adalberto Lewis CMA

## 2020-05-11 NOTE — TELEPHONE ENCOUNTER
Yes, please schedule a virtual visit for this. 40 minute appointment.     Electronically signed by Skylar Gomez CNP.

## 2020-05-11 NOTE — TELEPHONE ENCOUNTER
Nothing on her labs, no growth on the urine culture. Do you want to see her?    Send back to me if you want me to do a virtual visit

## 2020-05-11 NOTE — TELEPHONE ENCOUNTER
Reason for call:  Patient reporting a symptom    Symptom or request: Same issues as when she went to ED on 05/04, fatigue, muscle weakness and now she has dry mouth.  Tracy states she just wants to sleep all the time/all day long    Duration (how long have symptoms been present): Over 2 weeks    Have you been treated for this before? Yes    Additional comments: ED provider told her he could not find anything wrong with her but to check with Skylar Gomez to see if she has any ideas when her test results came in.    Phone Number patient can be reached at:  Home number on file [phone] (home)    Best Time:  any    Can we leave a detailed message on this number:  YES    Call taken on 5/11/2020 at 11:45 AM by Eleni Bradley

## 2020-05-12 ENCOUNTER — VIRTUAL VISIT (OUTPATIENT)
Dept: FAMILY MEDICINE | Facility: CLINIC | Age: 75
End: 2020-05-12
Payer: COMMERCIAL

## 2020-05-12 DIAGNOSIS — M62.81 GENERALIZED MUSCLE WEAKNESS: Primary | ICD-10-CM

## 2020-05-12 DIAGNOSIS — R19.7 DIARRHEA, UNSPECIFIED TYPE: ICD-10-CM

## 2020-05-12 PROCEDURE — 99213 OFFICE O/P EST LOW 20 MIN: CPT | Mod: TEL | Performed by: NURSE PRACTITIONER

## 2020-05-12 NOTE — PROGRESS NOTES
"Tracy Mcclelland is a 74 year old female who is being evaluated via a billable telephone visit.      The patient has been notified of following:     \"This telephone visit will be conducted via a call between you and your physician/provider. We have found that certain health care needs can be provided without the need for a physical exam.  This service lets us provide the care you need with a short phone conversation.  If a prescription is necessary we can send it directly to your pharmacy.  If lab work is needed we can place an order for that and you can then stop by our lab to have the test done at a later time.    Telephone visits are billed at different rates depending on your insurance coverage. During this emergency period, for some insurers they may be billed the same as an in-person visit.  Please reach out to your insurance provider with any questions.    If during the course of the call the physician/provider feels a telephone visit is not appropriate, you will not be charged for this service.\"    Patient has given verbal consent for Telephone visit?  Yes    What phone number would you like to be contacted at?     How would you like to obtain your AVS? Rosalinda    Subjective     Tracy Mcclelland is a 74 year old female who presents to clinic today for the following health issues:    HPI     Chief Complaint   Patient presents with     Hospital F/U     Muscle weakness: now has to use a walker to get around     Mouth Problem     Mouth very dry     Diarrhea     1 per day for x3 days       She was evaluated in the ER on 5/4/2020 for fatigue, muscle weakness and pain.  Work up was basically negative, CRP was 17.5, sodium was 131. Urine culture was negative.  She was given IV fluids and advised to follow up in clinic.  This is being done as a telephone visit due to COVID pandemic.   She states her weakness and fatigue have not improved.  Has all over muscle pains - arms and legs.  Previous issues with cervical " radiculopathy and bilateral shoulder and arm pain in the past, but no previous issues with her legs.  No fevers.  No cough or upper respiratory infection symptoms.  No nausea or vomiting. Has a decreased appetite.  No abdominal pain. But does now have diarrhea.  This is new since the ER visit.  No ill contacts. Other than the ER visit she hasn't left her house in 3 weeks.      Diarrhea  Onset: x3 days     Description:   Consistency of stool: watery/brown  Blood in stool: no   Number of loose stools in past 24 hours: 1    Progression of Symptoms:  same    Accompanying Signs & Symptoms:  Fever: no   Nausea or vomiting; no   Abdominal pain: no   Episodes of constipation: no   Weight loss: no     History:   Ill contacts: no   Recent use of antibiotics: no    Recent travels: no          Recent medication-new or changes(Rx or OTC): no     Therapies Tried and outcome:  NONE    ED/UC Followup:    Facility:  [facility]   Date of visit: 5/4/20  Reason for visit: Fatigue and muscle weakness  Current Status: Not better. Fatigue/Weakness is about the same.        Review of Systems   Constitutional, HEENT, cardiovascular, pulmonary, gi and gu systems are negative, except as otherwise noted.       Objective   Reported vitals:  LMP  (LMP Unknown)    healthy, alert and no distress  PSYCH: Alert and oriented times 3; coherent speech, normal   rate and volume, able to articulate logical thoughts, able   to abstract reason, no tangential thoughts, no hallucinations   or delusions  Her affect is normal  RESP: No cough, no audible wheezing, able to talk in full sentences  Remainder of exam unable to be completed due to telephone visits    Diagnostic Test Results:  Pending       Assessment/Plan:  1. Generalized muscle weakness  I am going to recheck some labs.  Unclear etiology.  She has had chronic shoulder pain for quite some time now, was felt this was due to cervical radiculopathy.  I question possible polymyalgia rheumatica.   She has had dramatic improvement of her arm symptoms with short courses of Prednisone in the past. This may explain her muscle pains and possibly her fatigue.  However, the diarrhea is new.  Will also get stool cultures.     - ESR: Erythrocyte sedimentation rate; Future  - CRP, inflammation; Future  - CBC with platelets and differential; Future  - **Comprehensive metabolic panel FUTURE anytime; Future  - **TSH with free T4 reflex FUTURE anytime; Future  - CK total; Future    No follow-ups on file.      Phone call duration:  15 minutes    MACHO Cifuentes CNP

## 2020-05-13 DIAGNOSIS — M62.81 GENERALIZED MUSCLE WEAKNESS: ICD-10-CM

## 2020-05-13 LAB
ALBUMIN SERPL-MCNC: 3.6 G/DL (ref 3.4–5)
ALP SERPL-CCNC: 62 U/L (ref 40–150)
ALT SERPL W P-5'-P-CCNC: 15 U/L (ref 0–50)
ANION GAP SERPL CALCULATED.3IONS-SCNC: 8 MMOL/L (ref 3–14)
AST SERPL W P-5'-P-CCNC: 19 U/L (ref 0–45)
BASOPHILS # BLD AUTO: 0 10E9/L (ref 0–0.2)
BASOPHILS NFR BLD AUTO: 0.3 %
BILIRUB SERPL-MCNC: 0.4 MG/DL (ref 0.2–1.3)
BUN SERPL-MCNC: 13 MG/DL (ref 7–30)
CALCIUM SERPL-MCNC: 8 MG/DL (ref 8.5–10.1)
CHLORIDE SERPL-SCNC: 96 MMOL/L (ref 94–109)
CK SERPL-CCNC: 78 U/L (ref 30–225)
CO2 SERPL-SCNC: 27 MMOL/L (ref 20–32)
CREAT SERPL-MCNC: 0.83 MG/DL (ref 0.52–1.04)
CRP SERPL-MCNC: 8.2 MG/L (ref 0–8)
DIFFERENTIAL METHOD BLD: NORMAL
EOSINOPHIL NFR BLD AUTO: 0.5 %
ERYTHROCYTE [DISTWIDTH] IN BLOOD BY AUTOMATED COUNT: 12.8 % (ref 10–15)
ERYTHROCYTE [SEDIMENTATION RATE] IN BLOOD BY WESTERGREN METHOD: 10 MM/H (ref 0–30)
GFR SERPL CREATININE-BSD FRML MDRD: 68 ML/MIN/{1.73_M2}
GLUCOSE SERPL-MCNC: 123 MG/DL (ref 70–99)
HCT VFR BLD AUTO: 43.6 % (ref 35–47)
HGB BLD-MCNC: 14.6 G/DL (ref 11.7–15.7)
IMM GRANULOCYTES # BLD: 0 10E9/L (ref 0–0.4)
IMM GRANULOCYTES NFR BLD: 0.3 %
LYMPHOCYTES # BLD AUTO: 3.6 10E9/L (ref 0.8–5.3)
LYMPHOCYTES NFR BLD AUTO: 37.7 %
MCH RBC QN AUTO: 32.7 PG (ref 26.5–33)
MCHC RBC AUTO-ENTMCNC: 33.5 G/DL (ref 31.5–36.5)
MCV RBC AUTO: 98 FL (ref 78–100)
MONOCYTES # BLD AUTO: 0.5 10E9/L (ref 0–1.3)
MONOCYTES NFR BLD AUTO: 5.6 %
NEUTROPHILS # BLD AUTO: 5.3 10E9/L (ref 1.6–8.3)
NEUTROPHILS NFR BLD AUTO: 55.6 %
NRBC # BLD AUTO: 0 10*3/UL
NRBC BLD AUTO-RTO: 0 /100
PLATELET # BLD AUTO: 285 10E9/L (ref 150–450)
POTASSIUM SERPL-SCNC: 3.5 MMOL/L (ref 3.4–5.3)
PROT SERPL-MCNC: 7.4 G/DL (ref 6.8–8.8)
RBC # BLD AUTO: 4.47 10E12/L (ref 3.8–5.2)
SODIUM SERPL-SCNC: 131 MMOL/L (ref 133–144)
TSH SERPL DL<=0.005 MIU/L-ACNC: 2.4 MU/L (ref 0.4–4)
WBC # BLD AUTO: 9.6 10E9/L (ref 4–11)

## 2020-05-13 PROCEDURE — 82550 ASSAY OF CK (CPK): CPT | Performed by: NURSE PRACTITIONER

## 2020-05-13 PROCEDURE — 80053 COMPREHEN METABOLIC PANEL: CPT | Performed by: NURSE PRACTITIONER

## 2020-05-13 PROCEDURE — 85025 COMPLETE CBC W/AUTO DIFF WBC: CPT | Performed by: NURSE PRACTITIONER

## 2020-05-13 PROCEDURE — 86140 C-REACTIVE PROTEIN: CPT | Performed by: NURSE PRACTITIONER

## 2020-05-13 PROCEDURE — 36415 COLL VENOUS BLD VENIPUNCTURE: CPT | Performed by: NURSE PRACTITIONER

## 2020-05-13 PROCEDURE — 85652 RBC SED RATE AUTOMATED: CPT | Performed by: NURSE PRACTITIONER

## 2020-05-13 PROCEDURE — 84443 ASSAY THYROID STIM HORMONE: CPT | Performed by: NURSE PRACTITIONER

## 2020-05-18 DIAGNOSIS — R19.7 DIARRHEA, UNSPECIFIED TYPE: ICD-10-CM

## 2020-05-18 LAB
C COLI+JEJUNI+LARI FUSA STL QL NAA+PROBE: NOT DETECTED
C DIFF TOX B STL QL: NEGATIVE
EC STX1 GENE STL QL NAA+PROBE: NOT DETECTED
EC STX2 GENE STL QL NAA+PROBE: NOT DETECTED
ENTERIC PATHOGEN COMMENT: NORMAL
NOROV GI+II ORF1-ORF2 JNC STL QL NAA+PR: NOT DETECTED
RVA NSP5 STL QL NAA+PROBE: NOT DETECTED
SALMONELLA SP RPOD STL QL NAA+PROBE: NOT DETECTED
SHIGELLA SP+EIEC IPAH STL QL NAA+PROBE: NOT DETECTED
SPECIMEN SOURCE: NORMAL
V CHOL+PARA RFBL+TRKH+TNAA STL QL NAA+PR: NOT DETECTED
Y ENTERO RECN STL QL NAA+PROBE: NOT DETECTED

## 2020-05-18 PROCEDURE — 87493 C DIFF AMPLIFIED PROBE: CPT | Mod: XU | Performed by: NURSE PRACTITIONER

## 2020-05-18 PROCEDURE — 87506 IADNA-DNA/RNA PROBE TQ 6-11: CPT | Performed by: NURSE PRACTITIONER

## 2020-05-20 ENCOUNTER — TELEPHONE (OUTPATIENT)
Dept: FAMILY MEDICINE | Facility: OTHER | Age: 75
End: 2020-05-20

## 2020-05-20 DIAGNOSIS — M79.10 MUSCLE PAIN: Primary | ICD-10-CM

## 2020-05-20 NOTE — TELEPHONE ENCOUNTER
----- Message from MACHO Cifuentes CNP sent at 5/19/2020  9:26 PM CDT -----  Please call and let her know her stool testing was all normal.  Please find out how she is doing and let me know.   Electronically signed by Skylar Gomez CNP.

## 2020-05-20 NOTE — TELEPHONE ENCOUNTER
Tracy states that she is feeling weak and has muscle aches. She doesn't feel worse. She is still having runny stools and would like to know why she is having runny stools. She is not running a fever. She doesn't;t have much of appetite.   Tika Tomlinson on 5/20/2020 at 8:25 AM

## 2020-05-21 RX ORDER — PREDNISONE 20 MG/1
40 TABLET ORAL DAILY
Qty: 10 TABLET | Refills: 0 | Status: SHIPPED | OUTPATIENT
Start: 2020-05-21 | End: 2020-05-28

## 2020-05-21 NOTE — TELEPHONE ENCOUNTER
Patient was informed that Skylar Gomez sent a prescription for a 5 day course of prednisone for you to try. This has helped in the past for your joint pains. She is scheduled for a telephone visit (5/28/20 at 10:20 am) with Skylar next week. Patient did not want to schedule a video appointment.    Adalberto Lewis, [facility]

## 2020-05-21 NOTE — TELEPHONE ENCOUNTER
I sent over a 5 day course of Prednisone for her to try.  This has helped her joint pains in the past.  I would like her to schedule a follow up telephone/video visit with me next week to recheck.     Electronically signed by Skylar Gomez CNP.

## 2020-05-27 DIAGNOSIS — E11.22 TYPE 2 DIABETES MELLITUS WITH STAGE 2 CHRONIC KIDNEY DISEASE, WITHOUT LONG-TERM CURRENT USE OF INSULIN (H): ICD-10-CM

## 2020-05-27 DIAGNOSIS — N18.2 TYPE 2 DIABETES MELLITUS WITH STAGE 2 CHRONIC KIDNEY DISEASE, WITHOUT LONG-TERM CURRENT USE OF INSULIN (H): ICD-10-CM

## 2020-05-28 ENCOUNTER — VIRTUAL VISIT (OUTPATIENT)
Dept: FAMILY MEDICINE | Facility: CLINIC | Age: 75
End: 2020-05-28
Payer: COMMERCIAL

## 2020-05-28 DIAGNOSIS — M35.3 POLYMYALGIA RHEUMATICA (H): Primary | ICD-10-CM

## 2020-05-28 PROCEDURE — 99213 OFFICE O/P EST LOW 20 MIN: CPT | Mod: TEL | Performed by: NURSE PRACTITIONER

## 2020-05-28 ASSESSMENT — PAIN SCALES - GENERAL: PAINLEVEL: NO PAIN (0)

## 2020-05-28 NOTE — PROGRESS NOTES
"Tracy Mcclelland is a 74 year old female who is being evaluated via a billable telephone visit.      The patient has been notified of following:     \"This telephone visit will be conducted via a call between you and your physician/provider. We have found that certain health care needs can be provided without the need for a physical exam.  This service lets us provide the care you need with a short phone conversation.  If a prescription is necessary we can send it directly to your pharmacy.  If lab work is needed we can place an order for that and you can then stop by our lab to have the test done at a later time.    Telephone visits are billed at different rates depending on your insurance coverage. During this emergency period, for some insurers they may be billed the same as an in-person visit.  Please reach out to your insurance provider with any questions.    If during the course of the call the physician/provider feels a telephone visit is not appropriate, you will not be charged for this service.\"    Patient has given verbal consent for Telephone visit?  Yes    What phone number would you like to be contacted at? [phone]    How would you like to obtain your AVS? Mail a copy    Subjective     Tracy Mcclelland is a 74 year old female who presents via phone visit today for the following health issues:    HPI  Medication Followup of Prednisone    Taking Medication as prescribed: yes    Side Effects:  None    Medication Helping Symptoms:  yes       Diarrhea Follow-up      Duration: 2 weeks off an on - improved now    Description:       Consistency of stool: runny and brown and explosive       Blood in stool: no        Number of loose stools past 24 hours: 1    Intensity:  severe    Accompanying signs and symptoms:       Fever: no        Nausea/vomitting: no        Abdominal pain: no        Weight loss: no     History (recent antibiotics or travel/ill contacts/med changes/testing done): no    Precipitating or " alleviating factors: None    Therapies tried and outcome: none      Tracy has had several visits for generalized fatigue, muscle weakness, diarrhea and myalgias over the past month.  Extensive work-up has been negative.  She has a history of issues with neck, shoulder and upper arm pain in the past.  She has seen Neurosurgery and had a cervical fusion last summer for this.  Despite surgery her symptoms have persisted and she has been treated on multiple occasions with oral steroids, which help.  Because of this, I again put her on a course of oral Prednisone last week to see if it would help her constitutional symptoms, with a presumptive diagnosis of polymyalgia rheumatica.  She took 5 days of 40 mg Prednisone and reports today she feels great.  All of her symptoms have resolved.  Stools are normal now.  She has been off the Prednisone for 2 days.   She is diabetic, on Metformin. Does not check her blood sugars at home.  Last A1C was 6.3  In December.     She has no other concerns today.        Reviewed and updated as needed this visit by Provider         Review of Systems   Constitutional, HEENT, cardiovascular, pulmonary, gi and gu systems are negative, except as otherwise noted.       Objective   Reported vitals:  LMP  (LMP Unknown)    healthy, alert and no distress  PSYCH: Alert and oriented times 3; coherent speech, normal   rate and volume, able to articulate logical thoughts, able   to abstract reason, no tangential thoughts, no hallucinations   or delusions  Her affect is normal  RESP: No cough, no audible wheezing, able to talk in full sentences  Remainder of exam unable to be completed due to telephone visits    Diagnostic Test Results:  none         Assessment/Plan:  1. Polymyalgia rheumatica (H)  She has had complete resolution of all symptoms after 5 days of Prednisone.  Risks/benefits of Prednisone discussed.  Discussed starting her on a daily dose, but given her diabetes and compete resolution of  symptoms, will hold off for now.  If symptoms return, she will let me know and we will restart Prednisone.   She may need to start monitoring her blood sugars at home at some point.  A1C is due next month.  May be delayed slightly due to COVID pandemic.  She will be back in clinic in 3 months for a full physical, okay to do then provided she isn't back on Prednisone before that time.           No follow-ups on file.      Phone call duration:  11 minutes    MACHO Cifuentes CNP

## 2020-05-28 NOTE — TELEPHONE ENCOUNTER
Prescription approved per [facility] Refill Protocol.    Laurie Peralta RN

## 2020-06-01 ENCOUNTER — TELEPHONE (OUTPATIENT)
Dept: FAMILY MEDICINE | Facility: OTHER | Age: 75
End: 2020-06-01

## 2020-06-01 DIAGNOSIS — E11.22 TYPE 2 DIABETES MELLITUS WITH CHRONIC KIDNEY DISEASE, WITHOUT LONG-TERM CURRENT USE OF INSULIN, UNSPECIFIED CKD STAGE (H): ICD-10-CM

## 2020-06-01 DIAGNOSIS — M35.3 POLYMYALGIA RHEUMATICA (H): Primary | ICD-10-CM

## 2020-06-01 RX ORDER — PREDNISONE 20 MG/1
20 TABLET ORAL DAILY
Qty: 30 TABLET | Refills: 0 | Status: SHIPPED | OUTPATIENT
Start: 2020-06-01 | End: 2020-09-25

## 2020-06-01 NOTE — TELEPHONE ENCOUNTER
I sent over Prednisone for her to start.  20 mg daily.  She will also need to start checking her blood sugars twice a day while on this.  Do a follow up telephone visit in 2-3 weeks with me.  We will work on reducing the Prednisone dose to the lowest dose that controls her symptoms.     Electronically signed by Skylar Gomez CNP.

## 2020-06-01 NOTE — TELEPHONE ENCOUNTER
Reason for Call: Patient update    Detailed comments: Pt calling to let Skylar Gomez know that her arm pain is back. Please advise.     Phone Number Patient can be reached at: Home number on file [phone] (home)    Best Time:     Can we leave a detailed message on this number? YES    Call taken on 6/1/2020 at 11:57 AM by Patricia Machado

## 2020-06-02 ENCOUNTER — TELEPHONE (OUTPATIENT)
Dept: FAMILY MEDICINE | Facility: OTHER | Age: 75
End: 2020-06-02

## 2020-06-02 DIAGNOSIS — E11.22 TYPE 2 DIABETES MELLITUS WITH CHRONIC KIDNEY DISEASE, WITHOUT LONG-TERM CURRENT USE OF INSULIN, UNSPECIFIED CKD STAGE (H): ICD-10-CM

## 2020-06-02 NOTE — TELEPHONE ENCOUNTER
Please send New Rx's for diabetic supplies to Meet Medicare guidelines.  Prn refills not acceptable, # of refills   And being she is non-insulin dependant , she can only test once daily for Medicare to pay for these.  Thank you, any questions give us a call.

## 2020-06-09 DIAGNOSIS — E11.22 TYPE 2 DIABETES MELLITUS WITH STAGE 2 CHRONIC KIDNEY DISEASE, WITHOUT LONG-TERM CURRENT USE OF INSULIN (H): ICD-10-CM

## 2020-06-09 DIAGNOSIS — N18.2 TYPE 2 DIABETES MELLITUS WITH STAGE 2 CHRONIC KIDNEY DISEASE, WITHOUT LONG-TERM CURRENT USE OF INSULIN (H): ICD-10-CM

## 2020-06-09 RX ORDER — GABAPENTIN 600 MG/1
TABLET ORAL
Qty: 360 TABLET | Refills: 1 | Status: SHIPPED | OUTPATIENT
Start: 2020-06-09 | End: 2020-10-07

## 2020-08-24 DIAGNOSIS — E78.5 HYPERLIPIDEMIA WITH TARGET LDL LESS THAN 100: ICD-10-CM

## 2020-08-25 RX ORDER — SIMVASTATIN 5 MG
TABLET ORAL
Qty: 90 TABLET | Refills: 1 | Status: SHIPPED | OUTPATIENT
Start: 2020-08-25 | End: 2021-02-09

## 2020-08-25 NOTE — TELEPHONE ENCOUNTER
Routing refill request to provider for review/approval because:  Labs not current:  LDL    FRANKY StinsonN, RN  [facility]

## 2020-09-16 ENCOUNTER — TELEPHONE (OUTPATIENT)
Dept: FAMILY MEDICINE | Facility: OTHER | Age: 75
End: 2020-09-16

## 2020-09-16 NOTE — TELEPHONE ENCOUNTER
Reason for Call:  Other call back    Detailed comments: Patient is calling stating she has a few things to go over with faisal, it is hard for her to make it to [place] for an appointment, patient has been having diarrhea often over the last month. Patient also states her ankles are swelling as well as she is having pain in her left leg between the groin and knee area. Patient is aware Faisal Gomez isn't in clinic routing to triage. Please advise     Phone Number Patient can be reached at: Home number on file [phone] (home)    Best Time: anytime     Can we leave a detailed message on this number? YES    Call taken on 9/16/2020 at 11:41 AM by Miranda Seipel Vaccari

## 2020-09-16 NOTE — TELEPHONE ENCOUNTER
Attempted to call patient at [phone] (home), no answer. Left message for a return call to the clinic when available.     FRANKY StinsonN, RN  [facility]

## 2020-09-17 NOTE — TELEPHONE ENCOUNTER
Attempted to call patient at [phone] (home), no answer. Left message for a return call to the clinic when available.     FRANKY StinsonN, RN  [facility]

## 2020-09-17 NOTE — TELEPHONE ENCOUNTER
Patient is reporting she has had diarrhea for the past month.  Her stools are loose, not watery.  She states they are brown and she has no pain.  They occur about 1 hour after breakfast every day, then the rest of the day she is fine.  She is denying the following symptoms: abdominal pain or swelling, fever, blood in stool, lightheadedness, dizziness, weakness, dehydration, vomiting.    She is also having swelling in her ankles.  She reports it is light swelling and a little more in her left than her right.  She is denying the following: pain, redness, heat to the area.  This has been going on for about 1 week.    Last symptoms she is reporting is pain between her left groin and knee for the past week.  It only hurts when she moves it a certain way, not all the time.  She states it only hurts when she tries to take it off her footstool or trying to get her leg up into bed.  The it is fine.  She is denying the following: swelling, redness, heat to the area, constant pain.    Patient states she can only get a ride to the clinic on Fridays.  She states she will only see PCP and will wait until next Friday, 9/25/2020, when she is scheduled.  She understands to go to the ED if she has problems before her appointment.  Closing this encounter.  Mercedez Oliveira, FRANKYN, RN

## 2020-09-25 ENCOUNTER — TELEPHONE (OUTPATIENT)
Dept: FAMILY MEDICINE | Facility: OTHER | Age: 75
End: 2020-09-25

## 2020-09-25 ENCOUNTER — HOSPITAL ENCOUNTER (OUTPATIENT)
Dept: GENERAL RADIOLOGY | Facility: CLINIC | Age: 75
Discharge: HOME | End: 2020-09-25
Attending: NURSE PRACTITIONER | Admitting: NURSE PRACTITIONER
Payer: MEDICARE

## 2020-09-25 ENCOUNTER — OFFICE VISIT (OUTPATIENT)
Dept: FAMILY MEDICINE | Facility: CLINIC | Age: 75
End: 2020-09-25
Payer: COMMERCIAL

## 2020-09-25 VITALS
RESPIRATION RATE: 18 BRPM | WEIGHT: 148.25 LBS | OXYGEN SATURATION: 96 % | BODY MASS INDEX: 26.05 KG/M2 | HEART RATE: 99 BPM | DIASTOLIC BLOOD PRESSURE: 64 MMHG | SYSTOLIC BLOOD PRESSURE: 110 MMHG | TEMPERATURE: 96 F

## 2020-09-25 DIAGNOSIS — M35.3 POLYMYALGIA RHEUMATICA (H): ICD-10-CM

## 2020-09-25 DIAGNOSIS — R60.0 BILATERAL LEG EDEMA: Primary | ICD-10-CM

## 2020-09-25 DIAGNOSIS — R10.32 LEFT GROIN PAIN: ICD-10-CM

## 2020-09-25 DIAGNOSIS — F17.200 TOBACCO USE DISORDER: ICD-10-CM

## 2020-09-25 DIAGNOSIS — E78.5 HYPERLIPIDEMIA WITH TARGET LDL LESS THAN 100: ICD-10-CM

## 2020-09-25 DIAGNOSIS — N18.2 CKD (CHRONIC KIDNEY DISEASE) STAGE 2, GFR 60-89 ML/MIN: ICD-10-CM

## 2020-09-25 DIAGNOSIS — R06.9 UNSPECIFIED ABNORMALITIES OF BREATHING: ICD-10-CM

## 2020-09-25 DIAGNOSIS — E11.22 TYPE 2 DIABETES MELLITUS WITH CHRONIC KIDNEY DISEASE, WITHOUT LONG-TERM CURRENT USE OF INSULIN, UNSPECIFIED CKD STAGE (H): ICD-10-CM

## 2020-09-25 LAB
ALBUMIN SERPL-MCNC: 3.5 G/DL (ref 3.4–5)
ALP SERPL-CCNC: 56 U/L (ref 40–150)
ALT SERPL W P-5'-P-CCNC: 15 U/L (ref 0–50)
ANION GAP SERPL CALCULATED.3IONS-SCNC: 5 MMOL/L (ref 3–14)
AST SERPL W P-5'-P-CCNC: 14 U/L (ref 0–45)
BASOPHILS # BLD AUTO: 0 10E9/L (ref 0–0.2)
BASOPHILS NFR BLD AUTO: 0.5 %
BILIRUB SERPL-MCNC: 0.4 MG/DL (ref 0.2–1.3)
BUN SERPL-MCNC: 15 MG/DL (ref 7–30)
CALCIUM SERPL-MCNC: 9 MG/DL (ref 8.5–10.1)
CHLORIDE SERPL-SCNC: 105 MMOL/L (ref 94–109)
CHOLEST SERPL-MCNC: 136 MG/DL
CO2 SERPL-SCNC: 25 MMOL/L (ref 20–32)
CREAT SERPL-MCNC: 0.75 MG/DL (ref 0.52–1.04)
CRP SERPL-MCNC: 21.3 MG/L (ref 0–8)
DIFFERENTIAL METHOD BLD: ABNORMAL
EOSINOPHIL NFR BLD AUTO: 0.6 %
ERYTHROCYTE [DISTWIDTH] IN BLOOD BY AUTOMATED COUNT: 11.7 % (ref 10–15)
ERYTHROCYTE [SEDIMENTATION RATE] IN BLOOD BY WESTERGREN METHOD: 16 MM/H (ref 0–30)
GFR SERPL CREATININE-BSD FRML MDRD: 77 ML/MIN/{1.73_M2}
GLUCOSE SERPL-MCNC: 112 MG/DL (ref 70–99)
HBA1C MFR BLD: 5.8 % (ref 0–5.6)
HCT VFR BLD AUTO: 41.7 % (ref 35–47)
HDLC SERPL-MCNC: 47 MG/DL
HGB BLD-MCNC: 13.3 G/DL (ref 11.7–15.7)
IMM GRANULOCYTES # BLD: 0 10E9/L (ref 0–0.4)
IMM GRANULOCYTES NFR BLD: 0.2 %
LDLC SERPL CALC-MCNC: 64 MG/DL
LYMPHOCYTES # BLD AUTO: 3.8 10E9/L (ref 0.8–5.3)
LYMPHOCYTES NFR BLD AUTO: 45.1 %
MCH RBC QN AUTO: 32.7 PG (ref 26.5–33)
MCHC RBC AUTO-ENTMCNC: 31.9 G/DL (ref 31.5–36.5)
MCV RBC AUTO: 103 FL (ref 78–100)
MONOCYTES # BLD AUTO: 0.5 10E9/L (ref 0–1.3)
MONOCYTES NFR BLD AUTO: 5.6 %
NEUTROPHILS # BLD AUTO: 4 10E9/L (ref 1.6–8.3)
NEUTROPHILS NFR BLD AUTO: 48 %
NONHDLC SERPL-MCNC: 89 MG/DL
NRBC # BLD AUTO: 0 10*3/UL
NRBC BLD AUTO-RTO: 0 /100
NT-PROBNP SERPL-MCNC: 443 PG/ML (ref 0–450)
PLATELET # BLD AUTO: 218 10E9/L (ref 150–450)
POTASSIUM SERPL-SCNC: 4.4 MMOL/L (ref 3.4–5.3)
PROT SERPL-MCNC: 7.1 G/DL (ref 6.8–8.8)
RBC # BLD AUTO: 4.07 10E12/L (ref 3.8–5.2)
SODIUM SERPL-SCNC: 135 MMOL/L (ref 133–144)
TRIGL SERPL-MCNC: 127 MG/DL
WBC # BLD AUTO: 8.4 10E9/L (ref 4–11)

## 2020-09-25 PROCEDURE — 73502 X-RAY EXAM HIP UNI 2-3 VIEWS: CPT | Mod: TC

## 2020-09-25 PROCEDURE — 80053 COMPREHEN METABOLIC PANEL: CPT | Performed by: NURSE PRACTITIONER

## 2020-09-25 PROCEDURE — 99214 OFFICE O/P EST MOD 30 MIN: CPT | Performed by: NURSE PRACTITIONER

## 2020-09-25 PROCEDURE — 85025 COMPLETE CBC W/AUTO DIFF WBC: CPT | Performed by: NURSE PRACTITIONER

## 2020-09-25 PROCEDURE — 36415 COLL VENOUS BLD VENIPUNCTURE: CPT | Performed by: NURSE PRACTITIONER

## 2020-09-25 PROCEDURE — 85652 RBC SED RATE AUTOMATED: CPT | Performed by: NURSE PRACTITIONER

## 2020-09-25 PROCEDURE — 83880 ASSAY OF NATRIURETIC PEPTIDE: CPT | Performed by: NURSE PRACTITIONER

## 2020-09-25 PROCEDURE — 80061 LIPID PANEL: CPT | Performed by: NURSE PRACTITIONER

## 2020-09-25 PROCEDURE — 83036 HEMOGLOBIN GLYCOSYLATED A1C: CPT | Performed by: NURSE PRACTITIONER

## 2020-09-25 PROCEDURE — 86140 C-REACTIVE PROTEIN: CPT | Performed by: NURSE PRACTITIONER

## 2020-09-25 RX ORDER — PREDNISONE 20 MG/1
40 TABLET ORAL DAILY
Qty: 10 TABLET | Refills: 0 | Status: SHIPPED | OUTPATIENT
Start: 2020-09-25 | End: 2020-09-30

## 2020-09-25 ASSESSMENT — PAIN SCALES - GENERAL: PAINLEVEL: NO PAIN (0)

## 2020-09-25 NOTE — TELEPHONE ENCOUNTER
Spoke with patient and informed of results below. Patient understood. No further questions or concerns at this time.   Katie Lopez MA

## 2020-09-25 NOTE — PROGRESS NOTES
Appropriate assistive devices provided during their visit. yes (Yes, No, N/A) wheelchair/walker (list device)    Exam table and/or cart  placed in the lowest position. yes (Yes, No, N/A)    Brakes on tables/carts/wheelchairs used at all times. yes (Yes, No, N/A)    Non slip footwear applied. yes (Yes, No, NA)    Patient was accompanied by staff throughout visit. yes (Yes, No, N/A)    Equipment safety straps used. N/a (Yes, No, N/A)    Assist with toileting. N/a (Yes, No, N/A)

## 2020-09-25 NOTE — PATIENT INSTRUCTIONS
We will get an x-ray today and call with results.    Labs will be done today.   For normal results, you will receive a letter with the results in about 2 weeks.  If anything is abnormal or unexpected, someone from the clinic will call you.

## 2020-09-25 NOTE — TELEPHONE ENCOUNTER
----- Message from MACHO Cifuentes CNP sent at 9/25/2020  2:46 PM CDT -----  Please call patient.  Her labs were all normal except an inflammatory marker was elevated.  I think her diarrhea may be related to this.  I am going to treat her with oral Prednisone again for 5 days.  Have her call me next week to give me an update.  We may keep her on a low dose for longer if her symptoms improve.  Her A1C was good at 5.8.      Electronically signed by Skylar Gomez CNP.

## 2020-09-25 NOTE — PROGRESS NOTES
Subjective     Tracy Mcclelland is a 75 year old female who presents to clinic today for the following health issues:    HPI       Diarrhea  Onset/Duration:  1 month   Description:       Consistency of stool: runny and loose       Blood in stool: no       Number of loose stools past 24 hours: 1  Progression of Symptoms: same  Accompanying signs and symptoms:       Fever: no       Nausea/Vomiting: no       Abdominal pain: no       Weight loss: YES       Episodes of constipation: no  History   Ill contacts: no  Recent use of antibiotics: no  Recent travels: no  Recent medication-new or changes(Rx or OTC): no  Precipitating or alleviating factors: None  Therapies tried and outcome: Imodium right ear    Diarrhea x 1 month.  Typically once a day.  No blood.  No abdominal pain or nausea.  Has had similar episodes in the past.  Negative stool cultures done in May.  No recent antibiotics or medication changes.     Diagnosed with polymyalgia rheumatica last spring.  Having similar symptoms at that time.  Resolved after 5 days of Prednisone.  Was supposed to start on a daily dose, but didn't get this message.      Does report a 10 pound weight loss, buts states she is actively eating less to avoid diarrhea.  Has never had a colonoscopy.      No fevers or fatigue.  Hasn't felt ill.     Left groin pain    Intermittent for several months.  No injury.  Has known OA in the left hip, the right was replaced in 2018.  She has never had any injections or interventions for this hip.      Has bilateral LE swelling also.  No pain.  Not worse in the PM.  No calf pain, swelling or erythema.  She would just like this checked out.     No dyspnea, chest pain or orthopnea.     Review of Systems   Constitutional, HEENT, cardiovascular, pulmonary, gi and gu systems are negative, except as otherwise noted.      Objective    /64   Pulse 99   Temp 96  F (35.6  C) (Temporal)   Resp 18   Wt 67.2 kg (148 lb 4 oz)   LMP  (LMP Unknown)   SpO2  96%   BMI 26.05 kg/m    Body mass index is 26.05 kg/m .  Physical Exam   GENERAL: alert, no distress, frail and elderly  NECK: no adenopathy, no asymmetry, masses, or scars and thyroid normal to palpation  RESP: lungs clear to auscultation - no rales, rhonchi or wheezes  CV: regular rate and rhythm, normal S1 S2, no S3 or S4, no murmur, click or rub  ABDOMEN: soft, nontender, no hepatosplenomegaly, no masses and bowel sounds normal  MS: bilateral LE trace edema, L>R. No calf pain, erythema.  Negative Homans' bilaterally.   SKIN: no suspicious lesions or rashes    Xray - left hip:       XR LEFT HIP TWO-THREE VIEWS   9/25/2020 1:52 PM      HISTORY: Left groin pain.     COMPARISON: Right hip x-rays dated 1/9/2019. Pelvis x-rays dated  10/31/2018.     FINDINGS:   Right hip arthroplasty components are partially imaged and  appear well seated without evidence for hardware failure or  periprosthetic fracture. Mild joint space loss of the left hip is  again noted. There are large osteophytes surrounding the left femoral  head with moderate to large osteophytes surrounding the left  acetabulum. There is mild subchondral eburnation of the left femoral  head and left acetabulum. No acute fracture or malalignment.  Degenerative changes of the lumbar spine are partially imaged.                                                                      IMPRESSION:  1. Moderate degenerative changes of the left hip are again noted.  2. Right hip arthroplasty demonstrates no obvious hardware failure or  periprosthetic fracture but is incompletely imaged.  3. Degenerative changes of the the lumbar spine are partially imaged.  4. No definite fracture is seen.       Office Visit on 09/25/2020   Component Date Value Ref Range Status     Hemoglobin A1C 09/25/2020 5.8* 0 - 5.6 % Final    Comment: Normal <5.7% Prediabetes 5.7-6.4%  Diabetes 6.5% or higher - adopted from ADA   consensus guidelines.       WBC 09/25/2020 8.4  4.0 - 11.0 10e9/L  Final     RBC Count 09/25/2020 4.07  3.8 - 5.2 10e12/L Final     Hemoglobin 09/25/2020 13.3  11.7 - 15.7 g/dL Final     Hematocrit 09/25/2020 41.7  35.0 - 47.0 % Final     MCV 09/25/2020 103* 78 - 100 fl Final     MCH 09/25/2020 32.7  26.5 - 33.0 pg Final     MCHC 09/25/2020 31.9  31.5 - 36.5 g/dL Final     RDW 09/25/2020 11.7  10.0 - 15.0 % Final     Platelet Count 09/25/2020 218  150 - 450 10e9/L Final     Diff Method 09/25/2020 Automated Method   Final     % Neutrophils 09/25/2020 48.0  % Final     % Lymphocytes 09/25/2020 45.1  % Final     % Monocytes 09/25/2020 5.6  % Final     % Eosinophils 09/25/2020 0.6  % Final     % Basophils 09/25/2020 0.5  % Final     % Immature Granulocytes 09/25/2020 0.2  % Final     Nucleated RBCs 09/25/2020 0  0 /100 Final     Absolute Neutrophil 09/25/2020 4.0  1.6 - 8.3 10e9/L Final     Absolute Lymphocytes 09/25/2020 3.8  0.8 - 5.3 10e9/L Final     Absolute Monocytes 09/25/2020 0.5  0.0 - 1.3 10e9/L Final     Absolute Basophils 09/25/2020 0.0  0.0 - 0.2 10e9/L Final     Abs Immature Granulocytes 09/25/2020 0.0  0 - 0.4 10e9/L Final     Absolute Nucleated RBC 09/25/2020 0.0   Final     Sodium 09/25/2020 135  133 - 144 mmol/L Final     Potassium 09/25/2020 4.4  3.4 - 5.3 mmol/L Final     Chloride 09/25/2020 105  94 - 109 mmol/L Final     Carbon Dioxide 09/25/2020 25  20 - 32 mmol/L Final     Anion Gap 09/25/2020 5  3 - 14 mmol/L Final     Glucose 09/25/2020 112* 70 - 99 mg/dL Final    Fasting specimen     Urea Nitrogen 09/25/2020 15  7 - 30 mg/dL Final     Creatinine 09/25/2020 0.75  0.52 - 1.04 mg/dL Final     GFR Estimate 09/25/2020 77  >60 mL/min/[1.73_m2] Final    Comment: Non  GFR Calc  Starting 12/18/2018, serum creatinine based estimated GFR (eGFR) will be   calculated using the Chronic Kidney Disease Epidemiology Collaboration   (CKD-EPI) equation.       GFR Estimate If Black 09/25/2020 89  >60 mL/min/[1.73_m2] Final    Comment:  GFR  Calc  Starting 12/18/2018, serum creatinine based estimated GFR (eGFR) will be   calculated using the Chronic Kidney Disease Epidemiology Collaboration   (CKD-EPI) equation.       Calcium 09/25/2020 9.0  8.5 - 10.1 mg/dL Final     Bilirubin Total 09/25/2020 0.4  0.2 - 1.3 mg/dL Final     Albumin 09/25/2020 3.5  3.4 - 5.0 g/dL Final     Protein Total 09/25/2020 7.1  6.8 - 8.8 g/dL Final     Alkaline Phosphatase 09/25/2020 56  40 - 150 U/L Final     ALT 09/25/2020 15  0 - 50 U/L Final     AST 09/25/2020 14  0 - 45 U/L Final     N-Terminal Pro Bnp 09/25/2020 443  0 - 450 pg/mL Final    Comment:    Reference range shown and results flagged as abnormal are for the outpatient,   non acute settings. Establishing a baseline value for each individual patient   is useful for follow-up.  Suggested inpatient cut points for confirming diagnosis of CHF in an acute   setting are:   >450 pg/mL (age 18 to less than 50)   >900 pg/mL (age 50 to less than 75)   >1800 pg/mL (75 yrs and older)  An inpatient or emergency department NT-proPBNP <300 pg/mL effectively rules   out acute CHF, with 99% negative predictive value.        Cholesterol 09/25/2020 136  <200 mg/dL Final     Triglycerides 09/25/2020 127  <150 mg/dL Final    Fasting specimen     HDL Cholesterol 09/25/2020 47* >49 mg/dL Final     LDL Cholesterol Calculated 09/25/2020 64  <100 mg/dL Final    Desirable:       <100 mg/dl     Non HDL Cholesterol 09/25/2020 89  <130 mg/dL Final     Sed Rate 09/25/2020 16  0 - 30 mm/h Final     CRP Inflammation 09/25/2020 21.3* 0.0 - 8.0 mg/L Final             Assessment & Plan     Bilateral leg edema  - CBC with platelets and differential  - Comprehensive metabolic panel (BMP + Alb, Alk Phos, ALT, AST, Total. Bili, TP)  - BNP-N terminal pro  - ESR: Erythrocyte sedimentation rate  - CRP, inflammation    Type 2 diabetes mellitus with chronic kidney disease, without long-term current use of insulin, unspecified CKD stage (H)  Chronic,  controlled.  No change in treatment plan.   - Hemoglobin A1c    CKD (chronic kidney disease) stage 2, GFR 60-89 ml/min  Chronic, controlled.  No change in treatment plan.   - CBC with platelets and differential  - Comprehensive metabolic panel (BMP + Alb, Alk Phos, ALT, AST, Total. Bili, TP)    Hyperlipidemia with target LDL less than 100  Chronic, controlled.  No change in treatment plan.   - Lipid panel reflex to direct LDL Fasting    Unspecified abnormalities of breathing   - BNP-N terminal pro    Left groin pain  - XR Hip Left 2-3 Views; Future    Polymyalgia rheumatica (H)  Will do a trial back on Prednisone.  I suspect her diarrhea may be related as she has had this previously at it resolved on Prednisone.  If symptoms improve, consider daily low dose.  Will need to watch her blood glucose closely, but this has been well controlled.   - predniSONE (DELTASONE) 20 MG tablet; Take 2 tablets (40 mg) by mouth daily for 5 days    Tobacco use disorder  She declines any help quitting.         See Patient Instructions    Return in about 1 week (around 10/2/2020) for Recheck only if not improving.    MACHO Cifuentes [facility]

## 2020-09-28 ENCOUNTER — TELEPHONE (OUTPATIENT)
Dept: FAMILY MEDICINE | Facility: OTHER | Age: 75
End: 2020-09-28

## 2020-09-28 NOTE — TELEPHONE ENCOUNTER
----- Message from MACHO Cifuentes CNP sent at 9/28/2020  8:19 AM CDT -----  Please call and let her know her x-ray showed moderate arthritis in the left hip.     If her pain doesn't improve on the Prednisone, we can sent her back to orthopedics about this.     Electronically signed by Skylar Gomez CNP.

## 2020-10-01 DIAGNOSIS — M35.3 POLYMYALGIA RHEUMATICA (H): ICD-10-CM

## 2020-10-01 RX ORDER — PREDNISONE 20 MG/1
40 TABLET ORAL DAILY
Qty: 10 TABLET | Refills: 0 | OUTPATIENT
Start: 2020-10-01 | End: 2020-10-06

## 2020-10-01 NOTE — TELEPHONE ENCOUNTER
Attempted to call patient, no answer. Left message for a return call to the clinic when available.     FRANKY StinsonN, RN  [facility]

## 2020-10-01 NOTE — TELEPHONE ENCOUNTER
"Tracy returns call and states she did not request a refill of prednisone, she still has 2 pills left to take.    Tracy states her leg pain is much better, ankle swelling is good too.  Still having a little diarrhea.  Did have a \"normal\" BM yesterday, but this morning had some loose stool again.  "

## 2020-10-01 NOTE — TELEPHONE ENCOUNTER
Routing refill request to provider for review/approval because:  Drug not on the [facility] refill protocol     Laurie Peralta RN

## 2020-10-02 DIAGNOSIS — M35.3 POLYMYALGIA RHEUMATICA (H): Primary | ICD-10-CM

## 2020-10-02 RX ORDER — PREDNISONE 5 MG/1
5 TABLET ORAL DAILY
Qty: 90 TABLET | Refills: 0 | Status: SHIPPED | OUTPATIENT
Start: 2020-10-02 | End: 2021-02-09

## 2020-10-06 DIAGNOSIS — E11.22 TYPE 2 DIABETES MELLITUS WITH STAGE 2 CHRONIC KIDNEY DISEASE, WITHOUT LONG-TERM CURRENT USE OF INSULIN (H): ICD-10-CM

## 2020-10-06 DIAGNOSIS — N18.2 TYPE 2 DIABETES MELLITUS WITH STAGE 2 CHRONIC KIDNEY DISEASE, WITHOUT LONG-TERM CURRENT USE OF INSULIN (H): ICD-10-CM

## 2020-10-06 NOTE — TELEPHONE ENCOUNTER
Requested Prescriptions   Pending Prescriptions Disp Refills     gabapentin (NEURONTIN) 600 MG tablet [Pharmacy Med Name: GABAPENTIN 600MG TABS] 360 tablet 1     Sig: TAKE TWO TABLETS BY MOUTH THREE TIMES A DAY     Last Written Prescription Date:  06/09/2020  Last Fill Quantity: 360,   # refills: 1  Last Office Visit: 09/25/2020  Future Office visit:       Routing refill request to provider for review/approval because:  Drug not on the [facility] or [facility] refill protocol or controlled substance    Katie Lopez MA

## 2020-10-07 RX ORDER — GABAPENTIN 600 MG/1
TABLET ORAL
Qty: 360 TABLET | Refills: 1 | Status: SHIPPED | OUTPATIENT
Start: 2020-10-07 | End: 2021-02-05

## 2020-11-23 DIAGNOSIS — N18.2 TYPE 2 DIABETES MELLITUS WITH STAGE 2 CHRONIC KIDNEY DISEASE, WITHOUT LONG-TERM CURRENT USE OF INSULIN (H): ICD-10-CM

## 2020-11-23 DIAGNOSIS — E11.22 TYPE 2 DIABETES MELLITUS WITH STAGE 2 CHRONIC KIDNEY DISEASE, WITHOUT LONG-TERM CURRENT USE OF INSULIN (H): ICD-10-CM

## 2020-11-23 NOTE — TELEPHONE ENCOUNTER
Prescription approved per [facility] Refill Protocol.    Laurie Peralta RN

## 2021-02-05 DIAGNOSIS — N18.2 TYPE 2 DIABETES MELLITUS WITH STAGE 2 CHRONIC KIDNEY DISEASE, WITHOUT LONG-TERM CURRENT USE OF INSULIN (H): ICD-10-CM

## 2021-02-05 DIAGNOSIS — E11.22 TYPE 2 DIABETES MELLITUS WITH STAGE 2 CHRONIC KIDNEY DISEASE, WITHOUT LONG-TERM CURRENT USE OF INSULIN (H): ICD-10-CM

## 2021-02-05 NOTE — TELEPHONE ENCOUNTER
Routing refill request to provider for review/approval because:  Drug not on the [facility] refill protocol     Requested Prescriptions   Pending Prescriptions Disp Refills     gabapentin (NEURONTIN) 600 MG tablet 360 tablet 1       There is no refill protocol information for this order      Last Written Prescription Date:  10/7/2020  Last Fill Quantity: 360,  # refills: 1   Last office visit: 9/25/2020 with prescribing provider:     Future Office Visit:    Type 2 diabetes mellitus with stage 2 chronic kidney disease, without long-term current use of insulin (H) [E11.22, N18.2]     Shruthi Webb RN

## 2021-02-06 RX ORDER — GABAPENTIN 600 MG/1
TABLET ORAL
Qty: 360 TABLET | Refills: 1 | Status: SHIPPED | OUTPATIENT
Start: 2021-02-06 | End: 2021-06-01

## 2021-02-09 ENCOUNTER — VIRTUAL VISIT (OUTPATIENT)
Dept: FAMILY MEDICINE | Facility: CLINIC | Age: 76
End: 2021-02-09
Payer: COMMERCIAL

## 2021-02-09 DIAGNOSIS — N18.2 TYPE 2 DIABETES MELLITUS WITH STAGE 2 CHRONIC KIDNEY DISEASE, WITHOUT LONG-TERM CURRENT USE OF INSULIN (H): ICD-10-CM

## 2021-02-09 DIAGNOSIS — E11.22 TYPE 2 DIABETES MELLITUS WITH STAGE 2 CHRONIC KIDNEY DISEASE, WITHOUT LONG-TERM CURRENT USE OF INSULIN (H): ICD-10-CM

## 2021-02-09 DIAGNOSIS — E78.5 HYPERLIPIDEMIA WITH TARGET LDL LESS THAN 100: ICD-10-CM

## 2021-02-09 DIAGNOSIS — Z76.89 ENCOUNTER TO ESTABLISH CARE: Primary | ICD-10-CM

## 2021-02-09 PROCEDURE — 99214 OFFICE O/P EST MOD 30 MIN: CPT | Mod: 95 | Performed by: NURSE PRACTITIONER

## 2021-02-09 RX ORDER — SIMVASTATIN 5 MG
TABLET ORAL
Qty: 90 TABLET | Refills: 1 | Status: SHIPPED | OUTPATIENT
Start: 2021-02-09 | End: 2021-06-11

## 2021-02-09 NOTE — PROGRESS NOTES
Tracy is a 75 year old who is being evaluated via a billable telephone visit.      What phone number would you like to be contacted at? [phone]  How would you like to obtain your AVS? Mail a copy  Assessment & Plan     Encounter to establish care  - Medical history, medication history, health maintenance needs, and priorities in terms of medical care. Plan will be to establish care with our team. Patient will need a visit with fasting labs this fall.       Type 2 diabetes mellitus with stage 2 chronic kidney disease, without long-term current use of insulin (H)  - Well controlled will continue with current plan of care, plan will be to repeat labs this fall.   - metFORMIN (GLUCOPHAGE) 500 MG tablet; Take 1 tablet (500 mg) by mouth 2 times daily (with meals)    Hyperlipidemia with target LDL less than 100  - She has good control on current plan and is tolerating the medication. Will continue with current plan of care.   - simvastatin (ZOCOR) 5 MG tablet; TAKE ONE TABLET BY MOUTH AT BEDTIME      30 minutes spent on the date of the encounter doing chart review, review of test results, interpretation of tests, patient visit and documentation       Rg Adams NP  [facility]     Tracy is a 75 year old who presents to clinic today for the following health issues     HPI       New Patient/Transfer of Care  Diabetes Follow-up      How often are you checking your blood sugar? Not at all    What concerns do you have today about your diabetes? None     Do you have any of these symptoms? (Select all that apply)  No numbness or tingling in feet.  No redness, sores or blisters on feet.  No complaints of excessive thirst.  No reports of blurry vision.  No significant changes to weight.    Have you had a diabetic eye exam in the last 12 months? No                Hyperlipidemia Follow-Up      Are you regularly taking any medication or supplement to lower your cholesterol?    Yes- simvastatin    Are you having muscle aches or other side effects that you think could be caused by your cholesterol lowering medication?  No    Hypertension Follow-up      Do you check your blood pressure regularly outside of the clinic? No     Are you following a low salt diet? Yes    Are your blood pressures ever more than 140 on the top number (systolic) OR more   than 90 on the bottom number (diastolic), for example 140/90? No    BP Readings from Last 2 Encounters:   09/25/20 110/64   05/04/20 113/85     Hemoglobin A1C (%)   Date Value   09/25/2020 5.8 (H)   12/16/2019 6.3 (H)     LDL Cholesterol Calculated (mg/dL)   Date Value   09/25/2020 64   04/10/2019 88       Chronic Kidney Disease Follow-up      Do you take any over the counter pain medicine?: No      How many servings of fruits and vegetables do you eat daily?  0-1    On average, how many sweetened beverages do you drink each day (Examples: soda, juice, sweet tea, etc.  Do NOT count diet or artificially sweetened beverages)?   0    How many days per week do you exercise enough to make your heart beat faster? 0    How many minutes a day do you exercise enough to make your heart beat faster? 0    How many days per week do you miss taking your medication? 0        Review of Systems   Constitutional, HEENT, cardiovascular, pulmonary, gi and gu systems are negative, except as otherwise noted.      Objective    Vitals - Patient Reported  Pain Score: No Pain (0)        Physical Exam   healthy, alert and no distress  PSYCH: Alert and oriented times 3; coherent speech, normal   rate and volume, able to articulate logical thoughts, able   to abstract reason, no tangential thoughts, no hallucinations   or delusions  Her affect is normal  RESP: No cough, no audible wheezing, able to talk in full sentences  Remainder of exam unable to be completed due to telephone visits    Labs reviewed in Epic            Phone call duration: 20 minutes

## 2021-02-09 NOTE — TELEPHONE ENCOUNTER
Made appt.................Carrillo Bauer LPN,   February 9, 2021,      8:07 AM,   [facility]

## 2021-05-12 ENCOUNTER — TRANSFERRED RECORDS (OUTPATIENT)
Dept: HEALTH INFORMATION MANAGEMENT | Facility: CLINIC | Age: 76
End: 2021-05-12

## 2021-05-12 LAB — RETINOPATHY: NEGATIVE

## 2021-06-01 DIAGNOSIS — N18.2 TYPE 2 DIABETES MELLITUS WITH STAGE 2 CHRONIC KIDNEY DISEASE, WITHOUT LONG-TERM CURRENT USE OF INSULIN (H): ICD-10-CM

## 2021-06-01 DIAGNOSIS — E11.22 TYPE 2 DIABETES MELLITUS WITH STAGE 2 CHRONIC KIDNEY DISEASE, WITHOUT LONG-TERM CURRENT USE OF INSULIN (H): ICD-10-CM

## 2021-06-01 RX ORDER — GABAPENTIN 600 MG/1
TABLET ORAL
Qty: 360 TABLET | Refills: 1 | Status: SHIPPED | OUTPATIENT
Start: 2021-06-01 | End: 2021-10-04

## 2021-06-01 NOTE — TELEPHONE ENCOUNTER
Requested Prescriptions   Pending Prescriptions Disp Refills     gabapentin (NEURONTIN) 600 MG tablet [Pharmacy Med Name: GABAPENTIN 600MG TABLET] 360 tablet 1     Sig: TAKE TWO TABLETS BY MOUTH THREE TIMES A DAY     Last Written Prescription Date:  02/06/2021  Last Fill Quantity: 360,   # refills: 1  Last Office Visit: 02/09/2021  Future Office visit:       Routing refill request to provider for review/approval because:  Drug not on the [facility] or [facility] refill protocol or controlled substance    Katie Lopez MA

## 2021-06-11 ENCOUNTER — OFFICE VISIT (OUTPATIENT)
Dept: FAMILY MEDICINE | Facility: CLINIC | Age: 76
End: 2021-06-11
Payer: COMMERCIAL

## 2021-06-11 VITALS
HEART RATE: 76 BPM | BODY MASS INDEX: 22.86 KG/M2 | DIASTOLIC BLOOD PRESSURE: 50 MMHG | OXYGEN SATURATION: 90 % | TEMPERATURE: 97.4 F | WEIGHT: 130.06 LBS | RESPIRATION RATE: 16 BRPM | SYSTOLIC BLOOD PRESSURE: 90 MMHG

## 2021-06-11 DIAGNOSIS — Z01.818 PREOP GENERAL PHYSICAL EXAM: Primary | ICD-10-CM

## 2021-06-11 DIAGNOSIS — N18.2 TYPE 2 DIABETES MELLITUS WITH STAGE 2 CHRONIC KIDNEY DISEASE, WITHOUT LONG-TERM CURRENT USE OF INSULIN (H): ICD-10-CM

## 2021-06-11 DIAGNOSIS — E11.22 TYPE 2 DIABETES MELLITUS WITH STAGE 2 CHRONIC KIDNEY DISEASE, WITHOUT LONG-TERM CURRENT USE OF INSULIN (H): ICD-10-CM

## 2021-06-11 DIAGNOSIS — M35.3 POLYMYALGIA RHEUMATICA (H): ICD-10-CM

## 2021-06-11 DIAGNOSIS — E78.5 HYPERLIPIDEMIA WITH TARGET LDL LESS THAN 100: ICD-10-CM

## 2021-06-11 LAB
ANION GAP SERPL CALCULATED.3IONS-SCNC: 4 MMOL/L (ref 3–14)
BUN SERPL-MCNC: 10 MG/DL (ref 7–30)
CALCIUM SERPL-MCNC: 8.5 MG/DL (ref 8.5–10.1)
CHLORIDE SERPL-SCNC: 104 MMOL/L (ref 94–109)
CO2 SERPL-SCNC: 28 MMOL/L (ref 20–32)
CREAT SERPL-MCNC: 0.74 MG/DL (ref 0.52–1.04)
GFR SERPL CREATININE-BSD FRML MDRD: 78 ML/MIN/{1.73_M2}
GLUCOSE SERPL-MCNC: 105 MG/DL (ref 70–99)
POTASSIUM SERPL-SCNC: 3.9 MMOL/L (ref 3.4–5.3)
SODIUM SERPL-SCNC: 136 MMOL/L (ref 133–144)

## 2021-06-11 PROCEDURE — 80048 BASIC METABOLIC PNL TOTAL CA: CPT | Performed by: NURSE PRACTITIONER

## 2021-06-11 PROCEDURE — 99213 OFFICE O/P EST LOW 20 MIN: CPT | Performed by: NURSE PRACTITIONER

## 2021-06-11 PROCEDURE — 36415 COLL VENOUS BLD VENIPUNCTURE: CPT | Performed by: NURSE PRACTITIONER

## 2021-06-11 RX ORDER — SIMVASTATIN 5 MG
TABLET ORAL
Qty: 90 TABLET | Refills: 1 | Status: ON HOLD | OUTPATIENT
Start: 2021-06-11 | End: 2022-02-02

## 2021-06-11 ASSESSMENT — PAIN SCALES - GENERAL: PAINLEVEL: NO PAIN (0)

## 2021-06-11 NOTE — PROGRESS NOTES
[address] [address]  Phone: [phone]  Fax: [phone]  Primary Provider: Oscar Wilson  Pre-op Performing Provider: OSCAR WILSON      PREOPERATIVE EVALUATION:  Today's date: 6/11/2021    Tracy Mcclelland is a 75 year old female who presents for a preoperative evaluation.    Surgical Information:  Surgery/Procedure: Cataract Surgery   Surgery Location: [facility]  Surgeon: Dr. Abdi  Surgery Date: 06/29/2021 and 07/11/2021   Time of Surgery: 8:00am   Where patient plans to recover: At home with family  Fax number for surgical facility: [phone]    Type of Anesthesia Anticipated: MAC anesthesia     Assessment & Plan     The proposed surgical procedure is considered LOW risk.    Preop general physical exam  - No new acute symptoms of concerns.  - All chronic conditions stable.   - Basic metabolic panel    Type 2 diabetes mellitus with stage 2 chronic kidney disease, without long-term current use of insulin (H)  - Labs today and follow up in the next 3-4 weeks for Annual Wellness visit.   - Random Glucose; Future  - Hemoglobin A1c; Future  - Basic metabolic panel    Polymyalgia rheumatica (H)  - No new joint pain, good quality of life.       Possible Sleep Apnea: No         Medication Instructions:  Hold all medications day of surgery.     RECOMMENDATION:  APPROVAL GIVEN to proceed with proposed procedure, without further diagnostic evaluation.              20  minutes spent on the date of the encounter doing chart review, history and exam, documentation and further activities per the note        Subjective     HPI related to upcoming procedure: Worsening blurred vision and night vision. Was assessed by Dr. Abdi and found to need bilateral cataract surgery the left eye being the worst.       Preop Questions 6/11/2021   1. Have you ever had a heart attack or stroke? No   2. Have you ever had surgery on your heart or blood  vessels, such as a stent placement, a coronary artery bypass, or surgery on an artery in your head, neck, heart, or legs? No   3. Do you have chest pain with activity? No   4. Do you have a history of  heart failure? No   5. Do you currently have a cold, bronchitis or symptoms of other infection? No   6. Do you have a cough, shortness of breath, or wheezing? No   7. Do you or anyone in your family have previous history of blood clots? No   8. Do you or does anyone in your family have a serious bleeding problem such as prolonged bleeding following surgeries or cuts? No   9. Have you ever had problems with anemia or been told to take iron pills? No   10. Have you had any abnormal blood loss such as black, tarry or bloody stools, or abnormal vaginal bleeding? No   11. Have you ever had a blood transfusion? No   12. Are you willing to have a blood transfusion if it is medically needed before, during, or after your surgery? Yes   13. Have you or any of your relatives ever had problems with anesthesia? No   14. Do you have sleep apnea, excessive snoring or daytime drowsiness? YES - daytime drowsiness   14a. Do you have a CPAP machine? No   15. Do you have any artifical heart valves or other implanted medical devices like a pacemaker, defibrillator, or continuous glucose monitor? No   16. Do you have artificial joints? YES - Hip   17. Are you allergic to latex? No   18. Is there any chance that you may be pregnant? -     Health Care Directive:  Patient has a Health Care Directive on file      Preoperative Review of :   reviewed - no record of controlled substances prescribed.          Review of Systems  CONSTITUTIONAL: NEGATIVE for fever, chills, change in weight  ENT/MOUTH: NEGATIVE for ear, mouth and throat problems  RESP: NEGATIVE for significant cough or SOB  CV: NEGATIVE for chest pain, palpitations or peripheral edema    Patient Active Problem List    Diagnosis Date Noted     Polymyalgia rheumatica (H)  05/28/2020     Priority: Medium     Status post total hip replacement, right 10/16/2018     Priority: Medium     Primary osteoarthritis of right hip 07/02/2018     Priority: Medium     CKD (chronic kidney disease) stage 2, GFR 60-89 ml/min 10/19/2015     Priority: Medium     Hereditary and idiopathic peripheral neuropathy 07/02/2015     Priority: Medium     Problem list name updated by automated process. Provider to review       Granuloma annulare 08/07/2014     Priority: Medium     Right dorsal hand       Hypertension, goal below 140/90 07/17/2012     Priority: Medium     Type 2 diabetes mellitus with diabetic chronic kidney disease (H) 11/02/2011     Priority: Medium     Tobacco use disorder 10/07/2010     Priority: Medium     Tennis Elbow-left 07/28/2010     Priority: Medium     Microalbuminuria 01/29/2010     Priority: Medium     Hyperlipidemia with target LDL less than 100 01/28/2010     Priority: Medium     Diagnosis updated by automated process. Provider to review and confirm.        Past Medical History:   Diagnosis Date     CKD (chronic kidney disease) stage 2, GFR 60-89 ml/min 10/19/2015     Headache(784.0)     hx.of Migraines     Lumbago      Type II or unspecified type diabetes mellitus without mention of complication, not stated as uncontrolled      Unspecified essential hypertension      Past Surgical History:   Procedure Laterality Date     ARTHROPLASTY HIP Right 10/16/2018    Procedure: right total hip arthroplasty;  Surgeon: Terrell Ervin DO;  Location: [facility] OR     C LIGATE FALLOPIAN TUBE       DISCECTOMY, FUSION CERVICAL ANTERIOR ONE LEVEL, COMBINED N/A 6/19/2019    Procedure: cervical 5-6 anterior cervical discectomy fusion;  Surgeon: Joe Jefferson MD;  Location:  OR     HC LAPAROSCOPY, SURGICAL; CHOLECYSTECTOMY  2000    Cholecystectomy, Laparoscopic     HC REMOVE TONSILS/ADENOIDS,<13 Y/O      T & A <11y.o.     INJECT EPIDURAL CERVICAL N/A 5/17/2019    Procedure: Epidural Steroid  Injection Bilateral Cervical 6-7;  Surgeon: Carlos Avendaño MD;  Location: [facility] OR     INJECT EPIDURAL CERVICAL Bilateral 12/20/2019    Procedure: Cervical Epidural Injection 6-7 bilateral;  Surgeon: Carlos Avendaño MD;  Location: [facility]     Current Outpatient Medications   Medication Sig Dispense Refill     acetaminophen (TYLENOL) 325 MG tablet Take 3 tablets (975 mg) by mouth every 8 hours as needed for mild pain 100 tablet 1     aspirin (ASA) 81 MG tablet Take 1 tablet (81 mg) by mouth daily       gabapentin (NEURONTIN) 600 MG tablet TAKE TWO TABLETS BY MOUTH THREE TIMES A  tablet 1     metFORMIN (GLUCOPHAGE) 500 MG tablet Take 1 tablet (500 mg) by mouth 2 times daily (with meals) 180 tablet 1     simvastatin (ZOCOR) 5 MG tablet TAKE ONE TABLET BY MOUTH AT BEDTIME 90 tablet 1     ACCU-CHEK GUIDE test strip USE TO TEST BLOOD SUGARS TWO TIMES A DAY OR AS DIRECTED (Patient not taking: Reported on 6/11/2021) 100 each 1     Blood Glucose Calibration (ACCU-CHEK GUIDE CONTROL) LIQD USE TO CALIBRATE METER WITH EACH NEW BOX OF TEST STRIPS (Patient not taking: Reported on 6/11/2021) 1 each 3     blood glucose monitoring (ACCU-CHEK FASTCLIX) lancets USE TO TEST BLOOD SUGARS TWO TIMES A DAY OR AS DIRECTED (Patient not taking: Reported on 6/11/2021) 102 each 5     blood glucose monitoring (NO BRAND SPECIFIED) meter device kit Use to test blood sugar once daily or as directed. (Patient not taking: Reported on 6/11/2021) 1 kit 11       Allergies   Allergen Reactions     Mold      sneezing, coughing , runny nose, watery eyes & red,     No Known Drug Allergies         Social History     Tobacco Use     Smoking status: Current Every Day Smoker     Packs/day: 0.25     Years: 20.00     Pack years: 5.00     Types: Cigarettes     Smokeless tobacco: Never Used     Tobacco comment: 2-3 cigs daily   Substance Use Topics     Alcohol use: Not Currently     Family History   Problem Relation Age of Onset     Heart Disease Mother          Pacemaker     Alzheimer Disease Father         Dementia/Loss of memory     Cerebrovascular Disease Maternal Grandmother      Heart Disease Maternal Grandfather         MI     Diabetes Paternal Grandmother      History   Drug Use No         Objective     BP 90/50 (BP Location: Left arm, Patient Position: Chair, Cuff Size: Adult Regular)   Pulse 76   Temp 97.4  F (36.3  C) (Temporal)   Resp 16   Wt 59 kg (130 lb 1 oz)   LMP  (LMP Unknown)   SpO2 90%   BMI 22.86 kg/m      Physical Exam  GENERAL APPEARANCE: healthy, alert and no distress  HENT: ear canals and TM's normal and nose and mouth without ulcers or lesions  RESP: lungs clear to auscultation - no rales, rhonchi or wheezes  CV: regular rate and rhythm, normal S1 S2, no S3 or S4 and no murmur, click or rub   ABDOMEN: soft, nontender, no HSM or masses and bowel sounds normal  NEURO: Normal strength and tone, sensory exam grossly normal, mentation intact and speech normal    Recent Labs   Lab Test 09/25/20  1333 05/13/20  1135 12/16/19  1104 12/16/19  1104   HGB 13.3 14.6   < >  --     285   < >  --     131*   < >  --    POTASSIUM 4.4 3.5   < >  --    CR 0.75 0.83   < >  --    A1C 5.8*  --   --  6.3*    < > = values in this interval not displayed.        Diagnostics:  Labs pending at this time.  Results will be reviewed when available.   No EKG required for low risk surgery (cataract, skin procedure, breast biopsy, etc).    Revised Cardiac Risk Index (RCRI):  The patient has the following serious cardiovascular risks for perioperative complications:   - No serious cardiac risks = 0 points     RCRI Interpretation: 0 points: Class I (very low risk - 0.4% complication rate)           Signed Electronically by: Rg Adams NP  Copy of this evaluation report is provided to requesting physician.

## 2021-06-11 NOTE — LETTER
June 14, 2021      Tracymelba Mcclelland  [address] [place] [id number]        Dear ,    We are writing to inform you of your test results.    electrolytes and kidney function looks good. Mildly elevated blood sugar but she is not fasting so not to worry with this level.     No need for further follow up in this regard.     Rg Adams CNP     Resulted Orders   Basic metabolic panel   Result Value Ref Range    Sodium 136 133 - 144 mmol/L    Potassium 3.9 3.4 - 5.3 mmol/L    Chloride 104 94 - 109 mmol/L    Carbon Dioxide 28 20 - 32 mmol/L    Anion Gap 4 3 - 14 mmol/L    Glucose 105 (H) 70 - 99 mg/dL    Urea Nitrogen 10 7 - 30 mg/dL    Creatinine 0.74 0.52 - 1.04 mg/dL    GFR Estimate 78 >60 mL/min/[1.73_m2]      Comment:      Non  GFR Calc  Starting 12/18/2018, serum creatinine based estimated GFR (eGFR) will be   calculated using the Chronic Kidney Disease Epidemiology Collaboration   (CKD-EPI) equation.      GFR Estimate If Black >90 >60 mL/min/[1.73_m2]      Comment:       GFR Calc  Starting 12/18/2018, serum creatinine based estimated GFR (eGFR) will be   calculated using the Chronic Kidney Disease Epidemiology Collaboration   (CKD-EPI) equation.      Calcium 8.5 8.5 - 10.1 mg/dL       If you have any questions or concerns, please call the clinic at the number listed above.

## 2021-06-11 NOTE — PATIENT INSTRUCTIONS

## 2021-06-14 ENCOUNTER — TRANSFERRED RECORDS (OUTPATIENT)
Dept: HEALTH INFORMATION MANAGEMENT | Facility: CLINIC | Age: 76
End: 2021-06-14

## 2021-06-30 ENCOUNTER — TELEPHONE (OUTPATIENT)
Dept: FAMILY MEDICINE | Facility: CLINIC | Age: 76
End: 2021-06-30

## 2021-06-30 NOTE — TELEPHONE ENCOUNTER
Ivon galvan from [facility] stating patient is scheduled for her second cataract surgery on 7/13/21. Please addend preop physical from 6/11 for clearance on second cataract surgery and fax to [phone]. Mony Martinez LPN

## 2021-10-04 DIAGNOSIS — N18.2 TYPE 2 DIABETES MELLITUS WITH STAGE 2 CHRONIC KIDNEY DISEASE, WITHOUT LONG-TERM CURRENT USE OF INSULIN (H): ICD-10-CM

## 2021-10-04 DIAGNOSIS — E11.22 TYPE 2 DIABETES MELLITUS WITH STAGE 2 CHRONIC KIDNEY DISEASE, WITHOUT LONG-TERM CURRENT USE OF INSULIN (H): ICD-10-CM

## 2021-10-04 NOTE — TELEPHONE ENCOUNTER
Requested Prescriptions   Pending Prescriptions Disp Refills     gabapentin (NEURONTIN) 600 MG tablet 360 tablet 1

## 2021-10-07 RX ORDER — GABAPENTIN 600 MG/1
TABLET ORAL
Qty: 360 TABLET | Refills: 1 | Status: ON HOLD | OUTPATIENT
Start: 2021-10-07 | End: 2022-02-01

## 2021-10-07 NOTE — TELEPHONE ENCOUNTER
Pending Prescriptions:                       Disp   Refills    gabapentin (NEURONTIN) 600 MG tablet       360 ta*1          Routing refill request to provider for review/approval because:  Drug not on the [facility] refill protocol

## 2021-10-19 ENCOUNTER — OFFICE VISIT (OUTPATIENT)
Dept: INTERNAL MEDICINE | Facility: CLINIC | Age: 76
End: 2021-10-19
Payer: COMMERCIAL

## 2021-10-19 VITALS
DIASTOLIC BLOOD PRESSURE: 56 MMHG | SYSTOLIC BLOOD PRESSURE: 92 MMHG | RESPIRATION RATE: 18 BRPM | HEART RATE: 88 BPM | WEIGHT: 116.2 LBS | OXYGEN SATURATION: 93 % | TEMPERATURE: 98.1 F | BODY MASS INDEX: 20.42 KG/M2

## 2021-10-19 DIAGNOSIS — E78.5 HYPERLIPIDEMIA WITH TARGET LDL LESS THAN 100: ICD-10-CM

## 2021-10-19 DIAGNOSIS — E11.22 TYPE 2 DIABETES MELLITUS WITH CHRONIC KIDNEY DISEASE, WITHOUT LONG-TERM CURRENT USE OF INSULIN, UNSPECIFIED CKD STAGE (H): Primary | ICD-10-CM

## 2021-10-19 DIAGNOSIS — M62.81 GENERALIZED MUSCLE WEAKNESS: ICD-10-CM

## 2021-10-19 PROCEDURE — 99214 OFFICE O/P EST MOD 30 MIN: CPT | Performed by: INTERNAL MEDICINE

## 2021-10-19 ASSESSMENT — PAIN SCALES - GENERAL: PAINLEVEL: NO PAIN (0)

## 2021-10-19 NOTE — PROGRESS NOTES
Paul Molina is a 76 year old who presents for the following health issues diabetes mellitus type 2, hyperlipidemia, neuropathy.    Patient presents to the clinic to establish care after previous provider moved clinics.  Patient claims her diabetes is well managed with the hemoglobin A1c 1 year ago at 5.8.  She takes Metformin 500 mg twice a day, and follows a diabetic diet.  She complains of pain numbness and tingling in her bilateral lower extremities.  This has been ongoing for several years.  She takes gabapentin 600 mg TID for treatment of her pain with good alleviation.  Pt reports generalized weakness, and has a hard time getting around throughout the day. She would like to begin physical therapy to improve this.   She has no acute medical complaints today.        Review of Systems   Constitutional, HEENT, cardiovascular, pulmonary, GI, , musculoskeletal, neuro, skin, endocrine and psych systems are negative, except as otherwise noted.      Objective    BP 92/56 (BP Location: Right arm, Patient Position: Sitting, Cuff Size: Adult Regular)   Pulse 88   Temp 98.1  F (36.7  C) (Temporal)   Resp 18   Wt 52.7 kg (116 lb 3.2 oz)   LMP  (LMP Unknown)   SpO2 93%   BMI 20.42 kg/m    Body mass index is 20.42 kg/m .  Physical Exam   Constitutional: He is not in acute distress, well-developed, and well-nourished  HEENT: Normocephalic and atraumatic. Right ear: Tympanic membrane and ear canal and external ear normal. Left ear: Tympanic membrane and ear canal and external ear normal. Nose normal Mouth: Mucous membranes are moist.  No pharyngeal swelling or posterior oropharyngeal erythema.  PERRL.  Cardiovascular: Normal rate and regular rhythm. S1 and S2 normal.  No murmurs rubs or gallops.  Pulmonary: Pulmonary effort is normal.  Breath sounds normal.  No wheezing rhonchi or rales.  Abdominal: Bowel sounds are normal.  Abdomen is soft. There is no abdominal tenderness rebound or  guarding.  Neurological: No focal deficit present.  She is alert and oriented to person place and time.     Psych: Appropriate affect, pleasant.  Skin: Cool and dry    Vital Signs:   BP 92/56 (BP Location: Right arm, Patient Position: Sitting, Cuff Size: Adult Regular)   Pulse 88   Temp 98.1  F (36.7  C) (Temporal)   Resp 18   Wt 52.7 kg (116 lb 3.2 oz)   LMP  (LMP Unknown)   SpO2 93%   BMI 20.42 kg/m               Decision Making    Problem and Complexity     1. Type 2 diabetes mellitus with chronic kidney disease, without long-term current use of insulin, unspecified CKD stage (H)  Pt T2 DM is well managed on metformin 500mg BID. Her most recent Hb A1c was 5.8 one year ago. She has a future order already scheduled for repeat Hb A1c. Will otherwise continue current treatment plan.      2. Hyperlipidemia with target LDL less than 100  ASCVD 10 year risk is 26%. We will continue current treatment of HLD with simvastatin. Most recent LDL returned at 64 one year ago.       3. (M62.81) Generalized muscle weakness  Plan: Physical Therapy Referral        Pt will schedule f/u with physical therapy.                   :                    FOLLOW UP   I have asked the patient to make an appointment for followup with me in 2 months for Medicare wellness exam          I have carefully explained the diagnosis and treatment options to the patient.  The patient has displayed an understanding of the above, and all subsequent questions were answered.    This patient has been interviewed, examined, diagnosed, and informed of the above by me personally.  Medical records and available pertinent information has been reviewed by me personally.  All decisions and discussion have been between myself and the patient/family.  This was done in the presence of Anmol Martin  , who acted as a medical scribe and recorded the events above.  No diagnosis or decision making was made by the above-mentioned scribe.  The patient, and or his/her  ensurors will not be billed for the presence or actions of this scribe.  The information recorded by the scribe has been reviewed by me and found to be accurate.        DO TIMUR Richards    Portions of this note were produced using [employer]  Although every attempt at real-time proof reading has been made, occasional grammar/syntax errors may have been missed.

## 2021-11-03 ENCOUNTER — HOSPITAL ENCOUNTER (EMERGENCY)
Facility: CLINIC | Age: 76
Discharge: HOME | End: 2021-11-03
Attending: EMERGENCY MEDICINE | Admitting: EMERGENCY MEDICINE
Payer: MEDICARE

## 2021-11-03 VITALS
DIASTOLIC BLOOD PRESSURE: 84 MMHG | SYSTOLIC BLOOD PRESSURE: 106 MMHG | WEIGHT: 115.8 LBS | TEMPERATURE: 97.6 F | BODY MASS INDEX: 20.35 KG/M2 | RESPIRATION RATE: 20 BRPM | OXYGEN SATURATION: 99 % | HEART RATE: 73 BPM

## 2021-11-03 DIAGNOSIS — R42 DIZZINESS: ICD-10-CM

## 2021-11-03 DIAGNOSIS — E87.1 HYPONATREMIA: ICD-10-CM

## 2021-11-03 DIAGNOSIS — R19.7 DIARRHEA, UNSPECIFIED TYPE: ICD-10-CM

## 2021-11-03 LAB
ALBUMIN SERPL-MCNC: 3.7 G/DL (ref 3.4–5)
ALBUMIN UR-MCNC: NEGATIVE MG/DL
ALP SERPL-CCNC: 42 U/L (ref 40–150)
ALT SERPL W P-5'-P-CCNC: 19 U/L (ref 0–50)
ANION GAP SERPL CALCULATED.3IONS-SCNC: 5 MMOL/L (ref 3–14)
APPEARANCE UR: CLEAR
AST SERPL W P-5'-P-CCNC: 31 U/L (ref 0–45)
BASOPHILS # BLD AUTO: 0 10E3/UL (ref 0–0.2)
BASOPHILS NFR BLD AUTO: 0 %
BILIRUB SERPL-MCNC: 0.3 MG/DL (ref 0.2–1.3)
BILIRUB UR QL STRIP: NEGATIVE
BUN SERPL-MCNC: 12 MG/DL (ref 7–30)
CALCIUM SERPL-MCNC: 8.1 MG/DL (ref 8.5–10.1)
CHLORIDE BLD-SCNC: 99 MMOL/L (ref 94–109)
CO2 SERPL-SCNC: 25 MMOL/L (ref 20–32)
COLOR UR AUTO: YELLOW
CREAT SERPL-MCNC: 0.56 MG/DL (ref 0.52–1.04)
EOSINOPHIL # BLD AUTO: 0 10E3/UL (ref 0–0.7)
EOSINOPHIL NFR BLD AUTO: 0 %
ERYTHROCYTE [DISTWIDTH] IN BLOOD BY AUTOMATED COUNT: 12.5 % (ref 10–15)
GFR SERPL CREATININE-BSD FRML MDRD: 90 ML/MIN/1.73M2
GLUCOSE BLD-MCNC: 93 MG/DL (ref 70–99)
GLUCOSE UR STRIP-MCNC: NEGATIVE MG/DL
HCT VFR BLD AUTO: 34 % (ref 35–47)
HGB BLD-MCNC: 11.4 G/DL (ref 11.7–15.7)
HGB UR QL STRIP: NEGATIVE
HYALINE CASTS: 1 /LPF
IMM GRANULOCYTES # BLD: 0 10E3/UL
IMM GRANULOCYTES NFR BLD: 0 %
KETONES UR STRIP-MCNC: NEGATIVE MG/DL
LEUKOCYTE ESTERASE UR QL STRIP: NEGATIVE
LYMPHOCYTES # BLD AUTO: 3 10E3/UL (ref 0.8–5.3)
LYMPHOCYTES NFR BLD AUTO: 47 %
MCH RBC QN AUTO: 34.8 PG (ref 26.5–33)
MCHC RBC AUTO-ENTMCNC: 33.5 G/DL (ref 31.5–36.5)
MCV RBC AUTO: 104 FL (ref 78–100)
MONOCYTES # BLD AUTO: 0.3 10E3/UL (ref 0–1.3)
MONOCYTES NFR BLD AUTO: 5 %
MUCOUS THREADS #/AREA URNS LPF: PRESENT /LPF
NEUTROPHILS # BLD AUTO: 3.1 10E3/UL (ref 1.6–8.3)
NEUTROPHILS NFR BLD AUTO: 48 %
NITRATE UR QL: NEGATIVE
NRBC # BLD AUTO: 0 10E3/UL
NRBC BLD AUTO-RTO: 0 /100
PH UR STRIP: 5 [PH] (ref 5–7)
PLATELET # BLD AUTO: 181 10E3/UL (ref 150–450)
POTASSIUM BLD-SCNC: 5.7 MMOL/L (ref 3.4–5.3)
PROT SERPL-MCNC: 6.8 G/DL (ref 6.8–8.8)
RBC # BLD AUTO: 3.28 10E6/UL (ref 3.8–5.2)
RBC URINE: 1 /HPF
SODIUM SERPL-SCNC: 129 MMOL/L (ref 133–144)
SP GR UR STRIP: 1.02 (ref 1–1.03)
SQUAMOUS EPITHELIAL: <1 /HPF
TROPONIN I SERPL-MCNC: <0.015 UG/L (ref 0–0.04)
TSH SERPL DL<=0.005 MIU/L-ACNC: 1.8 MU/L (ref 0.4–4)
UROBILINOGEN UR STRIP-MCNC: NORMAL MG/DL
WBC # BLD AUTO: 6.4 10E3/UL (ref 4–11)
WBC URINE: 1 /HPF

## 2021-11-03 PROCEDURE — 85004 AUTOMATED DIFF WBC COUNT: CPT | Performed by: EMERGENCY MEDICINE

## 2021-11-03 PROCEDURE — 99284 EMERGENCY DEPT VISIT MOD MDM: CPT | Mod: 25

## 2021-11-03 PROCEDURE — 84443 ASSAY THYROID STIM HORMONE: CPT | Performed by: EMERGENCY MEDICINE

## 2021-11-03 PROCEDURE — 93005 ELECTROCARDIOGRAM TRACING: CPT

## 2021-11-03 PROCEDURE — 96361 HYDRATE IV INFUSION ADD-ON: CPT

## 2021-11-03 PROCEDURE — 82040 ASSAY OF SERUM ALBUMIN: CPT | Performed by: EMERGENCY MEDICINE

## 2021-11-03 PROCEDURE — 36415 COLL VENOUS BLD VENIPUNCTURE: CPT | Performed by: EMERGENCY MEDICINE

## 2021-11-03 PROCEDURE — 81001 URINALYSIS AUTO W/SCOPE: CPT | Performed by: EMERGENCY MEDICINE

## 2021-11-03 PROCEDURE — 99284 EMERGENCY DEPT VISIT MOD MDM: CPT | Mod: 25 | Performed by: EMERGENCY MEDICINE

## 2021-11-03 PROCEDURE — 84484 ASSAY OF TROPONIN QUANT: CPT | Performed by: EMERGENCY MEDICINE

## 2021-11-03 PROCEDURE — 93010 ELECTROCARDIOGRAM REPORT: CPT | Performed by: EMERGENCY MEDICINE

## 2021-11-03 PROCEDURE — 96360 HYDRATION IV INFUSION INIT: CPT

## 2021-11-03 PROCEDURE — 250N000013 HC RX MED GY IP 250 OP 250 PS 637: Performed by: EMERGENCY MEDICINE

## 2021-11-03 PROCEDURE — 258N000003 HC RX IP 258 OP 636: Performed by: EMERGENCY MEDICINE

## 2021-11-03 RX ORDER — GABAPENTIN 300 MG/1
1200 CAPSULE ORAL ONCE
Status: COMPLETED | OUTPATIENT
Start: 2021-11-03 | End: 2021-11-03

## 2021-11-03 RX ORDER — ACETAMINOPHEN 500 MG
1000 TABLET ORAL ONCE
Status: COMPLETED | OUTPATIENT
Start: 2021-11-03 | End: 2021-11-03

## 2021-11-03 RX ORDER — ACETAMINOPHEN 500 MG
1000 TABLET ORAL
Status: ON HOLD | COMMUNITY
End: 2022-02-02

## 2021-11-03 RX ORDER — SODIUM CHLORIDE 9 MG/ML
INJECTION, SOLUTION INTRAVENOUS CONTINUOUS
Status: DISCONTINUED | OUTPATIENT
Start: 2021-11-03 | End: 2021-11-03 | Stop reason: HOSPADM

## 2021-11-03 RX ADMIN — ACETAMINOPHEN 1000 MG: 500 TABLET, FILM COATED ORAL at 16:47

## 2021-11-03 RX ADMIN — SODIUM CHLORIDE 1000 ML: 9 INJECTION, SOLUTION INTRAVENOUS at 14:38

## 2021-11-03 RX ADMIN — GABAPENTIN 1200 MG: 300 CAPSULE ORAL at 16:48

## 2021-11-03 NOTE — ED PROVIDER NOTES
History     Chief Complaint   Patient presents with     Diarrhea     Dizziness     HPI  Tracy Mcclelland is a 76 year old female who presents to the emergency room for diarrhea and dizziness.  She states that she has had diarrhea for approximately 1 month.  Has 1 bowel movement daily, but says that bowel movement is very loose and watery.  Has not not been bloody.  She has not yet addressed this with her primary provider.  Denies any abdominal pain in relation to diarrhea.  Denies any pain or burning with urination.  Has not been running a fever.  She noticed about 2 weeks ago and then again today she had an episode of dizziness.  Had woken up from sleep and gone to sit in her recliner when the dizziness came on out of nowhere.  Lasted for approximately 1 hour.  She says she could not tell if the room was spinning since she could not open her eyes as it made the dizziness worse.  After an hour it went away on its own and she was able to get up and move around, no further issues.  Did not feel nauseated.  Did not have a headache.  Denies any chest pain or shortness of breath with the dizziness.  She says she feels miserable so she came to the ER to see if we could figure out what is going on.  Currently feels improved.  Did have a bowel movement this morning that was again loose and watery.     Allergies:  Allergies   Allergen Reactions     Mold      sneezing, coughing , runny nose, watery eyes & red,     No Known Drug Allergies        Problem List:    Patient Active Problem List    Diagnosis Date Noted     Polymyalgia rheumatica (H) 05/28/2020     Priority: Medium     Status post total hip replacement, right 10/16/2018     Priority: Medium     Primary osteoarthritis of right hip 07/02/2018     Priority: Medium     CKD (chronic kidney disease) stage 2, GFR 60-89 ml/min 10/19/2015     Priority: Medium     Hereditary and idiopathic peripheral neuropathy 07/02/2015     Priority: Medium     Problem list name updated by  automated process. Provider to review       Granuloma annulare 08/07/2014     Priority: Medium     Right dorsal hand       Hypertension, goal below 140/90 07/17/2012     Priority: Medium     Type 2 diabetes mellitus with diabetic chronic kidney disease (H) 11/02/2011     Priority: Medium     Tobacco use disorder 10/07/2010     Priority: Medium     Tennis Elbow-left 07/28/2010     Priority: Medium     Microalbuminuria 01/29/2010     Priority: Medium     Hyperlipidemia with target LDL less than 100 01/28/2010     Priority: Medium     Diagnosis updated by automated process. Provider to review and confirm.          Past Medical History:    Past Medical History:   Diagnosis Date     CKD (chronic kidney disease) stage 2, GFR 60-89 ml/min 10/19/2015     Headache(784.0)      Lumbago      Type II or unspecified type diabetes mellitus without mention of complication, not stated as uncontrolled      Unspecified essential hypertension        Past Surgical History:    Past Surgical History:   Procedure Laterality Date     ARTHROPLASTY HIP Right 10/16/2018    Procedure: right total hip arthroplasty;  Surgeon: Terrell Ervin DO;  Location: [facility]     C LIGATE FALLOPIAN TUBE       DISCECTOMY, FUSION CERVICAL ANTERIOR ONE LEVEL, COMBINED N/A 6/19/2019    Procedure: cervical 5-6 anterior cervical discectomy fusion;  Surgeon: Joe Jefferson MD;  Location: [facility] OR     HC LAPAROSCOPY, SURGICAL; CHOLECYSTECTOMY  2000    Cholecystectomy, Laparoscopic     HC REMOVE TONSILS/ADENOIDS,<11 Y/O      T & A <11y.o.     INJECT EPIDURAL CERVICAL N/A 5/17/2019    Procedure: Epidural Steroid Injection Bilateral Cervical 6-7;  Surgeon: Carlos Avendaño MD;  Location: [facility]     INJECT EPIDURAL CERVICAL Bilateral 12/20/2019    Procedure: Cervical Epidural Injection 6-7 bilateral;  Surgeon: Carlos Avendñao MD;  Location: [facility] OR       Family History:    Family History   Problem Relation Age of Onset     Heart Disease Mother         Pacemaker      Alzheimer Disease Father         Dementia/Loss of memory     Cerebrovascular Disease Maternal Grandmother      Heart Disease Maternal Grandfather         MI     Diabetes Paternal Grandmother        Social History:  Marital Status:   [4]  Social History     Tobacco Use     Smoking status: Current Every Day Smoker     Packs/day: 0.25     Years: 20.00     Pack years: 5.00     Types: Cigarettes     Smokeless tobacco: Never Used     Tobacco comment: 4-5 per day   Substance Use Topics     Alcohol use: Not Currently     Drug use: No        Medications:    acetaminophen (TYLENOL) 500 MG tablet  aspirin (ASA) 81 MG tablet  gabapentin (NEURONTIN) 600 MG tablet  metFORMIN (GLUCOPHAGE) 500 MG tablet  simvastatin (ZOCOR) 5 MG tablet  ACCU-CHEK GUIDE test strip          Review of Systems   All other systems reviewed and are negative.      Physical Exam   BP: (!) 73/52  Pulse: 55  Temp: 97.6  F (36.4  C)  Resp: 20  Weight: 52.5 kg (115 lb 12.8 oz)  SpO2: (S) (!) 88 % (pt shaking, not getting a good reading)      Physical Exam  Vitals and nursing note reviewed.   Constitutional:       General: She is not in acute distress.     Appearance: She is not diaphoretic.   HENT:      Head: Atraumatic.      Mouth/Throat:      Pharynx: No oropharyngeal exudate.   Eyes:      General: No scleral icterus.     Pupils: Pupils are equal, round, and reactive to light.   Cardiovascular:      Heart sounds: Normal heart sounds.   Pulmonary:      Effort: Pulmonary effort is normal. No respiratory distress.      Breath sounds: Normal breath sounds.   Abdominal:      General: Bowel sounds are normal.      Palpations: Abdomen is soft.      Tenderness: There is no abdominal tenderness. There is no guarding.   Musculoskeletal:         General: No tenderness.   Skin:     General: Skin is warm.      Findings: No rash.   Neurological:      General: No focal deficit present.      Mental Status: She is alert.         ED Course        Procedures               EKG Interpretation:      Interpreted by Izzy Gómez DO  Time reviewed: [id number]  Symptoms at time of EKG: Diarrhea, dizziness   Rhythm: normal sinus   Rate: Normal  Axis: Normal  Ectopy: premature ventricular contractions (unifocal)  Conduction: normal  ST Segments/ T Waves: Low voltage, nonspecific T wave abnormality  Q Waves: none  Comparison to prior: No old EKG available    Clinical Impression: non-specific EKG                Critical Care time:  none               Results for orders placed or performed during the hospital encounter of 11/03/21 (from the past 24 hour(s))   CBC with platelets differential    Narrative    The following orders were created for panel order CBC with platelets differential.  Procedure                               Abnormality         Status                     ---------                               -----------         ------                     CBC with platelets and d...[[id number]]  Abnormal            Final result                 Please view results for these tests on the individual orders.   Comprehensive metabolic panel   Result Value Ref Range    Sodium 129 (L) 133 - 144 mmol/L    Potassium 5.7 (H) 3.4 - 5.3 mmol/L    Chloride 99 94 - 109 mmol/L    Carbon Dioxide (CO2) 25 20 - 32 mmol/L    Anion Gap 5 3 - 14 mmol/L    Urea Nitrogen 12 7 - 30 mg/dL    Creatinine 0.56 0.52 - 1.04 mg/dL    Calcium 8.1 (L) 8.5 - 10.1 mg/dL    Glucose 93 70 - 99 mg/dL    Alkaline Phosphatase 42 40 - 150 U/L    AST 31 0 - 45 U/L    ALT 19 0 - 50 U/L    Protein Total 6.8 6.8 - 8.8 g/dL    Albumin 3.7 3.4 - 5.0 g/dL    Bilirubin Total 0.3 0.2 - 1.3 mg/dL    GFR Estimate 90 >60 mL/min/1.73m2   Troponin I   Result Value Ref Range    Troponin I <0.015 0.000 - 0.045 ug/L   TSH with free T4 reflex   Result Value Ref Range    TSH 1.80 0.40 - 4.00 mU/L   CBC with platelets and differential   Result Value Ref Range    WBC Count 6.4 4.0 - 11.0 10e3/uL    RBC Count 3.28 (L) 3.80 - 5.20 10e6/uL     Hemoglobin 11.4 (L) 11.7 - 15.7 g/dL    Hematocrit 34.0 (L) 35.0 - 47.0 %     (H) 78 - 100 fL    MCH 34.8 (H) 26.5 - 33.0 pg    MCHC 33.5 31.5 - 36.5 g/dL    RDW 12.5 10.0 - 15.0 %    Platelet Count 181 150 - 450 10e3/uL    % Neutrophils 48 %    % Lymphocytes 47 %    % Monocytes 5 %    % Eosinophils 0 %    % Basophils 0 %    % Immature Granulocytes 0 %    NRBCs per 100 WBC 0 <1 /100    Absolute Neutrophils 3.1 1.6 - 8.3 10e3/uL    Absolute Lymphocytes 3.0 0.8 - 5.3 10e3/uL    Absolute Monocytes 0.3 0.0 - 1.3 10e3/uL    Absolute Eosinophils 0.0 0.0 - 0.7 10e3/uL    Absolute Basophils 0.0 0.0 - 0.2 10e3/uL    Absolute Immature Granulocytes 0.0 <=0.0 10e3/uL    Absolute NRBCs 0.0 10e3/uL   UA with Microscopic reflex to Culture    Specimen: Urine, Catheter   Result Value Ref Range    Color Urine Yellow Colorless, Straw, Light Yellow, Yellow    Appearance Urine Clear Clear    Glucose Urine Negative Negative mg/dL    Bilirubin Urine Negative Negative    Ketones Urine Negative Negative mg/dL    Specific Gravity Urine 1.016 1.003 - 1.035    Blood Urine Negative Negative    pH Urine 5.0 5.0 - 7.0    Protein Albumin Urine Negative Negative mg/dL    Urobilinogen Urine Normal Normal, 2.0 mg/dL    Nitrite Urine Negative Negative    Leukocyte Esterase Urine Negative Negative    Mucus Urine Present (A) None Seen /LPF    RBC Urine 1 <=2 /HPF    WBC Urine 1 <=5 /HPF    Squamous Epithelials Urine <1 <=1 /HPF    Hyaline Casts Urine 1 <=2 /LPF    Narrative    Urine Culture not indicated       Medications   0.9% sodium chloride BOLUS (0 mLs Intravenous Stopped 11/3/21 1700)   gabapentin (NEURONTIN) capsule 1,200 mg (1,200 mg Oral Given 11/3/21 1648)   acetaminophen (TYLENOL) tablet 1,000 mg (1,000 mg Oral Given 11/3/21 1647)       Assessments & Plan (with Medical Decision Making)  Tracy is a 76-year-old female who presents to the emergency room for ongoing diarrhea and 2 episodes of dizziness.  See history and focused physical  exam as above  Appearing 76-year-old female in no acute distress, vital signs are stable and she is afebrile.  No acute focal neurologic deficit.  She had dizziness 2 weeks ago and again today, but symptoms have resolved.  She is more concerned about the ongoing diarrhea.  Thinks she would be able to give a stool sample here.  Is agreeable to checking other labs and getting hydration with IV fluids  Labs and imaging as above.  No acute worrisome findings.  Patient has been up and ambulated to the bathroom without difficulty return for dizziness.  Was unable to give a stool sample here in the emergency room.  Instructed on collecting a stool sample at home and placed orders for her to return with a sample to the lab.  She should follow closely with her primary provider to ultimately determine etiology of her diarrhea.  If symptoms worsen return promptly to the emergency room for evaluation.  She felt comfortable with this plan.  Discharged in no acute distress       I have reviewed the nursing notes.    I have reviewed the findings, diagnosis, plan and need for follow up with the patient.       Discharge Medication List as of 11/3/2021  5:54 PM          Final diagnoses:   Diarrhea, unspecified type   Hyponatremia   Dizziness       11/3/2021   [facility] EMERGENCY DEPT     Izzy Gómez,   11/04/21 1536

## 2021-11-03 NOTE — ED TRIAGE NOTES
Pt reports she has been having diarrhea for awhile but the last 2 days she has dizziness in the morning for about an hour followed by diarrhea and then it gets better, she wants to know what is going on because it is just miserable

## 2021-11-03 NOTE — DISCHARGE INSTRUCTIONS
Your labs did not show any sign of infection.  Your potassium was slightly elevated and sodium was slightly low.  This is not unusual given the setting of ongoing diarrhea.  You were given some fluids to help fix this today    You should collect a stool sample at home and return to the lab for testing.  Hopefully this will determine what is causing your ongoing diarrhea    You may try over-the-counter Imodium per package instructions to help with your diarrhea    A message was sent to Dr. Braxton and to work you in for an appointment to follow-up.  He may have further recommendations or refer you to a GI specialist if needed    If you have any new or concerning symptoms that develop, do not hesitate to return to the emergency room for evaluation.

## 2022-01-29 DIAGNOSIS — E11.22 TYPE 2 DIABETES MELLITUS WITH STAGE 2 CHRONIC KIDNEY DISEASE, WITHOUT LONG-TERM CURRENT USE OF INSULIN (H): ICD-10-CM

## 2022-01-29 DIAGNOSIS — N18.2 TYPE 2 DIABETES MELLITUS WITH STAGE 2 CHRONIC KIDNEY DISEASE, WITHOUT LONG-TERM CURRENT USE OF INSULIN (H): ICD-10-CM

## 2022-01-30 ENCOUNTER — HOSPITAL ENCOUNTER (INPATIENT)
Facility: CLINIC | Age: 77
LOS: 4 days | Discharge: NURSING, REHAB OR LONG-TERM CARE | DRG: 689 | End: 2022-02-04
Attending: FAMILY MEDICINE | Admitting: INTERNAL MEDICINE
Payer: MEDICARE

## 2022-01-30 DIAGNOSIS — N18.2 TYPE 2 DIABETES MELLITUS WITH STAGE 2 CHRONIC KIDNEY DISEASE, WITHOUT LONG-TERM CURRENT USE OF INSULIN (H): ICD-10-CM

## 2022-01-30 DIAGNOSIS — R53.1 WEAKNESS: ICD-10-CM

## 2022-01-30 DIAGNOSIS — K52.9 COLITIS: ICD-10-CM

## 2022-01-30 DIAGNOSIS — R29.6 FALLS FREQUENTLY: ICD-10-CM

## 2022-01-30 DIAGNOSIS — R29.6 FALLING: ICD-10-CM

## 2022-01-30 DIAGNOSIS — R62.7 FAILURE TO THRIVE IN ADULT: ICD-10-CM

## 2022-01-30 DIAGNOSIS — N30.00 ACUTE CYSTITIS WITHOUT HEMATURIA: Primary | ICD-10-CM

## 2022-01-30 DIAGNOSIS — E11.22 TYPE 2 DIABETES MELLITUS WITH STAGE 2 CHRONIC KIDNEY DISEASE, WITHOUT LONG-TERM CURRENT USE OF INSULIN (H): ICD-10-CM

## 2022-01-30 DIAGNOSIS — E78.5 HYPERLIPIDEMIA WITH TARGET LDL LESS THAN 100: ICD-10-CM

## 2022-01-30 DIAGNOSIS — R62.7 ADULT FAILURE TO THRIVE: ICD-10-CM

## 2022-01-30 DIAGNOSIS — Z11.52 ENCOUNTER FOR SCREENING LABORATORY TESTING FOR SEVERE ACUTE RESPIRATORY SYNDROME CORONAVIRUS 2 (SARS-COV-2): ICD-10-CM

## 2022-01-30 DIAGNOSIS — E86.0 DEHYDRATION: ICD-10-CM

## 2022-01-30 DIAGNOSIS — M35.3 POLYMYALGIA RHEUMATICA (H): ICD-10-CM

## 2022-01-30 DIAGNOSIS — R19.7 DIARRHEA, UNSPECIFIED TYPE: ICD-10-CM

## 2022-01-30 DIAGNOSIS — R53.1 GENERALIZED WEAKNESS: ICD-10-CM

## 2022-01-30 LAB
ALBUMIN SERPL-MCNC: 3.6 G/DL (ref 3.4–5)
ALP SERPL-CCNC: 164 U/L (ref 40–150)
ALT SERPL W P-5'-P-CCNC: 38 U/L (ref 0–50)
ANION GAP SERPL CALCULATED.3IONS-SCNC: 7 MMOL/L (ref 3–14)
AST SERPL W P-5'-P-CCNC: 40 U/L (ref 0–45)
BASOPHILS # BLD AUTO: 0 10E3/UL (ref 0–0.2)
BASOPHILS NFR BLD AUTO: 0 %
BILIRUB SERPL-MCNC: 0.5 MG/DL (ref 0.2–1.3)
BUN SERPL-MCNC: 48 MG/DL (ref 7–30)
CALCIUM SERPL-MCNC: 9.4 MG/DL (ref 8.5–10.1)
CHLORIDE BLD-SCNC: 99 MMOL/L (ref 94–109)
CO2 SERPL-SCNC: 27 MMOL/L (ref 20–32)
CREAT SERPL-MCNC: 0.74 MG/DL (ref 0.52–1.04)
EOSINOPHIL # BLD AUTO: 0.1 10E3/UL (ref 0–0.7)
EOSINOPHIL NFR BLD AUTO: 1 %
ERYTHROCYTE [DISTWIDTH] IN BLOOD BY AUTOMATED COUNT: 12.8 % (ref 10–15)
GFR SERPL CREATININE-BSD FRML MDRD: 83 ML/MIN/1.73M2
GLUCOSE BLD-MCNC: 121 MG/DL (ref 70–99)
HCT VFR BLD AUTO: 36.7 % (ref 35–47)
HGB BLD-MCNC: 12.4 G/DL (ref 11.7–15.7)
IMM GRANULOCYTES # BLD: 0.1 10E3/UL
IMM GRANULOCYTES NFR BLD: 0 %
LYMPHOCYTES # BLD AUTO: 4 10E3/UL (ref 0.8–5.3)
LYMPHOCYTES NFR BLD AUTO: 31 %
MAGNESIUM SERPL-MCNC: 2.6 MG/DL (ref 1.6–2.3)
MCH RBC QN AUTO: 34.5 PG (ref 26.5–33)
MCHC RBC AUTO-ENTMCNC: 33.8 G/DL (ref 31.5–36.5)
MCV RBC AUTO: 102 FL (ref 78–100)
MONOCYTES # BLD AUTO: 0.8 10E3/UL (ref 0–1.3)
MONOCYTES NFR BLD AUTO: 6 %
NEUTROPHILS # BLD AUTO: 7.9 10E3/UL (ref 1.6–8.3)
NEUTROPHILS NFR BLD AUTO: 62 %
NRBC # BLD AUTO: 0 10E3/UL
NRBC BLD AUTO-RTO: 0 /100
PLATELET # BLD AUTO: 222 10E3/UL (ref 150–450)
POTASSIUM BLD-SCNC: 4.7 MMOL/L (ref 3.4–5.3)
PROT SERPL-MCNC: 7.3 G/DL (ref 6.8–8.8)
RBC # BLD AUTO: 3.59 10E6/UL (ref 3.8–5.2)
SODIUM SERPL-SCNC: 133 MMOL/L (ref 133–144)
WBC # BLD AUTO: 12.8 10E3/UL (ref 4–11)

## 2022-01-30 PROCEDURE — 36415 COLL VENOUS BLD VENIPUNCTURE: CPT | Performed by: FAMILY MEDICINE

## 2022-01-30 PROCEDURE — 83036 HEMOGLOBIN GLYCOSYLATED A1C: CPT | Performed by: INTERNAL MEDICINE

## 2022-01-30 PROCEDURE — 96360 HYDRATION IV INFUSION INIT: CPT | Performed by: FAMILY MEDICINE

## 2022-01-30 PROCEDURE — 258N000003 HC RX IP 258 OP 636: Performed by: FAMILY MEDICINE

## 2022-01-30 PROCEDURE — 80053 COMPREHEN METABOLIC PANEL: CPT | Performed by: FAMILY MEDICINE

## 2022-01-30 PROCEDURE — 87636 SARSCOV2 & INF A&B AMP PRB: CPT | Performed by: FAMILY MEDICINE

## 2022-01-30 PROCEDURE — 99285 EMERGENCY DEPT VISIT HI MDM: CPT | Performed by: FAMILY MEDICINE

## 2022-01-30 PROCEDURE — 99285 EMERGENCY DEPT VISIT HI MDM: CPT | Mod: 25 | Performed by: FAMILY MEDICINE

## 2022-01-30 PROCEDURE — C9803 HOPD COVID-19 SPEC COLLECT: HCPCS | Performed by: FAMILY MEDICINE

## 2022-01-30 PROCEDURE — 84443 ASSAY THYROID STIM HORMONE: CPT | Performed by: FAMILY MEDICINE

## 2022-01-30 PROCEDURE — 82040 ASSAY OF SERUM ALBUMIN: CPT | Performed by: FAMILY MEDICINE

## 2022-01-30 PROCEDURE — 83735 ASSAY OF MAGNESIUM: CPT | Performed by: FAMILY MEDICINE

## 2022-01-30 PROCEDURE — 85025 COMPLETE CBC W/AUTO DIFF WBC: CPT | Performed by: FAMILY MEDICINE

## 2022-01-30 RX ORDER — SODIUM CHLORIDE, SODIUM LACTATE, POTASSIUM CHLORIDE, CALCIUM CHLORIDE 600; 310; 30; 20 MG/100ML; MG/100ML; MG/100ML; MG/100ML
125 INJECTION, SOLUTION INTRAVENOUS CONTINUOUS
Status: DISCONTINUED | OUTPATIENT
Start: 2022-01-30 | End: 2022-01-31 | Stop reason: CLARIF

## 2022-01-30 RX ADMIN — SODIUM CHLORIDE, POTASSIUM CHLORIDE, SODIUM LACTATE AND CALCIUM CHLORIDE 1000 ML: 600; 310; 30; 20 INJECTION, SOLUTION INTRAVENOUS at 23:30

## 2022-01-30 ASSESSMENT — ENCOUNTER SYMPTOMS
DYSURIA: 0
SHORTNESS OF BREATH: 0
DIARRHEA: 1
FEVER: 0
CHILLS: 0
ABDOMINAL PAIN: 0
NAUSEA: 0
VOMITING: 0
WEAKNESS: 1
UNEXPECTED WEIGHT CHANGE: 1
APPETITE CHANGE: 1

## 2022-01-30 ASSESSMENT — MIFFLIN-ST. JEOR: SCORE: 999.35

## 2022-01-31 ENCOUNTER — APPOINTMENT (OUTPATIENT)
Dept: CT IMAGING | Facility: CLINIC | Age: 77
DRG: 689 | End: 2022-01-31
Attending: INTERNAL MEDICINE
Payer: MEDICARE

## 2022-01-31 ENCOUNTER — APPOINTMENT (OUTPATIENT)
Dept: PHYSICAL THERAPY | Facility: CLINIC | Age: 77
DRG: 689 | End: 2022-01-31
Attending: INTERNAL MEDICINE
Payer: MEDICARE

## 2022-01-31 ENCOUNTER — APPOINTMENT (OUTPATIENT)
Dept: OCCUPATIONAL THERAPY | Facility: CLINIC | Age: 77
DRG: 689 | End: 2022-01-31
Payer: MEDICARE

## 2022-01-31 PROBLEM — R29.6 FALLS FREQUENTLY: Status: ACTIVE | Noted: 2022-01-31

## 2022-01-31 PROBLEM — K52.9 COLITIS: Status: ACTIVE | Noted: 2022-01-31

## 2022-01-31 PROBLEM — R53.1 GENERALIZED WEAKNESS: Status: ACTIVE | Noted: 2022-01-31

## 2022-01-31 PROBLEM — R62.7 FAILURE TO THRIVE IN ADULT: Status: ACTIVE | Noted: 2022-01-31

## 2022-01-31 PROBLEM — R19.7 DIARRHEA, UNSPECIFIED TYPE: Status: ACTIVE | Noted: 2022-01-31

## 2022-01-31 PROBLEM — N30.00 ACUTE CYSTITIS WITHOUT HEMATURIA: Status: ACTIVE | Noted: 2022-01-31

## 2022-01-31 PROBLEM — M35.3 POLYMYALGIA RHEUMATICA (H): Status: ACTIVE | Noted: 2020-05-28

## 2022-01-31 PROBLEM — E86.0 DEHYDRATION: Status: ACTIVE | Noted: 2022-01-31

## 2022-01-31 LAB
ALBUMIN UR-MCNC: 30 MG/DL
AMORPH CRY #/AREA URNS HPF: ABNORMAL /HPF
ANION GAP SERPL CALCULATED.3IONS-SCNC: 6 MMOL/L (ref 3–14)
APPEARANCE UR: ABNORMAL
BACTERIA #/AREA URNS HPF: ABNORMAL /HPF
BILIRUB UR QL STRIP: NEGATIVE
BUN SERPL-MCNC: 35 MG/DL (ref 7–30)
C COLI+JEJUNI+LARI FUSA STL QL NAA+PROBE: NOT DETECTED
CALCIUM SERPL-MCNC: 8.4 MG/DL (ref 8.5–10.1)
CHLORIDE BLD-SCNC: 103 MMOL/L (ref 94–109)
CO2 SERPL-SCNC: 28 MMOL/L (ref 20–32)
COLOR UR AUTO: YELLOW
CREAT SERPL-MCNC: 0.61 MG/DL (ref 0.52–1.04)
CRP SERPL-MCNC: 109 MG/L (ref 0–8)
EC STX1 GENE STL QL NAA+PROBE: NOT DETECTED
EC STX2 GENE STL QL NAA+PROBE: NOT DETECTED
ERYTHROCYTE [DISTWIDTH] IN BLOOD BY AUTOMATED COUNT: 12.9 % (ref 10–15)
FLUAV RNA SPEC QL NAA+PROBE: NEGATIVE
FLUBV RNA RESP QL NAA+PROBE: NEGATIVE
GFR SERPL CREATININE-BSD FRML MDRD: >90 ML/MIN/1.73M2
GLUCOSE BLD-MCNC: 95 MG/DL (ref 70–99)
GLUCOSE BLDC GLUCOMTR-MCNC: 100 MG/DL (ref 70–99)
GLUCOSE BLDC GLUCOMTR-MCNC: 102 MG/DL (ref 70–99)
GLUCOSE BLDC GLUCOMTR-MCNC: 127 MG/DL (ref 70–99)
GLUCOSE BLDC GLUCOMTR-MCNC: 142 MG/DL (ref 70–99)
GLUCOSE BLDC GLUCOMTR-MCNC: 97 MG/DL (ref 70–99)
GLUCOSE UR STRIP-MCNC: NEGATIVE MG/DL
HBA1C MFR BLD: 5.7 % (ref 0–5.6)
HCT VFR BLD AUTO: 32.3 % (ref 35–47)
HGB BLD-MCNC: 10.9 G/DL (ref 11.7–15.7)
HGB UR QL STRIP: ABNORMAL
KETONES UR STRIP-MCNC: NEGATIVE MG/DL
LEUKOCYTE ESTERASE UR QL STRIP: ABNORMAL
MCH RBC QN AUTO: 34.2 PG (ref 26.5–33)
MCHC RBC AUTO-ENTMCNC: 33.7 G/DL (ref 31.5–36.5)
MCV RBC AUTO: 101 FL (ref 78–100)
NITRATE UR QL: NEGATIVE
NOROV GI+II ORF1-ORF2 JNC STL QL NAA+PR: NOT DETECTED
PH UR STRIP: 6 [PH] (ref 5–7)
PLATELET # BLD AUTO: 179 10E3/UL (ref 150–450)
POTASSIUM BLD-SCNC: 4.6 MMOL/L (ref 3.4–5.3)
RBC # BLD AUTO: 3.19 10E6/UL (ref 3.8–5.2)
RBC URINE: 5 /HPF
RVA NSP5 STL QL NAA+PROBE: NOT DETECTED
SALMONELLA SP RPOD STL QL NAA+PROBE: NOT DETECTED
SARS-COV-2 RNA RESP QL NAA+PROBE: NEGATIVE
SHIGELLA SP+EIEC IPAH STL QL NAA+PROBE: NOT DETECTED
SODIUM SERPL-SCNC: 137 MMOL/L (ref 133–144)
SP GR UR STRIP: 1.01 (ref 1–1.03)
SQUAMOUS EPITHELIAL: <1 /HPF
TSH SERPL DL<=0.005 MIU/L-ACNC: 0.97 MU/L (ref 0.4–4)
UROBILINOGEN UR STRIP-MCNC: NORMAL MG/DL
V CHOL+PARA RFBL+TRKH+TNAA STL QL NAA+PR: NOT DETECTED
WBC # BLD AUTO: 11 10E3/UL (ref 4–11)
WBC URINE: >182 /HPF
Y ENTERO RECN STL QL NAA+PROBE: NOT DETECTED

## 2022-01-31 PROCEDURE — 87506 IADNA-DNA/RNA PROBE TQ 6-11: CPT | Performed by: NURSE PRACTITIONER

## 2022-01-31 PROCEDURE — 97165 OT EVAL LOW COMPLEX 30 MIN: CPT | Mod: GO | Performed by: OCCUPATIONAL THERAPIST

## 2022-01-31 PROCEDURE — 85027 COMPLETE CBC AUTOMATED: CPT | Performed by: INTERNAL MEDICINE

## 2022-01-31 PROCEDURE — 99207 PR APP CREDIT; MD BILLING SHARED VISIT: CPT | Performed by: NURSE PRACTITIONER

## 2022-01-31 PROCEDURE — G0378 HOSPITAL OBSERVATION PER HR: HCPCS

## 2022-01-31 PROCEDURE — 87086 URINE CULTURE/COLONY COUNT: CPT | Performed by: FAMILY MEDICINE

## 2022-01-31 PROCEDURE — 250N000011 HC RX IP 250 OP 636: Performed by: FAMILY MEDICINE

## 2022-01-31 PROCEDURE — 86140 C-REACTIVE PROTEIN: CPT | Performed by: NURSE PRACTITIONER

## 2022-01-31 PROCEDURE — 82962 GLUCOSE BLOOD TEST: CPT

## 2022-01-31 PROCEDURE — 80048 BASIC METABOLIC PNL TOTAL CA: CPT | Performed by: INTERNAL MEDICINE

## 2022-01-31 PROCEDURE — 97535 SELF CARE MNGMENT TRAINING: CPT | Mod: GO | Performed by: OCCUPATIONAL THERAPIST

## 2022-01-31 PROCEDURE — 36416 COLLJ CAPILLARY BLOOD SPEC: CPT | Performed by: INTERNAL MEDICINE

## 2022-01-31 PROCEDURE — 250N000009 HC RX 250: Performed by: FAMILY MEDICINE

## 2022-01-31 PROCEDURE — 258N000003 HC RX IP 258 OP 636: Performed by: INTERNAL MEDICINE

## 2022-01-31 PROCEDURE — 74177 CT ABD & PELVIS W/CONTRAST: CPT

## 2022-01-31 PROCEDURE — 97530 THERAPEUTIC ACTIVITIES: CPT | Mod: GP | Performed by: PHYSICAL THERAPIST

## 2022-01-31 PROCEDURE — 120N000001 HC R&B MED SURG/OB

## 2022-01-31 PROCEDURE — 258N000003 HC RX IP 258 OP 636: Performed by: FAMILY MEDICINE

## 2022-01-31 PROCEDURE — 81001 URINALYSIS AUTO W/SCOPE: CPT | Performed by: FAMILY MEDICINE

## 2022-01-31 PROCEDURE — 99222 1ST HOSP IP/OBS MODERATE 55: CPT | Mod: AI | Performed by: INTERNAL MEDICINE

## 2022-01-31 PROCEDURE — 99207 PR NOT IN PERSON INPATIENT CONSULT STATISTICAL MARKER: CPT | Performed by: INTERNAL MEDICINE

## 2022-01-31 PROCEDURE — 96361 HYDRATE IV INFUSION ADD-ON: CPT | Performed by: FAMILY MEDICINE

## 2022-01-31 PROCEDURE — 250N000013 HC RX MED GY IP 250 OP 250 PS 637: Performed by: INTERNAL MEDICINE

## 2022-01-31 PROCEDURE — 97161 PT EVAL LOW COMPLEX 20 MIN: CPT | Mod: GP | Performed by: PHYSICAL THERAPIST

## 2022-01-31 PROCEDURE — 250N000011 HC RX IP 250 OP 636: Performed by: INTERNAL MEDICINE

## 2022-01-31 PROCEDURE — 99207 PR CDG-CHARGE REQUIRED MANUAL ENTRY: CPT | Mod: 59 | Performed by: INTERNAL MEDICINE

## 2022-01-31 RX ORDER — ASPIRIN 81 MG/1
81 TABLET, CHEWABLE ORAL DAILY
Status: DISCONTINUED | OUTPATIENT
Start: 2022-01-31 | End: 2022-02-04 | Stop reason: HOSPADM

## 2022-01-31 RX ORDER — ONDANSETRON 2 MG/ML
4 INJECTION INTRAMUSCULAR; INTRAVENOUS EVERY 6 HOURS PRN
Status: DISCONTINUED | OUTPATIENT
Start: 2022-01-31 | End: 2022-02-04 | Stop reason: HOSPADM

## 2022-01-31 RX ORDER — SIMVASTATIN 5 MG
5 TABLET ORAL AT BEDTIME
Status: DISCONTINUED | OUTPATIENT
Start: 2022-01-31 | End: 2022-02-04 | Stop reason: HOSPADM

## 2022-01-31 RX ORDER — SODIUM CHLORIDE 9 MG/ML
INJECTION, SOLUTION INTRAVENOUS CONTINUOUS
Status: ACTIVE | OUTPATIENT
Start: 2022-01-31 | End: 2022-02-01

## 2022-01-31 RX ORDER — ACETAMINOPHEN 325 MG/1
650 TABLET ORAL EVERY 4 HOURS PRN
Status: DISCONTINUED | OUTPATIENT
Start: 2022-01-31 | End: 2022-02-04 | Stop reason: HOSPADM

## 2022-01-31 RX ORDER — DEXTROSE MONOHYDRATE 25 G/50ML
25-50 INJECTION, SOLUTION INTRAVENOUS
Status: DISCONTINUED | OUTPATIENT
Start: 2022-01-31 | End: 2022-02-04 | Stop reason: HOSPADM

## 2022-01-31 RX ORDER — IOPAMIDOL 755 MG/ML
500 INJECTION, SOLUTION INTRAVASCULAR ONCE
Status: COMPLETED | OUTPATIENT
Start: 2022-01-31 | End: 2022-01-31

## 2022-01-31 RX ORDER — ONDANSETRON 4 MG/1
4 TABLET, ORALLY DISINTEGRATING ORAL EVERY 6 HOURS PRN
Status: DISCONTINUED | OUTPATIENT
Start: 2022-01-31 | End: 2022-02-04 | Stop reason: HOSPADM

## 2022-01-31 RX ORDER — NICOTINE POLACRILEX 4 MG
15-30 LOZENGE BUCCAL
Status: DISCONTINUED | OUTPATIENT
Start: 2022-01-31 | End: 2022-02-04 | Stop reason: HOSPADM

## 2022-01-31 RX ORDER — CEFTRIAXONE 1 G/1
1 INJECTION, POWDER, FOR SOLUTION INTRAMUSCULAR; INTRAVENOUS EVERY 24 HOURS
Status: DISCONTINUED | OUTPATIENT
Start: 2022-01-31 | End: 2022-02-02

## 2022-01-31 RX ADMIN — SODIUM CHLORIDE: 9 INJECTION, SOLUTION INTRAVENOUS at 19:00

## 2022-01-31 RX ADMIN — SODIUM CHLORIDE 60 ML: 9 INJECTION, SOLUTION INTRAVENOUS at 09:15

## 2022-01-31 RX ADMIN — CEFTRIAXONE SODIUM 1 G: 1 INJECTION, POWDER, FOR SOLUTION INTRAMUSCULAR; INTRAVENOUS at 04:48

## 2022-01-31 RX ADMIN — ASPIRIN 81 MG CHEWABLE TABLET 81 MG: 81 TABLET CHEWABLE at 08:20

## 2022-01-31 RX ADMIN — IOPAMIDOL 60 ML: 755 INJECTION, SOLUTION INTRAVENOUS at 09:15

## 2022-01-31 RX ADMIN — SODIUM CHLORIDE: 9 INJECTION, SOLUTION INTRAVENOUS at 03:37

## 2022-01-31 RX ADMIN — SODIUM CHLORIDE, POTASSIUM CHLORIDE, SODIUM LACTATE AND CALCIUM CHLORIDE 125 ML/HR: 600; 310; 30; 20 INJECTION, SOLUTION INTRAVENOUS at 00:51

## 2022-01-31 RX ADMIN — SIMVASTATIN 5 MG: 5 TABLET, FILM COATED ORAL at 20:34

## 2022-01-31 ASSESSMENT — ACTIVITIES OF DAILY LIVING (ADL)
FALL_HISTORY_WITHIN_LAST_SIX_MONTHS: YES
TOILETING_ISSUES: YES
ADLS_ACUITY_SCORE: 6
ADLS_ACUITY_SCORE: 16
ADLS_ACUITY_SCORE: 16
DIFFICULTY_COMMUNICATING: NO
WEAR_GLASSES_OR_BLIND: YES
ADLS_ACUITY_SCORE: 9
ADLS_ACUITY_SCORE: 16
WALKING_OR_CLIMBING_STAIRS: AMBULATION DIFFICULTY, REQUIRES EQUIPMENT;AMBULATION DIFFICULTY, DEPENDENT
ADLS_ACUITY_SCORE: 16
DOING_ERRANDS_INDEPENDENTLY_DIFFICULTY: NO
ADLS_ACUITY_SCORE: 6
ADLS_ACUITY_SCORE: 16
DRESSING/BATHING_DIFFICULTY: NO
WHICH_OF_THE_ABOVE_FUNCTIONAL_RISKS_HAD_A_RECENT_ONSET_OR_CHANGE?: AMBULATION;TRANSFERRING;TOILETING;FALL HISTORY
ADLS_ACUITY_SCORE: 16
WALKING_OR_CLIMBING_STAIRS_DIFFICULTY: NO
ADLS_ACUITY_SCORE: 16
NUMBER_OF_TIMES_PATIENT_HAS_FALLEN_WITHIN_LAST_SIX_MONTHS: 4
PREVIOUS_RESPONSIBILITIES: MEAL PREP;HOUSEKEEPING;LAUNDRY;MEDICATION MANAGEMENT;FINANCES
DIFFICULTY_EATING/SWALLOWING: NO
CONCENTRATING,_REMEMBERING_OR_MAKING_DECISIONS_DIFFICULTY: NO
EQUIPMENT_CURRENTLY_USED_AT_HOME: WALKER, STANDARD
ADLS_ACUITY_SCORE: 16
VISION_MANAGEMENT: GLASSES
DEPENDENT_IADLS:: CLEANING;SHOPPING
ADLS_ACUITY_SCORE: 16

## 2022-01-31 ASSESSMENT — MIFFLIN-ST. JEOR: SCORE: 1002.98

## 2022-01-31 NOTE — PROGRESS NOTES
S-(situation): Patient changed to inpatient status    B-(background): Patient status change due to observation goals not being met.    A-(assessment): see next note    R-(recommendations): Will monitor patient per MD orders.      Inpatient nursing criteria listed below were met:    Adult Profile completedYes  Health care directives status obtained and documented: Yes  VTE prophylaxis orders: SCDs  (FYI: Asprin is not an approved anticoagulation for DVT prophylaxis)  SCD's Documented: Yes  Vaccine assessment done and vaccine ordered if needed. Yes  My Chart patient sign up addressed and documented: Yes  Care Plan initiated: Yes  Discharge planning review completed (admission navigator) Yes

## 2022-01-31 NOTE — ED NOTES
ED Nursing criteria listed below was addressed during verbal handoff:     Abnormal vitals: No  Abnormal results: No  Med Reconciliation completed: No  Meds given in ED: Yes - LR. 1L given  Any Overdue Meds: No  Core Measures: N/A  Isolation: No  Special needs: Yes - falls risk, Ax1 -2  Skin assessment: Yes - redness coccyx/sacrum, redness R hip, healing sore left buttock.    Observation Patient  Education provided: N/A

## 2022-01-31 NOTE — H&P
History and Physical     FACILITY: [facility]      Name: Tracy Mcclelland   Age: 76 year old    : 1945                                                                       MRN: [id number]                                                                                          PCP: No Ref-Primary, Physician                                                                                              Chief Complaint   Patient presents with     Fall     Generalized Weakness          HPI:   Tracy Mcclelland is a 76 year old female with diabetes, presented to the ED for complaints of generalized weakness and multiple falls at home. Patient lives at home alone. Her sons do check on her occasionally. She reported diarrhea for the last 3-4 days. She denied abdominal pain. No fevers or chills. No nausea or emesis. She felt weak where she had difficulty walking in her home. She normally walks with a walker. She had a fall on  and was not able to get up from that fall, therefore she came to ED for evaluation.         Patient Active Problem List    Diagnosis Date Noted     Dehydration 2022     Priority: Medium     Generalized weakness 2022     Priority: Medium     Falls frequently 2022     Priority: Medium     Failure to thrive in adult 2022     Priority: Medium     Diarrhea, unspecified type 2022     Priority: Medium     Polymyalgia rheumatica (H) 2020     Priority: Medium     Status post total hip replacement, right 10/16/2018     Priority: Medium     Primary osteoarthritis of right hip 2018     Priority: Medium     CKD (chronic kidney disease) stage 2, GFR 60-89 ml/min 10/19/2015     Priority: Medium     Hereditary and idiopathic peripheral neuropathy 2015     Priority: Medium     Problem list name updated by automated process. Provider to review       Granuloma annulare 2014     Priority: Medium     Right dorsal hand        Hypertension, goal below 140/90 07/17/2012     Priority: Medium     Type 2 diabetes mellitus with diabetic chronic kidney disease (H) 11/02/2011     Priority: Medium     Tobacco use disorder 10/07/2010     Priority: Medium     Tennis Elbow-left 07/28/2010     Priority: Medium     Microalbuminuria 01/29/2010     Priority: Medium     Hyperlipidemia with target LDL less than 100 01/28/2010     Priority: Medium     Diagnosis updated by automated process. Provider to review and confirm.        Past Medical History:   Diagnosis Date     CKD (chronic kidney disease) stage 2, GFR 60-89 ml/min 10/19/2015     Headache(784.0)     hx.of Migraines     Lumbago      Type II or unspecified type diabetes mellitus without mention of complication, not stated as uncontrolled      Unspecified essential hypertension      No current outpatient medications on file.        Allergies   Allergen Reactions     Mold      sneezing, coughing , runny nose, watery eyes & red,     No Known Drug Allergies      Past Surgical History:   Procedure Laterality Date     ARTHROPLASTY HIP Right 10/16/2018    Procedure: right total hip arthroplasty;  Surgeon: Terrell Ervin DO;  Location: [facility] OR     DISCECTOMY, FUSION CERVICAL ANTERIOR ONE LEVEL, COMBINED N/A 6/19/2019    Procedure: cervical 5-6 anterior cervical discectomy fusion;  Surgeon: Joe Jefferson MD;  Location: [facility] OR     HC LAPAROSCOPY, SURGICAL; CHOLECYSTECTOMY  2000    Cholecystectomy, Laparoscopic     HC REMOVE TONSILS/ADENOIDS,<11 Y/O      T & A <11y.o.     INJECT EPIDURAL CERVICAL N/A 5/17/2019    Procedure: Epidural Steroid Injection Bilateral Cervical 6-7;  Surgeon: Carlos Avendaño MD;  Location: [facility] OR     INJECT EPIDURAL CERVICAL Bilateral 12/20/2019    Procedure: Cervical Epidural Injection 6-7 bilateral;  Surgeon: Carlos Avendaño MD;  Location: [facility] OR     ZZC LIGATE FALLOPIAN TUBE       Family History   Problem Relation Age of Onset     Heart Disease Mother         Pacemaker      Alzheimer Disease Father         Dementia/Loss of memory     Cerebrovascular Disease Maternal Grandmother      Heart Disease Maternal Grandfather         MI     Diabetes Paternal Grandmother      Social History     Socioeconomic History     Marital status:      Spouse name: Not on file     Number of children: 3     Years of education: 12     Highest education level: Not on file   Occupational History     Employer: [id number]   Tobacco Use     Smoking status: Current Every Day Smoker     Packs/day: 0.25     Years: 20.00     Pack years: 5.00     Types: Cigarettes     Smokeless tobacco: Never Used     Tobacco comment: 4-5 per day   Substance and Sexual Activity     Alcohol use: Not Currently     Drug use: No     Sexual activity: Not Currently   Other Topics Concern      Service No     Blood Transfusions No     Caffeine Concern No     Occupational Exposure No     Hobby Hazards No     Sleep Concern No     Stress Concern No     Weight Concern No     Special Diet No     Back Care No     Exercise No     Bike Helmet No     Comment: does not ride bike     Seat Belt Yes     Self-Exams Yes     Parent/sibling w/ CABG, MI or angioplasty before 65F 55M? No   Social History Narrative     Not on file     Social Determinants of Health     Financial Resource Strain: Not on file   Food Insecurity: Not on file   Transportation Needs: Not on file   Physical Activity: Not on file   Stress: Not on file   Social Connections: Not on file   Intimate Partner Violence: Not on file   Housing Stability: Not on file       Review of Systems  All ten systems were reviewed and negative except as detailed under HPI.      Vitals:     B/P: 95/49, T: 96.8, P: 84, R: 16       Physical Exam:  General appearance: AAOx3, NAD  HEENT: Normocephalic, atraumatic, PERRL, EOMI  Neck: Supple, no JVD, non-tender  Cardiac: RRR, normal S1 and S2, no peripheral edema  Respiratory/Chest: CTAB. No rhonchi, rales or wheezing. Equal  breath sounds bilaterally  GI/Abdomen: +BS in all 4 quadrants, soft, non-tender, non-distended, no guarding, no rebound  Back/Spine: Normal alignment, no tenderness  Musculoskeletal/Extremities: ROM intact, non-tender  Neuro: CN 2-12 intact, strength and sensation intact and equal  Psych: Good judgement  Skin: No breakdown, no ulcers. Warm, dry.     Lab Review:  Results for orders placed or performed during the hospital encounter of 01/30/22   Comprehensive metabolic panel     Status: Abnormal   Result Value Ref Range    Sodium 133 133 - 144 mmol/L    Potassium 4.7 3.4 - 5.3 mmol/L    Chloride 99 94 - 109 mmol/L    Carbon Dioxide (CO2) 27 20 - 32 mmol/L    Anion Gap 7 3 - 14 mmol/L    Urea Nitrogen 48 (H) 7 - 30 mg/dL    Creatinine 0.74 0.52 - 1.04 mg/dL    Calcium 9.4 8.5 - 10.1 mg/dL    Glucose 121 (H) 70 - 99 mg/dL    Alkaline Phosphatase 164 (H) 40 - 150 U/L    AST 40 0 - 45 U/L    ALT 38 0 - 50 U/L    Protein Total 7.3 6.8 - 8.8 g/dL    Albumin 3.6 3.4 - 5.0 g/dL    Bilirubin Total 0.5 0.2 - 1.3 mg/dL    GFR Estimate 83 >60 mL/min/1.73m2   Magnesium     Status: Abnormal   Result Value Ref Range    Magnesium 2.6 (H) 1.6 - 2.3 mg/dL   TSH with free T4 reflex     Status: Normal   Result Value Ref Range    TSH 0.97 0.40 - 4.00 mU/L   UA with Microscopic reflex to Culture     Status: Abnormal    Specimen: Urine, NOS   Result Value Ref Range    Color Urine Yellow Colorless, Straw, Light Yellow, Yellow    Appearance Urine Cloudy (A) Clear    Glucose Urine Negative Negative mg/dL    Bilirubin Urine Negative Negative    Ketones Urine Negative Negative mg/dL    Specific Gravity Urine 1.009 1.003 - 1.035    Blood Urine Moderate (A) Negative    pH Urine 6.0 5.0 - 7.0    Protein Albumin Urine 30  (A) Negative mg/dL    Urobilinogen Urine Normal Normal, 2.0 mg/dL    Nitrite Urine Negative Negative    Leukocyte Esterase Urine Large (A) Negative    Bacteria Urine Few (A) None Seen /HPF    Amorphous Crystals Urine Few (A) None  Seen /HPF    RBC Urine 5 (H) <=2 /HPF    WBC Urine >182 (H) <=5 /HPF    Squamous Epithelials Urine <1 <=1 /HPF    Narrative    Urine Culture ordered based on laboratory criteria   Symptomatic; Yes; 1/27/2022 Influenza A/B & SARS-CoV2 (COVID-19) Virus PCR Multiplex Nose     Status: Normal    Specimen: Nose; Swab   Result Value Ref Range    Influenza A PCR Negative Negative    Influenza B PCR Negative Negative    SARS CoV2 PCR Negative Negative    Narrative    Testing was performed using the brian SARS-CoV-2 & Influenza A/B Assay on the brian [employer] System. This test should be ordered for the detection of SARS-CoV-2 and influenza viruses in individuals who meet clinical and/or epidemiological criteria. Test performance is unknown in asymptomatic patients. This test is for in vitro diagnostic use under the FDA EUA for laboratories certified under CLIA to perform moderate and/or high complexity testing. This test has not been FDA cleared or approved. A negative result does not rule out the presence of PCR inhibitors in the specimen or target RNA in concentration below the limit of detection for the assay. If only one viral target is positive but coinfection with multiple targets is suspected, the sample should be re-tested with another FDA cleared, approved or authorized test, if coinfection would change clinical management. [facility] are certified under the Clinical Laboratory Improvement Amendments of 1988 (CLIA-88) as  qualified to perform moderate and/or high complexity laboratory testing.   CBC with platelets and differential     Status: Abnormal   Result Value Ref Range    WBC Count 12.8 (H) 4.0 - 11.0 10e3/uL    RBC Count 3.59 (L) 3.80 - 5.20 10e6/uL    Hemoglobin 12.4 11.7 - 15.7 g/dL    Hematocrit 36.7 35.0 - 47.0 %     (H) 78 - 100 fL    MCH 34.5 (H) 26.5 - 33.0 pg    MCHC 33.8 31.5 - 36.5 g/dL    RDW 12.8 10.0 - 15.0 %    Platelet Count 222 150 - 450 10e3/uL    % Neutrophils 62 %     % Lymphocytes 31 %    % Monocytes 6 %    % Eosinophils 1 %    % Basophils 0 %    % Immature Granulocytes 0 %    NRBCs per 100 WBC 0 <1 /100    Absolute Neutrophils 7.9 1.6 - 8.3 10e3/uL    Absolute Lymphocytes 4.0 0.8 - 5.3 10e3/uL    Absolute Monocytes 0.8 0.0 - 1.3 10e3/uL    Absolute Eosinophils 0.1 0.0 - 0.7 10e3/uL    Absolute Basophils 0.0 0.0 - 0.2 10e3/uL    Absolute Immature Granulocytes 0.1 <=0.4 10e3/uL    Absolute NRBCs 0.0 10e3/uL   Glucose by meter     Status: Abnormal   Result Value Ref Range    GLUCOSE BY METER POCT 102 (H) 70 - 99 mg/dL   CBC with platelets differential     Status: Abnormal    Narrative    The following orders were created for panel order CBC with platelets differential.  Procedure                               Abnormality         Status                     ---------                               -----------         ------                     CBC with platelets and d...[[id number]]  Abnormal            Final result                 Please view results for these tests on the individual orders.         ASSESSMENT:  Principal Problem:    Falls frequently  Active Problems:    Hyperlipidemia with target LDL less than 100    Type 2 diabetes mellitus with diabetic chronic kidney disease (H)    CKD (chronic kidney disease) stage 2, GFR 60-89 ml/min    Polymyalgia rheumatica (H)    Dehydration    Generalized weakness    Failure to thrive in adult    Diarrhea, unspecified type    Acute cystitis without hematuria       PLAN:   1) Multiple falls  PT/OT evaluation, case management consult for discharge planning  Fall precautions    2) UTI  On Rocephin  Follow up urine culture    3) Diarrhea  Likely gastroenteritis  CT abd/pelvis pending  Supportive care    4) Dehydration  IVFs with NS    5) Diabetes mellitus type 2  On insulin sliding scale  Monitor ACHS       Medication Reconciliation Complete: Yes  DVT prophylaxis: SCD  Dispostition: Med/surg 267  Code Status: Full    Level of Care:  Observation      I performed this consultation using real-time telehealth tools, including a live video connection between my location and the patient's location. My location is a different campus than the patient's campus. This consultation happened during the COVID health crisis.  Video start time: 01/31/2022 03:31    Video end time: 01/31/2022 03:37    As the provider for this telehealth service, I attest that I introduced myself to the patient, provided my credentials, disclosed my location, and determined that, based on a review of the patients chart and/or a discussion with members of the patient's treatment team, telemedicine via a real-time, two-way, interactive audio and video platform is an appropriate and effective means of providing this service. The patient and I mutually agree this visit is appropriate for telemedicine as well.      Sundar Guerrero,   January 31, 2022

## 2022-01-31 NOTE — ED PROVIDER NOTES
History     Chief Complaint   Patient presents with     Fall     Generalized Weakness     HPI  Tracy Mcclelland is a 76 year old female who presents to the ED this evening by private car.  Family brought her in because of increasing weakness and multiple falls.  She is in relative denial and admits to falling earlier this morning when she got out of bed and slipped.  She was unable to get back up and her son Oliver had to come over and help her.  Nursing staff talked to her family sounds like she been falling multiple times and just overall becoming more weak and failing at home.  She of course denies all of this.  She does admit to having diarrhea for the past 3 or 4 days maybe once or twice a day but denies any blood.  Family states that it appeared there was maybe some mucus and possibly some slight blood in the stool but she denies this.  She denies any fevers chills or sweats no nausea or vomiting.  She does admit to some weight loss she figures maybe about 15 pounds although she is not sure over what amount of time that has been.  States her appetite is not real good she eats when she feels like it which is not real often but she does take Ensure and states she drinks adequate water.  Denies any dysuria.  No chest pain or shortness of breath.  She states that she gets around home using her walker but it sounds like she did not even have enough strength to support weight on her legs this evening.  Took several family members to get her into the car.    She lives alone in her own home. Family members are close by and are continually over there when she falls or needs help.    ==========  Nurse note, hx from family ==========================     Brought in by family with concerns for multiple falls in the past couple weeks, has had diarrhea for last few days (with some being mucous and possible blood in stool). Did have fall that couldn't get up today and needed assistance.  Generalized weakness and fatigue, per  family not eating much and dehydration due to malnourishment.  Family has concerns patient is not safe going home and would like  consult.       Pt denies pain currently, states she is just tired  ===============================================      Allergies:  Allergies   Allergen Reactions     Mold      sneezing, coughing , runny nose, watery eyes & red,     No Known Drug Allergies        Problem List:    Patient Active Problem List    Diagnosis Date Noted     Dehydration 01/31/2022     Priority: Medium     Generalized weakness 01/31/2022     Priority: Medium     Falls frequently 01/31/2022     Priority: Medium     Failure to thrive in adult 01/31/2022     Priority: Medium     Diarrhea, unspecified type 01/31/2022     Priority: Medium     Polymyalgia rheumatica (H) 05/28/2020     Priority: Medium     Status post total hip replacement, right 10/16/2018     Priority: Medium     Primary osteoarthritis of right hip 07/02/2018     Priority: Medium     CKD (chronic kidney disease) stage 2, GFR 60-89 ml/min 10/19/2015     Priority: Medium     Hereditary and idiopathic peripheral neuropathy 07/02/2015     Priority: Medium     Problem list name updated by automated process. Provider to review       Granuloma annulare 08/07/2014     Priority: Medium     Right dorsal hand       Hypertension, goal below 140/90 07/17/2012     Priority: Medium     Type 2 diabetes mellitus with diabetic chronic kidney disease (H) 11/02/2011     Priority: Medium     Tobacco use disorder 10/07/2010     Priority: Medium     Tennis Elbow-left 07/28/2010     Priority: Medium     Microalbuminuria 01/29/2010     Priority: Medium     Hyperlipidemia with target LDL less than 100 01/28/2010     Priority: Medium     Diagnosis updated by automated process. Provider to review and confirm.          Past Medical History:    Past Medical History:   Diagnosis Date     CKD (chronic kidney disease) stage 2, GFR 60-89 ml/min 10/19/2015      Headache(784.0)      Lumbago      Type II or unspecified type diabetes mellitus without mention of complication, not stated as uncontrolled      Unspecified essential hypertension        Past Surgical History:    Past Surgical History:   Procedure Laterality Date     ARTHROPLASTY HIP Right 10/16/2018    Procedure: right total hip arthroplasty;  Surgeon: Terrell Ervin DO;  Location: [facility]     DISCECTOMY, FUSION CERVICAL ANTERIOR ONE LEVEL, COMBINED N/A 6/19/2019    Procedure: cervical 5-6 anterior cervical discectomy fusion;  Surgeon: Joe Jefferson MD;  Location: [facility] OR     HC LAPAROSCOPY, SURGICAL; CHOLECYSTECTOMY  2000    Cholecystectomy, Laparoscopic     HC REMOVE TONSILS/ADENOIDS,<11 Y/O      T & A <11y.o.     INJECT EPIDURAL CERVICAL N/A 5/17/2019    Procedure: Epidural Steroid Injection Bilateral Cervical 6-7;  Surgeon: Carlos Avendaño MD;  Location: [facility] OR     INJECT EPIDURAL CERVICAL Bilateral 12/20/2019    Procedure: Cervical Epidural Injection 6-7 bilateral;  Surgeon: Carlos Avendaño MD;  Location: [facility] OR     ZZC LIGATE FALLOPIAN TUBE         Family History:    Family History   Problem Relation Age of Onset     Heart Disease Mother         Pacemaker     Alzheimer Disease Father         Dementia/Loss of memory     Cerebrovascular Disease Maternal Grandmother      Heart Disease Maternal Grandfather         MI     Diabetes Paternal Grandmother        Social History:  Marital Status:   [4]  Social History     Tobacco Use     Smoking status: Current Every Day Smoker     Packs/day: 0.25     Years: 20.00     Pack years: 5.00     Types: Cigarettes     Smokeless tobacco: Never Used     Tobacco comment: 4-5 per day   Substance Use Topics     Alcohol use: Not Currently     Drug use: No        Medications:    ACCU-CHEK GUIDE test strip  acetaminophen (TYLENOL) 500 MG tablet  aspirin (ASA) 81 MG tablet  gabapentin (NEURONTIN) 600 MG tablet  metFORMIN (GLUCOPHAGE) 500 MG tablet  simvastatin (ZOCOR)  "5 MG tablet          Review of Systems   Constitutional: Positive for appetite change and unexpected weight change. Negative for chills and fever.   Respiratory: Negative for shortness of breath.    Cardiovascular: Negative for chest pain.   Gastrointestinal: Positive for diarrhea. Negative for abdominal pain, nausea and vomiting.   Genitourinary: Negative for dysuria.   Skin: Negative for rash.   Neurological: Positive for weakness ( generalized).   All other systems reviewed and are negative.      Physical Exam   BP: 112/83  Pulse: 68  Temp: 97.8  F (36.6  C)  Resp: 15  Height: 165.1 cm (5' 5\")  Weight: 51.3 kg (113 lb 3.2 oz)  SpO2: 98 %      Physical Exam  Constitutional:       General: She is not in acute distress.     Comments: Elderly thin lady sitting comfortably in the ED bed in no acute distress.  She needs help to sit forward in bed.   HENT:      Mouth/Throat:      Mouth: Mucous membranes are moist.      Pharynx: Oropharynx is clear.   Eyes:      Extraocular Movements: Extraocular movements intact.   Cardiovascular:      Rate and Rhythm: Normal rate and regular rhythm.   Pulmonary:      Effort: Pulmonary effort is normal.      Breath sounds: Normal breath sounds.   Abdominal:      Palpations: Abdomen is soft.      Tenderness: There is abdominal tenderness ( mild diffuse). There is no guarding or rebound.   Musculoskeletal:      Right lower leg: Edema (trace-1+ right ankle/foot) present.      Left lower leg: Edema (trace) present.   Skin:     General: Skin is warm and dry.      Comments: Pressure sore with some scabbing left buttock   Neurological:      General: No focal deficit present.      Mental Status: She is alert and oriented to person, place, and time.   Psychiatric:         Mood and Affect: Mood normal.         ED Course                 Procedures              Critical Care time:  none               Results for orders placed or performed during the hospital encounter of 01/30/22 (from the past 24 " hour(s))   CBC with platelets differential    Narrative    The following orders were created for panel order CBC with platelets differential.  Procedure                               Abnormality         Status                     ---------                               -----------         ------                     CBC with platelets and d...[[id number]]  Abnormal            Final result                 Please view results for these tests on the individual orders.   Comprehensive metabolic panel   Result Value Ref Range    Sodium 133 133 - 144 mmol/L    Potassium 4.7 3.4 - 5.3 mmol/L    Chloride 99 94 - 109 mmol/L    Carbon Dioxide (CO2) 27 20 - 32 mmol/L    Anion Gap 7 3 - 14 mmol/L    Urea Nitrogen 48 (H) 7 - 30 mg/dL    Creatinine 0.74 0.52 - 1.04 mg/dL    Calcium 9.4 8.5 - 10.1 mg/dL    Glucose 121 (H) 70 - 99 mg/dL    Alkaline Phosphatase 164 (H) 40 - 150 U/L    AST 40 0 - 45 U/L    ALT 38 0 - 50 U/L    Protein Total 7.3 6.8 - 8.8 g/dL    Albumin 3.6 3.4 - 5.0 g/dL    Bilirubin Total 0.5 0.2 - 1.3 mg/dL    GFR Estimate 83 >60 mL/min/1.73m2   Magnesium   Result Value Ref Range    Magnesium 2.6 (H) 1.6 - 2.3 mg/dL   TSH with free T4 reflex   Result Value Ref Range    TSH 0.97 0.40 - 4.00 mU/L   CBC with platelets and differential   Result Value Ref Range    WBC Count 12.8 (H) 4.0 - 11.0 10e3/uL    RBC Count 3.59 (L) 3.80 - 5.20 10e6/uL    Hemoglobin 12.4 11.7 - 15.7 g/dL    Hematocrit 36.7 35.0 - 47.0 %     (H) 78 - 100 fL    MCH 34.5 (H) 26.5 - 33.0 pg    MCHC 33.8 31.5 - 36.5 g/dL    RDW 12.8 10.0 - 15.0 %    Platelet Count 222 150 - 450 10e3/uL    % Neutrophils 62 %    % Lymphocytes 31 %    % Monocytes 6 %    % Eosinophils 1 %    % Basophils 0 %    % Immature Granulocytes 0 %    NRBCs per 100 WBC 0 <1 /100    Absolute Neutrophils 7.9 1.6 - 8.3 10e3/uL    Absolute Lymphocytes 4.0 0.8 - 5.3 10e3/uL    Absolute Monocytes 0.8 0.0 - 1.3 10e3/uL    Absolute Eosinophils 0.1 0.0 - 0.7 10e3/uL    Absolute  Basophils 0.0 0.0 - 0.2 10e3/uL    Absolute Immature Granulocytes 0.1 <=0.4 10e3/uL    Absolute NRBCs 0.0 10e3/uL   Symptomatic; Yes; 1/27/2022 Influenza A/B & SARS-CoV2 (COVID-19) Virus PCR Multiplex Nose    Specimen: Nose; Swab   Result Value Ref Range    Influenza A PCR Negative Negative    Influenza B PCR Negative Negative    SARS CoV2 PCR Negative Negative    Narrative    Testing was performed using the brian SARS-CoV-2 & Influenza A/B Assay on the brian Nany System. This test should be ordered for the detection of SARS-CoV-2 and influenza viruses in individuals who meet clinical and/or epidemiological criteria. Test performance is unknown in asymptomatic patients. This test is for in vitro diagnostic use under the FDA EUA for laboratories certified under CLIA to perform moderate and/or high complexity testing. This test has not been FDA cleared or approved. A negative result does not rule out the presence of PCR inhibitors in the specimen or target RNA in concentration below the limit of detection for the assay. If only one viral target is positive but coinfection with multiple targets is suspected, the sample should be re-tested with another FDA cleared, approved or authorized test, if coinfection would change clinical management. [facility] are certified under the Clinical Laboratory Improvement Amendments of 1988 (CLIA-88) as  qualified to perform moderate and/or high complexity laboratory testing.       Medications   lactated ringers BOLUS 1,000 mL (1,000 mLs Intravenous New Bag 1/30/22 2330)     Followed by   lactated ringers infusion (has no administration in time range)       Assessments & Plan (with Medical Decision Making)  76-year-old with diarrhea for the past 3 to 4 days and increasing weakness is in denial about her failing health.  Has fallen multiple times at home according to family.  She only admits to falling once earlier this morning.  She states she slipped and slid to  the ground and was unable to get back up because of weakness.  IV placed.  1 L normal saline.  She declined nausea medication.  Labs sent.  She will likely need to stay in the hospital for additional hydration and social service consult.  White count up slightly at 12.8 with a normal differential.  Labs are for the most part reassuring.  BUN up slightly at 48.  Creatinine still normal at 0.74 with a GFR of 83.  Covid and influenza were negative.  She is worn out and tired.  Not safe to go home.  She cannot support her own weight on her legs and is unable to care for herself.  We will keep her in the hospital, continue on IV fluids and ask social service to see her in the morning as well.  Family was updated by nursing staff.  I spoke with Dr. Guerrero, hospitalist on call. He has assumed her care will write orders.       I have reviewed the nursing notes.    I have reviewed the findings, diagnosis, plan and need for follow up with the patient.       ED to Inpatient Handoff:    Discussed with Dr. Guerrero at 1:49 AM   Patient accepted for Inpatient Stay  Pending studies include none  Code Status: Not Addressed           New Prescriptions    No medications on file       Final diagnoses:   Diarrhea, unspecified type   Dehydration   Generalized weakness   Failure to thrive in adult   Falls frequently       1/30/2022   [facility] EMERGENCY DEPT     Darvin Benitez MD  01/31/22 0152       Darvin Benitez MD  01/31/22 0156

## 2022-01-31 NOTE — CONSULTS
Care Management Initial Consult    General Information  Assessment completed with: Patient,Children, Patient and daughter, Dania   Type of CM/SW Visit: Initial Assessment    Primary Care Provider verified and updated as needed: Yes   Readmission within the last 30 days:        Reason for Consult: discharge planning  Advance Care Planning: Advance Care Planning Reviewed: other (comment) (No scanned document )          Communication Assessment  Patient's communication style: spoken language (English or Bilingual)    Hearing Difficulty or Deaf: no   Wear Glasses or Blind: yes    Cognitive  Cognitive/Neuro/Behavioral: WDL                      Living Environment:   People in home: alone     Current living Arrangements: mobile home      Able to return to prior arrangements: yes       Family/Social Support:  Care provided by: self  Provides care for: no one  Marital Status:   Children          Description of Support System: Supportive,Involved    Support Assessment: Adequate family and caregiver support,Adequate social supports    Current Resources:   Patient receiving home care services:       Community Resources:    Equipment currently used at home: grab bar, tub/shower,grab bar, toilet,walker, rolling,raised toilet seat,shower chair (2WW)  Supplies currently used at home:      Employment/Financial:  Employment Status: retired        Financial Concerns: No concerns identified           Lifestyle & Psychosocial Needs:  Social Determinants of Health     Tobacco Use: High Risk     Smoking Tobacco Use: Current Every Day Smoker     Smokeless Tobacco Use: Never Used   Alcohol Use: Not on file   Financial Resource Strain: Not on file   Food Insecurity: Not on file   Transportation Needs: Not on file   Physical Activity: Not on file   Stress: Not on file   Social Connections: Not on file   Intimate Partner Violence: Not on file   Depression: Not at risk     PHQ-2 Score: 0   Housing Stability: Not on file        Functional Status:  Prior to admission patient needed assistance:   Dependent ADLs:: Ambulation-walker  Dependent IADLs:: Cleaning,Shopping  Assesssment of Functional Status: Not at baseline with mobility    Mental Health Status:  Mental Health Status: No Current Concerns       Chemical Dependency Status:  Chemical Dependency Status: No Current Concerns             Values/Beliefs:  Spiritual, Cultural Beliefs, [employer]an Practices, Values that affect care:            Values/Beliefs Comment: Unknown     Additional Information:  Care Management has been consulted for discharge planning.  Writer has visited with patient and her daughter, Dania. Discussed the current OT hospital services recommendation is for TCU placement after hospital discharge. Patient and daughter are in agreement.  Patient was provided with Medicare certified nursing home list.     Pts choices are as follows:     [facility]  (Phone: [phone] Fax: [phone])  1/31- Writer has sent referral and left a message with Samantha in admissions.     [facility] (Phone: [phone] Fax: [phone])  1/31- Writer has sent referral and have spoken with Jones in admissions.  He plans to review the assessment as [facility] may have some openings come up this week.      [facility] (Phone: [phone] Fax: [phone])  1/31- Writer has sent referral and left a voicemail for admissions.  Last week, [facility] was not [facility] any [facility] due COVID in their facility.      Patient and daughter in agreement with referrals being sent to these facilities.  Discussed the Medicare.gov web site for nursing home compare and star ratings.  Daughter Dania lives in [place], so she wants to look into options in that area and possibly the [place] area and let writer know what other facilities they have interest in.      Discussed transportation options of family versus agency wheelchair van transport and private pay for this.  Will  continue to assess.      Care Management will continue to follow, assess, and assist with discharge planning.  Anticipate patient may be ready for discharge on Wednesday, 2/2/22.        DEEPTHI Gleason  [facility]   [phone]

## 2022-01-31 NOTE — PROGRESS NOTES
PRIMARY DIAGNOSIS: Falls, Weakness  OUTPATIENT/OBSERVATION GOALS TO BE MET BEFORE DISCHARGE:  1. Pain Status: Pain free.    2. Return to near baseline physical activity: No    3. Cleared for discharge by consultants (if involved): No    Discharge Planner Nurse   Safe discharge environment identified: No  Barriers to discharge: Yes       Entered by: Martin Correa 01/31/2022 3:28 AM     Please review provider order for any additional goals.   Nurse to notify provider when observation goals have been met and patient is ready for discharge.    Admitted for Weakness/Falls/Dehydration from diarhea.  Pt reluctant to accept cares/help from family or hospital staff.  Plan is to get social work involved, CT scan of the ABD, PT/OT, and possible placement

## 2022-01-31 NOTE — PLAN OF CARE
A/Ox4. Alittle more confused this evening. VSS. Heart rate regular, lung sounds clear, bowel sounds active. Frequent incontinent stool, incontinent of bladder. Purewick removed d/t stools. Very poor appetite. Daughter visited today. Needs encouragement to use call light when needs something. Bed/chair alarm in place. Will continue to monitor.

## 2022-01-31 NOTE — PROGRESS NOTES
"CLINICAL NUTRITION SERVICES - ASSESSMENT NOTE     Nutrition Prescription    RECOMMENDATIONS FOR MDs/PROVIDERS TO ORDER:  None at this time     Malnutrition Status:    Severe malnutrition in the context of chronic illness     Recommendations already ordered by Registered Dietitian (RD):  Ensure Enlive - Vanilla BID   Vital Cuisine     Future/Additional Recommendations:  Monitor PO intake, weight trends, labs      REASON FOR ASSESSMENT  Tracy Mcclelland is a/an 76 year old female assessed by the dietitian for Admission Nutrition Risk Screen for positive decreased appetite and unintended weight loss of 2-13 lbs     NUTRITION HISTORY  Per Chart Review:   PMH: CKD stage 2, HTN, T2D, hyperlipidemia  -Presented to ED on 1/30 with concerns for multiple falls, has had diarrhea for last few days  -Per H&P: Pt reports weight loss of about 15 pounds although she is not sure over what amount of time that has been.  States appetite is not real good but she does take Ensure and states she drinks adequate water  -Per patient, notes poor appetite which has been ongoing for several months. Pt and daughter confirms ongoing weight loss over the last 6+ months. Patient denies acute changes to eating/appetite.     CURRENT NUTRITION ORDERS  Diet: Moderate Consistent Carb (60 g CHO per Meal) Diet    Intake/Tolerance: 50% of breakfast and 25% of lunch documented today. Per patient, does not care for meals served.     LABS  Labs reviewed    MEDICATIONS  Medications reviewed;    mL/hr (3000 mL/day)    ANTHROPOMETRICS  Height: 165.1 cm (5' 5\")  Most Recent Weight: 51.7 kg (114 lb)    IBW: 56.8 kg  BMI: Normal BMI (BMI 18.97)  Weight History:   -12.4% weight loss in 7 months (severe)  Wt Readings from Last 10 Encounters:   01/31/22 51.7 kg (114 lb)   11/03/21 52.5 kg (115 lb 12.8 oz)   10/19/21 52.7 kg (116 lb 3.2 oz)   06/11/21 59 kg (130 lb 1 oz)   09/25/20 67.2 kg (148 lb 4 oz)   05/04/20 71.7 kg (158 lb)   01/13/20 72.3 kg (159 lb 6.4 " oz)   12/16/19 71.4 kg (157 lb 8 oz)   11/25/19 70.9 kg (156 lb 6.4 oz)   11/20/19 71.7 kg (158 lb)       Dosing Weight: 52 kg (actual)    ASSESSED NUTRITION NEEDS  Estimated Energy Needs: 5412-0071+ kcals/day (25 - 30+ kcals/kg)  Justification: Maintenance  Estimated Protein Needs: 62-78 grams protein/day (1.2 - 1.5 grams of pro/kg)  Justification: Maintenance and Repletion  Estimated Fluid Needs: 1 mL/kcal   Justification: Per provider pending fluid status    PHYSICAL FINDINGS  Visual assessment only. Patient resting under blankets. See malnutrition section below.    MALNUTRITION  % Intake: < 75% for >/= 3 months (moderate)  % Weight Loss: > 10% in 6 months (severe)  Subcutaneous Fat Loss: Facial region:  mild  Muscle Loss: Temporal:  moderate and Facial & jaw region:  moderate  Fluid Accumulation/Edema: None noted  Malnutrition Diagnosis: Severe malnutrition in the context of chronic illness     NUTRITION DIAGNOSIS  Inadequate oral intake related to chronic illness as evidenced by patient recall and 12.4% weight loss in 7 months.     INTERVENTIONS  Implementation  Nutrition Education: Introduced self and role of RD in patient care. Encouraged adequate oral intake as tolerated. Patient takes Ensure at home and willing to get them while here.    Collaboration with other providers - IDT rounds   Medical food supplement therapy - Ensure Enlive BID, Vital Cuisine     Goals  Patient to consume % of nutritionally adequate meal trays TID, or the equivalent with supplements/snacks.     Monitoring/Evaluation  Progress toward goals will be monitored and evaluated per protocol.    Sp Ford RDN, AGUSTIN  Clinical Dietitian   Office: [phone]  Weekend Pager: [phone]

## 2022-01-31 NOTE — PROGRESS NOTES
01/31/22 1420   Quick Adds   Type of Visit Initial PT Evaluation       Present no   Living Environment   People in home alone   Current Living Arrangements mobile home   Home Accessibility stairs to enter home   Number of Stairs, Main Entrance 4   Stair Railings, Main Entrance railings on both sides of stairs   Transportation Anticipated family or friend will provide   Living Environment Comments flat bed. step over tub/shower   Self-Care   Usual Activity Tolerance moderate   Current Activity Tolerance fair   Regular Exercise No   Equipment Currently Used at Home grab bar, tub/shower;grab bar, toilet;walker, rolling;raised toilet seat;shower chair  (2WW)   Activity/Exercise/Self-Care Comment does not use the walker outside of the home, holds onto another person's arm for support.    Disability/Function   Hearing Difficulty or Deaf no   Wear Glasses or Blind yes   Vision Management glasses   Concentrating, Remembering or Making Decisions Difficulty no   Difficulty Communicating no   Difficulty Eating/Swallowing no   Walking or Climbing Stairs Difficulty yes   Walking or Climbing Stairs ambulation difficulty, requires equipment   Mobility Management walker   Dressing/Bathing Difficulty yes   Dressing/Bathing bathing difficulty, requires equipment   Toileting issues yes   Toileting Management incontinent    Doing Errands Independently Difficulty (such as shopping) yes   Errands Management family assists   Fall history within last six months yes   Number of times patient has fallen within last six months 4  (family reports many more)   General Information   Onset of Illness/Injury or Date of Surgery 01/30/22   Referring Physician Dr. Guerrero   Patient/Family Therapy Goals Statement (PT) I don't know yet. I would like to go back home, but is willing to go to rehab.    Pertinent History of Current Problem (include personal factors and/or comorbidities that impact the POC) Patient is a 76 year old  female, admitted due to weakness and increased falls, found to be dehydrated, colitis, UTI, failure to thrive, generalized weakness and frequent falls. Patient with a previous medical history of CKD, R EAMON, idopathic peripheral neuropathy, DM 2 with CKD, HTN, smoker and hyperlipidemia.    Existing Precautions/Restrictions fall   Weight-Bearing Status - LUE full weight-bearing   Weight-Bearing Status - RUE full weight-bearing   Weight-Bearing Status - LLE full weight-bearing   Weight-Bearing Status - RLE full weight-bearing   General Observations PT eval and treat for discharge planning. Activity orders: ambulate with assistance   Cognition   Orientation Status (Cognition) oriented to;person;time;situation   Affect/Mental Status (Cognition) WFL   Follows Commands (Cognition) WFL   Pain Assessment   Patient Currently in Pain No   Integumentary/Edema   Integumentary/Edema Comments thin skin throughout. right wrist IV   Posture    Posture Forward head position;Protracted shoulders;Kyphosis   Range of Motion (ROM)   ROM Comment limited bilat UE overhead    Strength   Strength Comments bilat hip flexion 3+/5, left hip extension 3+/5, right hip extension 3/5, hip abduction 3/5 and hip adduction 4-/5. poor effort throughout   Bed Mobility   Bed Mobility scooting/bridging;rolling left;supine-sit   Rolling Left [place] (Bed Mobility) independent   Scooting/Bridging [place] (Bed Mobility) independent  (however minimal lift off)   Supine-Sit [place] (Bed Mobility) moderate assist (50% patient effort)   Bed Mobility Limitations decreased ability to use arms for pushing/pulling;decreased ability to use legs for bridging/pushing   Impairments Contributing to Impaired Bed Mobility decreased strength   Transfers   Transfers sit-stand transfer;toilet transfer   Transfer Safety Concerns Noted decreased step length   Sit-Stand Transfer   Sit-Stand [place] (Transfers) supervision;contact guard   Assistive Device  (Sit-Stand Transfers) walker, front-wheeled   Toilet Transfer   Type (Toilet Transfer) stand-sit;sit-stand   [place] Level (Toilet Transfer) minimum assist (75% patient effort)   Assistive Device (Toilet Transfer) walker, front-wheeled;grab bars/safety frame   Toilet Transfer Skill Comments significant effort to rise from the surface. observed significant self care deficits throughout this task which patient would benefit from OT to address.   Gait/Stairs (Locomotion)   [place] Level (Gait) supervision;contact guard   Assistive Device (Gait) walker, front-wheeled   Distance in Feet (Required for LE Total Joints) 30'   Pattern (Gait) step-through   Deviations/Abnormal Patterns (Gait) base of support, narrow;monse decreased;festinating/shuffling   Comment (Gait/Stairs) patient poorly navigates environmental obstacles, running the walker into objects on all sides of the walker   Balance   Balance Comments requires UE support throughout mobilization due to bilat LE hip and knee flexion  and weakness   Sensory Examination   Sensory Perception patient reports no sensory changes   Coordination   Coordination Comments slow sequencing   Muscle Tone   Muscle Tone no deficits were identified   Clinical Impression   Criteria for Skilled Therapeutic Intervention yes, treatment indicated   PT Diagnosis (PT) muscle weakness, impaired mobility, impaired balance   Influenced by the following impairments acute medical status and chronic decline   Functional limitations due to impairments use of walker, supervision for safety, physical assistance for bed mobility and transfers, poor insight to safety needs   Clinical Presentation Stable/Uncomplicated   Clinical Presentation Rationale medical status, clinical judgement, mobility tolerance   Clinical Decision Making (Complexity) low complexity   Therapy Frequency (PT) Daily   Predicted Duration of Therapy Intervention (days/wks) 5 days   Planned Therapy Interventions (PT)  balance training;bed mobility training;gait training;ROM (range of motion);strengthening;transfer training   Anticipated Equipment Needs at Discharge (PT)   (walker from home)   Risk & Benefits of therapy have been explained evaluation/treatment results reviewed;participants included;patient;daughter   Clinical Impression Comments Patient presents with impaired functional mobility due to weakness and acute medical status. Her daughter reports much of patient's self reported moblity and fall history is not correct and she has significant concerns for patient's safety. Patient is at a high risk of falling and demonstrates significant muscle weakness. She would benefit from TCU placement to address these deficits and progress her towards a safer level of functional mobility prior to returning home.   PT Discharge Planning    PT Discharge Recommendation (DC Rec) Transitional Care Facility  (family transport)   PT Rationale for DC Rec From a mobile home alone, walker at baseline, 4 stairs with railings to enter. Patient requires physical assistance for bed mobility, safety with use of the walker, toilet transfers and VC for safety with use of the walker. Patient is at a high risk of falling and demonstrates significant muscle weakness. She would benefit from TCU placement to address these deficits and progress her towards a safer level of functional mobility prior to returning home.   PT Brief overview of current status  MOD assist sidelying to sitting EOB. CGA sit to stand from EOB and MIN assist from toilet with walker. Ambulated 3', 10' 15' and 5' with walker with CGA and VC for safety   Total Evaluation Time   Total Evaluation Time (Minutes) 15     Thank you for your referral.  Eliza Cuevas, PT, DPT, ATC, LAT    [facility]  O: [phone]  E: [email or web address]

## 2022-01-31 NOTE — PROGRESS NOTES
[facility]    Medicine Progress Note - Hospitalist Service    Date of Admission:  1/30/2022    Assessment & Plan          Tracy Mcclelland is a 76 year old female with diabetes who presented to the ED 1/30/22 for complaints of generalized weakness and multiple falls at home. She had a fall on Sunday and was not able to get up from that fall, therefore she came to ED for evaluation. Patient also reported a one week history of frequent diarrhea. In the ED, patient was afebrile and hemodynamically stable. Workup showed likely UTI. CT of abdomen and pelvis completed 1/31 showed contiguous segment of descending and sigmoid colon edematous bowel wall thickening consistent with colitis. Given UTI and new diagnosis of colitis, will admit to inpatient status. She will continue IV Rocephin for UTI and will defer additional antibiotics for now pending results of enteric bacteria and viral panel. Given patient's comorbid chronic medical problems, she is at risk for an adverse outcome including worsening weakness and neurologic status, inadequate oral intake, worsening functional status, and even death, so hospitalization is considered medically necessary.  Based on the presently available information, hospitalization for at least 2 midnights is anticipated.      Principal Problem:      Falls frequently  Assessment: Patient presented with multiple falls over the one week prior to admission. Denies any preceding dizziness. She states she does not lose consciousness but rather slips and then has been too weak to get up on her own. Suspect secondary to UTI and possibly dehydration secondary to colitis.   Plan:   - Admit to inpatient status given UTI and colitis  - Continue IV Rocephin for UTI  - PT evaluation    Active Problems:      Diarrhea, unspecified type    Colitis  Assessment: Patient with a one week history of diarrhea. She notes 2-3 episodes of diarrhea per day. Denies any abdominal pain.  Possible blood present in stool. Abdominal CT done 1/31 findings consistent with colitis. Patient denies any abdominal pain. No nausea or vomiting. Appetite remains decreased  Plan:   - Continue IV fluids at 75 mL/hr for now  - Enteric bacteria and viral panel  - Continue enteric precautions   - Continue to monitor       Acute cystitis without hematuria  Assessment: UA done upon admission suspicious for UTI. Patient denies any urinary symptoms   Plan:   - Continue Rocephin for likely UTI  - Await urine culture results       Type 2 diabetes mellitus with diabetic chronic kidney disease (H)  Assessment: Manages with metformin 500 mg BID. Hemoglobin A1c of 5.7 at time of admission.   Plan:   - Monitor blood sugars QID AC and HS  - Continue PTA dose of metformin  - Hold PTA dose of metformin due to poor oral intake  - Cover with low sliding scale insulin   - Hypoglycemia protocol ordered        CKD (chronic kidney disease) stage 2, GFR 60-89 ml/min  Assessment: Chronic and stable. Creatinine 0.61 today  Plan:   - Continue to monitor       Hyperlipidemia with target LDL less than 100  Assessment: Manages with Zocor daily  Plan:   - Continue while in hospital      Diet: Moderate Consistent Carb (60 g CHO per Meal) Diet    DVT Prophylaxis: Pneumatic Compression Devices  Shaver Catheter: Not present  Central Lines: None  Cardiac Monitoring: None  Code Status: Full Code      Disposition Plan   Expected Discharge: 02/03/2022   Anticipated discharge location:  Awaiting care coordination huddle     The patient's care was discussed with the Attending Physician, Dr. Gómez, Patient and Patient's Family.    Braeden Short NP  Hospitalist Service  [facility]  Securely message with the Vocera Web Console (learn more here)  Text page via [employer] Paging/Directory

## 2022-01-31 NOTE — ED TRIAGE NOTES
Brought in by family with concerns for multiple falls in the past couple weeks, has had diarrhea for last few days (with some being mucous and possible blood in stool). Did have fall that couldn't get up today and needed assistance.  Generalized weakness and fatigue, per family not eating much and dehydration due to malnourishment.  Family has concerns patient is not safe going home and would like  consult.      Pt denies pain currently, states she is just tired

## 2022-01-31 NOTE — PROGRESS NOTES
01/31/22 1217   Quick Adds   Type of Visit Initial Occupational Therapy Evaluation   Living Environment   People in home alone   Current Living Arrangements mobile home   Home Accessibility stairs to enter home   Number of Stairs, Main Entrance 4   Transportation Anticipated family or friend will provide   Living Environment Comments Patient lives alone in mobile home, 4 stairs to enter with handrail. Has grab bars in bathroom, shower chair and uses fww within home. Reports children live nearby and assist as needed with shopping, household tasks etc.    Self-Care   Usual Activity Tolerance moderate   Current Activity Tolerance fair   Regular Exercise No   Equipment Currently Used at Home walker, standard;raised toilet seat;grab bar, tub/shower;grab bar, toilet;shower chair   Instrumental Activities of Daily Living (IADL)   Previous Responsibilities meal prep;housekeeping;laundry;medication management;finances   IADL Comments Children complete shopping and heavier household tasks, has service for lawn care and snow removal. Patient reports she still drives although her son has her car so currently not driving due to having no vehicle.    Disability/Function   Hearing Difficulty or Deaf no   Wear Glasses or Blind yes   Vision Management glasses   Concentrating, Remembering or Making Decisions Difficulty no   Difficulty Communicating no   Difficulty Eating/Swallowing no   Walking or Climbing Stairs Difficulty yes   Walking or Climbing Stairs ambulation difficulty, requires equipment   Dressing/Bathing Difficulty yes   Dressing/Bathing bathing difficulty, requires equipment   Toileting issues yes   Toileting Assistance toileting difficulty, requires equipment   Doing Errands Independently Difficulty (such as shopping) yes   Errands Management family completes   Fall history within last six months yes   Number of times patient has fallen within last six months 4   Change in Functional Status Since Onset of Current  Illness/Injury yes   General Information   Onset of Illness/Injury or Date of Surgery 01/30/22   Referring Physician Sundar Guerrero DO   Patient/Family Therapy Goal Statement (OT) patient wants to discharge home   Additional Occupational Profile Info/Pertinent History of Current Problem 76-year-old female with diabetes was admitted early this morning with generalized weakness and multiple falls.  No significant injury was noted on work-up.  She has evidence of UTI and was started on IV ceftriaxone although has no evidence of sepsis or instability.  Plan is for PT/OT evaluation and social work consultation for disposition purposes   Existing Precautions/Restrictions fall   Left Upper Extremity (Weight-bearing Status) full weight-bearing (FWB)   Right Upper Extremity (Weight-bearing Status) full weight-bearing (FWB)   Left Lower Extremity (Weight-bearing Status) full weight-bearing (FWB)   Right Lower Extremity (Weight-bearing Status) full weight-bearing (FWB)   General Observations and Info Activity: ambulate with assist   Cognitive Status Examination   Orientation Status orientation to person, place and time   Affect/Mental Status (Cognitive) WFL   Follows Commands WFL   Cognitive Status Comments Intact to conversation, question cognition with patient incontinent in bed and states she is not incontinent at home, also per chart review patient has had difficulty caring for self and eating/drinking as she should; will continue to monitor and screen cognition    Visual Perception   Visual Impairment/Limitations WFL;corrective lenses full-time   Impact of Vision Impairment on Function (Vision) denies changes; reports had 2 cataracts removed and new glasses recently   Sensory   Sensory Quick Adds No deficits were identified   Posture   Posture forward head position;protracted shoulders   Range of Motion Comprehensive   General Range of Motion bilateral upper extremity ROM WFL   Strength Comprehensive (MMT)   Comment,  General Manual Muscle Testing (MMT) Assessment Grossly deconditioned   Bed Mobility   Bed Mobility supine-sit   Supine-Sit [place] (Bed Mobility) moderate assist (50% patient effort)   Transfers   Transfers sit-stand transfer;toilet transfer   Sit-Stand Transfer   Sit-Stand [place] (Transfers) contact guard   Toilet Transfer   Type (Toilet Transfer) sit-stand   [place] Level (Toilet Transfer) minimum assist (75% patient effort)   Activities of Daily Living   BADL Assessment toileting;lower body dressing   Lower Body Dressing Assessment   [place] Level (Lower Body Dressing) moderate assist (50% patient effort)   Toileting   [place] Level (Toileting) maximum assist (25% patient effort)   Clinical Impression   Criteria for Skilled Therapeutic Interventions Met (OT) yes;meets criteria;skilled treatment is necessary   OT Diagnosis Decreased IND with ADLs/IADLs   OT Problem List-Impairments impacting ADL problems related to;activity tolerance impaired;balance;cognition;mobility;strength   Assessment of Occupational Performance 3-5 Performance Deficits   Identified Performance Deficits dressing, bathing, toileting, home mgmt, household mobility   Planned Therapy Interventions (OT) ADL retraining;IADL retraining;transfer training;home program guidelines;progressive activity/exercise;risk factor education   Clinical Decision Making Complexity (OT) low complexity   Therapy Frequency (OT) 5x/week   Predicted Duration of Therapy 1 week   Anticipated Equipment Needs Upon Discharge (OT)   (TBD)   Risk & Benefits of therapy have been explained evaluation/treatment results reviewed;care plan/treatment goals reviewed;patient   Comment-Clinical Impression Patient presenting to OT with gross deconditioning, weakness, and fatigue impacting ability to complete ADLs/IADLs. Patient will benefit from skilled OT to facilitate ADLs and functional mobility to increase strength and provide training in compensatory  strategies and AE to increase safety within the home.    OT Discharge Planning    OT Discharge Recommendation (DC Rec) Transitional Care Facility;home with home care occupational therapy;Home with assist  (24/7 assist at this time)   OT Rationale for DC Rec Patient lives alone at baseline, has been having frequent falls and difficulty caring for self in home environment. Needing assist for functional mobility and ADLs during OT evaluation. Rec TCU to increase strength for improved ADL performance prior to home. If patient refuses TCU rec 24/7 for safety given high falls risk and likely hospital readmission due to inabiliy to care for self.    OT Brief overview of current status  Mod A bed mobility, CGA - min A for functional transfers and fww, max A LB dressing and toileting as pt incontinent of stool during OT session   Total Evaluation Time (Minutes)   Total Evaluation Time (Minutes) 10       Thank you for the referral,    MARY Warner, OTR/L  [facility] - Occupational Therapy    Phone: [phone]  Email: [email or web address]

## 2022-01-31 NOTE — UTILIZATION REVIEW
"Admission Status; Secondary Review Determination     Admission Date: 1/30/2022 10:45 PM      Under the authority of the Utilization Management Committee, the utilization review process indicated a secondary review on the above patient.  The review outcome is based on review of the medical records, discussions with staff, and applying clinical experience noted on the date of the review.          (x) Observation Status Appropriate - This patient does not meet hospital inpatient criteria and is placed in observation status. If this patient's primary payer is Medicare and was admitted as an inpatient, Condition Code 44 should be used and patient status changed to \"observation\".     RATIONALE FOR DETERMINATION   76-year-old female with diabetes was admitted early this morning with generalized weakness and multiple falls.  No significant injury was noted on work-up.  She has evidence of UTI and was started on IV ceftriaxone although has no evidence of sepsis or instability.  Plan is for PT/OT evaluation and social work consultation for disposition purposes.  At the time of this review the patient does not meet medical necessity for inpatient hospitalization and observation status is recommended.    The severity of illness, intensity of service provided, expected LOS and risk for adverse outcome make the care appropriate for further observation; however, doesn't meet criteria for hospital inpatient admission.  [employer] was paged and notified of this determination.        The information on this document is developed by the utilization review team in order for the business office to ensure compliance.  This only denotes the appropriateness of proper admission status and does not reflect the quality of care rendered.         The definitions of Inpatient Status and Observation Status used in making the determination above are those provided in the CMS Coverage Manual, Chapter 1 and Chapter 6, section 70.4.    "   Sincerely,     Romero Espinoza DO MPH   Physician Advisor  Utilization Review  [facility]

## 2022-02-01 ENCOUNTER — APPOINTMENT (OUTPATIENT)
Dept: PHYSICAL THERAPY | Facility: CLINIC | Age: 77
DRG: 689 | End: 2022-02-01
Payer: MEDICARE

## 2022-02-01 ENCOUNTER — APPOINTMENT (OUTPATIENT)
Dept: OCCUPATIONAL THERAPY | Facility: CLINIC | Age: 77
DRG: 689 | End: 2022-02-01
Payer: MEDICARE

## 2022-02-01 LAB
ANION GAP SERPL CALCULATED.3IONS-SCNC: 5 MMOL/L (ref 3–14)
BACTERIA UR CULT: ABNORMAL
BACTERIA UR CULT: ABNORMAL
BUN SERPL-MCNC: 23 MG/DL (ref 7–30)
CALCIUM SERPL-MCNC: 7.7 MG/DL (ref 8.5–10.1)
CHLORIDE BLD-SCNC: 107 MMOL/L (ref 94–109)
CO2 SERPL-SCNC: 27 MMOL/L (ref 20–32)
CREAT SERPL-MCNC: 0.59 MG/DL (ref 0.52–1.04)
ERYTHROCYTE [DISTWIDTH] IN BLOOD BY AUTOMATED COUNT: 12.9 % (ref 10–15)
GFR SERPL CREATININE-BSD FRML MDRD: >90 ML/MIN/1.73M2
GLUCOSE BLD-MCNC: 81 MG/DL (ref 70–99)
GLUCOSE BLDC GLUCOMTR-MCNC: 148 MG/DL (ref 70–99)
GLUCOSE BLDC GLUCOMTR-MCNC: 213 MG/DL (ref 70–99)
GLUCOSE BLDC GLUCOMTR-MCNC: 89 MG/DL (ref 70–99)
GLUCOSE BLDC GLUCOMTR-MCNC: 99 MG/DL (ref 70–99)
HCT VFR BLD AUTO: 29.2 % (ref 35–47)
HGB BLD-MCNC: 9.6 G/DL (ref 11.7–15.7)
MCH RBC QN AUTO: 34.3 PG (ref 26.5–33)
MCHC RBC AUTO-ENTMCNC: 32.9 G/DL (ref 31.5–36.5)
MCV RBC AUTO: 104 FL (ref 78–100)
PLATELET # BLD AUTO: 164 10E3/UL (ref 150–450)
POTASSIUM BLD-SCNC: 4 MMOL/L (ref 3.4–5.3)
RBC # BLD AUTO: 2.8 10E6/UL (ref 3.8–5.2)
SODIUM SERPL-SCNC: 139 MMOL/L (ref 133–144)
WBC # BLD AUTO: 8.5 10E3/UL (ref 4–11)

## 2022-02-01 PROCEDURE — 258N000003 HC RX IP 258 OP 636: Performed by: NURSE PRACTITIONER

## 2022-02-01 PROCEDURE — 80048 BASIC METABOLIC PNL TOTAL CA: CPT | Performed by: INTERNAL MEDICINE

## 2022-02-01 PROCEDURE — 250N000012 HC RX MED GY IP 250 OP 636 PS 637: Performed by: INTERNAL MEDICINE

## 2022-02-01 PROCEDURE — 85027 COMPLETE CBC AUTOMATED: CPT | Performed by: INTERNAL MEDICINE

## 2022-02-01 PROCEDURE — 250N000011 HC RX IP 250 OP 636: Performed by: INTERNAL MEDICINE

## 2022-02-01 PROCEDURE — 99232 SBSQ HOSP IP/OBS MODERATE 35: CPT | Performed by: NURSE PRACTITIONER

## 2022-02-01 PROCEDURE — 97129 THER IVNTJ 1ST 15 MIN: CPT | Mod: GO | Performed by: OCCUPATIONAL THERAPIST

## 2022-02-01 PROCEDURE — 120N000001 HC R&B MED SURG/OB

## 2022-02-01 PROCEDURE — 97110 THERAPEUTIC EXERCISES: CPT | Mod: GO | Performed by: OCCUPATIONAL THERAPIST

## 2022-02-01 PROCEDURE — 36415 COLL VENOUS BLD VENIPUNCTURE: CPT | Performed by: INTERNAL MEDICINE

## 2022-02-01 PROCEDURE — 250N000013 HC RX MED GY IP 250 OP 250 PS 637: Performed by: INTERNAL MEDICINE

## 2022-02-01 PROCEDURE — 258N000003 HC RX IP 258 OP 636: Performed by: HOSPITALIST

## 2022-02-01 PROCEDURE — 99207 PR CDG-MDM COMPONENT: MEETS MODERATE - DOWN CODED: CPT | Performed by: NURSE PRACTITIONER

## 2022-02-01 PROCEDURE — 97530 THERAPEUTIC ACTIVITIES: CPT | Mod: GP | Performed by: PHYSICAL THERAPIST

## 2022-02-01 RX ORDER — LOPERAMIDE HCL 2 MG
2 CAPSULE ORAL 4 TIMES DAILY PRN
Status: DISCONTINUED | OUTPATIENT
Start: 2022-02-01 | End: 2022-02-04 | Stop reason: HOSPADM

## 2022-02-01 RX ORDER — SODIUM CHLORIDE 9 MG/ML
INJECTION, SOLUTION INTRAVENOUS CONTINUOUS
Status: DISCONTINUED | OUTPATIENT
Start: 2022-02-01 | End: 2022-02-02

## 2022-02-01 RX ORDER — GABAPENTIN 600 MG/1
TABLET ORAL
Qty: 360 TABLET | Refills: 0 | Status: SHIPPED | OUTPATIENT
Start: 2022-02-01 | End: 2022-02-02

## 2022-02-01 RX ADMIN — SODIUM CHLORIDE 500 ML: 9 INJECTION, SOLUTION INTRAVENOUS at 04:46

## 2022-02-01 RX ADMIN — ASPIRIN 81 MG CHEWABLE TABLET 81 MG: 81 TABLET CHEWABLE at 08:16

## 2022-02-01 RX ADMIN — SIMVASTATIN 5 MG: 5 TABLET, FILM COATED ORAL at 20:18

## 2022-02-01 RX ADMIN — CEFTRIAXONE SODIUM 1 G: 1 INJECTION, POWDER, FOR SOLUTION INTRAMUSCULAR; INTRAVENOUS at 04:46

## 2022-02-01 RX ADMIN — INSULIN ASPART 1 UNITS: 100 INJECTION, SOLUTION INTRAVENOUS; SUBCUTANEOUS at 13:35

## 2022-02-01 RX ADMIN — SODIUM CHLORIDE: 9 INJECTION, SOLUTION INTRAVENOUS at 13:36

## 2022-02-01 ASSESSMENT — ACTIVITIES OF DAILY LIVING (ADL)
ADLS_ACUITY_SCORE: 21
ADLS_ACUITY_SCORE: 23
ADLS_ACUITY_SCORE: 16
ADLS_ACUITY_SCORE: 23
ADLS_ACUITY_SCORE: 23
ADLS_ACUITY_SCORE: 21
ADLS_ACUITY_SCORE: 23
ADLS_ACUITY_SCORE: 23
ADLS_ACUITY_SCORE: 21
ADLS_ACUITY_SCORE: 23
ADLS_ACUITY_SCORE: 16
ADLS_ACUITY_SCORE: 23
ADLS_ACUITY_SCORE: 21
ADLS_ACUITY_SCORE: 23
ADLS_ACUITY_SCORE: 21
ADLS_ACUITY_SCORE: 23
ADLS_ACUITY_SCORE: 21
ADLS_ACUITY_SCORE: 23
ADLS_ACUITY_SCORE: 23

## 2022-02-01 NOTE — UTILIZATION REVIEW
"  Admission Status; Secondary Review Determination         Under the authority of the Utilization Management Committee, the utilization review process indicated a secondary review on the above patient.  The review outcome is based on review of the medical records, discussions with staff, and applying clinical experience noted on the date of the review.        (X)      Inpatient Status Appropriate - This patient's medical care is consistent with medical management for inpatient care and reasonable inpatient medical practice.      () Observation Status Appropriate - This patient does not meet hospital inpatient criteria and is placed in observation status. If this patient's primary payer is Medicare and was admitted as an inpatient, Condition Code 44 should be used and patient status changed to \"observation\".   () Admission Status NOT Appropriate - This patient's medical care is not consistent with medical management for Inpatient or Observation Status.          RATIONALE FOR DETERMINATION     76 year old female with diabetes who presented to the ED 1/30/22 for complaints of generalized weakness and multiple falls at home. She had a fall on Sunday and was not able to get up from that fall.  Patient also reported a one week history of frequent diarrhea. In the ED, patient was afebrile and hemodynamically stable. Workup showed likely UTI. CT of abdomen and pelvis completed 1/31 showed contiguous segment of descending and sigmoid colon edematous bowel wall thickening consistent with colitis.  She is on IV Rocephin.  Enteric panels are pending.  She is receiving IV fluids.  She is appropriate for inpatient status.    The severity of illness, intensity of service provided, expected LOS and risk for adverse outcome make the care complex, high risk and appropriate for hospital admission.        The information on this document is developed by the utilization review team in order for the business office to ensure compliance.  " This only denotes the appropriateness of proper admission status and does not reflect the quality of care rendered.         The definitions of Inpatient Status and Observation Status used in making the determination above are those provided in the CMS Coverage Manual, Chapter 1 and Chapter 6, section 70.4.      Sincerely,     Glenroy Elkins MD  Physician Advisor  Utilization Review/ Case Management  [facility].

## 2022-02-01 NOTE — PROGRESS NOTES
Care Management Follow Up    Length of Stay (days): 1    Expected Discharge Date: 02/03/2022     Concerns to be Addressed: discharge planning     Patient plan of care discussed at interdisciplinary rounds: Yes    Anticipated Discharge Disposition: Transitional Care  Disposition Comments: Patient and daughter in agreement with TCU placment at discharge.  Anticipated Discharge Services: None  Anticipated Discharge DME:      Patient/family educated on Medicare website which has current facility and service quality ratings: yes  Education Provided on the Discharge Plan:    Patient/Family in Agreement with the Plan: yes    Referrals Placed by CM/SW: Internal Clinic Care Coordination,Post Acute Facilities  Private pay costs discussed: Not applicable    Additional Information:  Patient accepted at [facility] (Admissions: [phone] Main Phone: [phone] Fax: [phone]) PENDING assessment from nurse.  Samantha, at [facility], is awaiting a call back from bedside nurse.    Update 1253 - Patient accepted at [facility] (Admissions: [phone] Main Phone: [phone] Fax: [phone]) when she is medically ready for discharge.  No smoking at [facility].  Will address with MD if patient needs something for smoking cessation.      1300 - Patient and daughter, Dania, updated and excited about being accepted at [facility].  Dania will plan to transport at discharge.    DEEPTHI Allred  [facility] [phone]/ [place] [phone]  Care Management

## 2022-02-01 NOTE — PROGRESS NOTES
Former[facility]    Medicine Progress Note - Hospitalist Service    Date of Admission:  1/30/2022    Assessment & Plan          Tracy Mcclelland is a 76 year old female with diabetes who presented to the ED 1/30/22 for complaints of generalized weakness and multiple falls at home. She had a fall on Sunday and was not able to get up from that fall, therefore she came to ED for evaluation. Patient also reported a one week history of frequent diarrhea. In the ED, patient was afebrile and hemodynamically stable. Workup showed likely UTI. CT of abdomen and pelvis completed 1/31 showed contiguous segment of descending and sigmoid colon edematous bowel wall thickening consistent with colitis. Given UTI and new diagnosis of colitis, will admit to inpatient status. She will continue IV Rocephin for UTI and will defer additional antibiotics for now pending results of enteric bacteria and viral panel. Given patient's comorbid chronic medical problems, she is at risk for an adverse outcome including worsening weakness and neurologic status, inadequate oral intake, worsening functional status, and even death, so hospitalization is considered medically necessary.  Based on the presently available information, hospitalization for at least 2 midnights is anticipated.      Principal Problem:      Falls frequently  Assessment: Patient presented with multiple falls over the one week prior to admission. Denies any preceding dizziness. She states she does not lose consciousness but rather slips and then has been too weak to get up on her own. Suspect secondary to UTI and possibly dehydration secondary to colitis. 2/1-Patient continues to have generalized weakness. Denies nausea but oral intake remains poor. Continues to have diarrhea with patient reporting 6 episodes of diarrhea over last 24 hours. Patient seen by PT who recommend TCU. SW following  Plan:   - Continue inpatient status given UTI and colitis  -  Continue IV Rocephin for UTI  - SW following for discharge planning    Active Problems:      Diarrhea, unspecified type    Colitis  Assessment: Patient with a one week history of diarrhea. She notes 2-3 episodes of diarrhea per day. Denies any abdominal pain. Possible blood present in stool. Abdominal CT done 1/31 findings consistent with colitis. Patient denies any abdominal pain. 2/1-No nausea or vomiting but appetite remains decreased. Patient with ongoing diarrhea. Denies any abdominal pain. Enteric bacteria and viral panel negative. Symptoms likely due to viral gastroenteritis. Blood pressure in 80's/50's today but baseline for patient appears to be 90's/40-50's.   Plan:   - Continue IV fluids at 75 mL/hr until oral intake improves  - Stop enteric precautions  - Add Imodium prn for diarrhea  - Continue to monitor       Acute cystitis without hematuria  Assessment: UA done upon admission suspicious for UTI. Patient denies any urinary symptoms 2/1-Urine culture grew Aerococcus urinae  Plan:   - Continue Rocephin for likely UTI  - Likely can transition to oral cephalosporin when diarrhea improved      Type 2 diabetes mellitus with diabetic chronic kidney disease (H)  Assessment: Manages with metformin 500 mg BID. Hemoglobin A1c of 5.7 at time of admission. 2/1-Stable   Plan:   - Monitor blood sugars QID AC and HS  - Continue to hold PTA dose of metformin  - Cover with low sliding scale insulin   - Hypoglycemia protocol ordered        CKD (chronic kidney disease) stage 2, GFR 60-89 ml/min  Assessment: Chronic and stable. Creatinine 0.59 today  Plan:   - Continue to monitor       Hyperlipidemia with target LDL less than 100  Assessment: Manages with Zocor daily  Plan:   - Continue while in hospital        Diet: Moderate Consistent Carb (60 g CHO per Meal) Diet  Snacks/Supplements Adult: Other; Vital Cuisine; With Meals  Snacks/Supplements Adult: Ensure Enlive; With Meals    DVT Prophylaxis: Pneumatic Compression  Devices  Shaver Catheter: Not present  Central Lines: None  Cardiac Monitoring: None  Code Status: Full Code      Disposition Plan   Expected Discharge: 02/03/2022     Anticipated discharge location: inpatient rehabilitation facility         The patient's care was discussed with the Attending Physician, Dr. Gómez, Bedside Nurse, Care Coordinator/, Patient and Patient's Family.    Braeden Short NP  Hospitalist Service  [facility]  Securely message with the Vocera Web Console (learn more here)  Text page via [employer] Paging/Directory         Clinically Significant Risk Factors Present on Admission             # Severe Malnutrition: based on nutrition assessment     ______________________________________________________________________    Interval History   Patient denies pain. Feels generally weak but improving per patient report. Denies shortness of breath and patient is maintaining oxygen saturations above 90% on room air.  Denies nausea but oral intake remains decreased. Continues to have frequent diarrhea. She is afebrile and hemodynamically stable.     Data reviewed today: I reviewed all medications, new labs and imaging results over the last 24 hours.     Physical Exam   Vital Signs: Temp: 97.2  F (36.2  C) Temp src: (P) Oral BP: (!) (P) 89/50 Pulse: (P) 72   Resp: (P) 16 SpO2: (P) 92 % O2 Device: (P) None (Room air)    Weight: 114 lbs 0 oz  Constitutional: awake, alert, cooperative, no apparent distress, and appears stated age  Respiratory: No increased work of breathing, good air exchange, clear to auscultation bilaterally, no crackles or wheezing  Cardiovascular: regular rate and rhythm and normal S1 and S2  GI: normal bowel sounds, non-distended and non-tender  Skin: normal skin color, texture, turgor  Musculoskeletal: no lower extremity pitting edema present  Neurologic: Awake, alert, oriented to name, place and time.    Neuropsychiatric: Normal mood and  affect    Data   Recent Labs   Lab 02/01/22  1141 02/01/22  0731 02/01/22  0528 01/31/22  0739 01/31/22  0606 01/31/22  0250 01/30/22  2328   WBC  --   --  8.5  --  11.0  --  12.8*   HGB  --   --  9.6*  --  10.9*  --  12.4   MCV  --   --  104*  --  101*  --  102*   PLT  --   --  164  --  179  --  222   NA  --   --  139  --  137  --  133   POTASSIUM  --   --  4.0  --  4.6  --  4.7   CHLORIDE  --   --  107  --  103  --  99   CO2  --   --  27  --  28  --  27   BUN  --   --  23  --  35*  --  48*   CR  --   --  0.59  --  0.61  --  0.74   ANIONGAP  --   --  5  --  6  --  7   BINH  --   --  7.7*  --  8.4*  --  9.4   * 89 81   < > 95   < > 121*   ALBUMIN  --   --   --   --   --   --  3.6   PROTTOTAL  --   --   --   --   --   --  7.3   BILITOTAL  --   --   --   --   --   --  0.5   ALKPHOS  --   --   --   --   --   --  164*   ALT  --   --   --   --   --   --  38   AST  --   --   --   --   --   --  40    < > = values in this interval not displayed.     No results found for this or any previous visit (from the past 24 hour(s)).  Medications     sodium chloride         aspirin  81 mg Oral Daily     cefTRIAXone  1 g Intravenous Q24H     insulin aspart  1-3 Units Subcutaneous TID AC     insulin aspart  1-3 Units Subcutaneous At Bedtime     simvastatin  5 mg Oral At Bedtime

## 2022-02-01 NOTE — TELEPHONE ENCOUNTER
Pending Prescriptions:                       Disp   Refills    gabapentin (NEURONTIN) 600 MG tablet [Phar*360 ta*0        Sig: TAKE TWO TABLETS BY MOUTH THREE TIMES A DAY    Routing refill request to provider for review/approval because:  Drug not on the [facility] refill protocol

## 2022-02-01 NOTE — PLAN OF CARE
"S-(situation): Shift note    B-(background): Falls, dehydration    A-(assessment): Low BP's overnight (80/50), 500cc bolus given.  Stool labs pending. ABX for suspected UTI.  A1 with GB to bathroom,  many incontinent watery stools.    R-(recommendations): ABX, labs, BP monitoring.       BP (!) 87/44   Pulse 73   Temp 97.4  F (36.3  C) (Oral)   Resp 16   Ht 1.651 m (5' 5\")   Wt 51.7 kg (114 lb)   LMP  (LMP Unknown)   SpO2 94%   BMI 18.97 kg/m       Problem: Adult Inpatient Plan of Care  Goal: Plan of Care Review  Outcome: No Change  Goal: Patient-Specific Goal (Individualized)  Outcome: No Change  Goal: Absence of Hospital-Acquired Illness or Injury  Outcome: No Change  Intervention: Identify and Manage Fall Risk  Recent Flowsheet Documentation  Taken 2/1/2022 0020 by Martin Correa RN  Safety Promotion/Fall Prevention:   bed alarm on   clutter free environment maintained   fall prevention program maintained   mobility aid in reach   nonskid shoes/slippers when out of bed   safety round/check completed   supervised activity   room organization consistent   activity supervised  Intervention: Prevent Skin Injury  Recent Flowsheet Documentation  Taken 2/1/2022 0020 by Martin Correa RN  Body Position: position changed independently  Goal: Optimal Comfort and Wellbeing  Outcome: No Change  Goal: Readiness for Transition of Care  Outcome: No Change     Problem: Risk for Delirium  Goal: Optimal Coping  Outcome: No Change  Goal: Improved Behavioral Control  Outcome: No Change  Goal: Improved Attention and Thought Clarity  Outcome: No Change  Goal: Improved Sleep  Outcome: No Change     Problem: OT General Care Plan  Goal: Toilet Transfer/Toileting (OT)  Description: Toilet Transfer/Toileting (OT)  Outcome: No Change  Goal: Home Management (OT)  Description: Home Management (OT)  Outcome: No Change  Goal: Cognitive (OT)  Description: Cognitive (OT)  Outcome: No Change     Problem: Discharge Planning  Goal: " Discharge Planning (Adult, OB, Behavioral, Peds)  Outcome: No Change

## 2022-02-02 ENCOUNTER — APPOINTMENT (OUTPATIENT)
Dept: OCCUPATIONAL THERAPY | Facility: CLINIC | Age: 77
DRG: 689 | End: 2022-02-02
Payer: MEDICARE

## 2022-02-02 ENCOUNTER — APPOINTMENT (OUTPATIENT)
Dept: PHYSICAL THERAPY | Facility: CLINIC | Age: 77
DRG: 689 | End: 2022-02-02
Payer: MEDICARE

## 2022-02-02 LAB
ANION GAP SERPL CALCULATED.3IONS-SCNC: 2 MMOL/L (ref 3–14)
BUN SERPL-MCNC: 15 MG/DL (ref 7–30)
CALCIUM SERPL-MCNC: 8.1 MG/DL (ref 8.5–10.1)
CHLORIDE BLD-SCNC: 109 MMOL/L (ref 94–109)
CO2 SERPL-SCNC: 27 MMOL/L (ref 20–32)
CREAT SERPL-MCNC: 0.51 MG/DL (ref 0.52–1.04)
CRP SERPL-MCNC: 25.9 MG/L (ref 0–8)
ERYTHROCYTE [DISTWIDTH] IN BLOOD BY AUTOMATED COUNT: 12.7 % (ref 10–15)
GFR SERPL CREATININE-BSD FRML MDRD: >90 ML/MIN/1.73M2
GLUCOSE BLD-MCNC: 92 MG/DL (ref 70–99)
GLUCOSE BLDC GLUCOMTR-MCNC: 83 MG/DL (ref 70–99)
GLUCOSE BLDC GLUCOMTR-MCNC: 89 MG/DL (ref 70–99)
GLUCOSE BLDC GLUCOMTR-MCNC: 97 MG/DL (ref 70–99)
GLUCOSE BLDC GLUCOMTR-MCNC: 98 MG/DL (ref 70–99)
HCT VFR BLD AUTO: 32.3 % (ref 35–47)
HGB BLD-MCNC: 10.7 G/DL (ref 11.7–15.7)
MCH RBC QN AUTO: 34.3 PG (ref 26.5–33)
MCHC RBC AUTO-ENTMCNC: 33.1 G/DL (ref 31.5–36.5)
MCV RBC AUTO: 104 FL (ref 78–100)
PLATELET # BLD AUTO: 187 10E3/UL (ref 150–450)
POTASSIUM BLD-SCNC: 4.2 MMOL/L (ref 3.4–5.3)
RBC # BLD AUTO: 3.12 10E6/UL (ref 3.8–5.2)
SODIUM SERPL-SCNC: 138 MMOL/L (ref 133–144)
WBC # BLD AUTO: 9.2 10E3/UL (ref 4–11)

## 2022-02-02 PROCEDURE — 99238 HOSP IP/OBS DSCHRG MGMT 30/<: CPT | Performed by: PEDIATRICS

## 2022-02-02 PROCEDURE — 97110 THERAPEUTIC EXERCISES: CPT | Mod: GP | Performed by: PHYSICAL THERAPIST

## 2022-02-02 PROCEDURE — 250N000011 HC RX IP 250 OP 636: Performed by: INTERNAL MEDICINE

## 2022-02-02 PROCEDURE — 250N000013 HC RX MED GY IP 250 OP 250 PS 637: Performed by: NURSE PRACTITIONER

## 2022-02-02 PROCEDURE — 97110 THERAPEUTIC EXERCISES: CPT | Mod: GO

## 2022-02-02 PROCEDURE — 250N000013 HC RX MED GY IP 250 OP 250 PS 637: Performed by: INTERNAL MEDICINE

## 2022-02-02 PROCEDURE — 36415 COLL VENOUS BLD VENIPUNCTURE: CPT | Performed by: INTERNAL MEDICINE

## 2022-02-02 PROCEDURE — 86140 C-REACTIVE PROTEIN: CPT | Performed by: NURSE PRACTITIONER

## 2022-02-02 PROCEDURE — 120N000001 HC R&B MED SURG/OB

## 2022-02-02 PROCEDURE — 85027 COMPLETE CBC AUTOMATED: CPT | Performed by: INTERNAL MEDICINE

## 2022-02-02 PROCEDURE — 80048 BASIC METABOLIC PNL TOTAL CA: CPT | Performed by: INTERNAL MEDICINE

## 2022-02-02 PROCEDURE — 99207 PR APP CREDIT; MD BILLING SHARED VISIT: CPT | Performed by: NURSE PRACTITIONER

## 2022-02-02 PROCEDURE — 258N000003 HC RX IP 258 OP 636: Performed by: NURSE PRACTITIONER

## 2022-02-02 RX ORDER — AMOXICILLIN 500 MG/1
500 CAPSULE ORAL EVERY 8 HOURS
Qty: 6 CAPSULE | Refills: 0 | Status: SHIPPED | OUTPATIENT
Start: 2022-02-02 | End: 2022-02-02

## 2022-02-02 RX ORDER — SIMVASTATIN 5 MG
5 TABLET ORAL AT BEDTIME
Qty: 30 TABLET | Refills: 0 | Status: SHIPPED | OUTPATIENT
Start: 2022-02-02 | End: 2022-06-14

## 2022-02-02 RX ORDER — AMOXICILLIN 500 MG/1
500 CAPSULE ORAL EVERY 8 HOURS SCHEDULED
Status: DISCONTINUED | OUTPATIENT
Start: 2022-02-02 | End: 2022-02-04 | Stop reason: HOSPADM

## 2022-02-02 RX ORDER — ONDANSETRON 4 MG/1
4 TABLET, ORALLY DISINTEGRATING ORAL EVERY 6 HOURS PRN
Qty: 10 TABLET | Refills: 0 | Status: SHIPPED | OUTPATIENT
Start: 2022-02-02 | End: 2022-02-02

## 2022-02-02 RX ORDER — ACETAMINOPHEN 500 MG
1000 TABLET ORAL
Qty: 100 TABLET | Refills: 0 | Status: ON HOLD | OUTPATIENT
Start: 2022-02-02 | End: 2022-06-16

## 2022-02-02 RX ORDER — ONDANSETRON 4 MG/1
4 TABLET, ORALLY DISINTEGRATING ORAL EVERY 6 HOURS PRN
Qty: 10 TABLET | Refills: 0 | Status: SHIPPED | OUTPATIENT
Start: 2022-02-02 | End: 2022-06-14

## 2022-02-02 RX ORDER — GABAPENTIN 600 MG/1
TABLET ORAL
Qty: 360 TABLET | Refills: 0 | Status: SHIPPED | OUTPATIENT
Start: 2022-02-02

## 2022-02-02 RX ORDER — LOPERAMIDE HCL 2 MG
2 CAPSULE ORAL 4 TIMES DAILY PRN
COMMUNITY
Start: 2022-02-02 | End: 2022-02-02

## 2022-02-02 RX ORDER — LOPERAMIDE HCL 2 MG
2 CAPSULE ORAL 4 TIMES DAILY PRN
Qty: 20 CAPSULE | Refills: 1 | Status: SHIPPED | OUTPATIENT
Start: 2022-02-02

## 2022-02-02 RX ORDER — AMOXICILLIN 500 MG/1
500 CAPSULE ORAL EVERY 8 HOURS
Qty: 6 CAPSULE | Refills: 0 | Status: SHIPPED | OUTPATIENT
Start: 2022-02-02 | End: 2022-06-14

## 2022-02-02 RX ADMIN — AMOXICILLIN 500 MG: 500 CAPSULE ORAL at 14:26

## 2022-02-02 RX ADMIN — CEFTRIAXONE SODIUM 1 G: 1 INJECTION, POWDER, FOR SOLUTION INTRAMUSCULAR; INTRAVENOUS at 04:41

## 2022-02-02 RX ADMIN — SIMVASTATIN 5 MG: 5 TABLET, FILM COATED ORAL at 21:52

## 2022-02-02 RX ADMIN — AMOXICILLIN 500 MG: 500 CAPSULE ORAL at 21:52

## 2022-02-02 RX ADMIN — ASPIRIN 81 MG CHEWABLE TABLET 81 MG: 81 TABLET CHEWABLE at 09:59

## 2022-02-02 RX ADMIN — SODIUM CHLORIDE: 9 INJECTION, SOLUTION INTRAVENOUS at 01:56

## 2022-02-02 ASSESSMENT — ACTIVITIES OF DAILY LIVING (ADL)
ADLS_ACUITY_SCORE: 23
ADLS_ACUITY_SCORE: 22
ADLS_ACUITY_SCORE: 22
ADLS_ACUITY_SCORE: 23
ADLS_ACUITY_SCORE: 22
ADLS_ACUITY_SCORE: 23
ADLS_ACUITY_SCORE: 22
ADLS_ACUITY_SCORE: 22
ADLS_ACUITY_SCORE: 23
ADLS_ACUITY_SCORE: 22
ADLS_ACUITY_SCORE: 23
ADLS_ACUITY_SCORE: 22
ADLS_ACUITY_SCORE: 22
ADLS_ACUITY_SCORE: 23
ADLS_ACUITY_SCORE: 22
ADLS_ACUITY_SCORE: 22
ADLS_ACUITY_SCORE: 23
ADLS_ACUITY_SCORE: 22
ADLS_ACUITY_SCORE: 23
ADLS_ACUITY_SCORE: 22

## 2022-02-02 ASSESSMENT — MIFFLIN-ST. JEOR: SCORE: 1029.74

## 2022-02-02 NOTE — PROGRESS NOTES
Care Management Follow Up    Length of Stay (days): 2    Expected Discharge Date: 02/03/2022     Concerns to be Addressed: discharge planning       Patient plan of care discussed at interdisciplinary rounds: Yes    Anticipated Discharge Disposition: Transitional Care  Disposition Comments: Patient accepted for TCU placement at [facility] for 2/3/22.  Dania Echeverria, to transport.    Anticipated Discharge Services: None    Patient/family educated on Medicare website which has current facility and service quality ratings: yes    Education Provided on the Discharge Plan: yes      Patient/Family in Agreement with the Plan: yes    Referrals Placed by CM/SW: Internal Clinic Care Coordination,Post Acute Facilities    Private pay costs discussed: Not applicable    Additional Information:    Writer visited with patient and spoke with daughterDania, over the phone.  Discussed that it has been indicated that patient is on track to be able to discharge to the [facility] TCU tomorrow morning.  Writer spoke with Samantha in admissions and she has verified that the bed will be available tomorrow.      DaughterDania, stated that she will be to the hospital between 0830 and 0900 tomorrow.  She will plan to transport patient to [facility] around 10:00 a.m.  Writer has left a message for Samantha at [facility] to confirm a morning admission will work.      PAS-RR    Per DHS regulation, CTS team completed and submitted PAS-RR to [place] [employer] Direct Connect via the [employer]. CTS team advised SNF and they are aware a PAS-RR has been submitted.     CTS team reviewed with patient that they may be contacted for a follow up appointment within 10 days of hospital discharge if SNF stay is less than 30 days. Contact information for [employer] was also provided.     Patient verbalized understanding.     PAS-RR # [id number]    DEEPTHI Gleason  [facility]   [phone]

## 2022-02-02 NOTE — PLAN OF CARE
"Pt disoriented to place and situation. Lung sounds clear, VSS on RA. Ax1 with gait belt and walker. Ambulated to bathroom. Still with blanchable redness on coccyx, Mepilex applied after morning shower. Still with very poor oral intake but drinks her Ensure. IV saline locked, fluids encouraged. Noted Rocephin shifted to Amoxicillin. Afebrile.     /59 (BP Location: Left arm)   Pulse 64   Temp 97.7  F (36.5  C) (Oral)   Resp 16   Ht 1.651 m (5' 5\")   Wt 54.4 kg (119 lb 14.4 oz)   LMP  (LMP Unknown)   SpO2 98%   BMI 19.95 kg/m    Iso:  No active isolations  Diet: Moderate Consistent Carb (60 g CHO per Meal) Diet  Snacks/Supplements Adult: Other; Vital Cuisine; With Meals  Snacks/Supplements Adult: Ensure Enlive; With Meals  Diet     Mg+: 2.6 (01/30 2328)  K:  4.2 (02/02 0658)  PLT: 187 (02/02 0658)  HGB: 10.7 (02/02 0658)  BS: 92 (02/02 0658)  "

## 2022-02-02 NOTE — PLAN OF CARE
A/Ox4. VSS. BP has improved since IV fluids were restarted. Heart rate regular, lung sounds clear, bowel sounds active. Incontinent of bowel and bladder. Poor appetite. Daughter visited today. Up with assist of 1 with walker and gait belt. Will continue to monitor.

## 2022-02-02 NOTE — PROGRESS NOTES
[facility]    Medicine Progress Note - Hospitalist Service    Date of Admission:  1/30/2022    Assessment & Plan          Tracy Mcclelland is a 76 year old female with diabetes who presented to the ED 1/30/22 for complaints of generalized weakness and multiple falls at home. She had a fall on Sunday and was not able to get up from that fall, therefore she came to ED for evaluation. Patient also reported a one week history of frequent diarrhea. In the ED, patient was afebrile and hemodynamically stable. Workup showed likely UTI. CT of abdomen and pelvis completed 1/31 showed contiguous segment of descending and sigmoid colon edematous bowel wall thickening consistent with colitis. Given UTI and new diagnosis of colitis, will admit to inpatient status. She will continue IV Rocephin for UTI and will defer additional antibiotics for now pending results of enteric bacteria and viral panel. Given patient's comorbid chronic medical problems, she is at risk for an adverse outcome including worsening weakness and neurologic status, inadequate oral intake, worsening functional status, and even death, so hospitalization is considered medically necessary.  Based on the presently available information, hospitalization for at least 2 midnights is anticipated.      Principal Problem:    Falls frequently  Assessment: Patient presented with multiple falls over the one week prior to admission. Denies any preceding dizziness. She states she does not lose consciousness but rather slips and then has been too weak to get up on her own. Suspect secondary to UTI and possibly dehydration secondary to colitis.  2/2/2022: She has been deemed suitable for discharge to TCU in [place], [place], when she is medically stable. She continues to work with therapy.  Plan:   -Continue to work with physical therapy  -[facility]  - following for discharge  planning    Active Problems:    Diarrhea, unspecified type  Colitis, likely viral gastroenteritis  Assessment: Patient with a one week history of diarrhea. She notes 2-3 episodes of diarrhea per day. Denies any abdominal pain. Possible blood present in stool. Abdominal CT done 1/31 findings consistent with colitis. Patient denies any abdominal pain.   2/2/2022: No nausea, vomiting, diarrhea overnight or this morning. Her appetite remains poor. No abdominal pain. Enteric bacterial and viral panel negative  Plan:   -Stop IV fluids  -May use Imodium as needed for loose stools  -Highly encourage adequate diet, monitor for loose stools    Acute cystitis without hematuria  Assessment: UA done upon admission suspicious for UTI. Patient denies any urinary symptoms. Urine culture with Aerococcus urinae; per pharmacy will not likely receive sensitivities.  2/2/2022: Discontinue empiric ceftriaxone, initiate amoxicillin 500 mg 3 times a day for 7 days with a total of 10 days of antimicrobial therapy.  Plan:   -Amoxicillin 500 mg 3 times daily for 7 more days    Type 2 diabetes mellitus with diabetic chronic kidney disease (H)  Assessment: Manages with metformin 500 mg BID. Hemoglobin A1c of 5.7 at time of admission.   2/2/2022: Stable   Plan:   - Monitor blood sugars QID AC and HS  - Continue to hold PTA dose of metformin  - Cover with low sliding scale insulin   - Hypoglycemia protocol ordered      CKD (chronic kidney disease) stage 2, GFR 60-89 ml/min  Assessment: Chronic and stable. Creatinine 0.51 today  Plan:   - Continue to monitor, optimize hydration  -Generally avoid nephrotoxic agents, renal dosing appropriate  -Avoid NSAIDs    Hyperlipidemia with target LDL less than 100  Assessment: Manages with Zocor daily  Plan:   - Continue while in hospital        Diet: Moderate Consistent Carb (60 g CHO per Meal) Diet  Snacks/Supplements Adult: Other; Vital Cuisine; With Meals  Snacks/Supplements Adult: Ensure Enlive; With  Meals    DVT Prophylaxis: Pneumatic Compression Devices  Shaver Catheter: Not present  Central Lines: None  Cardiac Monitoring: None  Code Status: Full Code      Disposition Plan   Expected Discharge: 02/03/2022     Anticipated discharge location: inpatient rehabilitation facility         The patient's care was discussed with the Attending Physician, Dr. Gómez, Bedside Nurse, Care Coordinator/, Patient and Patient's Family. Antimicrobial therapy discussed with pharmacist.    MACHO Mares, CNP  Hospitalist Service  formerly [facility]  Securely message with the Vocera Web Console (learn more here)  Text page via [facility] Paging/Directory     _____________________________________________________________________    Interval History   Tracy does not have any acute complaints this morning. She shares with me she ate approximately 50% of her breakfast, no further diarrhea. She is eager to discharge with hopes of discharging tomorrow. Her daughter is at the bedside.    Data reviewed today: I reviewed all medications, new labs and imaging results over the last 24 hours.     Physical Exam   Vital Signs: Temp: (!) 96.4  F (35.8  C) Temp src: Oral BP: 108/58 Pulse: 68   Resp: 16 SpO2: 95 % O2 Device: None (Room air)    Weight: 119 lbs 14.4 oz  Constitutional: 76-year-old female laying in bed without obvious acute distress  Respiratory: Lung fields are clear. No tachypnea, increased work of breathing, or hypoxia.  Cardiovascular: Regular rate and rhythm without obvious murmur, rub, or gallop. She appears adequately perfused.  GI: Soft, nondistended.  Skin: No obvious rashes or lesions on exposed skin.  Musculoskeletal: No obvious peripheral edema.  Neurologic: Awake, alert, oriented x4. No obvious neurologic deficit.  Neuropsychiatric: Normal mood and affect    Data   Recent Labs   Lab 02/02/22  0749 02/02/22  0658 02/01/22 2015 02/01/22  0731 02/01/22  0528 01/31/22  0739  01/31/22  0606 01/31/22  0250 01/30/22  2328   WBC  --  9.2  --   --  8.5  --  11.0  --  12.8*   HGB  --  10.7*  --   --  9.6*  --  10.9*  --  12.4   MCV  --  104*  --   --  104*  --  101*  --  102*   PLT  --  187  --   --  164  --  179  --  222   NA  --  138  --   --  139  --  137  --  133   POTASSIUM  --  4.2  --   --  4.0  --  4.6  --  4.7   CHLORIDE  --  109  --   --  107  --  103  --  99   CO2  --  27  --   --  27  --  28  --  27   BUN  --  15  --   --  23  --  35*  --  48*   CR  --  0.51*  --   --  0.59  --  0.61  --  0.74   ANIONGAP  --  2*  --   --  5  --  6  --  7   BINH  --  8.1*  --   --  7.7*  --  8.4*  --  9.4   GLC 83 92 213*   < > 81   < > 95   < > 121*   ALBUMIN  --   --   --   --   --   --   --   --  3.6   PROTTOTAL  --   --   --   --   --   --   --   --  7.3   BILITOTAL  --   --   --   --   --   --   --   --  0.5   ALKPHOS  --   --   --   --   --   --   --   --  164*   ALT  --   --   --   --   --   --   --   --  38   AST  --   --   --   --   --   --   --   --  40    < > = values in this interval not displayed.     Medications       amoxicillin  500 mg Oral Q8H VINOD     aspirin  81 mg Oral Daily     insulin aspart  1-3 Units Subcutaneous TID AC     insulin aspart  1-3 Units Subcutaneous At Bedtime     simvastatin  5 mg Oral At Bedtime

## 2022-02-02 NOTE — PLAN OF CARE
"S-(situation): Shift note    B-(background): Falls, Dehydration    A-(assessment): BP stable this shift  100-110's SYS.  IVF running at 75/hr.  Ax1 with walker to bathroom.  Mentation appeared to be better this shift.  To TCU in Thursday.    R-(recommendations): TCU possibly Thursday.  Meet [place]       /63 (BP Location: Left arm)   Pulse 69   Temp 98  F (36.7  C) (Oral)   Resp 18   Ht 1.651 m (5' 5\")   Wt 51.7 kg (114 lb)   LMP  (LMP Unknown)   SpO2 95%   BMI 18.97 kg/m       Problem: Adult Inpatient Plan of Care  Goal: Plan of Care Review  Outcome: Improving  Goal: Patient-Specific Goal (Individualized)  Outcome: Improving  Goal: Absence of Hospital-Acquired Illness or Injury  Outcome: Improving  Intervention: Identify and Manage Fall Risk  Recent Flowsheet Documentation  Taken 2/2/2022 0059 by Martin Correa RN  Safety Promotion/Fall Prevention:   activity supervised   bed alarm on   clutter free environment maintained   fall prevention program maintained   supervised activity   safety round/check completed  Intervention: Prevent Skin Injury  Recent Flowsheet Documentation  Taken 2/2/2022 0422 by Martin Correa, RN  Body Position:   left   turned  Taken 2/2/2022 0059 by Martin Correa RN  Body Position:   turned   right  Goal: Optimal Comfort and Wellbeing  Outcome: Improving  Goal: Readiness for Transition of Care  Outcome: Improving     Problem: Risk for Delirium  Goal: Optimal Coping  Outcome: Improving  Goal: Improved Behavioral Control  Outcome: Improving  Goal: Improved Attention and Thought Clarity  Outcome: Improving  Goal: Improved Sleep  Outcome: Improving     Problem: OT General Care Plan  Goal: Toilet Transfer/Toileting (OT)  Description: Toilet Transfer/Toileting (OT)  Outcome: Improving  Goal: Home Management (OT)  Description: Home Management (OT)  Outcome: Improving  Goal: Cognitive (OT)  Description: Cognitive (OT)  Outcome: Improving     Problem: Discharge " Planning  Goal: Discharge Planning (Adult, OB, Behavioral, Peds)  Outcome: Improving

## 2022-02-02 NOTE — DISCHARGE SUMMARY
[facility]  Hospitalist Discharge Summary      Date of Admission:  1/30/2022  Date of Discharge:  2/3/2022  Discharging Provider: Terrell Gastelum MD     Discharge Service: Hospitalist Service    Discharge Diagnoses   Frequent falls secondary to impaired mobility and debility  Urinary tract infection, treated  Colitis, likely viral gastroenteritis    Follow-ups Needed After Discharge   Follow-up Appointments     Follow Up and recommended labs and tests      Follow up with Nursing home physician.  No follow up labs or test are   needed.  Follow up with primary care provider in 5- to 7- days.  No follow up labs   or test are needed.             Discharge Disposition   Discharged to [place], [place], [facility]  Condition at discharge: Stable    Hospital Course   Tracy is a 76-year-old female who presented to [facility] emergency department on 1/30/2022 for evaluation and management of generalized weakness and multiple falls at home.  In the emergency department she endorsed a 1 week history of frequent diarrhea CT the of the abdomen pelvis noted contiguous segment of descending and sigmoid colon edematous bowel wall thickening consistent with colitis.  Her exam and history are consistent with viral gastroenteritis enteric bacterial and viral panel had no growth.  Urinalysis on admission concerning for infection; growth of Aerococcus urinae noted.  In the emergency department she was initiated on empiric ceftriaxone which was narrowed to amoxicillin on 2/2/2022.  A 10-day course of antibiotics has been recommended and antimicrobial therapy was initiated on 1/30/2022.    On the day of discharge she was tolerating an oral diet, passing urine and stool without difficulty.  Diarrhea improved.    While in-hospital she was evaluated by Physical and Occupational Therapy.  Tracy continued to be weak and was deemed suitable for TCU stay and has been [facility]    After the patient had been discharged on February 3, we were notified by TCU that they were not unable to accept the patient yesterday because of staffing issues, so the patient did not discharge from the hospital yesterday.  An alternate disposition has been secured today on February 4, so she will be discharged today to alternate TCU.  There was no significant interim clinical change in her status overnight from February 3 to February 4 and her discharge care plan has not otherwise changed.    Consultations This Hospital Stay   CARE MANAGEMENT / SOCIAL WORK IP CONSULT  PHYSICAL THERAPY ADULT IP CONSULT  OCCUPATIONAL THERAPY ADULT IP CONSULT  PHYSICAL THERAPY ADULT IP CONSULT  OCCUPATIONAL THERAPY ADULT IP CONSULT    Code Status   Full Code    Time Spent on this Encounter   I, Terrell Gastelum MD, personally saw the patient today and spent less than or equal to 30 minutes discharging this patient.       Terrell Gastelum MD    [address] MEDICAL SURGICAL  [address] [facility]   [place] [address]  Phone: [phone]  ______________________________________________________________________    Physical Exam   Vital Signs: Temp: 97.2  F (36.2  C) Temp src: Oral BP: 106/43 Pulse: 63   Resp: 16 SpO2: 99 % O2 Device: None (Room air)    Weight: 119 lbs 14.4 oz  General Appearance: Elderly woman, appears comfortable sitting up in a chair without signs of distress  Respiratory: Normal respiratory effort  GI: Normal bowel sounds, soft abdomen, nontender  Other: Alert and conversive, no confusion evident       Primary Care Physician   Physician No Ref-Primary    Discharge Orders      General info for SNF    Length of Stay Estimate: Short Term Care: Estimated # of Days <30  Condition at Discharge: Improving  Level of care:skilled   Rehabilitation Potential: Good  Admission H&P remains valid and up-to-date: Yes  Recent Chemotherapy: N/A  Use Nursing Home Standing Orders: Yes     Florinda  instructions    Give two-step Mantoux (PPD) Per Facility Policy Yes     Follow Up and recommended labs and tests    Follow up with Nursing home physician.  No follow up labs or test are needed.  Follow up with primary care provider in 5- to 7- days.  No follow up labs or test are needed.     Reason for your hospital stay    You were in the hospital after a fall.  You were found to have a urinary tract infection and he received antibiotics.  Your diarrhea is most likely due to viral gastroenteritis.     Glucose monitor nursing POCT    Before meals and at bedtime     Activity - Up with nursing assistance     Full Code     Physical Therapy Adult Consult    Evaluate and treat as clinically indicated.    Reason: Impaired mobility and debility, falls     Occupational Therapy Adult Consult    Evaluate and treat as clinically indicated.    Reason: Impaired mobility and debility, falls     Fall precautions     Diet    Follow this diet upon discharge: Orders Placed This Encounter      Snacks/Supplements Adult: Other; Vital Cuisine; With Meals      Snacks/Supplements Adult: Ensure Enlive; With Meals      Moderate Consistent Carb (60 g CHO per Meal) Diet       Discharge Medications   Current Discharge Medication List      START taking these medications    Details   amoxicillin (AMOXIL) 500 MG capsule Take 1 capsule (500 mg) by mouth every 8 hours  Qty: 6 capsule, Refills: 0    Associated Diagnoses: Acute cystitis without hematuria      loperamide (IMODIUM) 2 MG capsule Take 1 capsule (2 mg) by mouth 4 times daily as needed for diarrhea    Associated Diagnoses: Diarrhea, unspecified type; Colitis      ondansetron (ZOFRAN-ODT) 4 MG ODT tab Take 1 tablet (4 mg) by mouth every 6 hours as needed for nausea or vomiting  Qty: 10 tablet, Refills: 0    Associated Diagnoses: Acute cystitis without hematuria         CONTINUE these medications which have NOT CHANGED    Details   acetaminophen (TYLENOL) 500 MG tablet Take 1,000 mg by mouth  3 times daily      aspirin (ASA) 81 MG tablet Take 1 tablet (81 mg) by mouth daily    Associated Diagnoses: Hyperlipidemia with target LDL less than 100      metFORMIN (GLUCOPHAGE) 500 MG tablet Take 1 tablet (500 mg) by mouth 2 times daily (with meals)  Qty: 180 tablet, Refills: 1    Associated Diagnoses: Type 2 diabetes mellitus with stage 2 chronic kidney disease, without long-term current use of insulin (H)      simvastatin (ZOCOR) 5 MG tablet TAKE ONE TABLET BY MOUTH AT BEDTIME  Qty: 90 tablet, Refills: 1    Associated Diagnoses: Hyperlipidemia with target LDL less than 100      ACCU-CHEK GUIDE test strip USE TO TEST BLOOD SUGARS TWO TIMES A DAY OR AS DIRECTED  Qty: 100 each, Refills: 1    Comments: Prescription not previously filled at [facility]. Pleaseauthorize a new RX for this patient. Thank you.  Associated Diagnoses: Type 2 diabetes mellitus with chronic kidney disease, without long-term current use of insulin, unspecified CKD stage (H)      gabapentin (NEURONTIN) 600 MG tablet TAKE TWO TABLETS BY MOUTH THREE TIMES A DAY  Qty: 360 tablet, Refills: 0    Associated Diagnoses: Type 2 diabetes mellitus with stage 2 chronic kidney disease, without long-term current use of insulin (H)           Allergies   Allergies   Allergen Reactions     Mold      sneezing, coughing , runny nose, watery eyes & red,     No Known Drug Allergies

## 2022-02-03 ENCOUNTER — APPOINTMENT (OUTPATIENT)
Dept: PHYSICAL THERAPY | Facility: CLINIC | Age: 77
DRG: 689 | End: 2022-02-03
Attending: PEDIATRICS
Payer: MEDICARE

## 2022-02-03 ENCOUNTER — PATIENT OUTREACH (OUTPATIENT)
Dept: CARE COORDINATION | Facility: CLINIC | Age: 77
End: 2022-02-03
Payer: COMMERCIAL

## 2022-02-03 DIAGNOSIS — K52.9 COLITIS: ICD-10-CM

## 2022-02-03 DIAGNOSIS — R29.6 FALLS FREQUENTLY: Primary | ICD-10-CM

## 2022-02-03 LAB
ANION GAP SERPL CALCULATED.3IONS-SCNC: 4 MMOL/L (ref 3–14)
BUN SERPL-MCNC: 14 MG/DL (ref 7–30)
CALCIUM SERPL-MCNC: 7.8 MG/DL (ref 8.5–10.1)
CHLORIDE BLD-SCNC: 104 MMOL/L (ref 94–109)
CO2 SERPL-SCNC: 28 MMOL/L (ref 20–32)
CREAT SERPL-MCNC: 0.47 MG/DL (ref 0.52–1.04)
ERYTHROCYTE [DISTWIDTH] IN BLOOD BY AUTOMATED COUNT: 12.6 % (ref 10–15)
GFR SERPL CREATININE-BSD FRML MDRD: >90 ML/MIN/1.73M2
GLUCOSE BLD-MCNC: 84 MG/DL (ref 70–99)
GLUCOSE BLDC GLUCOMTR-MCNC: 107 MG/DL (ref 70–99)
GLUCOSE BLDC GLUCOMTR-MCNC: 121 MG/DL (ref 70–99)
GLUCOSE BLDC GLUCOMTR-MCNC: 88 MG/DL (ref 70–99)
GLUCOSE BLDC GLUCOMTR-MCNC: 89 MG/DL (ref 70–99)
HCT VFR BLD AUTO: 30.4 % (ref 35–47)
HGB BLD-MCNC: 10.2 G/DL (ref 11.7–15.7)
MCH RBC QN AUTO: 34.3 PG (ref 26.5–33)
MCHC RBC AUTO-ENTMCNC: 33.6 G/DL (ref 31.5–36.5)
MCV RBC AUTO: 102 FL (ref 78–100)
PLATELET # BLD AUTO: 181 10E3/UL (ref 150–450)
POTASSIUM BLD-SCNC: 3.9 MMOL/L (ref 3.4–5.3)
RBC # BLD AUTO: 2.97 10E6/UL (ref 3.8–5.2)
SODIUM SERPL-SCNC: 136 MMOL/L (ref 133–144)
WBC # BLD AUTO: 7.6 10E3/UL (ref 4–11)

## 2022-02-03 PROCEDURE — 250N000013 HC RX MED GY IP 250 OP 250 PS 637: Performed by: INTERNAL MEDICINE

## 2022-02-03 PROCEDURE — 97530 THERAPEUTIC ACTIVITIES: CPT | Mod: GP | Performed by: PHYSICAL THERAPIST

## 2022-02-03 PROCEDURE — 250N000013 HC RX MED GY IP 250 OP 250 PS 637: Performed by: NURSE PRACTITIONER

## 2022-02-03 PROCEDURE — 120N000001 HC R&B MED SURG/OB

## 2022-02-03 PROCEDURE — 80048 BASIC METABOLIC PNL TOTAL CA: CPT | Performed by: NURSE PRACTITIONER

## 2022-02-03 PROCEDURE — 36415 COLL VENOUS BLD VENIPUNCTURE: CPT | Performed by: NURSE PRACTITIONER

## 2022-02-03 PROCEDURE — 85014 HEMATOCRIT: CPT | Performed by: NURSE PRACTITIONER

## 2022-02-03 PROCEDURE — 97110 THERAPEUTIC EXERCISES: CPT | Mod: GP | Performed by: PHYSICAL THERAPIST

## 2022-02-03 RX ADMIN — AMOXICILLIN 500 MG: 500 CAPSULE ORAL at 13:54

## 2022-02-03 RX ADMIN — SIMVASTATIN 5 MG: 5 TABLET, FILM COATED ORAL at 20:50

## 2022-02-03 RX ADMIN — ASPIRIN 81 MG CHEWABLE TABLET 81 MG: 81 TABLET CHEWABLE at 08:03

## 2022-02-03 RX ADMIN — ACETAMINOPHEN 650 MG: 325 TABLET, FILM COATED ORAL at 15:32

## 2022-02-03 RX ADMIN — AMOXICILLIN 500 MG: 500 CAPSULE ORAL at 06:07

## 2022-02-03 RX ADMIN — AMOXICILLIN 500 MG: 500 CAPSULE ORAL at 20:51

## 2022-02-03 ASSESSMENT — ACTIVITIES OF DAILY LIVING (ADL)
ADLS_ACUITY_SCORE: 22

## 2022-02-03 ASSESSMENT — MIFFLIN-ST. JEOR: SCORE: 1029.74

## 2022-02-03 NOTE — PROGRESS NOTES
Care Management Discharge Note    Discharge Date: 02/03/2022     Discharge Disposition: Transitional Care - [facility] (Admissions: [phone] Main Phone: [phone] Fax: [phone])    Discharge Services: None    Discharge DME:      Discharge Transportation: family or friend will provide,agency    Private pay costs discussed: Not applicable    PAS Confirmation Code: [id number]    Patient/family educated on Medicare website which has current facility and service quality ratings: yes    Education Provided on the Discharge Plan:      Persons Notified of Discharge Plans: Patient/daughter, Dania    Patient/Family in Agreement with the Plan: yes    Handoff Referral Completed: Yes    Additional Information:  Patient discharging to [facility] (Admissions: [phone] Main Phone: [phone] Fax: [phone]) today.  Family transport at 1000.  Samantha at [facility], aware of discharge time.    DEEPTHI Allred  [facility] [phone]/ [facility] [phone]  Care Management

## 2022-02-03 NOTE — LETTER
To:             Please give to facility    From:   Vangie Fabian RN, Care Coordinator   [facility]   Vangie Fabian RN, Care Coordinator   [facility]'s   E-mail [email or web address]   [phone]   Patient Name:  Tracy Mcclelland YOB: 1945   Admit date:2/3/2022       *Information Needed:  Please contact me when the patient will discharge (or if they will move to long term care)- include the discharge date, disposition, and main diagnosis   - If the patient is discharged with home care services, please provide the name of the agency    Also- Please inform me if a care conference is being held.   [facility]   Vangie Fabian RN, Care Coordinator   [facility]   E-mail [email or web address]   [phone]                             Thank you

## 2022-02-03 NOTE — PLAN OF CARE
Physical Therapy Discharge Summary    Reason for therapy discharge:    Discharged to transitional care facility.    Progress towards therapy goal(s). See goals on Care Plan in [facility] electronic health record for goal details.  Goals partially met.  Barriers to achieving goals:   limited tolerance for therapy and discharge from facility.    Therapy recommendation(s):    Continued therapy is recommended.  Rationale/Recommendations:  patient would benefit from skilled PT intervention to address decreased activity tolerance, impaired mobility, generalized weakness and global safety prior to return home..     Thank you for your referral.  Eliza Cuevas, PT, DPT, ATC, [facility]  O: [phone]  E: [email or web address]

## 2022-02-03 NOTE — PROGRESS NOTES
Care Management Follow Up    Length of Stay (days): 3    Expected Discharge Date: 02/03/2022     Concerns to be Addressed: discharge planning       Patient plan of care discussed at interdisciplinary rounds: Yes    Anticipated Discharge Disposition: Transitional Care  Disposition Comments: Patient accepted for TCU placement at [facility] for 2/3/22.  Daughter, Dania, to transport.    Anticipated Discharge Services: None    Anticipated Discharge DME:      Patient/family educated on Medicare website which has current facility and service quality ratings: yes    Education Provided on the Discharge Plan:      Patient/Family in Agreement with the Plan: yes    Referrals Placed by CM/SW: Internal Clinic Care Coordination,Post Acute Facilities    Private pay costs discussed: Not applicable    Additional Information:  Received a voicemail from Samantha at [facility] (Admissions: [phone] Main Phone: [phone] Fax: [phone]) at 0900 stating they are no longer able to take patient today due to staffing.  No beds at [facility] until next week.  Family quite upset at this sudden news at patient was scheduled to discharge at 1000.  Writer called and talked with Jones at [facility] (Main Phone: [phone] Admissions Phone: [phone] Fax: [phone]).  [facility] has no beds until next week.  Writer called and left a voicemail for Martita at [facility] (Phone: [phone] Fax: [phone]).      Care Management will continue to work with patient/family on TCU placement.      1350 - Met with patient and family with update.  Left another voicemail for Martita at [facility] (Phone: [phone] Fax: [phone]).  Discussed additional options with patient/Dania.  Dania agreed with additional referrals being sent to:    - [facility]  (Phone: [phone] Fax [phone])    - [facility] (Phone: [phone] Fax:  [phone])    -[facility] [place] (Phone: [phone] Fax:[phone])    - [facility] [place] (Phone: [phone] Fax: [phone])    Referrals sent.    Vanessa Morillo [facility] [phone]/ [facility] [phone]  Care Management

## 2022-02-03 NOTE — PLAN OF CARE
Patient is alert and oriented. LS clear. Tolerating oral abx. Delayed discharge today d/t Meet [facility] not having enough staff. Ambulating assist of 1 with gait/walker. Denies pain.

## 2022-02-03 NOTE — PROGRESS NOTES
Clinic Care Coordination Contact  Care Coordination Transition Communication  Clinical Data: [facility]  Hospitalist Discharge Summary       Date of Admission:  1/30/2022  Date of Discharge:  2/3/2022  Discharge Diagnoses     Frequent falls secondary to impaired mobility and debility  Urinary tract infection, treated  Colitis, likely viral gastroenteritis       Transition to Facility:              Facility Name: [facility] (Admissions: [phone] Main Phone: [phone] Fax: [phone]              Contact name and phone number/fax: [facility] RN faxed contact to TCU to call when the patient is discharged     Plan: RN/SW Care Coordinator will await notification from facility staff informing RN/SW Care Coordinator of patient's discharge plans/needs. RN/SW Care Coordinator will review chart and outreach to facility staff every 4 weeks and as needed.     [facility]   Vangie Fabian RN, Care Coordinator   [facility]'s   E-mail [email or web address]   [phone]

## 2022-02-03 NOTE — PROGRESS NOTES
Occupational Therapy Discharge Summary    Reason for therapy discharge:    Discharged to transitional care facility.    Progress towards therapy goal(s). See goals on Care Plan in [facility] electronic health record for goal details.  Goals partially met.  Barriers to achieving goals:   limited tolerance for therapy and discharge from facility.    Therapy recommendation(s):    Continued therapy is recommended.  Rationale/Recommendations:  to progress towards baseline functioning with ADLs and UB strengthening.     Thank you for the referral.

## 2022-02-03 NOTE — PLAN OF CARE
Alert and oriented, slept all night. VSS on room air. Lungs CTA. Ambulating to bathroom as A1 with gait belt and walker. Oral amoxicillin given. Denies pain. Plan to discharge to [facility] between 2159-4078, daughter to transport.

## 2022-02-03 NOTE — PLAN OF CARE
Medically stable, vitals stable. Patient alert and oriented x4 this shift. Denies pain or concerns. Blood glucose stable- no insulin given this shift. Will continue to monitor.

## 2022-02-04 ENCOUNTER — PATIENT OUTREACH (OUTPATIENT)
Dept: CARE COORDINATION | Facility: CLINIC | Age: 77
End: 2022-02-04
Payer: COMMERCIAL

## 2022-02-04 VITALS
BODY MASS INDEX: 19.78 KG/M2 | HEIGHT: 65 IN | RESPIRATION RATE: 16 BRPM | TEMPERATURE: 98.3 F | HEART RATE: 67 BPM | OXYGEN SATURATION: 96 % | WEIGHT: 118.7 LBS | SYSTOLIC BLOOD PRESSURE: 126 MMHG | DIASTOLIC BLOOD PRESSURE: 65 MMHG

## 2022-02-04 DIAGNOSIS — R29.6 FALLS FREQUENTLY: Primary | ICD-10-CM

## 2022-02-04 DIAGNOSIS — N39.0 UTI (URINARY TRACT INFECTION): ICD-10-CM

## 2022-02-04 LAB
GLUCOSE BLDC GLUCOMTR-MCNC: 104 MG/DL (ref 70–99)
GLUCOSE BLDC GLUCOMTR-MCNC: 84 MG/DL (ref 70–99)

## 2022-02-04 PROCEDURE — 250N000013 HC RX MED GY IP 250 OP 250 PS 637: Performed by: NURSE PRACTITIONER

## 2022-02-04 PROCEDURE — 250N000013 HC RX MED GY IP 250 OP 250 PS 637: Performed by: INTERNAL MEDICINE

## 2022-02-04 RX ADMIN — AMOXICILLIN 500 MG: 500 CAPSULE ORAL at 06:24

## 2022-02-04 RX ADMIN — ASPIRIN 81 MG CHEWABLE TABLET 81 MG: 81 TABLET CHEWABLE at 08:01

## 2022-02-04 ASSESSMENT — ACTIVITIES OF DAILY LIVING (ADL)
ADLS_ACUITY_SCORE: 22

## 2022-02-04 ASSESSMENT — MIFFLIN-ST. JEOR: SCORE: 1024.3

## 2022-02-04 NOTE — LETTER
To:             Please give to facility    From:   Vangie Fabian RN, Care Coordinator   [facility]   Vangie Fabian RN, Care Coordinator   [facility]   E-mail [email or web address]   [phone]   Patient Name:  Tracy Mcclelland  YOB: 1945    Admit date: 2/4/2022      *Information Needed:  Please contact me when the patient will discharge (or if they will move to long term care)- include the discharge date, disposition, and main diagnosis   - If the patient is discharged with home care services, please provide the name of the agency    Also- Please inform me if a care conference is being held.   [facility]   Vangie Fabian RN, Care Coordinator   [facility]   E-mail [email or web address]   [phone]                             Thank you

## 2022-02-04 NOTE — PROGRESS NOTES
Care Management Follow Up    Length of Stay (days): 4    Expected Discharge Date: 02/03/2022     Concerns to be Addressed: discharge planning       Patient plan of care discussed at interdisciplinary rounds: Yes    Anticipated Discharge Disposition: Transitional Care    Disposition Comments: Patient accepted for TCU placement but delayed d/t [facility] not having adquate staff for admission. Family to transport.    Anticipated Discharge Services: None    Anticipated Discharge DME:      Patient/family educated on Medicare website which has current facility and service quality ratings: yes    Education Provided on the Discharge Plan:      Patient/Family in Agreement with the Plan: yes    Referrals Placed by CM/SW: Internal Clinic Care Coordination,Post Acute Facilities    Private pay costs discussed: Not applicable    Additional Information:  TCU referrals pending - awaiting reply from [facility]  (Phone: [phone] Fax [phone]) and [facility] facilities.    If TCU bed is not found today or over there weekend, patient is accepted at [facility] (Admissions: [phone] Main Phone: [phone] Fax: [phone]) for Monday.  [facility] not have staffing over the weekend to accept patient.    Family to provide transport.    DEEPTHI Allred  [facility] [phone]/ [facility] [phone]  Care Management

## 2022-02-04 NOTE — PROGRESS NOTES
Care Management Discharge Note    Discharge Date: 02/03/2022     Discharge Disposition: Transitional Care - [facility]  (Phone: [phone] Fax [phone])    Discharge Services: None    Discharge DME:      Discharge Transportation: family or friend will provide,agency    Private pay costs discussed: Not applicable    PAS Confirmation Code: [id number]    Patient/family educated on Medicare website which has current facility and service quality ratings: yes    Education Provided on the Discharge Plan:      Persons Notified of Discharge Plans: Patient/daughter    Patient/Family in Agreement with the Plan: yes    Handoff Referral Completed: Yes    Additional Information:  Patient accepted at [facility]  (Phone: [phone] Fax [phone]).  Patient and daughter, Dania, notified and in agreement with discharge plan to [facility].  Dania to transport.    DEEPTHI Allred  [facility] [phone]/ [facility] [phone]  Care Management

## 2022-02-04 NOTE — PROGRESS NOTES
S-(situation): Patient discharged to TCU via family member vehicle at 1327    B-(background): Falls, weakness    A-(assessment): Alert and oriented. Assist of 1 with walker. Denies pain but does have some breakthrough right sided hip pain.     R-(recommendations): Discharge instructions reviewed with family, bedside report called to [facility]. Listed belongings gathered and returned to patient.          Discharge Nursing Criteria:     Care Plan and Patient education resolved: Yes    New Medications- pt has been educated about purpose and side effects: Yes    Vaccines  Influenza status verified at discharge:  Yes        MISC  Prescriptions if needed, hard copies sent with patient  NA  Home medications returned to patient: Yes  Medication Bin checked and emptied on discharge Yes  Patient reports post-discharge pain management plan is effective: Yes

## 2022-02-04 NOTE — PROGRESS NOTES
S-(situation): shift note    B-(background): Increased falls / weakness    A-(assessment): Pt is A&O.  VSS.  Afebrile.  Up to void often during the night.  Moves very slowly and is unable to get self from bed to commode.  Assist of one staff needed with belt and walker.  Voiding often in good amounts.  Taking sips of water during the night.      R-(recommendations): Will cont to monitor with possible discharge today.

## 2022-02-04 NOTE — PROGRESS NOTES
Change in plan Patient will discharge to a alternate TCU  [facility]  Clinic Care Coordination Contact  Care Coordination Transition Communication         Clinical Data:  [facility]  Hospitalist Discharge Summary       Date of Admission:  1/30/2022  Date of Discharge:  2/4/2022  Discharging Provider: Terrell Gastelum MD     Discharge Service: Hospitalist Service        Discharge Diagnoses     Frequent falls secondary to impaired mobility and debility  Urinary tract infection, treated  Colitis, likely viral gastroenteritis        Transition to Facility:              Facility Name:   [facility] (Phone: [phone] Fax [phone])              Contact name and phone number/fax: CC RN contact faxed to TCU to call when the patient is discharged     Plan: RN/SW Care Coordinator will await notification from facility staff informing RN/SW Care Coordinator of patient's discharge plans/needs. RN/SW Care Coordinator will review chart and outreach to facility staff every 4 weeks and as needed.     [facility]   Vangie Fabian RN, Care Coordinator   [facility]'s   E-mail [email or web address]   [phone]

## 2022-02-04 NOTE — PLAN OF CARE
Patient is alert and oriented. Ambulating to bathroom with assist of 1. Reports occasional pain in the right hip-declined medication. Tolerating oral abx.         Problem: Adult Inpatient Plan of Care  Goal: Absence of Hospital-Acquired Illness or Injury  Intervention: Identify and Manage Fall Risk  Recent Flowsheet Documentation  Taken 2/4/2022 0714 by Eli Cuellar, RN  Safety Promotion/Fall Prevention:   activity supervised   assistive device/personal items within reach   bed alarm on   fall prevention program maintained   clutter free environment maintained  Intervention: Prevent Skin Injury  Recent Flowsheet Documentation  Taken 2/4/2022 0714 by Eli Cuellar, RN  Body Position:   turned   right   left

## 2022-02-04 NOTE — PROGRESS NOTES
Tracy Mcclelland  Gender: female  : 1945  [address] [facility] [id number] [phone] (home)     Medical Record: [id number]  Pharmacy:    [facility] [place], [place] - [address] [place] [address]  [id number] [place], [place] - [address] [place] [address]  [facility] - [place], [place] - [address]  Primary Care Provider: No Ref-Primary, Physician    Parent's names are: Data Unavailable (mother) and Data Unavailable (father).      [facility]  2022     Discharge Phone Call:  Discharge to [place]

## 2022-02-04 NOTE — PLAN OF CARE
Physical Therapy Discharge Summary    Reason for therapy discharge:    Discharged to transitional care facility.    Progress towards therapy goal(s). See goals on Care Plan in [facility] electronic health record for goal details.  Goals partially met.  Barriers to achieving goals:   limited tolerance for therapy and discharge from facility.    Therapy recommendation(s):    Continued therapy is recommended.  Rationale/Recommendations:  to address strength, conditioning, balance and safety prior to return to home environment.   Thank you for your referral.  Eliza Cuevas, PT, DPT, ATC, LAT    [facility]  O: [phone]  E: [email or web address]

## 2022-02-04 NOTE — PLAN OF CARE
Pt. Alert and oriented x4, tolerating PO ABx, delayed discharge, awaiting new date. Denies pain, Assist of 1 with transfers.

## 2022-02-22 ENCOUNTER — LAB REQUISITION (OUTPATIENT)
Dept: LAB | Facility: CLINIC | Age: 77
End: 2022-02-22

## 2022-02-22 ENCOUNTER — PATIENT OUTREACH (OUTPATIENT)
Dept: CARE COORDINATION | Facility: CLINIC | Age: 77
End: 2022-02-22
Payer: COMMERCIAL

## 2022-02-22 DIAGNOSIS — R60.9 EDEMA, UNSPECIFIED: ICD-10-CM

## 2022-02-22 LAB
ANION GAP SERPL CALCULATED.3IONS-SCNC: 4 MMOL/L (ref 3–14)
BUN SERPL-MCNC: 16 MG/DL (ref 7–30)
CALCIUM SERPL-MCNC: 8.2 MG/DL (ref 8.5–10.1)
CHLORIDE BLD-SCNC: 102 MMOL/L (ref 94–109)
CO2 SERPL-SCNC: 30 MMOL/L (ref 20–32)
CREAT SERPL-MCNC: 0.57 MG/DL (ref 0.52–1.04)
ERYTHROCYTE [DISTWIDTH] IN BLOOD BY AUTOMATED COUNT: 12.6 % (ref 10–15)
GFR SERPL CREATININE-BSD FRML MDRD: >90 ML/MIN/1.73M2
GLUCOSE BLD-MCNC: 134 MG/DL (ref 70–99)
HCT VFR BLD AUTO: 32 % (ref 35–47)
HGB BLD-MCNC: 10.2 G/DL (ref 11.7–15.7)
MCH RBC QN AUTO: 33.8 PG (ref 26.5–33)
MCHC RBC AUTO-ENTMCNC: 31.9 G/DL (ref 31.5–36.5)
MCV RBC AUTO: 106 FL (ref 78–100)
PLATELET # BLD AUTO: 192 10E3/UL (ref 150–450)
POTASSIUM BLD-SCNC: 4.6 MMOL/L (ref 3.4–5.3)
RBC # BLD AUTO: 3.02 10E6/UL (ref 3.8–5.2)
SODIUM SERPL-SCNC: 136 MMOL/L (ref 133–144)
WBC # BLD AUTO: 7.5 10E3/UL (ref 4–11)

## 2022-02-22 PROCEDURE — 36415 COLL VENOUS BLD VENIPUNCTURE: CPT | Performed by: FAMILY MEDICINE

## 2022-02-22 PROCEDURE — 85027 COMPLETE CBC AUTOMATED: CPT | Performed by: FAMILY MEDICINE

## 2022-02-22 PROCEDURE — P9603 ONE-WAY ALLOW PRORATED MILES: HCPCS | Performed by: FAMILY MEDICINE

## 2022-02-22 PROCEDURE — 82310 ASSAY OF CALCIUM: CPT | Performed by: FAMILY MEDICINE

## 2022-02-22 NOTE — PROGRESS NOTES
Clinic Care Coordination Contact  [facility]/Voicemail       Clinical Data:   [facility]  Hospitalist Discharge Summary       Date of Admission:  1/30/2022  Date of Discharge:  2/3/2022  Discharge Diagnoses     Frequent falls secondary to impaired mobility and debility  Urinary tract infection, treated  Colitis, likely viral gastroenteritis  Transferred to the [facility] TCU  Unable to contact TCU   Care Coordinator Outreach  Outreach attempted x 1.  Left message on patient's voicemail with call back information and requested return call.  Plan: Care Coordinator will await a return call from the patient   [facility]   Vangie Fabian RN, Care Coordinator   [facility]'s   E-mail [email or web address]   [phone]

## 2022-03-10 ENCOUNTER — PATIENT OUTREACH (OUTPATIENT)
Dept: CARE COORDINATION | Facility: CLINIC | Age: 77
End: 2022-03-10
Payer: COMMERCIAL

## 2022-03-10 NOTE — PROGRESS NOTES
Clinic Care Coordination Contact    Situation: Patient chart reviewed by care coordinator.    Background:   Hospitalist Discharge Summary       Date of Admission:  1/30/2022  Date of Discharge:  2/4/2022  Discharging Provider: Terrell Gastelum MD     Discharge Service: Hospitalist Service        Discharge Diagnoses     Frequent falls secondary to impaired mobility and debility  Urinary tract infection, treated  Colitis, likely viral gastroenteritis      Transition to Facility:              Facility Name:   [facility] (Phone: [phone] Fax [phone])    Assessment: CC RN spoke to Rosy T[facility] RN and she report they had a family conference and the patient needs 24/7 care  Family is discussing a plan going forward LTC?  Plan/Recommendations: Rosy will call CC RN next week with an update     [facility]   Vangie Fabian RN, Care Coordinator   [facility]'s   E-mail [email or web address]   [phone]

## 2022-03-18 ENCOUNTER — PATIENT OUTREACH (OUTPATIENT)
Dept: CARE COORDINATION | Facility: CLINIC | Age: 77
End: 2022-03-18
Payer: COMMERCIAL

## 2022-03-18 NOTE — PROGRESS NOTES
Clinic Care Coordination Contact  Care Team Conversations    Incoming call from [place] [facility] [phone]  Left a message CC RN VM with an update.   Patient is now in their LTC area.    No further outreaches will be made     [facility]   Vangie Fabian RN, Care Coordinator   [facility]'s   E-mail [email or web address]   [phone]

## 2022-03-18 NOTE — TELEPHONE ENCOUNTER
Gabapentin      Last Written Prescription Date:  2/12/2020  Last Fill Quantity: 360,   # refills: 1  Last Office Visit: 5/28/2020-virtual visit  Future Office visit:       Routing refill request to provider for review/approval because:  Drug not on the [facility], [facility] or [facility] refill protocol or controlled substance     Cyclosporine Counseling:  I discussed with the patient the risks of cyclosporine including but not limited to hypertension, gingival hyperplasia,myelosuppression, immunosuppression, liver damage, kidney damage, neurotoxicity, lymphoma, and serious infections. The patient understands that monitoring is required including baseline blood pressure, CBC, CMP, lipid panel and uric acid, and then 1-2 times monthly CMP and blood pressure.

## 2022-03-22 ENCOUNTER — LAB REQUISITION (OUTPATIENT)
Dept: LAB | Facility: CLINIC | Age: 77
End: 2022-03-22

## 2022-03-22 DIAGNOSIS — Z79.899 OTHER LONG TERM (CURRENT) DRUG THERAPY: ICD-10-CM

## 2022-03-22 LAB
CHOLEST SERPL-MCNC: 143 MG/DL
FASTING STATUS PATIENT QL REPORTED: NORMAL
HBA1C MFR BLD: 5 % (ref 0–5.6)
HDLC SERPL-MCNC: 51 MG/DL
LDLC SERPL CALC-MCNC: 71 MG/DL
NONHDLC SERPL-MCNC: 92 MG/DL
TRIGL SERPL-MCNC: 103 MG/DL

## 2022-03-22 PROCEDURE — P9603 ONE-WAY ALLOW PRORATED MILES: HCPCS | Performed by: FAMILY MEDICINE

## 2022-03-22 PROCEDURE — 80061 LIPID PANEL: CPT | Performed by: FAMILY MEDICINE

## 2022-03-22 PROCEDURE — 36415 COLL VENOUS BLD VENIPUNCTURE: CPT | Performed by: FAMILY MEDICINE

## 2022-03-22 PROCEDURE — 83036 HEMOGLOBIN GLYCOSYLATED A1C: CPT | Performed by: FAMILY MEDICINE

## 2022-06-14 ENCOUNTER — APPOINTMENT (OUTPATIENT)
Dept: ULTRASOUND IMAGING | Facility: CLINIC | Age: 77
DRG: 293 | End: 2022-06-14
Attending: FAMILY MEDICINE
Payer: MEDICARE

## 2022-06-14 ENCOUNTER — APPOINTMENT (OUTPATIENT)
Dept: GENERAL RADIOLOGY | Facility: CLINIC | Age: 77
DRG: 293 | End: 2022-06-14
Attending: FAMILY MEDICINE
Payer: MEDICARE

## 2022-06-14 ENCOUNTER — HOSPITAL ENCOUNTER (INPATIENT)
Facility: CLINIC | Age: 77
LOS: 1 days | Discharge: NURSING, REHAB OR LONG-TERM CARE | DRG: 293 | End: 2022-06-16
Attending: FAMILY MEDICINE | Admitting: INTERNAL MEDICINE
Payer: MEDICARE

## 2022-06-14 DIAGNOSIS — R53.1 GENERALIZED WEAKNESS: ICD-10-CM

## 2022-06-14 DIAGNOSIS — N18.2 CKD (CHRONIC KIDNEY DISEASE) STAGE 2, GFR 60-89 ML/MIN: ICD-10-CM

## 2022-06-14 DIAGNOSIS — A41.9 SEPTIC SHOCK (H): ICD-10-CM

## 2022-06-14 DIAGNOSIS — R65.21 SEPTIC SHOCK (H): ICD-10-CM

## 2022-06-14 DIAGNOSIS — E86.0 DEHYDRATION: ICD-10-CM

## 2022-06-14 DIAGNOSIS — N18.2 TYPE 2 DIABETES MELLITUS WITH STAGE 2 CHRONIC KIDNEY DISEASE, WITHOUT LONG-TERM CURRENT USE OF INSULIN (H): ICD-10-CM

## 2022-06-14 DIAGNOSIS — E11.22 TYPE 2 DIABETES MELLITUS WITH CHRONIC KIDNEY DISEASE, WITHOUT LONG-TERM CURRENT USE OF INSULIN, UNSPECIFIED CKD STAGE (H): ICD-10-CM

## 2022-06-14 DIAGNOSIS — E11.22 TYPE 2 DIABETES MELLITUS WITH STAGE 2 CHRONIC KIDNEY DISEASE, WITHOUT LONG-TERM CURRENT USE OF INSULIN (H): ICD-10-CM

## 2022-06-14 DIAGNOSIS — M35.3 POLYMYALGIA RHEUMATICA (H): ICD-10-CM

## 2022-06-14 LAB
ALBUMIN SERPL-MCNC: 2.9 G/DL (ref 3.4–5)
ALBUMIN UR-MCNC: NEGATIVE MG/DL
ALP SERPL-CCNC: 44 U/L (ref 40–150)
ALT SERPL W P-5'-P-CCNC: 15 U/L (ref 0–50)
ANION GAP SERPL CALCULATED.3IONS-SCNC: 6 MMOL/L (ref 3–14)
APPEARANCE UR: CLEAR
AST SERPL W P-5'-P-CCNC: 19 U/L (ref 0–45)
BASE EXCESS BLDV CALC-SCNC: 2.7 MMOL/L (ref -7.7–1.9)
BASOPHILS # BLD AUTO: 0 10E3/UL (ref 0–0.2)
BASOPHILS NFR BLD AUTO: 0 %
BILIRUB SERPL-MCNC: 0.7 MG/DL (ref 0.2–1.3)
BILIRUB UR QL STRIP: NEGATIVE
BUN SERPL-MCNC: 21 MG/DL (ref 7–30)
CALCIUM SERPL-MCNC: 7.5 MG/DL (ref 8.5–10.1)
CHLORIDE BLD-SCNC: 105 MMOL/L (ref 94–109)
CO2 SERPL-SCNC: 27 MMOL/L (ref 20–32)
COLOR UR AUTO: YELLOW
CREAT SERPL-MCNC: 0.75 MG/DL (ref 0.52–1.04)
CRP SERPL-MCNC: 58.1 MG/L (ref 0–8)
D DIMER PPP FEU-MCNC: 2.54 UG/ML FEU (ref 0–0.5)
EOSINOPHIL # BLD AUTO: 0 10E3/UL (ref 0–0.7)
EOSINOPHIL NFR BLD AUTO: 0 %
ERYTHROCYTE [DISTWIDTH] IN BLOOD BY AUTOMATED COUNT: 13.6 % (ref 10–15)
FLUAV RNA SPEC QL NAA+PROBE: NEGATIVE
FLUBV RNA RESP QL NAA+PROBE: NEGATIVE
GFR SERPL CREATININE-BSD FRML MDRD: 82 ML/MIN/1.73M2
GLUCOSE BLD-MCNC: 113 MG/DL (ref 70–99)
GLUCOSE UR STRIP-MCNC: NEGATIVE MG/DL
HCO3 BLDV-SCNC: 28 MMOL/L (ref 21–28)
HCT VFR BLD AUTO: 33.4 % (ref 35–47)
HGB BLD-MCNC: 11.3 G/DL (ref 11.7–15.7)
HGB UR QL STRIP: ABNORMAL
HOLD SPECIMEN: NORMAL
HOLD SPECIMEN: NORMAL
IMM GRANULOCYTES # BLD: 0 10E3/UL
IMM GRANULOCYTES NFR BLD: 1 %
KETONES UR STRIP-MCNC: NEGATIVE MG/DL
LACTATE SERPL-SCNC: 0.7 MMOL/L (ref 0.7–2)
LACTATE SERPL-SCNC: 0.8 MMOL/L (ref 0.7–2)
LEUKOCYTE ESTERASE UR QL STRIP: NEGATIVE
LYMPHOCYTES # BLD AUTO: 1 10E3/UL (ref 0.8–5.3)
LYMPHOCYTES NFR BLD AUTO: 19 %
MCH RBC QN AUTO: 32 PG (ref 26.5–33)
MCHC RBC AUTO-ENTMCNC: 33.8 G/DL (ref 31.5–36.5)
MCV RBC AUTO: 95 FL (ref 78–100)
MONOCYTES # BLD AUTO: 0.3 10E3/UL (ref 0–1.3)
MONOCYTES NFR BLD AUTO: 5 %
MUCOUS THREADS #/AREA URNS LPF: PRESENT /LPF
NEUTROPHILS # BLD AUTO: 3.9 10E3/UL (ref 1.6–8.3)
NEUTROPHILS NFR BLD AUTO: 75 %
NITRATE UR QL: NEGATIVE
NRBC # BLD AUTO: 0 10E3/UL
NRBC BLD AUTO-RTO: 0 /100
NT-PROBNP SERPL-MCNC: 6330 PG/ML (ref 0–1800)
O2/TOTAL GAS SETTING VFR VENT: 21 %
PCO2 BLDV: 43 MM HG (ref 40–50)
PH BLDV: 7.41 [PH] (ref 7.32–7.43)
PH UR STRIP: 7 [PH] (ref 5–7)
PLATELET # BLD AUTO: 171 10E3/UL (ref 150–450)
PO2 BLDV: 47 MM HG (ref 25–47)
POTASSIUM BLD-SCNC: 3.7 MMOL/L (ref 3.4–5.3)
PROT SERPL-MCNC: 6 G/DL (ref 6.8–8.8)
RBC # BLD AUTO: 3.53 10E6/UL (ref 3.8–5.2)
RBC URINE: 6 /HPF
SARS-COV-2 RNA RESP QL NAA+PROBE: NEGATIVE
SODIUM SERPL-SCNC: 138 MMOL/L (ref 133–144)
SP GR UR STRIP: 1.01 (ref 1–1.03)
TROPONIN I SERPL HS-MCNC: 10 NG/L
UROBILINOGEN UR STRIP-MCNC: NORMAL MG/DL
WBC # BLD AUTO: 5.1 10E3/UL (ref 4–11)
WBC URINE: 0 /HPF

## 2022-06-14 PROCEDURE — 93971 EXTREMITY STUDY: CPT | Mod: LT

## 2022-06-14 PROCEDURE — 80053 COMPREHEN METABOLIC PANEL: CPT | Performed by: FAMILY MEDICINE

## 2022-06-14 PROCEDURE — 3E033XZ INTRODUCTION OF VASOPRESSOR INTO PERIPHERAL VEIN, PERCUTANEOUS APPROACH: ICD-10-PCS | Performed by: FAMILY MEDICINE

## 2022-06-14 PROCEDURE — 96367 TX/PROPH/DG ADDL SEQ IV INF: CPT | Performed by: FAMILY MEDICINE

## 2022-06-14 PROCEDURE — 85014 HEMATOCRIT: CPT | Performed by: FAMILY MEDICINE

## 2022-06-14 PROCEDURE — 93005 ELECTROCARDIOGRAM TRACING: CPT | Performed by: FAMILY MEDICINE

## 2022-06-14 PROCEDURE — 83880 ASSAY OF NATRIURETIC PEPTIDE: CPT | Performed by: FAMILY MEDICINE

## 2022-06-14 PROCEDURE — 84484 ASSAY OF TROPONIN QUANT: CPT | Performed by: FAMILY MEDICINE

## 2022-06-14 PROCEDURE — 99291 CRITICAL CARE FIRST HOUR: CPT | Mod: 25 | Performed by: FAMILY MEDICINE

## 2022-06-14 PROCEDURE — 96361 HYDRATE IV INFUSION ADD-ON: CPT | Performed by: FAMILY MEDICINE

## 2022-06-14 PROCEDURE — 83605 ASSAY OF LACTIC ACID: CPT | Performed by: FAMILY MEDICINE

## 2022-06-14 PROCEDURE — 96365 THER/PROPH/DIAG IV INF INIT: CPT | Performed by: FAMILY MEDICINE

## 2022-06-14 PROCEDURE — 82803 BLOOD GASES ANY COMBINATION: CPT | Performed by: FAMILY MEDICINE

## 2022-06-14 PROCEDURE — 258N000003 HC RX IP 258 OP 636: Performed by: FAMILY MEDICINE

## 2022-06-14 PROCEDURE — C9803 HOPD COVID-19 SPEC COLLECT: HCPCS | Performed by: FAMILY MEDICINE

## 2022-06-14 PROCEDURE — 71045 X-RAY EXAM CHEST 1 VIEW: CPT

## 2022-06-14 PROCEDURE — 36415 COLL VENOUS BLD VENIPUNCTURE: CPT | Performed by: FAMILY MEDICINE

## 2022-06-14 PROCEDURE — 87086 URINE CULTURE/COLONY COUNT: CPT | Performed by: FAMILY MEDICINE

## 2022-06-14 PROCEDURE — 93010 ELECTROCARDIOGRAM REPORT: CPT | Performed by: FAMILY MEDICINE

## 2022-06-14 PROCEDURE — 96366 THER/PROPH/DIAG IV INF ADDON: CPT | Performed by: FAMILY MEDICINE

## 2022-06-14 PROCEDURE — 85379 FIBRIN DEGRADATION QUANT: CPT | Performed by: FAMILY MEDICINE

## 2022-06-14 PROCEDURE — 81001 URINALYSIS AUTO W/SCOPE: CPT | Performed by: FAMILY MEDICINE

## 2022-06-14 PROCEDURE — 87636 SARSCOV2 & INF A&B AMP PRB: CPT | Performed by: FAMILY MEDICINE

## 2022-06-14 PROCEDURE — 250N000011 HC RX IP 250 OP 636: Performed by: FAMILY MEDICINE

## 2022-06-14 PROCEDURE — 87040 BLOOD CULTURE FOR BACTERIA: CPT | Performed by: FAMILY MEDICINE

## 2022-06-14 PROCEDURE — 250N000009 HC RX 250: Performed by: FAMILY MEDICINE

## 2022-06-14 PROCEDURE — 86140 C-REACTIVE PROTEIN: CPT | Performed by: FAMILY MEDICINE

## 2022-06-14 RX ORDER — ROPIVACAINE IN 0.9% SOD CHL/PF 0.1 %
.03-.125 PLASTIC BAG, INJECTION (ML) EPIDURAL CONTINUOUS
Status: DISCONTINUED | OUTPATIENT
Start: 2022-06-14 | End: 2022-06-15

## 2022-06-14 RX ORDER — NOREPINEPHRINE BITARTRATE 0.02 MG/ML
.01-.6 INJECTION, SOLUTION INTRAVENOUS CONTINUOUS
Status: DISCONTINUED | OUTPATIENT
Start: 2022-06-14 | End: 2022-06-14

## 2022-06-14 RX ORDER — SODIUM CHLORIDE 9 MG/ML
INJECTION, SOLUTION INTRAVENOUS CONTINUOUS
Status: DISCONTINUED | OUTPATIENT
Start: 2022-06-14 | End: 2022-06-16

## 2022-06-14 RX ADMIN — TAZOBACTAM SODIUM AND PIPERACILLIN SODIUM 3.38 G: 375; 3 INJECTION, SOLUTION INTRAVENOUS at 23:12

## 2022-06-14 RX ADMIN — SODIUM CHLORIDE: 9 INJECTION, SOLUTION INTRAVENOUS at 23:12

## 2022-06-14 RX ADMIN — Medication 0.03 MCG/KG/MIN: at 23:41

## 2022-06-14 RX ADMIN — SODIUM CHLORIDE 1000 ML: 9 INJECTION, SOLUTION INTRAVENOUS at 22:06

## 2022-06-15 PROBLEM — R57.9 SHOCK CIRCULATORY (H): Status: ACTIVE | Noted: 2022-06-15

## 2022-06-15 LAB
ANION GAP SERPL CALCULATED.3IONS-SCNC: 4 MMOL/L (ref 3–14)
BUN SERPL-MCNC: 17 MG/DL (ref 7–30)
CALCIUM SERPL-MCNC: 7.2 MG/DL (ref 8.5–10.1)
CHLORIDE BLD-SCNC: 108 MMOL/L (ref 94–109)
CO2 SERPL-SCNC: 26 MMOL/L (ref 20–32)
CREAT SERPL-MCNC: 0.69 MG/DL (ref 0.52–1.04)
ERYTHROCYTE [DISTWIDTH] IN BLOOD BY AUTOMATED COUNT: 13.6 % (ref 10–15)
GFR SERPL CREATININE-BSD FRML MDRD: 89 ML/MIN/1.73M2
GLUCOSE BLD-MCNC: 98 MG/DL (ref 70–99)
GLUCOSE BLDC GLUCOMTR-MCNC: 105 MG/DL (ref 70–99)
GLUCOSE BLDC GLUCOMTR-MCNC: 116 MG/DL (ref 70–99)
GLUCOSE BLDC GLUCOMTR-MCNC: 90 MG/DL (ref 70–99)
GLUCOSE BLDC GLUCOMTR-MCNC: 97 MG/DL (ref 70–99)
GLUCOSE BLDC GLUCOMTR-MCNC: 98 MG/DL (ref 70–99)
HCT VFR BLD AUTO: 30.2 % (ref 35–47)
HGB BLD-MCNC: 10.3 G/DL (ref 11.7–15.7)
MCH RBC QN AUTO: 32.4 PG (ref 26.5–33)
MCHC RBC AUTO-ENTMCNC: 34.1 G/DL (ref 31.5–36.5)
MCV RBC AUTO: 95 FL (ref 78–100)
PLATELET # BLD AUTO: 144 10E3/UL (ref 150–450)
POTASSIUM BLD-SCNC: 3.5 MMOL/L (ref 3.4–5.3)
RBC # BLD AUTO: 3.18 10E6/UL (ref 3.8–5.2)
SODIUM SERPL-SCNC: 138 MMOL/L (ref 133–144)
WBC # BLD AUTO: 4.4 10E3/UL (ref 4–11)

## 2022-06-15 PROCEDURE — 250N000013 HC RX MED GY IP 250 OP 250 PS 637: Performed by: INTERNAL MEDICINE

## 2022-06-15 PROCEDURE — 80048 BASIC METABOLIC PNL TOTAL CA: CPT | Performed by: INTERNAL MEDICINE

## 2022-06-15 PROCEDURE — 36415 COLL VENOUS BLD VENIPUNCTURE: CPT | Performed by: INTERNAL MEDICINE

## 2022-06-15 PROCEDURE — 99207 PR APP CREDIT; MD BILLING SHARED VISIT: CPT | Performed by: HOSPITALIST

## 2022-06-15 PROCEDURE — 120N000001 HC R&B MED SURG/OB

## 2022-06-15 PROCEDURE — 258N000003 HC RX IP 258 OP 636: Performed by: FAMILY MEDICINE

## 2022-06-15 PROCEDURE — 250N000009 HC RX 250: Performed by: INTERNAL MEDICINE

## 2022-06-15 PROCEDURE — 258N000003 HC RX IP 258 OP 636: Performed by: INTERNAL MEDICINE

## 2022-06-15 PROCEDURE — 99223 1ST HOSP IP/OBS HIGH 75: CPT | Mod: AI | Performed by: INTERNAL MEDICINE

## 2022-06-15 PROCEDURE — 85014 HEMATOCRIT: CPT | Performed by: INTERNAL MEDICINE

## 2022-06-15 PROCEDURE — 250N000011 HC RX IP 250 OP 636: Performed by: INTERNAL MEDICINE

## 2022-06-15 PROCEDURE — 250N000011 HC RX IP 250 OP 636: Performed by: FAMILY MEDICINE

## 2022-06-15 RX ORDER — ASPIRIN 81 MG/1
81 TABLET ORAL DAILY
Status: DISCONTINUED | OUTPATIENT
Start: 2022-06-15 | End: 2022-06-16 | Stop reason: HOSPADM

## 2022-06-15 RX ORDER — ROPIVACAINE IN 0.9% SOD CHL/PF 0.1 %
.01-.125 PLASTIC BAG, INJECTION (ML) EPIDURAL CONTINUOUS
Status: DISCONTINUED | OUTPATIENT
Start: 2022-06-15 | End: 2022-06-15

## 2022-06-15 RX ORDER — MONTELUKAST SODIUM 4 MG/1
1 TABLET, CHEWABLE ORAL DAILY PRN
Status: DISCONTINUED | OUTPATIENT
Start: 2022-06-15 | End: 2022-06-16 | Stop reason: HOSPADM

## 2022-06-15 RX ORDER — ACETAMINOPHEN 325 MG/1
650 TABLET ORAL EVERY 4 HOURS PRN
Status: DISCONTINUED | OUTPATIENT
Start: 2022-06-15 | End: 2022-06-16 | Stop reason: HOSPADM

## 2022-06-15 RX ORDER — CEFAZOLIN SODIUM 1 G/50ML
1250 SOLUTION INTRAVENOUS EVERY 24 HOURS
Status: DISCONTINUED | OUTPATIENT
Start: 2022-06-15 | End: 2022-06-15

## 2022-06-15 RX ORDER — ROPINIROLE 0.25 MG/1
0.25 TABLET, FILM COATED ORAL
COMMUNITY
Start: 2022-06-10

## 2022-06-15 RX ORDER — ONDANSETRON 4 MG/1
4 TABLET, ORALLY DISINTEGRATING ORAL EVERY 6 HOURS PRN
Status: DISCONTINUED | OUTPATIENT
Start: 2022-06-15 | End: 2022-06-16 | Stop reason: HOSPADM

## 2022-06-15 RX ORDER — MONTELUKAST SODIUM 4 MG/1
1 TABLET, CHEWABLE ORAL DAILY PRN
COMMUNITY
Start: 2022-06-13

## 2022-06-15 RX ORDER — ENOXAPARIN SODIUM 100 MG/ML
40 INJECTION SUBCUTANEOUS EVERY 24 HOURS
Status: DISCONTINUED | OUTPATIENT
Start: 2022-06-15 | End: 2022-06-16 | Stop reason: HOSPADM

## 2022-06-15 RX ORDER — LOPERAMIDE HCL 2 MG
2 CAPSULE ORAL 4 TIMES DAILY PRN
Status: DISCONTINUED | OUTPATIENT
Start: 2022-06-15 | End: 2022-06-16 | Stop reason: HOSPADM

## 2022-06-15 RX ORDER — EMOLLIENT BASE
CREAM (GRAM) TOPICAL
COMMUNITY
Start: 2022-02-14

## 2022-06-15 RX ORDER — PHENOL 1.4 %
10 AEROSOL, SPRAY (ML) MUCOUS MEMBRANE
COMMUNITY

## 2022-06-15 RX ORDER — DEXTROSE MONOHYDRATE 25 G/50ML
25-50 INJECTION, SOLUTION INTRAVENOUS
Status: DISCONTINUED | OUTPATIENT
Start: 2022-06-15 | End: 2022-06-16 | Stop reason: HOSPADM

## 2022-06-15 RX ORDER — NICOTINE POLACRILEX 4 MG
15-30 LOZENGE BUCCAL
Status: DISCONTINUED | OUTPATIENT
Start: 2022-06-15 | End: 2022-06-16 | Stop reason: HOSPADM

## 2022-06-15 RX ORDER — ONDANSETRON 2 MG/ML
4 INJECTION INTRAMUSCULAR; INTRAVENOUS EVERY 6 HOURS PRN
Status: DISCONTINUED | OUTPATIENT
Start: 2022-06-15 | End: 2022-06-16 | Stop reason: HOSPADM

## 2022-06-15 RX ORDER — NOREPINEPHRINE BITARTRATE 0.02 MG/ML
.01-.6 INJECTION, SOLUTION INTRAVENOUS CONTINUOUS
Status: DISCONTINUED | OUTPATIENT
Start: 2022-06-15 | End: 2022-06-15

## 2022-06-15 RX ORDER — GABAPENTIN 400 MG/1
1200 CAPSULE ORAL 3 TIMES DAILY
Status: DISCONTINUED | OUTPATIENT
Start: 2022-06-15 | End: 2022-06-16 | Stop reason: HOSPADM

## 2022-06-15 RX ADMIN — Medication 0.01 MCG/KG/MIN: at 03:35

## 2022-06-15 RX ADMIN — GABAPENTIN 1200 MG: 400 CAPSULE ORAL at 20:16

## 2022-06-15 RX ADMIN — GABAPENTIN 1200 MG: 400 CAPSULE ORAL at 14:06

## 2022-06-15 RX ADMIN — ENOXAPARIN SODIUM 40 MG: 40 INJECTION SUBCUTANEOUS at 09:26

## 2022-06-15 RX ADMIN — SODIUM CHLORIDE: 9 INJECTION, SOLUTION INTRAVENOUS at 09:20

## 2022-06-15 RX ADMIN — GABAPENTIN 1200 MG: 400 CAPSULE ORAL at 09:25

## 2022-06-15 RX ADMIN — LOPERAMIDE HYDROCHLORIDE 2 MG: 2 CAPSULE ORAL at 11:41

## 2022-06-15 RX ADMIN — SODIUM CHLORIDE: 9 INJECTION, SOLUTION INTRAVENOUS at 18:01

## 2022-06-15 RX ADMIN — TAZOBACTAM SODIUM AND PIPERACILLIN SODIUM 3.38 G: 375; 3 INJECTION, SOLUTION INTRAVENOUS at 06:28

## 2022-06-15 RX ADMIN — ASPIRIN 81 MG: 81 TABLET, COATED ORAL at 09:25

## 2022-06-15 RX ADMIN — VANCOMYCIN HYDROCHLORIDE 1250 MG: 1 INJECTION, POWDER, LYOPHILIZED, FOR SOLUTION INTRAVENOUS at 02:55

## 2022-06-15 ASSESSMENT — ENCOUNTER SYMPTOMS
HEADACHES: 0
NECK STIFFNESS: 0
ABDOMINAL PAIN: 0
HEMATOLOGIC/LYMPHATIC NEGATIVE: 1
FATIGUE: 1
JOINT SWELLING: 1
ALLERGIC/IMMUNOLOGIC NEGATIVE: 1
CONFUSION: 1
WHEEZING: 0
EYES NEGATIVE: 1
COUGH: 0
ENDOCRINE NEGATIVE: 1
SHORTNESS OF BREATH: 0
DYSURIA: 0
PALPITATIONS: 0
DIZZINESS: 0
FEVER: 1
CHEST TIGHTNESS: 0
NECK PAIN: 0
FACIAL ASYMMETRY: 0

## 2022-06-15 ASSESSMENT — ACTIVITIES OF DAILY LIVING (ADL)
HEARING_DIFFICULTY_OR_DEAF: NO
EQUIPMENT_CURRENTLY_USED_AT_HOME: WALKER, ROLLING
TOILETING: 1-->ASSISTANCE (EQUIPMENT/PERSON) NEEDED (NOT DEVELOPMENTALLY APPROPRIATE)
ADLS_ACUITY_SCORE: 45
ADLS_ACUITY_SCORE: 45
WEAR_GLASSES_OR_BLIND: NO
ADLS_ACUITY_SCORE: 49
DOING_ERRANDS_INDEPENDENTLY_DIFFICULTY: YES
TOILETING: 1-->ASSISTANCE (EQUIPMENT/PERSON) NEEDED
TRANSFERRING: 1-->ASSISTANCE (EQUIPMENT/PERSON) NEEDED (NOT DEVELOPMENTALLY APPROPRIATE)
ADLS_ACUITY_SCORE: 49
ADLS_ACUITY_SCORE: 49
WALKING_OR_CLIMBING_STAIRS_DIFFICULTY: YES
TOILETING_ISSUES: YES
BATHING: 1-->ASSISTANCE NEEDED
ADLS_ACUITY_SCORE: 35
ADLS_ACUITY_SCORE: 45
CONCENTRATING,_REMEMBERING_OR_MAKING_DECISIONS_DIFFICULTY: YES
DIFFICULTY_COMMUNICATING: YES
ADLS_ACUITY_SCORE: 49
DIFFICULTY_EATING/SWALLOWING: NO
ADLS_ACUITY_SCORE: 37
WALKING_OR_CLIMBING_STAIRS: AMBULATION DIFFICULTY, REQUIRES EQUIPMENT
DEPENDENT_IADLS:: CLEANING;COOKING;LAUNDRY;SHOPPING;MEAL PREPARATION;MEDICATION MANAGEMENT;MONEY MANAGEMENT;TRANSPORTATION
DRESS: 1-->ASSISTANCE (EQUIPMENT/PERSON) NEEDED
TRANSFERRING: 1-->ASSISTANCE (EQUIPMENT/PERSON) NEEDED
TOILETING_ASSISTANCE: TOILETING DIFFICULTY, ASSISTANCE 1 PERSON
FALL_HISTORY_WITHIN_LAST_SIX_MONTHS: NO
DRESS: 1-->ASSISTANCE (EQUIPMENT/PERSON) NEEDED (NOT DEVELOPMENTALLY APPROPRIATE)
ADLS_ACUITY_SCORE: 49
DRESSING/BATHING_DIFFICULTY: YES
CHANGE_IN_FUNCTIONAL_STATUS_SINCE_ONSET_OF_CURRENT_ILLNESS/INJURY: YES

## 2022-06-15 NOTE — CONSULTS
Care Management Initial Consult    General Information  Assessment completed with: Family, Daughter- Dania  Type of CM/SW Visit: Initial Assessment    Primary Care Provider verified and updated as needed: Yes   Readmission within the last 30 days: no previous admission in last 30 days      Reason for Consult: discharge planning  Advance Care Planning: Advance Care Planning Reviewed: present on chart          Communication Assessment  Patient's communication style: spoken language (English or Bilingual)    Hearing Difficulty or Deaf: no   Wear Glasses or Blind: no    Cognitive  Cognitive/Neuro/Behavioral: WDL  Level of Consciousness: alert  Arousal Level: opens eyes spontaneously  Orientation: disoriented to, time, situation  Mood/Behavior: calm     Speech: clear, slow    Living Environment:   People in home: facility resident     Current living Arrangements: extended care facility  Name of Facility: [facility]   Able to return to prior arrangements: yes       Family/Social Support:  Care provided by: other (see comments) (Nursing Home Staff)  Provides care for: no one  Marital Status:   Children, Facility resident(s)/Staff          Description of Support System: Supportive, Involved    Support Assessment: Adequate family and caregiver support, Adequate social supports    Current Resources:   Patient receiving home care services: No     Community Resources: Skilled Nursing Facility  Equipment currently used at home: walker, rolling  Supplies currently used at home:      Employment/Financial:  Employment Status: retired        Financial Concerns: No concerns identified           Lifestyle & Psychosocial Needs:  Social Determinants of Health     Tobacco Use: High Risk     Smoking Tobacco Use: Current Every Day Smoker     Smokeless Tobacco Use: Never Used   Alcohol Use: Not on file   Financial Resource Strain: Not on file   Food Insecurity: Not on file   Transportation Needs: Not on file   Physical  Activity: Not on file   Stress: Not on file   Social Connections: Not on file   Intimate Partner Violence: Not on file   Depression: Not at risk     PHQ-2 Score: 0   Housing Stability: Not on file       Functional Status:  Prior to admission patient needed assistance:   Dependent ADLs:: Ambulation-walker, Bathing, Dressing, Grooming, Transfers  Dependent IADLs:: Cleaning, Cooking, Laundry, Shopping, Meal Preparation, Medication Management, Money Management, Transportation       Mental Health Status:  Mental Health Status: No Current Concerns       Chemical Dependency Status:  Chemical Dependency Status: No Current Concerns             Values/Beliefs:  Spiritual, Cultural Beliefs, [employer] Practices, Values that affect care:            Values/Beliefs Comment: Unknown    Additional Information:  Care Management has been consulted for discharge planning.  Writer met with daughter, Dania, in the patient room.  Patient was sleeping.  Discussed that patient lives in long term care at [facility] in [place].  Patient has lived there since February.  Daughter, Dania, stated that she had just spoken with Samantha in admissions at [facility].  Family would prefer that patient live at that facility, as patient and family are from the [facility]. Discussed that writer could make a referral to [facility] and daughter was in agreement.      Discussed transportation options with the daughter.  It is anticipated that patient will need wheelchair/agency transport.  Discussed private cost for this and daughter is ok with private costs.  Will need to get final cost from the transport company on day of discharge.      Writer called and spoke with Samantha in admissions at [facility]. Writer sent the referral and she is reviewing patient's information.  Discussed that the provider today has indicated that patient may be ready for discharge tomorrow.  She stated understanding of this.  Will await word as to  whether patient can be accepted into long term care at [facility] or if patient will be returning to [place] at [facility].      DEEPTHI Gleason  [facility]   [phone]

## 2022-06-15 NOTE — PROGRESS NOTES
Pt refusing turn and repo.  Whenever this RN turns her on her L side, she returns to R side immediately after.  Skin issues documented.  Pt does not seem to have capacity to be educated on the potential risks of refusing turns due to pt's baseline dementia.

## 2022-06-15 NOTE — ED TRIAGE NOTES
Pt presents with weakness and lethargy.Fever .  Pt is resident at [facility]. Pt was their for rehab due to urosepsis in the past. Pt had covid booster yesterday. Weakness started this morning. Daughter Dania in car.      Triage Assessment     Row Name 06/14/22 2049       Triage Assessment (Adult)    Airway WDL WDL       Respiratory WDL    Respiratory WDL WDL       Skin Circulation/Temperature WDL    Skin Circulation/Temperature WDL WDL       Cardiac WDL    Cardiac WDL WDL       Peripheral/Neurovascular WDL    Peripheral Neurovascular WDL WDL       Cognitive/Neuro/Behavioral WDL    Cognitive/Neuro/Behavioral WDL X  lethargic        Pupils (CN II)    Pupil PERRLA yes    Pupil Size Left 3 mm    Pupil Size Right 3 mm       [place] Coma Scale    Best Eye Response 4-->(E4) spontaneous    Best Motor Response 6-->(M6) obeys commands    Best Verbal Response 4-->(V4) confused    Shira Coma Scale Score 14

## 2022-06-15 NOTE — ED NOTES
Bed: ED08  Expected date:   Expected time:   Means of arrival:   Comments:  EMS  76yr old lethargic

## 2022-06-15 NOTE — PROGRESS NOTES
"Admitted/transferred from: ED  Reason for admission/transfer: Sepsis, hypotention  Result of skin assessment and interventions/actions: Turns, specialty mattress  Height, weight, drug calc weight: Done  Patient belongings (see Flowsheet)  MDRO education added to care planNo.      Pt arrives to Unit receiving 0.03/kg/hr NorEpi drip.  Answerers some questions, pt is lethargic.    /76   Pulse 75   Temp 98.5  F (36.9  C)   Resp 16   Ht 1.651 m (5' 5\")   Wt 59.2 kg (130 lb 8 oz)   LMP  (LMP Unknown)   SpO2 98%   BMI 21.72 kg/m      ?    "

## 2022-06-15 NOTE — PHARMACY-VANCOMYCIN DOSING SERVICE
"Pharmacy Vancomycin Initial Note  Date of Service Christine 15, 2022  Patient's  1945  76 year old, female    Indication: Sepsis    Current estimated CrCl = Estimated Creatinine Clearance: 58.7 mL/min (based on SCr of 0.75 mg/dL).    Creatinine for last 3 days  2022:  9:10 PM Creatinine 0.75 mg/dL    Recent Vancomycin Level(s) for last 3 days  No results found for requested labs within last 72 hours.      Vancomycin IV Administrations (past 72 hours)      No vancomycin orders with administrations in past 72 hours.                Nephrotoxins and other renal medications (From now, onward)    Start     Dose/Rate Route Frequency Ordered Stop    06/15/22 0300  vancomycin (VANCOCIN) 1,250 mg in sodium chloride 0.9 % 250 mL intermittent infusion         1,250 mg  over 90 Minutes Intravenous EVERY 24 HOURS 06/15/22 0213      06/15/22 0200  norepinephrine (LEVOPHED) 4 mg in  mL infusion PREMIX         0.01-0.6 mcg/kg/min × 69.9 kg  2.6-157.3 mL/hr  Intravenous CONTINUOUS 06/15/22 0148      22 2335  norepinephrine (LEVOPHED) 4 mg in  mL PERIPHERAL infusion         0.03-0.125 mcg/kg/min × 69.9 kg  7.9-32.8 mL/hr  Intravenous CONTINUOUS 22 2330      22 2330  piperacillin-tazobactam (ZOSYN) infusion 3.375 g        Note to Pharmacy: For [facility], [facility] and [facility]: For Zosyn-naive patients, use the \"Zosyn initial dose + extended infusion\" order panel.    3.375 g  100 mL/hr over 30 Minutes Intravenous EVERY 6 HOURS 22 2304            Contrast Orders - past 72 hours (72h ago, onward)    None          Insight[employer] Prediction of Planned Initial Vancomycin Regimen  Loading dose: N/A  Regimen: 1250 mg IV every 24 hours.  Start time: 02:13 on 06/15/2022  Exposure target: AUC24 (range)400-600 mg/L.hr   AUC24,ss: 462 mg/L.hr  Probability of AUC24 > 400: 67 %  Ctrough,ss: 12.8 mg/L  Probability of Ctrough,ss > 20: 12 %  Probability of nephrotoxicity (Lodise ANIYA 2009): 8 %        Plan:  1. Start vancomycin  " 1250 mg IV q24h.   2. Vancomycin monitoring method: AUC  3. Vancomycin therapeutic monitoring goal: 400-600 mg*h/L  4. Pharmacy will check vancomycin levels as appropriate in 1-3 Days.    5. Serum creatinine levels will be ordered daily for the first week of therapy and at least twice weekly for subsequent weeks.      John Lindsey RPH

## 2022-06-15 NOTE — H&P
[facility]    History and Physical - Hospitalist Service       Date of Admission:  6/14/2022    Assessment & Plan    hypotension  -possible SIRS reaction to recent vaccination; no obvious infectious source  -2L NS + maintenance fluids given; pt was on levophed for a short time, now off with good BP  -blood and urine cultures pending  -will cont Vanco and Zosyn for now pending cultures  -lactic acid 0.8      Left arm pain/swelling  -Doppler-neg DVT  -presume vaccine was given in this arm    DM  -last A1C 5  -hold Metformin; ISS for now    Neuropathy  -cont gabapentin    Dementia  -pt resides at [facility]    Anemia  -stable    Diet: Combination Diet Moderate Consistent Carb (60 g CHO per Meal) Diet    DVT Prophylaxis: lovenox  Shaver Catheter: PRESENT, indication: Strict 1-2 Hour I&O  Central Lines: None  Code Status: Full Code      Clinically Significant Risk Factors Present on Admission       # Platelet Defect: home medication list includes an antiplatelet medication  # Circulatory Shock: currently requiring pressors for blood pressure support       Disposition Plan   Expected Discharge: > 2 days     The patient's care was discussed with the patient.        ALEJANDRA CHILDRESS MD  [facility]  Securely message with the Vocera Web Console (learn more here)  Text page via [facility] Paging/Directory      Visit/Communication Style   Virtual (Video) communication was used to evaluate Tracy.  Tracy consented to the use of video communication: yes  Video START time: 0610 6/15/2022  Video STOP time: 0620  , 6/15/2022   Patient's location: [facility]   Provider's location during the visit: [facility] Tele-medicine site        ______________________________________________________________________    Chief Complaint   AMS    History of Present Illness    The patient cannot provide a reliable history.  The history is obtained from the ED  provider and medical record.    76yoF with DM, CKD, polymylagia rheumatica, UC, and dementia presented to the ED with weakness and altered mental status for one day.  The patient's daughter stated she was unresponsive at the nursing home yesterday prior to transfer.  She received a COVID vaccine the day prior and has not felt well since then.  She did not eat or drink much today.  The patient reports some pain and swelling in her left arm/hand.      In the ED she was noted to be hypotensive (as low as 60/30).  She received 2L NS bolus and was started on levophed.  Her mental status improved to baseline per ED provider and daughter.      Infectious workup was ordered and she was started on antibiotics empirically for possible septic shock.    Review of Systems    General: negative for fever, chills, sweats,   Eyes: negative for blurred vision, loss of vision  Ear Nose and Throat: negative for pharyngitis, speech or swallowing difficulties  Respiratory:  negative for sputum production, wheezing, HARRIS, pleuritic pain, sob or cough  Cardiology:  negative for chest pain, palpitations, orthopnea, PND, edema, syncope   Gastrointestinal: negative for abdominal pain, nausea, vomiting, diarrhea, constipation, hematemesis, melena or hematochezia  Genitourinary: negative for frequency, urgency, dysuria, hematuria   Neurological: negative for focal weakness, paresthesia    Past Medical History    I have reviewed this patient's medical history and updated it with pertinent information if needed.   Past Medical History:   Diagnosis Date     CKD (chronic kidney disease) stage 2, GFR 60-89 ml/min 10/19/2015     Headache(784.0)     hx.of Migraines     Lumbago      Type II or unspecified type diabetes mellitus without mention of complication, not stated as uncontrolled      Unspecified essential hypertension        Past Surgical History   I have reviewed this patient's surgical history and updated it with pertinent information if  needed.  Past Surgical History:   Procedure Laterality Date     ARTHROPLASTY HIP Right 10/16/2018    Procedure: right total hip arthroplasty;  Surgeon: Terrell Ervin DO;  Location: [facility] OR     DISCECTOMY, FUSION CERVICAL ANTERIOR ONE LEVEL, COMBINED N/A 6/19/2019    Procedure: cervical 5-6 anterior cervical discectomy fusion;  Surgeon: Joe Jefferson MD;  Location: [facility] OR     HC LAPAROSCOPY, SURGICAL; CHOLECYSTECTOMY  2000    Cholecystectomy, Laparoscopic     HC REMOVE TONSILS/ADENOIDS,<11 Y/O      T & A <11y.o.     INJECT EPIDURAL CERVICAL N/A 5/17/2019    Procedure: Epidural Steroid Injection Bilateral Cervical 6-7;  Surgeon: Carlos Avendaño MD;  Location: [facility] OR     INJECT EPIDURAL CERVICAL Bilateral 12/20/2019    Procedure: Cervical Epidural Injection 6-7 bilateral;  Surgeon: Carlos Avendaño MD;  Location: [facility] OR     ZZC LIGATE FALLOPIAN TUBE         Social History   I have reviewed this patient's social history and updated it with pertinent information if needed.  Social History     Tobacco Use     Smoking status: Current Every Day Smoker     Packs/day: 0.25     Years: 20.00     Pack years: 5.00     Types: Cigarettes     Smokeless tobacco: Never Used     Tobacco comment: 4-5 per day   Substance Use Topics     Alcohol use: Not Currently     Drug use: No       Family History   I have reviewed this patient's family history and updated it with pertinent information if needed.  Family History   Problem Relation Age of Onset     Heart Disease Mother         Pacemaker     Alzheimer Disease Father         Dementia/Loss of memory     Cerebrovascular Disease Maternal Grandmother      Heart Disease Maternal Grandfather         MI     Diabetes Paternal Grandmother        Prior to Admission Medications   Prior to Admission Medications   Prescriptions Last Dose Informant Patient Reported? Taking?   ACCU-CHEK GUIDE test strip  Self No Yes   Sig: USE TO TEST BLOOD SUGARS TWO TIMES A DAY OR AS DIRECTED   BIOFREEZE  COLORLESS 4 % GEL 6/13/2022  Yes Yes   Sig: APPLY TO AFFECTED AREAS 2 TIMES DAILY; APPLY 3 TIMES DAILY AS NEEDED   Melatonin 10 MG TABS tablet 6/14/2022 at HS  Yes Yes   Sig: Take 10 mg by mouth nightly as needed for sleep   acetaminophen (TYLENOL) 500 MG tablet 6/14/2022 at Unknown time  No Yes   Sig: Take 2 tablets (1,000 mg) by mouth 3 times daily   aspirin (ASA) 81 MG tablet 6/14/2022 at 0800 Self No Yes   Sig: Take 1 tablet (81 mg) by mouth daily   colestipol (COLESTID) 1 g tablet 6/14/2022 at Unknown time  Yes Yes   Sig: Take 1 g by mouth daily as needed for diarrhea   emollient (VANICREAM) external cream   Yes Yes   Sig: APPLY TO AFFECTED AREA(S) TWICE DAILY; APPLY TWICE DAILY AS NEEDED   gabapentin (NEURONTIN) 600 MG tablet 6/14/2022 at Unknown time  No Yes   Sig: TAKE TWO TABLETS BY MOUTH THREE TIMES A DAY   loperamide (IMODIUM) 2 MG capsule 6/14/2022 at Unknown time  No Yes   Sig: Take 1 capsule (2 mg) by mouth 4 times daily as needed for diarrhea   metFORMIN (GLUCOPHAGE) 500 MG tablet 6/13/2022 at 0800  No Yes   Sig: Take 1 tablet (500 mg) by mouth 2 times daily (with meals)   Patient taking differently: Take 500 mg by mouth daily (with breakfast)   rOPINIRole (REQUIP) 0.25 MG tablet 6/14/2022 at HS  Yes Yes   Sig: Take 0.25 mg by mouth nightly as needed      Facility-Administered Medications: None     Allergies   Allergies   Allergen Reactions     Mold      sneezing, coughing , runny nose, watery eyes & red,     No Known Drug Allergies        Physical Exam   Vital Signs: Temp: 98.3  F (36.8  C) Temp src: Oral BP: 114/58 Pulse: 70   Resp: 18 SpO2: 94 % O2 Device: None (Room air) Oxygen Delivery: 2 LPM  Weight: 130 lbs 8 oz    Gen:  in no acute distress, lying semi-supine in hospital stretcher  HEENT:  Anicteric sclera, PER, hearing intact to voice  Resp:  No accessory muscle use, breath sounds clear; no wheezes no rales no rhonchi  Card:  No murmur, normal S1, S2   Abd:  Soft per RN exam, no TTP,  non-distended, normoactive bowel sounds are present  Musc:  Normal strength and movement of the major muscle groups without obvious deformity  Psych:   not anxious, not agitated  Extr: left hand/forearm swelling, no tenderness    Data     Recent Labs   Lab 06/15/22  0308 06/14/22  2110   WBC  --  5.1   HGB  --  11.3*   MCV  --  95   PLT  --  171   NA  --  138   POTASSIUM  --  3.7   CHLORIDE  --  105   CO2  --  27   BUN  --  21   CR  --  0.75   ANIONGAP  --  6   BINH  --  7.5*   * 113*   ALBUMIN  --  2.9*   PROTTOTAL  --  6.0*   BILITOTAL  --  0.7   ALKPHOS  --  44   ALT  --  15   AST  --  19         Recent Results (from the past 24 hour(s))   XR Chest Port 1 View    Narrative    EXAM: XR CHEST PORT 1 VIEW  LOCATION: [facility]  DATE/TIME: 6/14/2022 10:43 PM    INDICATION: Weakness, hypotension  COMPARISON: None.      Impression    IMPRESSION: Heart size and pulmonary vessels normal. Minimal interstitial prominence, no focal pneumonia or pleural effusion evident.  High-grade DJD involving shoulders.   US Upper Extremity Venous Duplex Left    Narrative    EXAM: US UPPER EXTREMITY VENOUS DUPLEX LEFT  LOCATION: [facility]  DATE/TIME: 6/14/2022 11:39 PM    INDICATION: Left arm swelling. Elevated d-dimer.  COMPARISON: None.  TECHNIQUE: Venous Duplex ultrasound of the left upper extremity with (when possible) and without compression, augmentation, and duplex. Color flow and spectral Doppler with waveform analysis performed.    FINDINGS: Ultrasound includes evaluation of the internal jugular vein, innominate vein, subclavian vein, axillary vein, and brachial vein. The superficial cephalic and basilic veins were also evaluated where seen. Portions of the axillary and basilic   veins were not visualized due to difficulty with patient mobility.    LEFT: No deep venous thrombosis. No superficial thrombophlebitis. Small soft tissue fluid collection in the  anterior shoulder measuring 2.4 x 1.8 x 1.2 cm.      Impression    IMPRESSION:   1.  No deep venous thrombosis in the left upper extremity.

## 2022-06-15 NOTE — ED NOTES
ED Nursing criteria listed below was addressed during verbal handoff:     Abnormal vitals: Yes, Patient has been hypotensive, slight fevers.   Abnormal results: No  Med Reconciliation completed: Yes  Meds given in ED: Yes  Any Overdue Meds: N/A  Core Measures: Yes, 2 BC drawn  Isolation: N/A  Special needs: Yes, fall risk, serious skin risks   Skin assessment: Yes. Patient has multiple areas of concern including bilateral heels and feet, perineum, and hips. She srinivasan not like to be turned or moved. I had her on the right side (her preferred side) for the first 2 hours of the visit here tonight then rolled her to the left side. She refuses to be on her back at all. She has swelling and tenderness to the left arm. No redness noted. Pallavi area has significant yeast, attempted to clean as much of it as I could while placing her jules however she did not want to spread her legs at all to let me clean. She has a brief on but is usually continent of stool. Inner knees were red but blanchable prior to having a pillow placed between them.    Observation Patient  Education provided: N/A

## 2022-06-15 NOTE — PROGRESS NOTES
Chart Check  Hypotension and responded to fluids. On pressors for a brief amount of time. Sepsis work up so far negative. Unclear cause of hypotension.    On room air/2 LPM      Mehnaz Cornejo MD

## 2022-06-15 NOTE — PLAN OF CARE
"Goal Outcome Evaluation:    Plan of Care Reviewed With: patient     Overall Patient Progress: no change    Outcome Evaluation: Pt arrived to unit on levo drip at 0.03.  Pt BP continued to improve throughout morning and Levo turned off at 0350. 's/70's with MAP>65.  Pt has dementia at baseline, gets orientation questions incorrect at times.  Daughter Dania is point of contact.  Pt is from [facility].  Redness to reva-area, barrier cream applied, skin left open to air.  Shaver in place for I/O's.    /60   Pulse 70   Temp 98.3  F (36.8  C) (Oral)   Resp 18   Ht 1.651 m (5' 5\")   Wt 59.2 kg (130 lb 8 oz)   LMP  (LMP Unknown)   SpO2 94%   BMI 21.72 kg/m         "

## 2022-06-15 NOTE — ED PROVIDER NOTES
History     Chief Complaint   Patient presents with     Generalized Weakness     Covid booster yesterday.      HPI  Tracy Mcclelland is a 76 year old female who presented to the emergency room via ambulance from her nursing home secondary to concerns about increasing weakness that started this morning.  Patient reportedly had her COVID-19 booster shot yesterday.  Patient also with a history for sepsis secondary to urinary tract infection in the past.  There was also report of possible fever from the nursing home.  Patient states that she just generally does not feel well.  When asked if she has has any pain issues she states that her left arm has been painful but this has been present for a long time.  This is not something new.  She has had swelling in the left arm and hand.  In review of the patient's chart she presented similarly in January of this year to the emergency room and was admitted for diarrhea and acute cystitis with weakness.  She specifically denied any abdominal pain or back pain more than typical.  Denied chest pain.  Denies shortness of breath or cough more than usual.  She was not sure which arm the COVID immunization was placed.  The patient's daughter, Dania, arrived later in the patient's visit and stated that the patient's blood pressure typically runs low.  She also stated that the patient is scheduled for CT scan of her chest and lung tomorrow because of a nodule that was found previously.  She stated that she has had a long history of smoking.  The patient's daughter stated that the patient was totally unresponsive at the nursing home before she was transported to the emergency room.  She also stated that the patient has not eaten anything all day and or drank anything all day and feels like she is likely significantly dehydrated.  She did have a COVID booster shot yesterday.  I did verify with the daughter that the patient is a full CODE STATUS.  Daughter also stated that the patient  has a history for diabetes, chronic kidney disease, polymyalgia rheumatica, recurrent urine infections, ulcerative colitis, dementia issues, and arthritis to her joints.  Her daughter stated that the patient has been in the nursing home since her last hospitalization here in January of this year.    Allergies:  Allergies   Allergen Reactions     Mold      sneezing, coughing , runny nose, watery eyes & red,     No Known Drug Allergies        Problem List:    Patient Active Problem List    Diagnosis Date Noted     Shock circulatory (H) 06/15/2022     Priority: Medium     Dehydration 01/31/2022     Priority: Medium     Generalized weakness 01/31/2022     Priority: Medium     Falls frequently 01/31/2022     Priority: Medium     Failure to thrive in adult 01/31/2022     Priority: Medium     Diarrhea, unspecified type 01/31/2022     Priority: Medium     Acute cystitis without hematuria 01/31/2022     Priority: Medium     Colitis 01/31/2022     Priority: Medium     Polymyalgia rheumatica (H) 05/28/2020     Priority: Medium     Status post total hip replacement, right 10/16/2018     Priority: Medium     Primary osteoarthritis of right hip 07/02/2018     Priority: Medium     CKD (chronic kidney disease) stage 2, GFR 60-89 ml/min 10/19/2015     Priority: Medium     Hereditary and idiopathic peripheral neuropathy 07/02/2015     Priority: Medium     Problem list name updated by automated process. Provider to review       Granuloma annulare 08/07/2014     Priority: Medium     Right dorsal hand       Hypertension, goal below 140/90 07/17/2012     Priority: Medium     Type 2 diabetes mellitus with diabetic chronic kidney disease (H) 11/02/2011     Priority: Medium     Tobacco use disorder 10/07/2010     Priority: Medium     Tennis Elbow-left 07/28/2010     Priority: Medium     Microalbuminuria 01/29/2010     Priority: Medium     Hyperlipidemia with target LDL less than 100 01/28/2010     Priority: Medium     Diagnosis updated by  automated process. Provider to review and confirm.          Past Medical History:    Past Medical History:   Diagnosis Date     CKD (chronic kidney disease) stage 2, GFR 60-89 ml/min 10/19/2015     Headache(784.0)      Lumbago      Type II or unspecified type diabetes mellitus without mention of complication, not stated as uncontrolled      Unspecified essential hypertension        Past Surgical History:    Past Surgical History:   Procedure Laterality Date     ARTHROPLASTY HIP Right 10/16/2018    Procedure: right total hip arthroplasty;  Surgeon: Terrell Ervin DO;  Location: [facility]     DISCECTOMY, FUSION CERVICAL ANTERIOR ONE LEVEL, COMBINED N/A 6/19/2019    Procedure: cervical 5-6 anterior cervical discectomy fusion;  Surgeon: Joe Jefferson MD;  Location: [facility] OR     HC LAPAROSCOPY, SURGICAL; CHOLECYSTECTOMY  2000    Cholecystectomy, Laparoscopic     HC REMOVE TONSILS/ADENOIDS,<11 Y/O      T & A <11y.o.     INJECT EPIDURAL CERVICAL N/A 5/17/2019    Procedure: Epidural Steroid Injection Bilateral Cervical 6-7;  Surgeon: Carlos Avendaño MD;  Location: [facility]     INJECT EPIDURAL CERVICAL Bilateral 12/20/2019    Procedure: Cervical Epidural Injection 6-7 bilateral;  Surgeon: Carlos Avendaño MD;  Location: [facility] OR     ZZC LIGATE FALLOPIAN TUBE         Family History:    Family History   Problem Relation Age of Onset     Heart Disease Mother         Pacemaker     Alzheimer Disease Father         Dementia/Loss of memory     Cerebrovascular Disease Maternal Grandmother      Heart Disease Maternal Grandfather         MI     Diabetes Paternal Grandmother        Social History:  Marital Status:   [4]  Social History     Tobacco Use     Smoking status: Current Every Day Smoker     Packs/day: 0.25     Years: 20.00     Pack years: 5.00     Types: Cigarettes     Smokeless tobacco: Never Used     Tobacco comment: 4-5 per day   Substance Use Topics     Alcohol use: Not Currently     Drug use: No         Medications:    ACCU-CHEK GUIDE test strip  acetaminophen (TYLENOL) 500 MG tablet  aspirin (ASA) 81 MG tablet  BIOFREEZE COLORLESS 4 % GEL  colestipol (COLESTID) 1 g tablet  emollient (VANICREAM) external cream  gabapentin (NEURONTIN) 600 MG tablet  loperamide (IMODIUM) 2 MG capsule  Melatonin 10 MG TABS tablet  metFORMIN (GLUCOPHAGE) 500 MG tablet  rOPINIRole (REQUIP) 0.25 MG tablet          Review of Systems   Constitutional: Positive for fatigue and fever.        Patient's daughter states that the patient typically runs low blood pressures usually in the 90s systolic when she takes her to her doctors appointments and has been this way for the last couple years.  She states that the patient has not felt well upon waking this morning and has not eaten or drank anything through the day.  They are concerned about low blood sugar as a reason for her unresponsiveness prior to calling to the ambulance but her sugar was found to be 156.  No one is sure which arm the patient received her injection for COVID immunization and yesterday.   HENT: Negative.    Eyes: Negative.    Respiratory: Negative for cough, chest tightness, shortness of breath and wheezing.    Cardiovascular: Negative for chest pain and palpitations.   Gastrointestinal: Negative for abdominal pain.   Endocrine: Negative.    Genitourinary: Positive for decreased urine volume. Negative for dysuria.   Musculoskeletal: Positive for joint swelling (left hand and wrist area swelling). Negative for neck pain and neck stiffness.   Skin: Negative for rash.   Allergic/Immunologic: Negative.    Neurological: Positive for syncope (episode of unresponsiveness). Negative for dizziness, facial asymmetry and headaches.   Hematological: Negative.    Psychiatric/Behavioral: Positive for confusion (Patient with history for dementia.).   All other systems reviewed and are negative.      Physical Exam   BP: (!) 65/28  Pulse: 79  Temp: 99.4  F (37.4  C)  Resp: 20  Weight:  69.9 kg (154 lb 3.2 oz)  SpO2: 98 %      Physical Exam  Vitals and nursing note reviewed. Exam conducted with a chaperone present (Daughter).   Constitutional:       Appearance: She is ill-appearing. She is not toxic-appearing or diaphoretic.   HENT:      Head: Normocephalic and atraumatic.      Nose: No congestion.      Mouth/Throat:      Mouth: Mucous membranes are dry.      Pharynx: No posterior oropharyngeal erythema.      Comments: Patient with her own teeth without dentures.  They appear to be in good repair without signs of acute infection.  Eyes:      General: No scleral icterus.     Conjunctiva/sclera: Conjunctivae normal.   Cardiovascular:      Rate and Rhythm: Normal rate.      Pulses: Normal pulses.      Heart sounds:     No friction rub.   Pulmonary:      Effort: Pulmonary effort is normal. No respiratory distress.      Breath sounds: No rhonchi or rales.   Chest:      Chest wall: No tenderness.   Abdominal:      General: Bowel sounds are normal.      Tenderness: There is no abdominal tenderness. There is no guarding or rebound.   Musculoskeletal:         General: Swelling (left wrist and hand without erythema) present. No deformity.      Cervical back: Normal range of motion and neck supple. No tenderness.      Comments: Patient favoring her left arm with some swelling to the distal left arm and hand.  Patient's daughter states that she has been favoring this arm for several weeks.  She has known severe arthritis in her shoulders but her daughter states that she has not seen the swelling in the past.   Skin:     Capillary Refill: Capillary refill takes less than 2 seconds.      Findings: No rash.   Neurological:      Mental Status: She is alert. Mental status is at baseline.      Comments: Patient is alert and answers questions appropriately at this time.  Her daughter states that her mentation is at her baseline currently.  She states she is she appears markedly improved from when she saw her at the  "nursing home several hours ago.   Psychiatric:         Mood and Affect: Mood normal.         Behavior: Behavior normal.      Comments: Patient is pleasant and cooperative.         ED Course                 Procedures              EKG Interpretation:      Interpreted by Glenroy Brown DO  Time reviewed: 22:03  Symptoms at time of EKG: Weakness   Rhythm: normal sinus   Rate: Normal  Axis: Normal  Ectopy: premature ventricular contractions (frequent)  Conduction: low voltage  ST Segments/ T Waves: Non-specific ST-T wave changes  Q Waves: none  Comparison to prior: Unchanged from 11/03/21    Clinical Impression: no acute changes        Critical Care time:  was 35 minutes for this patient excluding procedures.     The patient has signs of Septic Shock  The patient has signs of septic shock as evidenced by:  1. Presence of Sepsis, AND  2. Persistent hypotension defined by the last 2 BP readings within the ONE HOUR following completion of the 30mL/kg bolus being low (SBP <90 or MAP <65)    Time septic shock diagnosis confirmed = 10:17 PM  06/14/22   as this was the time when Persistent Hypotension present (2 consecutive SBP <90 or MAP <65 within ONE hour after 30cc/kg IVF bolus completed)    3 Hour Septic Shock Bundle Completion:  1. Initial Lactic Acid Result:   Recent Labs   Lab Test 06/14/22  2242 06/14/22  2117   LACT 0.8 0.7     2. Blood Cultures before Antibiotics: Yes  3. Broad Spectrum Antibiotics Administered:  yes       Anti-infectives (From admission through now)    Start     Dose/Rate Route Frequency Ordered Stop    06/14/22 2330  piperacillin-tazobactam (ZOSYN) infusion 3.375 g        Note to Pharmacy: For [facility], [facility] and [facility]: For Zosyn-naive patients, use the \"Zosyn initial dose + extended infusion\" order panel.    3.375 g  100 mL/hr over 30 Minutes Intravenous EVERY 6 HOURS 06/14/22 2304            4. IF 30 mL/kg bolus criteria met based on:  -Lactate > 4  OR  -Initial Hypotension:  Definition:  2 low " BP readings (SBP <90, MAP <65, or decrease > 40 from baseline due to infection) within 3 hrs of each other during the time period of 6 hrs before and 3 hrs  after time zero  THEN: Fluid volume administered in ED:  Full 30 mL/kg bolus given (see amount below).    BMI Readings from Last 1 Encounters:   06/14/22 25.66 kg/m      30 mL/kg fluids based on weight: 2,100 mL  30 mL/kg fluids based on IBW (must be >= 60 inches tall): 1,710 mL    Septic Shock reassessment:  1. Repeat Lactic Acid Level: 0.8  2. Vasopressors started for Persistent Hypotension defined by the last 2 BP readings within the ONE HOUR following completion of the 30mL/kg bolus being low (SBP <90 or MAP <65).    I attest to having performed a repeat sepsis exam and assessment of perfusion at 23:15 and the results demonstrate no change.           Results for orders placed or performed during the hospital encounter of 06/14/22 (from the past 24 hour(s))   CBC with platelets differential    Narrative    The following orders were created for panel order CBC with platelets differential.  Procedure                               Abnormality         Status                     ---------                               -----------         ------                     CBC with platelets and d...[[id number]]  Abnormal            Final result                 Please view results for these tests on the individual orders.   Comprehensive metabolic panel   Result Value Ref Range    Sodium 138 133 - 144 mmol/L    Potassium 3.7 3.4 - 5.3 mmol/L    Chloride 105 94 - 109 mmol/L    Carbon Dioxide (CO2) 27 20 - 32 mmol/L    Anion Gap 6 3 - 14 mmol/L    Urea Nitrogen 21 7 - 30 mg/dL    Creatinine 0.75 0.52 - 1.04 mg/dL    Calcium 7.5 (L) 8.5 - 10.1 mg/dL    Glucose 113 (H) 70 - 99 mg/dL    Alkaline Phosphatase 44 40 - 150 U/L    AST 19 0 - 45 U/L    ALT 15 0 - 50 U/L    Protein Total 6.0 (L) 6.8 - 8.8 g/dL    Albumin 2.9 (L) 3.4 - 5.0 g/dL    Bilirubin Total 0.7 0.2 - 1.3 mg/dL     GFR Estimate 82 >60 mL/min/1.73m2   [place] [facility]    Narrative    The following orders were created for panel order [place] [facility].  Procedure                               Abnormality         Status                     ---------                               -----------         ------                     Extra Blood Culture Bottle[[id number]]                       Final result               Extra Blue Top Tube[[id number]]                              Final result                 Please view results for these tests on the individual orders.   CBC with platelets and differential   Result Value Ref Range    WBC Count 5.1 4.0 - 11.0 10e3/uL    RBC Count 3.53 (L) 3.80 - 5.20 10e6/uL    Hemoglobin 11.3 (L) 11.7 - 15.7 g/dL    Hematocrit 33.4 (L) 35.0 - 47.0 %    MCV 95 78 - 100 fL    MCH 32.0 26.5 - 33.0 pg    MCHC 33.8 31.5 - 36.5 g/dL    RDW 13.6 10.0 - 15.0 %    Platelet Count 171 150 - 450 10e3/uL    % Neutrophils 75 %    % Lymphocytes 19 %    % Monocytes 5 %    % Eosinophils 0 %    % Basophils 0 %    % Immature Granulocytes 1 %    NRBCs per 100 WBC 0 <1 /100    Absolute Neutrophils 3.9 1.6 - 8.3 10e3/uL    Absolute Lymphocytes 1.0 0.8 - 5.3 10e3/uL    Absolute Monocytes 0.3 0.0 - 1.3 10e3/uL    Absolute Eosinophils 0.0 0.0 - 0.7 10e3/uL    Absolute Basophils 0.0 0.0 - 0.2 10e3/uL    Absolute Immature Granulocytes 0.0 <=0.4 10e3/uL    Absolute NRBCs 0.0 10e3/uL   Extra Blood Culture Bottle   Result Value Ref Range    Hold Specimen JIC    Extra Blue Top Tube   Result Value Ref Range    Hold Specimen JIC    Troponin I   Result Value Ref Range    Troponin I High Sensitivity 10 <54 ng/L   CRP inflammation   Result Value Ref Range    CRP Inflammation 58.1 (H) 0.0 - 8.0 mg/L   Nt probnp inpatient (BNP)   Result Value Ref Range    N terminal Pro BNP Inpatient 6,330 (H) 0 - 1,800 pg/mL   D dimer quantitative   Result Value Ref Range    D-Dimer Quantitative 2.54 (H) 0.00 - 0.50 ug/mL FEU    Narrative    This D-dimer assay is  intended for use in conjunction with a clinical pretest probability assessment model to exclude pulmonary embolism (PE) and deep venous thrombosis (DVT) in outpatients suspected of PE or DVT. The cut-off value is 0.50 ug/mL FEU.   Lactic acid whole blood   Result Value Ref Range    Lactic Acid 0.7 0.7 - 2.0 mmol/L   Blood gas venous   Result Value Ref Range    pH Venous 7.41 7.32 - 7.43    pCO2 Venous 43 40 - 50 mm Hg    pO2 Venous 47 25 - 47 mm Hg    Bicarbonate Venous 28 21 - 28 mmol/L    Base Excess/Deficit (+/-) 2.7 (H) -7.7 - 1.9 mmol/L    FIO2 21    Symptomatic; Yes; 6/13/2022 Influenza A/B & SARS-CoV2 (COVID-19) Virus PCR Multiplex Nose    Specimen: Nose; Swab   Result Value Ref Range    Influenza A PCR Negative Negative    Influenza B PCR Negative Negative    SARS CoV2 PCR Negative Negative    Narrative    Testing was performed using the brian SARS-CoV-2 & Influenza A/B Assay on the brian Nany System. This test should be ordered for the detection of SARS-CoV-2 and influenza viruses in individuals who meet clinical and/or epidemiological criteria. Test performance is unknown in asymptomatic patients. This test is for in vitro diagnostic use under the FDA EUA for laboratories certified under CLIA to perform moderate and/or high complexity testing. This test has not been FDA cleared or approved. A negative result does not rule out the presence of PCR inhibitors in the specimen or target RNA in concentration below the limit of detection for the assay. If only one viral target is positive but coinfection with multiple targets is suspected, the sample should be re-tested with another FDA cleared, approved or authorized test, if coinfection would change clinical management. [facility] are certified under the Clinical Laboratory Improvement Amendments of 1988 (CLIA-88) as  qualified to perform moderate and/or high complexity laboratory testing.   UA with Microscopic reflex to Culture    Specimen:  Urine, Shaver Catheter   Result Value Ref Range    Color Urine Yellow Colorless, Straw, Light Yellow, Yellow    Appearance Urine Clear Clear    Glucose Urine Negative Negative mg/dL    Bilirubin Urine Negative Negative    Ketones Urine Negative Negative mg/dL    Specific Gravity Urine 1.012 1.003 - 1.035    Blood Urine Moderate (A) Negative    pH Urine 7.0 5.0 - 7.0    Protein Albumin Urine Negative Negative mg/dL    Urobilinogen Urine Normal Normal, 2.0 mg/dL    Nitrite Urine Negative Negative    Leukocyte Esterase Urine Negative Negative    Mucus Urine Present (A) None Seen /LPF    RBC Urine 6 (H) <=2 /HPF    WBC Urine 0 <=5 /HPF    Narrative    Urine Culture not indicated   Lactic acid whole blood   Result Value Ref Range    Lactic Acid 0.8 0.7 - 2.0 mmol/L   XR Chest Port 1 View    Narrative    EXAM: XR CHEST PORT 1 VIEW  LOCATION: [facility]  DATE/TIME: 6/14/2022 10:43 PM    INDICATION: Weakness, hypotension  COMPARISON: None.      Impression    IMPRESSION: Heart size and pulmonary vessels normal. Minimal interstitial prominence, no focal pneumonia or pleural effusion evident.  High-grade DJD involving shoulders.   US Upper Extremity Venous Duplex Left    Narrative    EXAM: US UPPER EXTREMITY VENOUS DUPLEX LEFT  LOCATION: [facility]  DATE/TIME: 6/14/2022 11:39 PM    INDICATION: Left arm swelling. Elevated d-dimer.  COMPARISON: None.  TECHNIQUE: Venous Duplex ultrasound of the left upper extremity with (when possible) and without compression, augmentation, and duplex. Color flow and spectral Doppler with waveform analysis performed.    FINDINGS: Ultrasound includes evaluation of the internal jugular vein, innominate vein, subclavian vein, axillary vein, and brachial vein. The superficial cephalic and basilic veins were also evaluated where seen. Portions of the axillary and basilic   veins were not visualized due to difficulty with patient  mobility.    LEFT: No deep venous thrombosis. No superficial thrombophlebitis. Small soft tissue fluid collection in the anterior shoulder measuring 2.4 x 1.8 x 1.2 cm.      Impression    IMPRESSION:   1.  No deep venous thrombosis in the left upper extremity.       Medications   sodium chloride (PF) 0.9% PF flush 3 mL (has no administration in time range)   sodium chloride (PF) 0.9% PF flush 3 mL (3 mLs Intracatheter Not Given 6/14/22 2245)   0.9% sodium chloride BOLUS (0 mLs Intravenous Stopped 6/14/22 2321)     Followed by   0.9% sodium chloride BOLUS (1,000 mLs Intravenous Not Given 6/14/22 2244)     Followed by   sodium chloride 0.9% infusion ( Intravenous New Bag 6/14/22 2312)   piperacillin-tazobactam (ZOSYN) infusion 3.375 g (0 g Intravenous Stopped 6/14/22 2342)   norepinephrine (LEVOPHED) 4 mg in  mL PERIPHERAL infusion (0.03 mcg/kg/min × 69.9 kg Intravenous New Bag 6/14/22 2341)       Assessments & Plan (with Medical Decision Making)  76-year-old female to the ER via ambulance from her nursing home in [place], [place] secondary concerns of an episode of decreased responsiveness.  The patient reportedly with fever and inability to tolerate oral intake through the day.  Patient received her COVID-19 booster shot yesterday.  She woke this morning not feeling well.  Initial concern per EMS at the scene was possibly low blood sugars given her diabetic history in lack of oral intake today.  However, her glucose at the scene was 156.  Patient was found to be significantly hypotensive.  Her daughter states that she normally runs with low blood pressures typically in the 90s systolic.  Patient became more responsive in route to the hospital after IV fluid administration.  Patient received 1 L of IV fluid prior to arrival to the ER.  Patient was alert and appeared answer appropriately.  Her daughter stated that her mentation was back at her baseline given her chronic dementia issues.  Patient has significant  hypotension with systolic blood pressures in the 70s.  Patient did state that she had some pain in her left arm and her daughter noted that the left arm appeared to be swollen as compared to her normal.  It was not known which arm the patient received her COVID immunization in.  Concern for possible Sirs reaction to her COVID-19 immunization versus possible allergic reaction/anaphylaxis from the immunization versus possible sepsis from occult infection or possible acute coronary syndrome felt to be possible etiologies for symptomatology.  Patient was given a full 30 mL/kg of IV fluid hydration.  She initially did respond with blood pressures into the 100 systolic range but then became more hypotensive once again.  IV epinephrine was initiated as a vasopressor and I felt this also would be beneficial should her symptomatology be secondary to a anaphylactic reaction to her immunization.  Patient did not demonstrate any rash but she did have some swelling to the left arm.  An ultrasound of the arm was performed and there was no signs of DVT.  Patient's chest x-ray was unremarkable for abnormality.  Patient with history for urinary tract infections but her urinalysis was relatively unremarkable.  Her blood test results are also reassuring.  Her lactic acid levels were normal.  Based on her hypotension she met criteria for shock and possibly septic shock.  Patient initiated on IV antibiotic therapy after cultures obtained.  Plan is to admit the patient to the ICU for close monitoring pending results of her cultures.   I spoke with Dr. Evans who agreed except the patient to her hospitalist service.     I have reviewed the nursing notes.    I have reviewed the findings, diagnosis, plan and need for follow up with the patient.     Critical Care Addendum    My initial assessment, based on my review of prehospital provider report, review of nursing observations, review of vital signs, focused history, physical exam, review  of cardiac rhythm monitor, 12 lead ECG analysis and discussion with daughter, established that Tracy Mcclelland has suspicion for sepsis and need for evaluation and early goal-directed therapy, which requires immediate intervention, and therefore she is critically ill.     After the initial assessment, the care team initiated multiple lab tests, initiated IV fluid administration and initiated medication therapy with the medications as listed above to provide stabilization care. Due to the critical nature of this patient, I reassessed nursing observations, vital signs, physical exam, review of cardiac rhythm monitor, 12 lead ECG analysis and interpretation of Response to interventions multiple times prior to her disposition.     Time also spent performing documentation, discussion with family to obtain medical information for decision making, reviewing test results and coordination of care.     Critical care time (excluding teaching time and procedures): 35 minutes.         Final diagnoses:   Septic shock (H) - Possibly secondary to adverse reaction to her recent COVID-19 immunization.[   Dehydration   Generalized weakness   Type 2 diabetes mellitus with chronic kidney disease, without long-term current use of insulin, unspecified CKD stage (H)   CKD (chronic kidney disease) stage 2, GFR 60-89 ml/min       6/14/2022   [facility] EMERGENCY DEPT     Glenroy Brown, DO  06/15/22 0053

## 2022-06-16 VITALS
RESPIRATION RATE: 18 BRPM | SYSTOLIC BLOOD PRESSURE: 124 MMHG | WEIGHT: 131.1 LBS | HEIGHT: 65 IN | TEMPERATURE: 97.2 F | BODY MASS INDEX: 21.84 KG/M2 | OXYGEN SATURATION: 96 % | HEART RATE: 74 BPM | DIASTOLIC BLOOD PRESSURE: 66 MMHG

## 2022-06-16 LAB
BACTERIA UR CULT: NO GROWTH
GLUCOSE BLDC GLUCOMTR-MCNC: 172 MG/DL (ref 70–99)
GLUCOSE BLDC GLUCOMTR-MCNC: 84 MG/DL (ref 70–99)
GLUCOSE BLDC GLUCOMTR-MCNC: 89 MG/DL (ref 70–99)

## 2022-06-16 PROCEDURE — 258N000003 HC RX IP 258 OP 636: Performed by: FAMILY MEDICINE

## 2022-06-16 PROCEDURE — 99238 HOSP IP/OBS DSCHRG MGMT 30/<: CPT | Performed by: PEDIATRICS

## 2022-06-16 PROCEDURE — 250N000013 HC RX MED GY IP 250 OP 250 PS 637: Performed by: INTERNAL MEDICINE

## 2022-06-16 PROCEDURE — 250N000011 HC RX IP 250 OP 636: Performed by: INTERNAL MEDICINE

## 2022-06-16 RX ORDER — ACETAMINOPHEN 500 MG
1000 TABLET ORAL EVERY 6 HOURS PRN
Qty: 100 TABLET | Refills: 0 | DISCHARGE
Start: 2022-06-16

## 2022-06-16 RX ADMIN — GABAPENTIN 1200 MG: 400 CAPSULE ORAL at 09:12

## 2022-06-16 RX ADMIN — ENOXAPARIN SODIUM 40 MG: 40 INJECTION SUBCUTANEOUS at 09:13

## 2022-06-16 RX ADMIN — SODIUM CHLORIDE: 9 INJECTION, SOLUTION INTRAVENOUS at 02:42

## 2022-06-16 RX ADMIN — ASPIRIN 81 MG: 81 TABLET, COATED ORAL at 09:12

## 2022-06-16 ASSESSMENT — ACTIVITIES OF DAILY LIVING (ADL)
ADLS_ACUITY_SCORE: 42
ADLS_ACUITY_SCORE: 43
ADLS_ACUITY_SCORE: 43
ADLS_ACUITY_SCORE: 42

## 2022-06-16 NOTE — PLAN OF CARE
S-(situation): Patient discharged to [facility] via [facility] with .    B-(background): Shock post COVID booster vaccine.  Removed Shaver at 0900.    A-(assessment): Pt progressing, ready for discharge to TCU.  Last bowel movement: 6/16/2022.     R-(recommendations):Report called to Larissa Care Manager at [facility]. Listed belongings gathered and sent with patient.     Discharge Nursing Criteria:     Care Plan and Patient education resolved: Yes    Vaccines  Influenza status verified at discharge:  Yes    MISC  Home medications returned to patient: Yes  Medication Bin checked and emptied on discharge Yes  All paperwork sent with patient/Copy of AVS sent to [facility].

## 2022-06-16 NOTE — PROGRESS NOTES
Tracy Mcclelland  Gender: female  : 1945  [address] [place] [address]  [phone] (home)     Medical Record: [id number]  Pharmacy:    [facility] [place], [place] - [address]   [facility] [id number] - [place], [place] - [address]  [facility] - [place], [place] - [address]  Primary Care Provider: No Ref-Primary, Physician    Parent's names are: Data Unavailable (mother) and Data Unavailable (father).      [facility]  2022     Discharge Phone Call:  Discharge to [facility]

## 2022-06-16 NOTE — PROGRESS NOTES
[facility]    Medicine Progress Note - Hospitalist Service    Date of Admission:  6/14/2022    Assessment & Plan        Hypotension  -Possible SIRS reaction to recent vaccination; no obvious infectious source  -2L NS + maintenance fluids given; pt was on levophed for a short time, now off with good BP today, feels to be at baseline  -Blood and urine cultures pending  -Lactic acid 0.8  - Since she responded quickly to hydration and does not have fever or leukocytosis or source of bacterial infection. I discontinued Vanco and Zosyn        Left arm pain/swelling  - Doppler-neg DVT  - presume vaccine was given in this arm  - Currently has no pain or tenderness, redness or mass  Or LOM.      DM  -last A1C 5  -hold Metformin; ISS for now     Neuropathy  -cont gabapentin     Dementia  -pt resides at SNF     Anemia  -stable     DVT Prophylaxis: lovenox  Code Status: Full Code         Diet: Combination Diet Moderate Consistent Carb (60 g CHO per Meal) Diet    Code Status: Full Code      Disposition Plan   Expected Discharge: 06/16/2022   To SNF.   Anticipated discharge location: long-term care facility    Delays:            The patient's care was discussed with the Bedside Nurse, Patient and Patient's Family.    Kristie Guerrero MD  Hospitalist Service  [facility]  Securely message with the Vocera Web Console (learn more here)  Text page via [employer] Paging/Directory         Clinically Significant Risk Factors Present on Admission          # Hypocalcemia: corrected calcium <8.5 on admission, will replace as needed    # Hypoalbuminemia: Albumin = 2.9 g/dL (Ref range: 3.4 - 5.0 g/dL) on admission, will monitor as appropriate    # Platelet Defect: home medication list includes an antiplatelet medication  # Circulatory Shock: currently requiring pressors for blood pressure support       ______________________________________________________________________    Interval  History   After hydration and receiving IV fluid the patient responded well and returned to baseline.  For few hours since 2:30 AM today she was on Levophed drip due to hypotension.  Ever since has been doing well and is alert and oriented and does not have any complaints.  She was transferred to medical floor from ICU.  I discontinued Zosyn and vancomycin since patient does not seem to be in sepsis and hypotension was due to SIRS secondary to side effect of COVID vaccination.  She is afebrile.  Most likely can be discharged to skilled nursing facility tomorrow.    Data reviewed today: I reviewed all medications, new labs and imaging results over the last 24 hours. I personally reviewed no images or EKG's today.    Physical Exam   Vital Signs: Temp: 98.5  F (36.9  C) Temp src: Oral BP: 95/46 Pulse: 76   Resp: (!) 7 SpO2: 94 % O2 Device: None (Room air) Oxygen Delivery: 2 LPM  Weight: 130 lbs 8 oz  Gen:  in no acute distress, lying semi-supine in hospital stretcher  HEENT:  Anicteric sclera, PER, hearing intact to voice  Resp:  No accessory muscle use, breath sounds clear; no wheezes no rales no rhonchi  Card:  No murmur, normal S1, S2   Abd:  Soft per RN exam, no TTP, non-distended, normoactive bowel sounds are present  Musc:  Normal strength and movement of the major muscle groups without obvious deformity  Psych:   not anxious, not agitated  Extr: left hand/forearm with mild edema, no tenderness  Skin: No rash or erythema.    Data   Recent Labs   Lab 06/15/22  1632 06/15/22  1248 06/15/22  0701 06/15/22  0517 06/15/22  0308 06/14/22  2110   WBC  --   --   --  4.4  --  5.1   HGB  --   --   --  10.3*  --  11.3*   MCV  --   --   --  95  --  95   PLT  --   --   --  144*  --  171   NA  --   --   --  138  --  138   POTASSIUM  --   --   --  3.5  --  3.7   CHLORIDE  --   --   --  108  --  105   CO2  --   --   --  26  --  27   BUN  --   --   --  17  --  21   CR  --   --   --  0.69  --  0.75   ANIONGAP  --   --   --  4  --   6   BINH  --   --   --  7.2*  --  7.5*   GLC 98 97 90 98   < > 113*   ALBUMIN  --   --   --   --   --  2.9*   PROTTOTAL  --   --   --   --   --  6.0*   BILITOTAL  --   --   --   --   --  0.7   ALKPHOS  --   --   --   --   --  44   ALT  --   --   --   --   --  15   AST  --   --   --   --   --  19    < > = values in this interval not displayed.

## 2022-06-16 NOTE — PROGRESS NOTES
Care Management Discharge Note    Discharge Date: 06/16/2022     Discharge Disposition: Skilled Nursing Facility - Long term care @ [facility]  (Phone: [phone] Fax [phone])    Discharge Services: Transportation Services - [facility] @ 1100 - private pay    Discharge DME:      Discharge Transportation: agency - [facility] - private pay @ 1100    Private pay costs discussed: transportation costs    PAS Confirmation Code:    Patient/family educated on Medicare website which has current facility and service quality ratings:      Education Provided on the Discharge Plan:      Persons Notified of Discharge Plans: Daughter, Dania, and son, Terrell    Patient/Family in Agreement with the Plan: yes    Handoff Referral Completed: Yes    Additional Information:  Patient medically ready for discharge.  Spoke to Samantha at [facility].  They are not able to accept patient today due to staffing.  They anticipate they will have more staff next week.  Plan is for patient to discharge back to [facility]  (Phone: [phone] Fax [phone]).  [facility]. Private pay.  Updated patient, Dania, and Terrell, about transport time.  Notified Mar, admissions for [facility], of patient's return and transport time.    DEEPTHI Allred  [facility] [phone]/ [facility] [phone]  Care Management

## 2022-06-16 NOTE — DISCHARGE SUMMARY
[facility]  Hospitalist Discharge Summary      Date of Admission:  6/14/2022  Date of Discharge:  6/16/2022  Discharging Provider: Terrell Gastelum MD  Discharge Service: Hospitalist Service    Discharge Diagnoses   Principal Problem:    Shock circulatory (H)  Active Problems:    Type 2 diabetes mellitus with diabetic chronic kidney disease (H)    Hereditary and idiopathic peripheral neuropathy    Polymyalgia rheumatica (H)      Follow-ups Needed After Discharge   Follow-up Appointments     Follow Up and recommended labs and tests      Follow up with Nursing home physician.             Unresulted Labs Ordered in the Past 30 Days of this Admission     Date and Time Order Name Status Description    6/14/2022  9:14 PM Blood Culture Peripheral Blood Preliminary     6/14/2022  9:14 PM Blood Culture Peripheral Blood Preliminary       These results will be followed up by  NH provider    Discharge Disposition   Discharged to nursing home  Condition at discharge: Stable      Hospital Course   76-year-old woman with diabetes, neuropathy, and polymyalgia rheumatica presented from the nursing home with 1 day history of decreased responsiveness with poor oral intake and low blood pressure after she reportedly had received the COVID-19 vaccine the previous day.  Due to concern for shock, she was admitted to the ICU.  Please see history and physical for details.    She presented with severe hypotension and unresponsiveness that was not responsive to fluid resuscitation.  She was started on vasopressor therapy with improvement in mentation.  She was empirically treated with antibiotics, but blood and urine cultures were subsequently negative, bacterial infection was not suspected clinically, and clinical course was not worrisome for an acute infectious process.  Antibiotics were later discontinued.  She weaned off of vasopressor therapy and maintained adequate hemodynamic status.  Mentation had  recovered to baseline and she tolerated advancing diet and activity by discharge.  She was noted to have swelling in her left arm which was the limb in which she had received the vaccination, but there were no signs of venous thrombosis on ultrasound.  Clinical course was not suspicious for anaphylaxis and no new medical problems were identified.  After investigation, hypotension and shock probably due to poor oral intake after recent vaccination was suspected.    Consultations This Hospital Stay   PHARMACY TO [facility]  CARE MANAGEMENT / SOCIAL WORK IP CONSULT    Code Status   Full Code    Time Spent on this Encounter   I, Terrell Gastelum MD, personally saw the patient today and spent less than or equal to 30 minutes discharging this patient.       Terrell Gastelum MD  [address] SURGICAL  [address] [place] [address]  Phone: [phone]  ______________________________________________________________________    Physical Exam   Vital Signs: Temp: 97.2  F (36.2  C) Temp src: Oral BP: 124/66 Pulse: 74   Resp: 18 SpO2: 96 % O2 Device: None (Room air)    Weight: 131 lbs 1.6 oz  General Appearance: Pale elderly woman, no acute distress resting in bed  Other: Maintains wakefulness and attention, no confusion evident, purposefully and symmetrically moves limbs       Primary Care Physician   Physician No Ref-Primary    Discharge Orders      General info for SNF    Length of Stay Estimate: Long Term Care  Condition at Discharge: Stable  Level of care:skilled   Rehabilitation Potential: Fair  Admission H&P remains valid and up-to-date: Yes  Recent Chemotherapy: N/A  Use Nursing Home Standing Orders: Yes     Follow Up and recommended labs and tests    Follow up with Nursing home physician.     Reason for your hospital stay    Hospitalized due to low blood pressure and improved     Activity - Up with nursing assistance     Diet    Follow this diet upon discharge: Orders Placed This  Encounter      Combination Diet Moderate Consistent Carb (60 g CHO per Meal) Diet       Significant Results and Procedures   Most Recent 3 CBC's:Recent Labs   Lab Test 06/15/22  0517 06/14/22 2110 02/22/22  0936   WBC 4.4 5.1 7.5   HGB 10.3* 11.3* 10.2*   MCV 95 95 106*   * 171 192     Most Recent 3 BMP's:Recent Labs   Lab Test 06/16/22  1111 06/16/22  0739 06/16/22  0204 06/15/22  0701 06/15/22  0517 06/15/22  0308 06/14/22 2110 02/22/22  0936   NA  --   --   --   --  138  --  138 136   POTASSIUM  --   --   --   --  3.5  --  3.7 4.6   CHLORIDE  --   --   --   --  108  --  105 102   CO2  --   --   --   --  26  --  27 30   BUN  --   --   --   --  17  --  21 16   CR  --   --   --   --  0.69  --  0.75 0.57   ANIONGAP  --   --   --   --  4  --  6 4   BINH  --   --   --   --  7.2*  --  7.5* 8.2*   * 89 84   < > 98   < > 113* 134*    < > = values in this interval not displayed.     Most Recent 2 LFT's:Recent Labs   Lab Test 06/14/22 2110 01/30/22  2328   AST 19 40   ALT 15 38   ALKPHOS 44 164*   BILITOTAL 0.7 0.5   ,   Results for orders placed or performed during the hospital encounter of 06/14/22   XR Chest Port 1 View    Narrative    EXAM: XR CHEST PORT 1 VIEW  LOCATION: [facility]  DATE/TIME: 6/14/2022 10:43 PM    INDICATION: Weakness, hypotension  COMPARISON: None.      Impression    IMPRESSION: Heart size and pulmonary vessels normal. Minimal interstitial prominence, no focal pneumonia or pleural effusion evident.  High-grade DJD involving shoulders.   US Upper Extremity Venous Duplex Left    Narrative    EXAM: US UPPER EXTREMITY VENOUS DUPLEX LEFT  LOCATION: [facility]  DATE/TIME: 6/14/2022 11:39 PM    INDICATION: Left arm swelling. Elevated d-dimer.  COMPARISON: None.  TECHNIQUE: Venous Duplex ultrasound of the left upper extremity with (when possible) and without compression, augmentation, and duplex. Color flow and spectral Doppler  with waveform analysis performed.    FINDINGS: Ultrasound includes evaluation of the internal jugular vein, innominate vein, subclavian vein, axillary vein, and brachial vein. The superficial cephalic and basilic veins were also evaluated where seen. Portions of the axillary and basilic   veins were not visualized due to difficulty with patient mobility.    LEFT: No deep venous thrombosis. No superficial thrombophlebitis. Small soft tissue fluid collection in the anterior shoulder measuring 2.4 x 1.8 x 1.2 cm.      Impression    IMPRESSION:   1.  No deep venous thrombosis in the left upper extremity.       Discharge Medications   Current Discharge Medication List      CONTINUE these medications which have CHANGED    Details   acetaminophen (TYLENOL) 500 MG tablet Take 2 tablets (1,000 mg) by mouth every 6 hours as needed for fever or pain  Qty: 100 tablet, Refills: 0    Associated Diagnoses: Polymyalgia rheumatica (H)      metFORMIN (GLUCOPHAGE) 500 MG tablet Take 1 tablet (500 mg) by mouth daily (with breakfast)    Associated Diagnoses: Type 2 diabetes mellitus with stage 2 chronic kidney disease, without long-term current use of insulin (H)         CONTINUE these medications which have NOT CHANGED    Details   ACCU-CHEK GUIDE test strip USE TO TEST BLOOD SUGARS TWO TIMES A DAY OR AS DIRECTED  Qty: 100 each, Refills: 1    Comments: Prescription not previously filled at [facility]. Pleaseauthorize a new RX for this patient. Thank you.  Associated Diagnoses: Type 2 diabetes mellitus with chronic kidney disease, without long-term current use of insulin, unspecified CKD stage (H)      aspirin (ASA) 81 MG tablet Take 1 tablet (81 mg) by mouth daily    Associated Diagnoses: Hyperlipidemia with target LDL less than 100      BIOFREEZE COLORLESS 4 % GEL APPLY TO AFFECTED AREAS 2 TIMES DAILY; APPLY 3 TIMES DAILY AS NEEDED      colestipol (COLESTID) 1 g tablet Take 1 g by mouth daily as needed for diarrhea      emollient  (VANICREAM) external cream APPLY TO AFFECTED AREA(S) TWICE DAILY; APPLY TWICE DAILY AS NEEDED      gabapentin (NEURONTIN) 600 MG tablet TAKE TWO TABLETS BY MOUTH THREE TIMES A DAY  Qty: 360 tablet, Refills: 0    Associated Diagnoses: Type 2 diabetes mellitus with stage 2 chronic kidney disease, without long-term current use of insulin (H)      loperamide (IMODIUM) 2 MG capsule Take 1 capsule (2 mg) by mouth 4 times daily as needed for diarrhea  Qty: 20 capsule, Refills: 1    Associated Diagnoses: Diarrhea, unspecified type; Colitis      Melatonin 10 MG TABS tablet Take 10 mg by mouth nightly as needed for sleep      rOPINIRole (REQUIP) 0.25 MG tablet Take 0.25 mg by mouth nightly as needed           Allergies   Allergies   Allergen Reactions     Mold      sneezing, coughing , runny nose, watery eyes & red,     No Known Drug Allergies

## 2022-06-16 NOTE — PLAN OF CARE
Goal Outcome Evaluation:    Plan of Care Reviewed With: patient     Overall Patient Progress: improving    Outcome Evaluation: Blood pressure significantly improved. Pt reports that she always has low pressures and has for months. She has been alert and oriented. Pt has been repositioning herself from side to side in bed but states she favors her right side more. Shaver in place with good output. Left hand edematous compared to right.

## 2022-06-17 ENCOUNTER — PATIENT OUTREACH (OUTPATIENT)
Dept: CARE COORDINATION | Facility: CLINIC | Age: 77
End: 2022-06-17
Payer: COMMERCIAL

## 2022-06-17 DIAGNOSIS — Z71.89 OTHER SPECIFIED COUNSELING: ICD-10-CM

## 2022-06-17 NOTE — PROGRESS NOTES
Clinic Care Coordination Contact    Background: Care Coordination referral placed from [facility] discharge report for reason of patient meeting criteria for a TCM outreach call by [facility] team.    Assessment: Upon chart review, CCR[facility] Team member will cancel/close the referral for TCM outreach due to reason below:    Patient has discharged to a Group home, Memory Care or Nursing Home    Plan: Care Coordination referral for TCM outreach canceled.    Angela Reese  Community Health Worker  [facility]  Ph: [phone]

## 2022-06-20 ENCOUNTER — DOCUMENTATION ONLY (OUTPATIENT)
Dept: OTHER | Facility: CLINIC | Age: 77
End: 2022-06-20
Payer: COMMERCIAL

## 2022-06-20 LAB
BACTERIA BLD CULT: NO GROWTH
BACTERIA BLD CULT: NO GROWTH

## 2022-06-22 ENCOUNTER — LAB REQUISITION (OUTPATIENT)
Dept: LAB | Facility: CLINIC | Age: 77
End: 2022-06-22

## 2022-06-22 DIAGNOSIS — E11.22 TYPE 2 DIABETES MELLITUS WITH DIABETIC CHRONIC KIDNEY DISEASE (H): ICD-10-CM

## 2022-06-23 LAB — HBA1C MFR BLD: 5.5 % (ref 0–5.6)

## 2022-06-23 PROCEDURE — 83036 HEMOGLOBIN GLYCOSYLATED A1C: CPT | Performed by: FAMILY MEDICINE

## 2022-06-23 PROCEDURE — P9603 ONE-WAY ALLOW PRORATED MILES: HCPCS | Performed by: FAMILY MEDICINE

## 2022-06-23 PROCEDURE — 36415 COLL VENOUS BLD VENIPUNCTURE: CPT | Performed by: FAMILY MEDICINE

## 2022-06-27 ENCOUNTER — LAB REQUISITION (OUTPATIENT)
Dept: LAB | Facility: CLINIC | Age: 77
End: 2022-06-27

## 2022-06-27 DIAGNOSIS — Z11.1 ENCOUNTER FOR SCREENING FOR RESPIRATORY TUBERCULOSIS: ICD-10-CM

## 2022-06-28 PROCEDURE — 86481 TB AG RESPONSE T-CELL SUSP: CPT | Performed by: NURSE PRACTITIONER

## 2022-06-28 PROCEDURE — 36415 COLL VENOUS BLD VENIPUNCTURE: CPT | Performed by: NURSE PRACTITIONER

## 2022-06-28 PROCEDURE — P9603 ONE-WAY ALLOW PRORATED MILES: HCPCS | Performed by: NURSE PRACTITIONER

## 2022-06-29 LAB
GAMMA INTERFERON BACKGROUND BLD IA-ACNC: 0.11 IU/ML
M TB IFN-G BLD-IMP: NEGATIVE
M TB IFN-G CD4+ BCKGRND COR BLD-ACNC: 9.89 IU/ML
MITOGEN IGNF BCKGRD COR BLD-ACNC: 0 IU/ML
MITOGEN IGNF BCKGRD COR BLD-ACNC: 0.01 IU/ML
QUANTIFERON MITOGEN: 10 IU/ML
QUANTIFERON NIL TUBE: 0.11 IU/ML
QUANTIFERON TB1 TUBE: 0.12 IU/ML
QUANTIFERON TB2 TUBE: 0.11

## 2022-08-30 PROCEDURE — 87086 URINE CULTURE/COLONY COUNT: CPT | Mod: ORL | Performed by: NURSE PRACTITIONER

## 2022-08-31 ENCOUNTER — LAB REQUISITION (OUTPATIENT)
Dept: LAB | Facility: CLINIC | Age: 77
End: 2022-08-31
Payer: MEDICARE

## 2022-08-31 DIAGNOSIS — R30.0 DYSURIA: ICD-10-CM

## 2022-09-02 LAB — BACTERIA UR CULT: NO GROWTH

## 2022-09-14 ENCOUNTER — LAB REQUISITION (OUTPATIENT)
Dept: LAB | Facility: CLINIC | Age: 77
End: 2022-09-14
Payer: MEDICARE

## 2022-09-14 DIAGNOSIS — E11.22 TYPE 2 DIABETES MELLITUS WITH DIABETIC CHRONIC KIDNEY DISEASE (H): ICD-10-CM

## 2022-09-15 LAB — HBA1C MFR BLD: 5.7 % (ref 0–5.6)

## 2022-09-15 PROCEDURE — P9603 ONE-WAY ALLOW PRORATED MILES: HCPCS | Mod: ORL | Performed by: FAMILY MEDICINE

## 2022-09-15 PROCEDURE — 83036 HEMOGLOBIN GLYCOSYLATED A1C: CPT | Mod: ORL | Performed by: FAMILY MEDICINE

## 2022-09-15 PROCEDURE — 36415 COLL VENOUS BLD VENIPUNCTURE: CPT | Mod: ORL | Performed by: FAMILY MEDICINE

## 2022-09-29 ENCOUNTER — LAB REQUISITION (OUTPATIENT)
Dept: LAB | Facility: CLINIC | Age: 77
End: 2022-09-29
Payer: MEDICARE

## 2022-09-29 DIAGNOSIS — E11.22 TYPE 2 DIABETES MELLITUS WITH DIABETIC CHRONIC KIDNEY DISEASE (H): ICD-10-CM

## 2022-09-29 PROCEDURE — 87086 URINE CULTURE/COLONY COUNT: CPT | Mod: ORL | Performed by: FAMILY MEDICINE

## 2022-10-01 LAB — BACTERIA UR CULT: NORMAL

## 2022-11-22 NOTE — LETTER
"[facility]  [address] [facility] [address]  [phone]  [phone]        July 27, 2017     Tracy Mcclelland  [address] [place] [id number]        Dear Tracy:        Here are the results of your latest lab tests:  Your thyroid tests were normal.  Your A1C came back slightly elevated at 6.7, but it is still within goal.  Your other labs were normal.       LDL Cholesterol Calculated   Date Value Ref Range Status   08/01/2016 83 <100 mg/dL Final     Comment:     Desirable:       <100 mg/dl     LDL Cholesterol Direct   Date Value Ref Range Status   07/27/2017 91 <100 mg/dL Final     Comment:     Desirable:       <100 mg/dl     HDL Cholesterol   Date Value Ref Range Status   08/01/2016 50 >49 mg/dL Final     Triglycerides   Date Value Ref Range Status   08/01/2016 94 <150 mg/dL Final     Comment:     Fasting specimen     Cholesterol   Date Value Ref Range Status   08/01/2016 152 <200 mg/dL Final       LDL is the \"bad\" cholesterol linked to heart disease and stroke.   HDL is the \"good\" choesterol and when it is high, it decreases the risk for above problems.        Follow a low-fat, low-cholesterol diet and get regular exercise.  Please feel free to call the clinic if you have any questions.    Sincerely,        Skylar Gomez, MACHO CNP      XENIA/clovisg,LPN   " Suspect RSO paresis. Stable.

## 2022-11-27 ENCOUNTER — LAB REQUISITION (OUTPATIENT)
Dept: LAB | Facility: CLINIC | Age: 77
End: 2022-11-27
Payer: MEDICARE

## 2022-11-27 DIAGNOSIS — I12.9 HYPERTENSIVE CHRONIC KIDNEY DISEASE WITH STAGE 1 THROUGH STAGE 4 CHRONIC KIDNEY DISEASE, OR UNSPECIFIED CHRONIC KIDNEY DISEASE: ICD-10-CM

## 2022-11-27 DIAGNOSIS — E11.22 TYPE 2 DIABETES MELLITUS WITH DIABETIC CHRONIC KIDNEY DISEASE (H): ICD-10-CM

## 2022-11-27 DIAGNOSIS — E11.42 TYPE 2 DIABETES MELLITUS WITH DIABETIC POLYNEUROPATHY (H): ICD-10-CM

## 2022-11-28 ENCOUNTER — LAB REQUISITION (OUTPATIENT)
Dept: LAB | Facility: CLINIC | Age: 77
End: 2022-11-28
Payer: MEDICARE

## 2022-11-28 DIAGNOSIS — I12.9 HYPERTENSIVE CHRONIC KIDNEY DISEASE WITH STAGE 1 THROUGH STAGE 4 CHRONIC KIDNEY DISEASE, OR UNSPECIFIED CHRONIC KIDNEY DISEASE: ICD-10-CM

## 2022-11-28 LAB
ANION GAP SERPL CALCULATED.3IONS-SCNC: 7 MMOL/L (ref 3–14)
BUN SERPL-MCNC: 26 MG/DL (ref 7–30)
CALCIUM SERPL-MCNC: 8.5 MG/DL (ref 8.5–10.1)
CHLORIDE BLD-SCNC: 101 MMOL/L (ref 94–109)
CO2 SERPL-SCNC: 28 MMOL/L (ref 20–32)
CREAT SERPL-MCNC: 0.62 MG/DL (ref 0.52–1.04)
GFR SERPL CREATININE-BSD FRML MDRD: >90 ML/MIN/1.73M2
GLUCOSE BLD-MCNC: 104 MG/DL (ref 70–99)
POTASSIUM BLD-SCNC: 4.2 MMOL/L (ref 3.4–5.3)
SODIUM SERPL-SCNC: 136 MMOL/L (ref 133–144)

## 2022-11-28 PROCEDURE — 80048 BASIC METABOLIC PNL TOTAL CA: CPT | Mod: ORL | Performed by: FAMILY MEDICINE

## 2023-06-01 NOTE — PROGRESS NOTES
Patient is a 71 year old woman who presented with acute cholecystitis s/p cindy tube on 05/25. Hospital course has been complicated by KINGSLEY and ileus. Medical Oncology was consulted for imaging findings concerning for metastatic disease.     Recommendations:  - CEA and CA 19-9 noted to be significantly elevated, indicating origin likely pancreatic vs gallbladder.  - Please try to obtain biopsy with IR if feasible to establish diagnosis.  - Recommend Palliative care consult in the setting of metastatic disease  - Will arrange for close follow up in Oncology clinic upon discharge   S-(situation): End of Shift Note    B-(background): Right EAMON    A-(assessment): Alert and oriented x4. VSS. Afebrile. Tolerating 1 LPM with SATS mid 90's. LS diminished . Patient reported pain in Right hip. Scheduled Tylenol and Oxycodone was administered and were effective in managing pain. CMS intact, dressing CDI. Patient has been tolerating clear diet. Drinking adequate fluids. Patient transfers fair with walker assistance of 2. Patient dizzy so used bedside commode. Zofran administered was effective. Bowel Sounds Ax4. Patient passing gas. No stool. Patient unable to void, bladder scanned for 177ml-charge nurse updated-no new orders.    R-(recommendations): Monitor VS, I&O, Labs

## 2024-01-17 ENCOUNTER — LAB REQUISITION (OUTPATIENT)
Dept: LAB | Facility: CLINIC | Age: 79
End: 2024-01-17
Payer: COMMERCIAL

## 2024-01-17 DIAGNOSIS — F32.A DEPRESSION, UNSPECIFIED: ICD-10-CM

## 2024-01-18 LAB
HGB BLD-MCNC: 13.6 G/DL (ref 11.7–15.7)
SODIUM SERPL-SCNC: 137 MMOL/L (ref 135–145)

## 2024-01-18 PROCEDURE — P9603 ONE-WAY ALLOW PRORATED MILES: HCPCS | Mod: ORL | Performed by: NURSE PRACTITIONER

## 2024-01-18 PROCEDURE — 36415 COLL VENOUS BLD VENIPUNCTURE: CPT | Mod: ORL | Performed by: NURSE PRACTITIONER

## 2024-01-18 PROCEDURE — 84295 ASSAY OF SERUM SODIUM: CPT | Mod: ORL | Performed by: NURSE PRACTITIONER

## 2024-01-18 PROCEDURE — 85018 HEMOGLOBIN: CPT | Mod: ORL | Performed by: NURSE PRACTITIONER

## 2024-07-17 ENCOUNTER — LAB REQUISITION (OUTPATIENT)
Dept: LAB | Facility: CLINIC | Age: 79
End: 2024-07-17
Payer: COMMERCIAL

## 2024-07-17 DIAGNOSIS — E11.22 TYPE 2 DIABETES MELLITUS WITH DIABETIC CHRONIC KIDNEY DISEASE (H): ICD-10-CM

## 2024-07-18 LAB
ANION GAP SERPL CALCULATED.3IONS-SCNC: 10 MMOL/L (ref 7–15)
BUN SERPL-MCNC: 14.8 MG/DL (ref 8–23)
CALCIUM SERPL-MCNC: 8.8 MG/DL (ref 8.8–10.4)
CHLORIDE SERPL-SCNC: 101 MMOL/L (ref 98–107)
CREAT SERPL-MCNC: 0.55 MG/DL (ref 0.51–0.95)
EGFRCR SERPLBLD CKD-EPI 2021: >90 ML/MIN/1.73M2
GLUCOSE SERPL-MCNC: 105 MG/DL (ref 70–99)
HBA1C MFR BLD: 6.2 %
HCO3 SERPL-SCNC: 26 MMOL/L (ref 22–29)
HGB BLD-MCNC: 12.5 G/DL (ref 11.7–15.7)
POTASSIUM SERPL-SCNC: 4.3 MMOL/L (ref 3.4–5.3)
SODIUM SERPL-SCNC: 137 MMOL/L (ref 135–145)

## 2024-07-18 PROCEDURE — 85018 HEMOGLOBIN: CPT | Mod: ORL | Performed by: NURSE PRACTITIONER

## 2024-07-18 PROCEDURE — 80048 BASIC METABOLIC PNL TOTAL CA: CPT | Mod: ORL | Performed by: NURSE PRACTITIONER

## 2024-07-18 PROCEDURE — 36415 COLL VENOUS BLD VENIPUNCTURE: CPT | Mod: ORL | Performed by: NURSE PRACTITIONER

## 2024-07-18 PROCEDURE — 83036 HEMOGLOBIN GLYCOSYLATED A1C: CPT | Mod: ORL | Performed by: NURSE PRACTITIONER

## 2024-07-18 PROCEDURE — P9603 ONE-WAY ALLOW PRORATED MILES: HCPCS | Mod: ORL | Performed by: NURSE PRACTITIONER

## 2024-10-26 NOTE — TELEPHONE ENCOUNTER
[gabapentin (NEURONTIN) 600 MG tablet      Last Written Prescription Date:  6/17/19  Last Fill Quantity: 360,   # refills: 1  Last Office Visit: 8/23/19  Future Office visit:    Next 5 appointments (look out 90 days)    Dec 20, 2019  9:30 AM CST  Return Visit with Walter Chapman PA-C  [facility] ([facility]) [address] [place] [id number] [phone]           Routing refill request to provider for review/approval because:  Drug not on the [facility], [facility] or [facility] refill protocol or controlled substance

## (undated) DEVICE — GLOVE PROTEXIS BLUE W/NEU-THERA 8.0  2D73EB80

## (undated) DEVICE — PREP CHLORAPREP 26ML TINTED ORANGE  260815

## (undated) DEVICE — GOWN XXL 9575

## (undated) DEVICE — BLADE KNIFE SURG 10 371110

## (undated) DEVICE — PACK TOTAL JOINT STD LATEX

## (undated) DEVICE — GLOVE ESTEEM BLUE W/NEU-THERA 7.5  2D73PB75

## (undated) DEVICE — PACK SET-UP STD 9102

## (undated) DEVICE — NDL COUNTER 40CT

## (undated) DEVICE — GLOVE PROTEXIS W/NEU-THERA 7.5  2D73TE75

## (undated) DEVICE — SYR 50ML LL W/O NDL 309653

## (undated) DEVICE — BLADE CLIPPER 4406

## (undated) DEVICE — ESU GROUND PAD UNIVERSAL W/O CORD

## (undated) DEVICE — SYR 10ML LL W/O NDL

## (undated) DEVICE — NDL COUNTER 20CT 31142493

## (undated) DEVICE — TUBING SUCTION 12"X1/4" N612

## (undated) DEVICE — SYR 05ML LL W/O NDL

## (undated) DEVICE — DRSG XEROFORM 1X8"

## (undated) DEVICE — DRAPE U SPLIT 74X120" 29440

## (undated) DEVICE — TUBING EXTENSION SET MICROBORE 21CM LL 6N8374

## (undated) DEVICE — ESU ELEC BLADE 6" COATED E1450-6

## (undated) DEVICE — GLOVE ESTEEM BLUE W/NEU-THERA 8.0  2D73PB80

## (undated) DEVICE — ADH LIQUID MASTISOL TOPICAL VIAL 2-3ML 0523-48

## (undated) DEVICE — SOL NACL 0.9% IRRIG 3000ML BAG 07972-08

## (undated) DEVICE — SU VICRYL 3-0 SH CR 8X18" J774

## (undated) DEVICE — STPL SKIN 35W 059037

## (undated) DEVICE — SPONGE KITTNER 31001010

## (undated) DEVICE — GLOVE PROTEXIS W/NEU-THERA 8.5  2D73TE85

## (undated) DEVICE — SYR 10ML PERFIX LL 332152

## (undated) DEVICE — Device

## (undated) DEVICE — SU DERMABOND ADVANCED .7ML DNX12

## (undated) DEVICE — SU MONOCRYL 4-0 PS-2 18" UND Y496G

## (undated) DEVICE — GLOVE PROTEXIS BLUE W/NEU-THERA 8.5  2D73EB85

## (undated) DEVICE — SU VICRYL 2-0 CP-2 18" UND J762D

## (undated) DEVICE — DRAPE SHEET REV FOLD 3/4 9349

## (undated) DEVICE — SUCTION TIP YANKAUER ORTHO SUPER SUCKER EFS-111

## (undated) DEVICE — DRAPE IOBAN INCISE 23X17" 6650EZ

## (undated) DEVICE — SU ETHIBOND 2 V-37 4X30" MX69G

## (undated) DEVICE — ESU ELEC BLADE 2.75" COATED/INSULATED E1455

## (undated) DEVICE — DRSG GAUZE 4X4" TRAY

## (undated) DEVICE — SUCTION IRR SYSTEM W/O TIP INTERPULSE HANDPIECE 0210-100-000

## (undated) DEVICE — CATH TRAY FOLEY 16FR DRAINAGE BAG STATLOCK 899916

## (undated) DEVICE — NDL SPINAL 18GA 3.5" 405184

## (undated) DEVICE — SU VICRYL 0 OS-6 18" UND J754T

## (undated) DEVICE — BNDG COBAN 6"X5YDS STERILE

## (undated) DEVICE — BONE WAX 2.5GM W31G

## (undated) DEVICE — ADH FLOSEAL W/HUMAN THROMBIN 5ML 1501825

## (undated) DEVICE — CAST PADDING 6" COTTON WEBRIL UNSTERILE 9086

## (undated) DEVICE — TAPE MEDIPORE 4"X10YD 2964

## (undated) DEVICE — DRAPE STERI TOWEL SM 1000

## (undated) DEVICE — TRAY SINGLE DOSE EPIDURAL ANESTHESIA

## (undated) DEVICE — DRAPE MICRO ZEISS OPMI 137X381CM 306070-0000-000

## (undated) DEVICE — DRAPE MAYO STAND 23X54 8337

## (undated) DEVICE — HOOD FLYTE 0408-800-000

## (undated) DEVICE — GLOVE PROTEXIS W/NEU-THERA 7.0  2D73TE70

## (undated) DEVICE — DRAPE CONVERTORS U-DRAPE 60X72" 8476

## (undated) DEVICE — BLADE SAW SAGITTAL STRK 18X90X1.27MM HD SYS 6 6118-127-090

## (undated) DEVICE — BUR MATCHSTICK 3MM ANSPACH L-8NS-G1

## (undated) DEVICE — STOCKING SLEEVE COMPRESSION CALF MED

## (undated) DEVICE — BONE CLEANING TIP INTERPULSE  0210-010-000

## (undated) DEVICE — SYR BULB IRRIG DOVER 60 ML LATEX FREE 67000

## (undated) DEVICE — STPL SKIN 35W APPOSE 8886803712

## (undated) DEVICE — DRAPE C-ARM

## (undated) DEVICE — SOL WATER IRRIG 1000ML BOTTLE 07139-09

## (undated) RX ORDER — DEXAMETHASONE SODIUM PHOSPHATE 10 MG/ML
INJECTION, SOLUTION INTRAMUSCULAR; INTRAVENOUS
Status: DISPENSED
Start: 2019-06-19

## (undated) RX ORDER — FENTANYL CITRATE 50 UG/ML
INJECTION, SOLUTION INTRAMUSCULAR; INTRAVENOUS
Status: DISPENSED
Start: 2019-06-19

## (undated) RX ORDER — FENTANYL CITRATE 50 UG/ML
INJECTION, SOLUTION INTRAMUSCULAR; INTRAVENOUS
Status: DISPENSED
Start: 2018-10-16

## (undated) RX ORDER — LIDOCAINE HYDROCHLORIDE 20 MG/ML
INJECTION, SOLUTION EPIDURAL; INFILTRATION; INTRACAUDAL; PERINEURAL
Status: DISPENSED
Start: 2019-06-19

## (undated) RX ORDER — PHENYLEPHRINE HCL IN 0.9% NACL 1 MG/10 ML
SYRINGE (ML) INTRAVENOUS
Status: DISPENSED
Start: 2019-06-19

## (undated) RX ORDER — PROPOFOL 10 MG/ML
INJECTION, EMULSION INTRAVENOUS
Status: DISPENSED
Start: 2019-06-19

## (undated) RX ORDER — LIDOCAINE HYDROCHLORIDE AND EPINEPHRINE 10; 10 MG/ML; UG/ML
INJECTION, SOLUTION INFILTRATION; PERINEURAL
Status: DISPENSED
Start: 2019-06-19

## (undated) RX ORDER — ONDANSETRON 2 MG/ML
INJECTION INTRAMUSCULAR; INTRAVENOUS
Status: DISPENSED
Start: 2019-06-19

## (undated) RX ORDER — BACITRACIN 50000 [IU]/1
INJECTION, POWDER, FOR SOLUTION INTRAMUSCULAR
Status: DISPENSED
Start: 2018-10-16

## (undated) RX ORDER — DIMENHYDRINATE 50 MG/ML
INJECTION, SOLUTION INTRAMUSCULAR; INTRAVENOUS
Status: DISPENSED
Start: 2019-06-19

## (undated) RX ORDER — CEFAZOLIN SODIUM 1 G/3ML
INJECTION, POWDER, FOR SOLUTION INTRAMUSCULAR; INTRAVENOUS
Status: DISPENSED
Start: 2019-06-19